# Patient Record
Sex: MALE | Race: BLACK OR AFRICAN AMERICAN | NOT HISPANIC OR LATINO | Employment: OTHER | URBAN - METROPOLITAN AREA
[De-identification: names, ages, dates, MRNs, and addresses within clinical notes are randomized per-mention and may not be internally consistent; named-entity substitution may affect disease eponyms.]

---

## 2019-10-07 ENCOUNTER — APPOINTMENT (EMERGENCY)
Dept: RADIOLOGY | Facility: HOSPITAL | Age: 79
DRG: 690 | End: 2019-10-07
Payer: COMMERCIAL

## 2019-10-07 ENCOUNTER — APPOINTMENT (EMERGENCY)
Dept: CT IMAGING | Facility: HOSPITAL | Age: 79
DRG: 690 | End: 2019-10-07
Payer: COMMERCIAL

## 2019-10-07 ENCOUNTER — HOSPITAL ENCOUNTER (INPATIENT)
Facility: HOSPITAL | Age: 79
LOS: 9 days | Discharge: HOME HEALTH | DRG: 690 | End: 2019-10-16
Attending: EMERGENCY MEDICINE | Admitting: NURSE ANESTHETIST, CERTIFIED REGISTERED
Payer: COMMERCIAL

## 2019-10-07 DIAGNOSIS — R14.0 ABDOMINAL DISTENTION: ICD-10-CM

## 2019-10-07 DIAGNOSIS — R26.2 AMBULATORY DYSFUNCTION: ICD-10-CM

## 2019-10-07 DIAGNOSIS — R06.00 DYSPNEA: Primary | ICD-10-CM

## 2019-10-07 DIAGNOSIS — N17.9 AKI (ACUTE KIDNEY INJURY) (HCC): ICD-10-CM

## 2019-10-07 DIAGNOSIS — R33.9 URINARY RETENTION: ICD-10-CM

## 2019-10-07 DIAGNOSIS — N12 PYELONEPHRITIS: ICD-10-CM

## 2019-10-07 LAB
ALBUMIN SERPL BCP-MCNC: 4 G/DL (ref 3.5–5)
ALP SERPL-CCNC: 98 U/L (ref 46–116)
ALT SERPL W P-5'-P-CCNC: 17 U/L (ref 12–78)
ANION GAP SERPL CALCULATED.3IONS-SCNC: 17 MMOL/L (ref 4–13)
APTT PPP: 38 SECONDS (ref 23–37)
AST SERPL W P-5'-P-CCNC: 27 U/L (ref 5–45)
BACTERIA UR QL AUTO: ABNORMAL /HPF
BASE EX.OXY STD BLDV CALC-SCNC: 83 % (ref 60–80)
BASE EXCESS BLDV CALC-SCNC: -4.8 MMOL/L
BASOPHILS # BLD AUTO: 0.04 THOUSANDS/ΜL (ref 0–0.1)
BASOPHILS NFR BLD AUTO: 0 % (ref 0–1)
BILIRUB SERPL-MCNC: 1 MG/DL (ref 0.2–1)
BILIRUB UR QL STRIP: NEGATIVE
BUN SERPL-MCNC: 70 MG/DL (ref 5–25)
CALCIUM SERPL-MCNC: 9.6 MG/DL (ref 8.3–10.1)
CHLORIDE SERPL-SCNC: 105 MMOL/L (ref 100–108)
CLARITY UR: ABNORMAL
CO2 SERPL-SCNC: 19 MMOL/L (ref 21–32)
COLOR UR: ABNORMAL
CREAT SERPL-MCNC: 4.74 MG/DL (ref 0.6–1.3)
EOSINOPHIL # BLD AUTO: 0.03 THOUSAND/ΜL (ref 0–0.61)
EOSINOPHIL NFR BLD AUTO: 0 % (ref 0–6)
ERYTHROCYTE [DISTWIDTH] IN BLOOD BY AUTOMATED COUNT: 14.3 % (ref 11.6–15.1)
GFR SERPL CREATININE-BSD FRML MDRD: 13 ML/MIN/1.73SQ M
GLUCOSE SERPL-MCNC: 120 MG/DL (ref 65–140)
GLUCOSE UR STRIP-MCNC: NEGATIVE MG/DL
HCO3 BLDV-SCNC: 17.7 MMOL/L (ref 24–30)
HCT VFR BLD AUTO: 45.8 % (ref 36.5–49.3)
HGB BLD-MCNC: 15.1 G/DL (ref 12–17)
HGB UR QL STRIP.AUTO: ABNORMAL
IMM GRANULOCYTES # BLD AUTO: 0.07 THOUSAND/UL (ref 0–0.2)
IMM GRANULOCYTES NFR BLD AUTO: 1 % (ref 0–2)
INR PPP: 1.1 (ref 0.84–1.19)
KETONES UR STRIP-MCNC: NEGATIVE MG/DL
LACTATE SERPL-SCNC: 1.5 MMOL/L (ref 0.5–2)
LEUKOCYTE ESTERASE UR QL STRIP: ABNORMAL
LIPASE SERPL-CCNC: 74 U/L (ref 73–393)
LYMPHOCYTES # BLD AUTO: 1 THOUSANDS/ΜL (ref 0.6–4.47)
LYMPHOCYTES NFR BLD AUTO: 8 % (ref 14–44)
MAGNESIUM SERPL-MCNC: 2.5 MG/DL (ref 1.6–2.6)
MCH RBC QN AUTO: 29.6 PG (ref 26.8–34.3)
MCHC RBC AUTO-ENTMCNC: 33 G/DL (ref 31.4–37.4)
MCV RBC AUTO: 90 FL (ref 82–98)
MONOCYTES # BLD AUTO: 1 THOUSAND/ΜL (ref 0.17–1.22)
MONOCYTES NFR BLD AUTO: 8 % (ref 4–12)
NEUTROPHILS # BLD AUTO: 10.96 THOUSANDS/ΜL (ref 1.85–7.62)
NEUTS SEG NFR BLD AUTO: 83 % (ref 43–75)
NITRITE UR QL STRIP: NEGATIVE
NON-SQ EPI CELLS URNS QL MICRO: ABNORMAL /HPF
NRBC BLD AUTO-RTO: 0 /100 WBCS
NT-PROBNP SERPL-MCNC: 289 PG/ML
O2 CT BLDV-SCNC: 18 ML/DL
PCO2 BLDV: 27.2 MM HG (ref 42–50)
PH BLDV: 7.43 [PH] (ref 7.3–7.4)
PH UR STRIP.AUTO: 8 [PH]
PLATELET # BLD AUTO: 197 THOUSANDS/UL (ref 149–390)
PMV BLD AUTO: 11.3 FL (ref 8.9–12.7)
PO2 BLDV: 49.3 MM HG (ref 35–45)
POTASSIUM SERPL-SCNC: 5.1 MMOL/L (ref 3.5–5.3)
PROT SERPL-MCNC: 8.6 G/DL (ref 6.4–8.2)
PROT UR STRIP-MCNC: ABNORMAL MG/DL
PROTHROMBIN TIME: 14.2 SECONDS (ref 11.6–14.5)
RBC # BLD AUTO: 5.1 MILLION/UL (ref 3.88–5.62)
RBC #/AREA URNS AUTO: ABNORMAL /HPF
SODIUM SERPL-SCNC: 141 MMOL/L (ref 136–145)
SP GR UR STRIP.AUTO: 1.01 (ref 1–1.03)
TROPONIN I SERPL-MCNC: <0.02 NG/ML
UROBILINOGEN UR QL STRIP.AUTO: 0.2 E.U./DL
WBC # BLD AUTO: 13.1 THOUSAND/UL (ref 4.31–10.16)
WBC #/AREA URNS AUTO: ABNORMAL /HPF

## 2019-10-07 PROCEDURE — 84145 PROCALCITONIN (PCT): CPT | Performed by: EMERGENCY MEDICINE

## 2019-10-07 PROCEDURE — 85730 THROMBOPLASTIN TIME PARTIAL: CPT | Performed by: EMERGENCY MEDICINE

## 2019-10-07 PROCEDURE — 82805 BLOOD GASES W/O2 SATURATION: CPT | Performed by: EMERGENCY MEDICINE

## 2019-10-07 PROCEDURE — 81001 URINALYSIS AUTO W/SCOPE: CPT | Performed by: EMERGENCY MEDICINE

## 2019-10-07 PROCEDURE — 99285 EMERGENCY DEPT VISIT HI MDM: CPT

## 2019-10-07 PROCEDURE — 87040 BLOOD CULTURE FOR BACTERIA: CPT | Performed by: EMERGENCY MEDICINE

## 2019-10-07 PROCEDURE — 71250 CT THORAX DX C-: CPT

## 2019-10-07 PROCEDURE — 96375 TX/PRO/DX INJ NEW DRUG ADDON: CPT

## 2019-10-07 PROCEDURE — 83690 ASSAY OF LIPASE: CPT | Performed by: EMERGENCY MEDICINE

## 2019-10-07 PROCEDURE — 36415 COLL VENOUS BLD VENIPUNCTURE: CPT | Performed by: EMERGENCY MEDICINE

## 2019-10-07 PROCEDURE — 93005 ELECTROCARDIOGRAM TRACING: CPT

## 2019-10-07 PROCEDURE — 71046 X-RAY EXAM CHEST 2 VIEWS: CPT

## 2019-10-07 PROCEDURE — 83880 ASSAY OF NATRIURETIC PEPTIDE: CPT | Performed by: EMERGENCY MEDICINE

## 2019-10-07 PROCEDURE — 85025 COMPLETE CBC W/AUTO DIFF WBC: CPT | Performed by: EMERGENCY MEDICINE

## 2019-10-07 PROCEDURE — 83735 ASSAY OF MAGNESIUM: CPT | Performed by: EMERGENCY MEDICINE

## 2019-10-07 PROCEDURE — 96374 THER/PROPH/DIAG INJ IV PUSH: CPT

## 2019-10-07 PROCEDURE — 87086 URINE CULTURE/COLONY COUNT: CPT | Performed by: EMERGENCY MEDICINE

## 2019-10-07 PROCEDURE — 80061 LIPID PANEL: CPT | Performed by: STUDENT IN AN ORGANIZED HEALTH CARE EDUCATION/TRAINING PROGRAM

## 2019-10-07 PROCEDURE — 83605 ASSAY OF LACTIC ACID: CPT | Performed by: EMERGENCY MEDICINE

## 2019-10-07 PROCEDURE — 96361 HYDRATE IV INFUSION ADD-ON: CPT

## 2019-10-07 PROCEDURE — 84484 ASSAY OF TROPONIN QUANT: CPT | Performed by: EMERGENCY MEDICINE

## 2019-10-07 PROCEDURE — 74176 CT ABD & PELVIS W/O CONTRAST: CPT

## 2019-10-07 PROCEDURE — 99284 EMERGENCY DEPT VISIT MOD MDM: CPT | Performed by: EMERGENCY MEDICINE

## 2019-10-07 PROCEDURE — 85610 PROTHROMBIN TIME: CPT | Performed by: EMERGENCY MEDICINE

## 2019-10-07 PROCEDURE — 80053 COMPREHEN METABOLIC PANEL: CPT | Performed by: EMERGENCY MEDICINE

## 2019-10-07 RX ORDER — ONDANSETRON 2 MG/ML
4 INJECTION INTRAMUSCULAR; INTRAVENOUS ONCE
Status: COMPLETED | OUTPATIENT
Start: 2019-10-07 | End: 2019-10-07

## 2019-10-07 RX ORDER — 0.9 % SODIUM CHLORIDE 0.9 %
3 VIAL (ML) INJECTION AS NEEDED
Status: DISCONTINUED | OUTPATIENT
Start: 2019-10-07 | End: 2019-10-08

## 2019-10-07 RX ORDER — MORPHINE SULFATE 4 MG/ML
4 INJECTION, SOLUTION INTRAMUSCULAR; INTRAVENOUS ONCE
Status: COMPLETED | OUTPATIENT
Start: 2019-10-07 | End: 2019-10-07

## 2019-10-07 RX ADMIN — ONDANSETRON 4 MG: 2 INJECTION INTRAMUSCULAR; INTRAVENOUS at 20:49

## 2019-10-07 RX ADMIN — SODIUM CHLORIDE 500 ML: 0.9 INJECTION, SOLUTION INTRAVENOUS at 18:08

## 2019-10-07 RX ADMIN — MORPHINE SULFATE 4 MG: 4 INJECTION INTRAVENOUS at 20:52

## 2019-10-07 NOTE — ED PROVIDER NOTES
History  Chief Complaint   Patient presents with    Shortness of Breath     pt presents to ed with sob that started a few weeks ago and has been getting worse, pt also reports constant sharp testicular pain for the last two days    Testicle Pain     67year old male patient presents emergency department for evaluation shortness of breath with any exertion  The patient is tachypneic and trying to lift his leg on the stretcher  The patient has an exam showing likely congestive heart failure but also has a purulent penile discharge  This is new and worsening over last three days  Patient describes a large amount of pelvic pain as well  Patient's penis and scrotum were visualized and there is no obvious signs of perineal swelling but the patient's exam otherwise with a purulent penile discharge and his pain out of proportion to his exam is concerning for 40 days  Patient had a full septic evaluation done  History provided by:  Patient   used: No    Shortness of Breath   Severity:  Moderate  Onset quality:  Gradual  Timing:  Constant  Progression:  Worsening  Chronicity:  New  Context: not activity, not animal exposure, not smoke exposure and not URI    Relieved by:  Nothing  Worsened by:  Nothing  Ineffective treatments:  None tried  Associated symptoms: cough    Associated symptoms: no diaphoresis, no hemoptysis, no PND, no sputum production and no swollen glands    Risk factors: no hx of cancer, no hx of PE/DVT and no oral contraceptive use    Testicle Pain   Associated symptoms: cough and shortness of breath        None       Past Medical History:   Diagnosis Date    Hypertension        Past Surgical History:   Procedure Laterality Date    COLONOSCOPY      TUMOR REMOVAL         No family history on file  I have reviewed and agree with the history as documented      Social History     Tobacco Use    Smoking status: Never Smoker    Smokeless tobacco: Never Used   Substance Use Topics    Alcohol use: Never     Frequency: Never    Drug use: Never        Review of Systems   Constitutional: Negative for diaphoresis  Respiratory: Positive for cough and shortness of breath  Negative for hemoptysis and sputum production  Cardiovascular: Negative for PND  Genitourinary: Positive for testicular pain  All other systems reviewed and are negative  Physical Exam  Physical Exam   Constitutional: He is oriented to person, place, and time  He appears well-developed and well-nourished  No distress  HENT:   Head: Normocephalic and atraumatic  Right Ear: External ear normal    Left Ear: External ear normal    Eyes: Conjunctivae and EOM are normal  Right eye exhibits no discharge  Left eye exhibits no discharge  No scleral icterus  Neck: Normal range of motion  Neck supple  No JVD present  No tracheal deviation present  No thyromegaly present  Cardiovascular: Normal rate and regular rhythm  Pulmonary/Chest: Effort normal and breath sounds normal  No stridor  No respiratory distress  He has no wheezes  He has no rales  Abdominal: Soft  Bowel sounds are normal  He exhibits no distension  There is no tenderness  Musculoskeletal: Normal range of motion  He exhibits no edema, tenderness or deformity  Neurological: He is alert and oriented to person, place, and time  No cranial nerve deficit  Coordination normal    Skin: Skin is warm and dry  He is not diaphoretic  Psychiatric: He has a normal mood and affect  His behavior is normal    Nursing note and vitals reviewed        Vital Signs  ED Triage Vitals [10/07/19 1624]   Temperature Pulse Respirations Blood Pressure SpO2   98 5 °F (36 9 °C) (!) 107 (!) 35 125/61 98 %      Temp Source Heart Rate Source Patient Position - Orthostatic VS BP Location FiO2 (%)   Oral Monitor Sitting Left arm --      Pain Score       --           Vitals:    10/07/19 1624   BP: 125/61   Pulse: (!) 107   Patient Position - Orthostatic VS: Sitting Visual Acuity      ED Medications  Medications   sodium chloride (PF) 0 9 % injection 3 mL (has no administration in time range)   sodium chloride 0 9 % bolus 500 mL (has no administration in time range)       Diagnostic Studies  Results Reviewed     Procedure Component Value Units Date/Time    Lipase [[id number]]     Lab Status:  No result Specimen:  Blood     Magnesium [[id number]]     Lab Status:  No result Specimen:  Blood     Blood gas, venous [[id number]]     Lab Status:  No result Specimen:  Blood     Protime-INR [[id number]]     Lab Status:  No result Specimen:  Blood     APTT [[id number]]     Lab Status:  No result Specimen:  Blood     Procalcitonin [[id number]]     Lab Status:  No result Specimen:  Blood     Urinalysis with culture and sensitivity reflex [[id number]]     Lab Status:  No result Specimen:  Urine     Troponin I [[id number]]     Lab Status:  No result Specimen:  Blood     Blood culture [[id number]]     Lab Status:  No result Specimen:  Blood     Blood culture [[id number]]     Lab Status:  No result Specimen:  Blood     CBC and differential [[id number]]     Lab Status:  No result Specimen:  Blood     B-type natriuretic peptide [[id number]]     Lab Status:  No result Specimen:  Blood     Comprehensive metabolic panel [[id number]]     Lab Status:  No result Specimen:  Blood     Lactate Blood [[id number]]     Lab Status:  No result Specimen:  Blood     Lactate Blood [[id number]]     Lab Status:  No result Specimen:  Blood                  XR chest 2 views    (Results Pending)              Procedures  Procedures       ED Course                               MDM  Number of Diagnoses or Management Options  Dyspnea: new and requires workup  Urinary retention: new and requires workup     Amount and/or Complexity of Data Reviewed  Clinical lab tests: ordered and reviewed  Tests in the radiology section of CPT®: reviewed and ordered  Decide to obtain previous medical records or to obtain history from someone other than the patient: yes  Review and summarize past medical records: yes    Patient Progress  Patient progress: stable      Disposition  Final diagnoses:   None     ED Disposition     None      Follow-up Information    None         Patient's Medications    No medications on file     No discharge procedures on file      ED Provider  Electronically Signed by           Tamiko Jacob DO  10/09/19 0549

## 2019-10-08 ENCOUNTER — APPOINTMENT (INPATIENT)
Dept: NON INVASIVE DIAGNOSTICS | Facility: HOSPITAL | Age: 79
DRG: 690 | End: 2019-10-08
Payer: COMMERCIAL

## 2019-10-08 PROBLEM — E78.49 OTHER HYPERLIPIDEMIA: Status: ACTIVE | Noted: 2019-10-08

## 2019-10-08 PROBLEM — R06.02 SOB (SHORTNESS OF BREATH): Status: ACTIVE | Noted: 2019-10-08

## 2019-10-08 PROBLEM — N17.9 AKI (ACUTE KIDNEY INJURY) (HCC): Status: ACTIVE | Noted: 2019-10-08

## 2019-10-08 PROBLEM — I10 ESSENTIAL HYPERTENSION: Status: ACTIVE | Noted: 2019-10-08

## 2019-10-08 PROBLEM — R33.9 URINARY RETENTION: Status: ACTIVE | Noted: 2019-10-08

## 2019-10-08 PROBLEM — N12 PYELONEPHRITIS: Status: ACTIVE | Noted: 2019-10-08

## 2019-10-08 LAB
ANION GAP SERPL CALCULATED.3IONS-SCNC: 16 MMOL/L (ref 4–13)
ATRIAL RATE: 108 BPM
BASOPHILS # BLD AUTO: 0.03 THOUSANDS/ΜL (ref 0–0.1)
BASOPHILS # BLD AUTO: 0.05 THOUSANDS/ΜL (ref 0–0.1)
BASOPHILS NFR BLD AUTO: 0 % (ref 0–1)
BASOPHILS NFR BLD AUTO: 0 % (ref 0–1)
BUN SERPL-MCNC: 73 MG/DL (ref 5–25)
CALCIUM SERPL-MCNC: 8.6 MG/DL (ref 8.3–10.1)
CHLORIDE SERPL-SCNC: 109 MMOL/L (ref 100–108)
CHOLEST SERPL-MCNC: 115 MG/DL (ref 50–200)
CHOLEST SERPL-MCNC: 146 MG/DL (ref 50–200)
CO2 SERPL-SCNC: 18 MMOL/L (ref 21–32)
CREAT SERPL-MCNC: 4.89 MG/DL (ref 0.6–1.3)
EOSINOPHIL # BLD AUTO: 0.09 THOUSAND/ΜL (ref 0–0.61)
EOSINOPHIL # BLD AUTO: 0.11 THOUSAND/ΜL (ref 0–0.61)
EOSINOPHIL NFR BLD AUTO: 1 % (ref 0–6)
EOSINOPHIL NFR BLD AUTO: 1 % (ref 0–6)
ERYTHROCYTE [DISTWIDTH] IN BLOOD BY AUTOMATED COUNT: 14.3 % (ref 11.6–15.1)
ERYTHROCYTE [DISTWIDTH] IN BLOOD BY AUTOMATED COUNT: 14.3 % (ref 11.6–15.1)
GFR SERPL CREATININE-BSD FRML MDRD: 12 ML/MIN/1.73SQ M
GLUCOSE SERPL-MCNC: 102 MG/DL (ref 65–140)
HCT VFR BLD AUTO: 38.2 % (ref 36.5–49.3)
HCT VFR BLD AUTO: 41.1 % (ref 36.5–49.3)
HDLC SERPL-MCNC: 39 MG/DL (ref 40–60)
HDLC SERPL-MCNC: 46 MG/DL (ref 40–60)
HGB BLD-MCNC: 12.2 G/DL (ref 12–17)
HGB BLD-MCNC: 13.4 G/DL (ref 12–17)
IMM GRANULOCYTES # BLD AUTO: 0.03 THOUSAND/UL (ref 0–0.2)
IMM GRANULOCYTES # BLD AUTO: 0.04 THOUSAND/UL (ref 0–0.2)
IMM GRANULOCYTES NFR BLD AUTO: 0 % (ref 0–2)
IMM GRANULOCYTES NFR BLD AUTO: 0 % (ref 0–2)
LDLC SERPL CALC-MCNC: 62 MG/DL (ref 0–100)
LDLC SERPL CALC-MCNC: 74 MG/DL (ref 0–100)
LYMPHOCYTES # BLD AUTO: 1.31 THOUSANDS/ΜL (ref 0.6–4.47)
LYMPHOCYTES # BLD AUTO: 1.57 THOUSANDS/ΜL (ref 0.6–4.47)
LYMPHOCYTES NFR BLD AUTO: 11 % (ref 14–44)
LYMPHOCYTES NFR BLD AUTO: 14 % (ref 14–44)
MAGNESIUM SERPL-MCNC: 2.5 MG/DL (ref 1.6–2.6)
MCH RBC QN AUTO: 29.7 PG (ref 26.8–34.3)
MCH RBC QN AUTO: 29.8 PG (ref 26.8–34.3)
MCHC RBC AUTO-ENTMCNC: 31.9 G/DL (ref 31.4–37.4)
MCHC RBC AUTO-ENTMCNC: 32.6 G/DL (ref 31.4–37.4)
MCV RBC AUTO: 91 FL (ref 82–98)
MCV RBC AUTO: 93 FL (ref 82–98)
MONOCYTES # BLD AUTO: 1.05 THOUSAND/ΜL (ref 0.17–1.22)
MONOCYTES # BLD AUTO: 1.15 THOUSAND/ΜL (ref 0.17–1.22)
MONOCYTES NFR BLD AUTO: 11 % (ref 4–12)
MONOCYTES NFR BLD AUTO: 9 % (ref 4–12)
NEUTROPHILS # BLD AUTO: 8.01 THOUSANDS/ΜL (ref 1.85–7.62)
NEUTROPHILS # BLD AUTO: 9 THOUSANDS/ΜL (ref 1.85–7.62)
NEUTS SEG NFR BLD AUTO: 74 % (ref 43–75)
NEUTS SEG NFR BLD AUTO: 79 % (ref 43–75)
NONHDLC SERPL-MCNC: 100 MG/DL
NONHDLC SERPL-MCNC: 76 MG/DL
NRBC BLD AUTO-RTO: 0 /100 WBCS
NRBC BLD AUTO-RTO: 0 /100 WBCS
P AXIS: 65 DEGREES
PLATELET # BLD AUTO: 155 THOUSANDS/UL (ref 149–390)
PLATELET # BLD AUTO: 165 THOUSANDS/UL (ref 149–390)
PMV BLD AUTO: 11.2 FL (ref 8.9–12.7)
PMV BLD AUTO: 11.4 FL (ref 8.9–12.7)
POTASSIUM SERPL-SCNC: 4.3 MMOL/L (ref 3.5–5.3)
PR INTERVAL: 152 MS
PROCALCITONIN SERPL-MCNC: 0.36 NG/ML
QRS AXIS: 71 DEGREES
QRSD INTERVAL: 82 MS
QT INTERVAL: 314 MS
QTC INTERVAL: 420 MS
RBC # BLD AUTO: 4.11 MILLION/UL (ref 3.88–5.62)
RBC # BLD AUTO: 4.5 MILLION/UL (ref 3.88–5.62)
SODIUM SERPL-SCNC: 143 MMOL/L (ref 136–145)
T WAVE AXIS: -46 DEGREES
TRIGL SERPL-MCNC: 131 MG/DL
TRIGL SERPL-MCNC: 72 MG/DL
VENTRICULAR RATE: 108 BPM
WBC # BLD AUTO: 10.9 THOUSAND/UL (ref 4.31–10.16)
WBC # BLD AUTO: 11.54 THOUSAND/UL (ref 4.31–10.16)

## 2019-10-08 PROCEDURE — 85025 COMPLETE CBC W/AUTO DIFF WBC: CPT | Performed by: STUDENT IN AN ORGANIZED HEALTH CARE EDUCATION/TRAINING PROGRAM

## 2019-10-08 PROCEDURE — 80061 LIPID PANEL: CPT | Performed by: PHYSICIAN ASSISTANT

## 2019-10-08 PROCEDURE — 83735 ASSAY OF MAGNESIUM: CPT | Performed by: PHYSICIAN ASSISTANT

## 2019-10-08 PROCEDURE — 93306 TTE W/DOPPLER COMPLETE: CPT | Performed by: INTERNAL MEDICINE

## 2019-10-08 PROCEDURE — 93010 ELECTROCARDIOGRAM REPORT: CPT | Performed by: INTERNAL MEDICINE

## 2019-10-08 PROCEDURE — 99223 1ST HOSP IP/OBS HIGH 75: CPT | Performed by: PHYSICIAN ASSISTANT

## 2019-10-08 PROCEDURE — 99223 1ST HOSP IP/OBS HIGH 75: CPT | Performed by: INTERNAL MEDICINE

## 2019-10-08 PROCEDURE — 51702 INSERT TEMP BLADDER CATH: CPT | Performed by: NURSE PRACTITIONER

## 2019-10-08 PROCEDURE — 99222 1ST HOSP IP/OBS MODERATE 55: CPT | Performed by: UROLOGY

## 2019-10-08 PROCEDURE — 85025 COMPLETE CBC W/AUTO DIFF WBC: CPT | Performed by: PHYSICIAN ASSISTANT

## 2019-10-08 PROCEDURE — 0T9B70Z DRAINAGE OF BLADDER WITH DRAINAGE DEVICE, VIA NATURAL OR ARTIFICIAL OPENING: ICD-10-PCS | Performed by: UROLOGY

## 2019-10-08 PROCEDURE — 80048 BASIC METABOLIC PNL TOTAL CA: CPT | Performed by: PHYSICIAN ASSISTANT

## 2019-10-08 PROCEDURE — 93306 TTE W/DOPPLER COMPLETE: CPT

## 2019-10-08 RX ORDER — DONEPEZIL HYDROCHLORIDE 5 MG/1
5 TABLET, FILM COATED ORAL
COMMUNITY
End: 2020-01-19 | Stop reason: SINTOL

## 2019-10-08 RX ORDER — HYDROMORPHONE HCL/PF 1 MG/ML
0.5 SYRINGE (ML) INJECTION
Status: DISCONTINUED | OUTPATIENT
Start: 2019-10-08 | End: 2019-10-16 | Stop reason: HOSPADM

## 2019-10-08 RX ORDER — CLONIDINE HYDROCHLORIDE 0.1 MG/1
0.1 TABLET ORAL
Status: DISCONTINUED | OUTPATIENT
Start: 2019-10-08 | End: 2019-10-09

## 2019-10-08 RX ORDER — OXYBUTYNIN CHLORIDE 5 MG/1
5 TABLET ORAL 3 TIMES DAILY
Status: DISCONTINUED | OUTPATIENT
Start: 2019-10-08 | End: 2019-10-16 | Stop reason: HOSPADM

## 2019-10-08 RX ORDER — OMEGA-3-ACID ETHYL ESTERS 1 G/1
1 CAPSULE, LIQUID FILLED ORAL DAILY
COMMUNITY

## 2019-10-08 RX ORDER — PRAVASTATIN SODIUM 40 MG
40 TABLET ORAL
Status: DISCONTINUED | OUTPATIENT
Start: 2019-10-08 | End: 2019-10-16 | Stop reason: HOSPADM

## 2019-10-08 RX ORDER — CHLORAL HYDRATE 500 MG
1000 CAPSULE ORAL DAILY
Status: DISCONTINUED | OUTPATIENT
Start: 2019-10-08 | End: 2019-10-09

## 2019-10-08 RX ORDER — ONDANSETRON 2 MG/ML
4 INJECTION INTRAMUSCULAR; INTRAVENOUS EVERY 6 HOURS PRN
Status: DISCONTINUED | OUTPATIENT
Start: 2019-10-08 | End: 2019-10-16 | Stop reason: HOSPADM

## 2019-10-08 RX ORDER — SODIUM CHLORIDE 9 MG/ML
75 INJECTION, SOLUTION INTRAVENOUS CONTINUOUS
Status: DISPENSED | OUTPATIENT
Start: 2019-10-08 | End: 2019-10-08

## 2019-10-08 RX ORDER — ACETAMINOPHEN 325 MG/1
650 TABLET ORAL EVERY 6 HOURS PRN
Status: DISCONTINUED | OUTPATIENT
Start: 2019-10-08 | End: 2019-10-16 | Stop reason: HOSPADM

## 2019-10-08 RX ORDER — HEPARIN SODIUM 5000 [USP'U]/ML
5000 INJECTION, SOLUTION INTRAVENOUS; SUBCUTANEOUS EVERY 8 HOURS SCHEDULED
Status: DISCONTINUED | OUTPATIENT
Start: 2019-10-08 | End: 2019-10-08

## 2019-10-08 RX ORDER — CLONIDINE HYDROCHLORIDE 0.1 MG/1
0.1 TABLET ORAL
COMMUNITY
End: 2019-11-04 | Stop reason: SDUPTHER

## 2019-10-08 RX ORDER — TAMSULOSIN HYDROCHLORIDE 0.4 MG/1
0.4 CAPSULE ORAL
Status: DISCONTINUED | OUTPATIENT
Start: 2019-10-08 | End: 2019-10-16 | Stop reason: HOSPADM

## 2019-10-08 RX ORDER — ATROPA BELLADONNA AND OPIUM 16.2; 3 MG/1; MG/1
30 SUPPOSITORY RECTAL EVERY 8 HOURS PRN
Status: DISCONTINUED | OUTPATIENT
Start: 2019-10-08 | End: 2019-10-16 | Stop reason: HOSPADM

## 2019-10-08 RX ORDER — ALLOPURINOL 300 MG/1
300 TABLET ORAL DAILY
COMMUNITY
End: 2019-12-18 | Stop reason: SDUPTHER

## 2019-10-08 RX ORDER — LABETALOL 200 MG/1
200 TABLET, FILM COATED ORAL
Status: DISCONTINUED | OUTPATIENT
Start: 2019-10-08 | End: 2019-10-09

## 2019-10-08 RX ORDER — DONEPEZIL HYDROCHLORIDE 5 MG/1
5 TABLET, FILM COATED ORAL
Status: DISCONTINUED | OUTPATIENT
Start: 2019-10-08 | End: 2019-10-16 | Stop reason: HOSPADM

## 2019-10-08 RX ORDER — MULTIVIT-MIN/IRON FUM/FOLIC AC 7.5 MG-4
1 TABLET ORAL DAILY
COMMUNITY

## 2019-10-08 RX ORDER — CALCIUM CARBONATE 200(500)MG
1000 TABLET,CHEWABLE ORAL DAILY PRN
Status: DISCONTINUED | OUTPATIENT
Start: 2019-10-08 | End: 2019-10-16 | Stop reason: HOSPADM

## 2019-10-08 RX ORDER — LABETALOL 200 MG/1
200 TABLET, FILM COATED ORAL
COMMUNITY
End: 2019-12-11 | Stop reason: SDUPTHER

## 2019-10-08 RX ORDER — SIMVASTATIN 20 MG
20 TABLET ORAL
COMMUNITY
End: 2020-01-20 | Stop reason: SDUPTHER

## 2019-10-08 RX ORDER — BISACODYL 10 MG
10 SUPPOSITORY, RECTAL RECTAL DAILY PRN
Status: DISCONTINUED | OUTPATIENT
Start: 2019-10-08 | End: 2019-10-16 | Stop reason: HOSPADM

## 2019-10-08 RX ADMIN — CLONIDINE HYDROCHLORIDE 0.1 MG: 0.1 TABLET ORAL at 23:12

## 2019-10-08 RX ADMIN — OXYBUTYNIN CHLORIDE 5 MG: 5 TABLET ORAL at 21:35

## 2019-10-08 RX ADMIN — ATROPA BELLADONNA AND OPIUM 1 SUPPOSITORY: 16.2; 3 SUPPOSITORY RECTAL at 13:41

## 2019-10-08 RX ADMIN — SODIUM CHLORIDE 75 ML/HR: 0.9 INJECTION, SOLUTION INTRAVENOUS at 00:47

## 2019-10-08 RX ADMIN — DONEPEZIL HYDROCHLORIDE 5 MG: 5 TABLET ORAL at 00:47

## 2019-10-08 RX ADMIN — OXYBUTYNIN CHLORIDE 5 MG: 5 TABLET ORAL at 17:27

## 2019-10-08 RX ADMIN — PRAVASTATIN SODIUM 40 MG: 40 TABLET ORAL at 17:27

## 2019-10-08 RX ADMIN — CLONIDINE HYDROCHLORIDE 0.1 MG: 0.1 TABLET ORAL at 00:47

## 2019-10-08 RX ADMIN — LABETALOL HYDROCHLORIDE 200 MG: 100 TABLET, FILM COATED ORAL at 00:47

## 2019-10-08 RX ADMIN — CEFTRIAXONE SODIUM 1000 MG: 10 INJECTION, POWDER, FOR SOLUTION INTRAVENOUS at 02:10

## 2019-10-08 RX ADMIN — HYDROMORPHONE HYDROCHLORIDE 0.5 MG: 1 INJECTION, SOLUTION INTRAMUSCULAR; INTRAVENOUS; SUBCUTANEOUS at 21:09

## 2019-10-08 RX ADMIN — DONEPEZIL HYDROCHLORIDE 5 MG: 5 TABLET ORAL at 23:12

## 2019-10-08 RX ADMIN — LABETALOL HYDROCHLORIDE 200 MG: 100 TABLET, FILM COATED ORAL at 23:13

## 2019-10-08 RX ADMIN — HEPARIN SODIUM 5000 UNITS: 5000 INJECTION INTRAVENOUS; SUBCUTANEOUS at 00:47

## 2019-10-08 RX ADMIN — HYDROMORPHONE HYDROCHLORIDE 0.5 MG: 1 INJECTION, SOLUTION INTRAMUSCULAR; INTRAVENOUS; SUBCUTANEOUS at 12:17

## 2019-10-08 RX ADMIN — Medication 1000 MG: at 08:35

## 2019-10-08 RX ADMIN — TAMSULOSIN HYDROCHLORIDE 0.4 MG: 0.4 CAPSULE ORAL at 17:27

## 2019-10-08 RX ADMIN — HEPARIN SODIUM 5000 UNITS: 5000 INJECTION INTRAVENOUS; SUBCUTANEOUS at 06:42

## 2019-10-08 NOTE — ASSESSMENT & PLAN NOTE
· Reports SOB w/ minimal activity x 1 month  · Unclear etiology  BBSCTA  O2 sat 94-98% on RA  · CXR and CT chest negative for acute pulmonary process  CT chest does reveal cardiomegaly with small anterior pericardial effusion    · Echocardiogram pending

## 2019-10-08 NOTE — PROCEDURES
BEDSIDE PROCEDURE  Indwelling Catheter PROCEDURE NOTE    Pre-operative Diagnosis: BPH, Urinary Retention, gross hematuria        Post-operative Diagnosis: BPH, Urinary Retention, gross hematuria & Diff Catheterization    INDICATION   Lucian Gonzales is a 78-year-old male with BPH, urinary obstruction, bilateral hydronephrosis and gross hematuria after Cruz catheter insertion  Consultation requested to perform three-way Cruz Catheter Placement for initiation of continuous bladder irrigation  TIME OUT: Correct patient identity  PROCEDURE   Consent: Patient was agreeable  Formal  Informed consent however, not applicable  Under sterile conditions the patient was positioned  Betadine solution and sterile drapes were utilized  Non- Petroleum based gel was used to lubricate urethral meatus insertion site  Utilized 22FR three-way Catheter  Urine was obtained without any difficulties  Findings  200 mL of wine colored urine was obtained  Complications:  None; patient tolerated the procedure well  Condition: stable    Plan  Maintain Cruz to continuous bladder irrigation  Do not remove  Continue Q 4 hour manual irrigation using Nallely syringe and 120 ml NSS  Refer to manual irrigation orders  No further  intervention required  Void trial to be determined          TAMRA García        Attending Attestation: Not Applicable

## 2019-10-08 NOTE — H&P
H&P- Ebenezermatt Sanchezr 1940, 67 y o  male MRN: [id number]    Unit/Bed#: -01 Encounter: [id number]    Primary Care Provider: No primary care provider on file  Date and time admitted to hospital: 10/7/2019  4:48 PM        * Urinary retention  Assessment & Plan  · Daughter reports frequent visits to bathroom and c/o inability to "go to the bathroom" x 2-3 days  Initially thought he was referring to his bowels  Began having abdominal and testicular pain on day of admission  · CT abdomen/pelvis completed in ED revealed "Prostatomegaly with prostatic calcifications and moderately distended bladder which demonstrates circumferential bladder wall thickening and perivesicular fat stranding  Findings are consistent with cystitis  Correlate for bladder outlet obstruction  Moderately distended ureters and renal pelvices with with mild periureteral and perinephric stranding  Findings suggest upper urinary tract infection  · UA completed in ED revealed Lg blood, Lg leukocytes and 20-30 WBC  · Creatinine on admission 4 74  Unknown baseline  Recently moved to area with daughter  · Cruz cath placed in ED  · Initiate Flomax daily  · Consult urology    Pyelonephritis  Assessment & Plan  · Per CT and (+) urinary symptoms  · UA results as above  · Urine culture pending  · WBC 13 10  Afebrile  · Rocephin daily    MILE (acute kidney injury) ([facility] [address] )  Assessment & Plan  · Creatinine on admission 4 74  Unknown baseline  · Likely 2/2 post-renal obstruction  · Cruz cath placed  · NS @ 75mL/hr  · Recheck BMP in am  If not improving consult nephrology  · Urology consult placed for bladder outlet obstruction    SOB (shortness of breath)  Assessment & Plan  · Reports SOB w/ minimal activity x 1 month  · Unclear etiology  BBSCTA  O2 sat 94-98% on RA  · CXR and CT chest negative for acute pulmonary process  CT chest does reveal cardiomegaly with small anterior pericardial effusion    · Echocardiogram pending    Essential hypertension  Assessment & Plan  · BP adequately controlled on current regimen  · Continue home dose clonidine and labetalol  · Monitor BP per unit protocol    Other hyperlipidemia  Assessment & Plan  · FLP in am  · Continue home dose simvastatin (equivelant)      VTE Prophylaxis: Heparin  / sequential compression device   Code Status: Level 1 Full Code  POLST: POLST form is not discussed and not completed at this time  Discussion with family: Daughter at bedside    Anticipated Length of Stay:  Patient will be admitted on an Inpatient basis with an anticipated length of stay of  Greater than 2 midnights  Justification for Hospital Stay: See AP above    Total Time for Visit, including Counseling / Coordination of Care: 30 minutes  Greater than 50% of this total time spent on direct patient counseling and coordination of care  Chief Complaint:   SOB x 1 month  Abdominal and testicular pain    History of Present Illness:    Bessie Garcia is a 67 y o  male who presents with SOB on minimal exertion x 1 month  Also with c/o acute onset abdominal and testicular pain prompting visit to ED  Daughter does states that for the past 2-3 days he has made frequent trips to the bathroom and had complaints of inability to go to the bathroom  Daughter states she assumed he was referring to his bowel so she bought him some stool softeners  CT completed in ED revealed bladder distension and bladder wall thickening as well as moderately distended ureters and renal pelvices  Was also noted to have creatinine at 4 74  Unknown baseline creatinine as previous records not available  Denies fevers (+) chills  Denies N/V/D    Review of Systems:    Review of Systems   Constitutional: Positive for chills  Negative for appetite change and fever  HENT: Negative for congestion, ear pain, sinus pressure and sore throat  Eyes: Negative for visual disturbance  Respiratory: Positive for shortness of breath   Negative for cough and wheezing  Cardiovascular: Negative for chest pain, palpitations and leg swelling  Gastrointestinal: Positive for abdominal pain and constipation  Negative for diarrhea, nausea and vomiting  Genitourinary: Positive for difficulty urinating, frequency and testicular pain  Musculoskeletal: Negative for neck pain and neck stiffness  Neurological: Negative for dizziness, syncope and headaches  All other systems reviewed and are negative  Past Medical and Surgical History:     Past Medical History:   Diagnosis Date    Hypertension        Past Surgical History:   Procedure Laterality Date    COLONOSCOPY      TUMOR REMOVAL         Meds/Allergies:    Prior to Admission medications    Medication Sig Start Date End Date Taking? Authorizing Provider   allopurinol (ZYLOPRIM) 300 mg tablet Take 300 mg by mouth daily   Yes Historical Provider, MD   cloNIDine (CATAPRES) 0 1 mg tablet Take 0 1 mg by mouth daily at bedtime   Yes Historical Provider, MD   donepezil (ARICEPT) 5 mg tablet Take 5 mg by mouth daily at bedtime   Yes Historical Provider, MD   labetalol (NORMODYNE) 200 mg tablet Take 200 mg by mouth daily at bedtime   Yes Historical Provider, MD   Multiple Vitamins-Minerals (MULTIVITAMIN WITH MINERALS) tablet Take 1 tablet by mouth daily   Yes Historical Provider, MD   omega-3-acid ethyl esters (LOVAZA) 1 g capsule Take 1 g by mouth daily   Yes Historical Provider, MD   simvastatin (ZOCOR) 20 mg tablet Take 20 mg by mouth daily at bedtime   Yes Historical Provider, MD     I have reviewed home medications with patient family member  Allergies:    Allergies   Allergen Reactions    Strawberry C [Ascorbate] Hives       Social History:     Marital Status:    Patient Pre-hospital Living Situation: Lives with daughter  Patient Pre-hospital Level of Mobility: Ambulatory with cane  Patient Pre-hospital Diet Restrictions: None  Substance Use History:   Social History     Substance and Sexual Activity Alcohol Use Never    Frequency: Never     Social History     Tobacco Use   Smoking Status Passive Smoke Exposure - Never Smoker   Smokeless Tobacco Never Used     Social History     Substance and Sexual Activity   Drug Use Never       Family History:    Family History   Family history unknown: Yes       Physical Exam:     Vitals:   Blood Pressure: 107/65 (10/08/19 0215)  Pulse: 82 (10/08/19 0215)  Temperature: 98 4 °F (36 9 °C) (10/07/19 2347)  Temp Source: Oral (10/07/19 2347)  Respirations: 20 (10/08/19 0020)  Height: 6' (182 9 cm) (10/07/19 1624)  Weight - Scale: 91 4 kg (201 lb 8 oz) (10/07/19 1912)  SpO2: 95 % (10/08/19 0215)    Physical Exam   Constitutional: He appears well-developed and well-nourished  No distress  HENT:   Head: Normocephalic and atraumatic  Eyes: Pupils are equal, round, and reactive to light  EOM are normal    Neck: Normal range of motion  Neck supple  Cardiovascular: Normal rate, regular rhythm, normal heart sounds and intact distal pulses  Exam reveals no gallop and no friction rub  No murmur heard  Pulmonary/Chest: Effort normal and breath sounds normal  No respiratory distress  He has no wheezes  He has no rales  Abdominal: Soft  Bowel sounds are normal  There is no tenderness  There is no rebound and no guarding  Musculoskeletal: Normal range of motion  He exhibits no edema or tenderness  Neurological: He is alert  Skin: Skin is warm and dry  Additional Data:     Lab Results: I have personally reviewed pertinent reports        Results from last 7 days   Lab Units 10/07/19  1718   WBC Thousand/uL 13 10*   HEMOGLOBIN g/dL 15 1   HEMATOCRIT % 45 8   PLATELETS Thousands/uL 197   NEUTROS PCT % 83*   LYMPHS PCT % 8*   MONOS PCT % 8   EOS PCT % 0     Results from last 7 days   Lab Units 10/07/19  1718   SODIUM mmol/L 141   POTASSIUM mmol/L 5 1   CHLORIDE mmol/L 105   CO2 mmol/L 19*   BUN mg/dL 70*   CREATININE mg/dL 4 74*   ANION GAP mmol/L 17*   CALCIUM mg/dL 9 6 ALBUMIN g/dL 4 0   TOTAL BILIRUBIN mg/dL 1 00   ALK PHOS U/L 98   ALT U/L 17   AST U/L 27   GLUCOSE RANDOM mg/dL 120     Results from last 7 days   Lab Units 10/07/19  1718   INR  1 10             Results from last 7 days   Lab Units 10/07/19  1910 10/07/19  1718   LACTIC ACID mmol/L  --  1 5   PROCALCITONIN ng/ml 0 36*  --        Imaging: I have personally reviewed pertinent reports  and I have personally reviewed pertinent films in PACS    CT chest abdomen pelvis wo contrast   Final Result by Chely Alex MD (10/07 2114)   No acute pulmonary disease  Cholelithiasis without cholecystitis  Prostatomegaly with prostatic calcifications and moderately distended bladder which demonstrates circumferential bladder wall thickening and perivesicular fat stranding  Findings are consistent with cystitis  Correlate for bladder outlet obstruction  Moderately distended ureters and renal pelvices with with mild periureteral and perinephric stranding  Findings suggest upper urinary tract infection  Please note that pyelonephritis is difficult to exclude without contrast       Left lower pole renal cyst   No renal calculi  I personally discussed this study with Kaley Frost on 10/7/2019 at 9:14 PM                      Workstation performed: [id number]         XR chest 2 views    (Results Pending)       EKG, Pathology, and Other Studies Reviewed on Admission:   · EKG: NA    Allscripts / Epic Records Reviewed: Yes     ** Please Note: This note has been constructed using a voice recognition system   **

## 2019-10-08 NOTE — ASSESSMENT & PLAN NOTE
· Daughter reports frequent visits to bathroom and c/o inability to "go to the bathroom" x 2-3 days  Initially thought he was referring to his bowels  Began having abdominal and testicular pain on day of admission  · CT abdomen/pelvis completed in ED revealed "Prostatomegaly with prostatic calcifications and moderately distended bladder which demonstrates circumferential bladder wall thickening and perivesicular fat stranding  Findings are consistent with cystitis  Correlate for bladder outlet obstruction  Moderately distended ureters and renal pelvices with with mild periureteral and perinephric stranding  Findings suggest upper urinary tract infection  · UA completed in ED revealed Lg blood, Lg leukocytes and 20-30 WBC  · Creatinine on admission 4 74  Unknown baseline  Recently moved to area with daughter    · Cruz cath placed in ED  · Initiate Flomax daily  · Consult urology

## 2019-10-08 NOTE — UTILIZATION REVIEW
Initial Clinical Review    Admission: Date/Time/Statement: Inpatient Admission Orders (From admission, onward)     Ordered        10/07/19 2224  Inpatient Admission  Once                   Orders Placed This Encounter   Procedures    Inpatient Admission     Standing Status:   Standing     Number of Occurrences:   1     Order Specific Question:   Admitting Physician     Answer:   Skylar Lopez [[id number]]     Order Specific Question:   Level of Care     Answer:   Med Surg [[id number]]     Order Specific Question:   Estimated length of stay     Answer:   More than 2 Midnights     Order Specific Question:   Certification     Answer:   I certify that inpatient services are medically necessary for this patient for a duration of greater than two midnights  See H&P and MD Progress Notes for additional information about the patient's course of treatment  ED Arrival Information     Expected Arrival Acuity Means of Arrival Escorted By Service Admission Type    - 10/7/2019 16:21 Emergent Walk-In Family [facility] General Medicine Emergency    Arrival Complaint    testicular pain, sob        Chief Complaint   Patient presents with    Shortness of Breath     pt presents to ed with sob that started a few weeks ago and has been getting worse, pt also reports constant sharp testicular pain for the last two days    Testicle Pain     Assessment/Plan: 68 yo male to ED from home w/ SOB on minimal exertion for the last month   + acute onset of abd pain and testicular pain   freq trips to BR and inability to go  CT in ED revealed bladder distention and bladder wall thickening and noted to have creat 4 74, w/ unknown baseline     ED Triage Vitals   Temperature Pulse Respirations Blood Pressure SpO2   10/07/19 1624 10/07/19 1624 10/07/19 1624 10/07/19 1624 10/07/19 1624   98 5 °F (36 9 °C) (!) 107 (!) 35 125/61 98 %      Temp Source Heart Rate Source Patient Position - Orthostatic VS BP Location FiO2 (%)   10/07/19 1624 10/07/19 1624 10/07/19 1624 10/07/19 1624 --   Oral Monitor Sitting Left arm       Pain Score       10/07/19 1808       No Pain        Wt Readings from Last 1 Encounters:   10/07/19 91 4 kg (201 lb 8 oz)     Additional Vital Signs:   10/08/19 06:53:17  98 3 °F (36 8 °C)  75  17  121/73  89  95 %       10/08/19 02:15:58    82    107/65  79  95 %       10/08/19 0020    98  20  142/78        Lying   10/07/19 2347  98 4 °F (36 9 °C)  88  20  167/97    95 %  None (Room air)  Sitting   10/07/19 2200    94  20  114/64    94 %       10/07/19 1808    99  20  138/86    96 %  None (Room          Pertinent Labs/Diagnostic Test Results:   10/7 CT chest -   No acute pulmonary disease  Cholelithiasis without cholecystitis  Prostatomegaly with prostatic calcifications and moderately distended bladder which demonstrates circumferential bladder wall thickening and perivesicular fat stranding  Findings are consistent with cystitis  Correlate for bladder outlet obstruction  Moderately distended ureters and renal pelvices with with mild periureteral and perinephric stranding  Findings suggest upper urinary tract infection  Please note that pyelonephritis is difficult to exclude without contrast    Left lower pole renal cyst   No renal calculi        Results from last 7 days   Lab Units 10/08/19  0545 10/07/19  1718   WBC Thousand/uL 10 90* 13 10*   HEMOGLOBIN g/dL 13 4 15 1   HEMATOCRIT % 41 1 45 8   PLATELETS Thousands/uL 165 197   NEUTROS ABS Thousands/µL 8 01* 10 96*     Results from last 7 days   Lab Units 10/08/19  0545 10/07/19  1718   SODIUM mmol/L 143 141   POTASSIUM mmol/L 4 3 5 1   CHLORIDE mmol/L 109* 105   CO2 mmol/L 18* 19*   ANION GAP mmol/L 16* 17*   BUN mg/dL 73* 70*   CREATININE mg/dL 4 89* 4 74*   EGFR ml/min/1 73sq m 12 13   CALCIUM mg/dL 8 6 9 6   MAGNESIUM mg/dL 2 5 2 5     Results from last 7 days   Lab Units 10/07/19  1718   AST U/L 27   ALT U/L 17   ALK PHOS U/L 98   TOTAL PROTEIN g/dL 8 6*   ALBUMIN g/dL 4 0 TOTAL BILIRUBIN mg/dL 1 00     Results from last 7 days   Lab Units 10/08/19  0545 10/07/19  1718   GLUCOSE RANDOM mg/dL 102 120     Results from last 7 days   Lab Units 10/07/19  1718   PH TELMA  7 431*   PCO2 TELMA mm Hg 27 2*   PO2 TELMA mm Hg 49 3*   HCO3 TELMA mmol/L 17 7*   BASE EXC TELMA mmol/L -4 8   O2 CONTENT TELMA ml/dL 18 0   O2 HGB, VENOUS % 83 0*     Results from last 7 days   Lab Units 10/07/19  1718   TROPONIN I ng/mL <0 02     Results from last 7 days   Lab Units 10/07/19  1718   PROTIME seconds 14 2   INR  1 10   PTT seconds 38*     Results from last 7 days   Lab Units 10/07/19  1910   PROCALCITONIN ng/ml 0 36*     Results from last 7 days   Lab Units 10/07/19  1718   LACTIC ACID mmol/L 1 5     Results from last 7 days   Lab Units 10/07/19  1718   NT-PRO BNP pg/mL 289     Results from last 7 days   Lab Units 10/07/19  1718   LIPASE u/L 74     Results from last 7 days   Lab Units 10/07/19  2150   CLARITY UA  Cloudy   COLOR UA  Red   SPEC GRAV UA  1 015   PH UA  8 0   GLUCOSE UA mg/dl Negative   KETONES UA mg/dl Negative   BLOOD UA  Large*   PROTEIN UA mg/dl 100 (2+)*   NITRITE UA  Negative   BILIRUBIN UA  Negative   UROBILINOGEN UA E U /dl 0 2   LEUKOCYTES UA  Large*   WBC UA /hpf 20-30*   RBC UA /hpf Innumerable*   BACTERIA UA /hpf Occasional   EPITHELIAL CELLS WET PREP /hpf Occasional       ED Treatment:   Medication Administration from 10/07/2019 1621 to 10/07/2019 2346       Date/Time Order Dose Route Action     10/07/2019 1808 sodium chloride 0 9 % bolus 500 mL 500 mL Intravenous New Bag     10/07/2019 2052 morphine (PF) 4 mg/mL injection 4 mg 4 mg Intravenous Given     10/07/2019 2049 ondansetron (ZOFRAN) injection 4 mg 4 mg Intravenous Given        Past Medical History:   Diagnosis Date    Hypertension      Present on Admission:   Essential hypertension   Pyelonephritis   Other hyperlipidemia   MILE (acute kidney injury) ([facility] [address] )      Admitting Diagnosis: Urinary retention [R33 9]  Testicular pain [N50 819]  Dyspnea [R06 00]  SOB (shortness of breath) [R06 02]  Age/Sex: 67 y o  male  Admission Orders:    Current Facility-Administered Medications:  acetaminophen 650 mg Oral Q6H PRN     bisacodyl 10 mg Rectal Daily PRN     calcium carbonate 1,000 mg Oral Daily PRN     cefTRIAXone 1,000 mg Intravenous Q24H     cloNIDine 0 1 mg Oral HS     donepezil 5 mg Oral HS     fish oil 1,000 mg Oral Daily     heparin (porcine) 5,000 Units Subcutaneous Q8H [address]     labetalol 200 mg Oral HS     ondansetron 4 mg Intravenous Q6H PRN     pravastatin 40 mg Oral Daily With Dinner     sodium chloride 75 mL/hr Intravenous Continuous     tamsulosin 0 4 mg Oral Daily With Dinner       Reg diet   I&O   Up w/ assist   SCD  IP CONSULT TO UROLOGY  IP CONSULT TO NEPHROLOGY    Network Utilization Review Department  Phone: [phone]; Fax [phone]  [email or web address]  ATTENTION: Please call with any questions or concerns to [phone]  and carefully listen to the prompts so that you are directed to the right person  Send all requests for admission clinical reviews, approved or denied determinations and any other requests to fax [phone]   All voicemails are confidential

## 2019-10-08 NOTE — CONSULTS
NEPHROLOGY CONSULTATION NOTE    Patient: Shwetha Acosta               Sex: male          DOA: 10/7/2019  4:48 PM   YOB: 1940        Age:  67 y o         LOS:  LOS: 1 day     REFERRING PHYSICIAN:  RATNA Connor 105 / CONSULTATION:  Acute kidney injury    DATE OF CONSULTATION / SERVICE: 10/8/2019    ADMISSION DIAGNOSIS: Urinary retention     CHIEF COMPLAINT     Inability to go to the bathroom for few days  HPI     This is a 67years old male with past medical history of hypertension, dyslipidemia who was brought to our facility by his daughter due to inability to empty his bladder completely for the past few days  Patient used to live in [place] and moved in with his daughter about 1 month ago  Patient's daughter states that Mr  Alba Zhou had been complaining of testicular pain for the past few days and had difficulty walking  In the emergency room, a CT scan of the abdomen pelvis without contrast revealed distended bladder along with hydroureter  Urinalysis revealed pyuria suggestive of urinary tract infection  A Cruz catheter was placed in the ER which is draining red color urine  Patient denies having history of large prostate  He or the daughter also denies having any history of underlying CKD  Currently patient denies nausea, vomiting, headache, dizziness, abdominal pain, constipation or rash      PAST MEDICAL HISTORY     Past Medical History:   Diagnosis Date    Hypertension        PAST SURGICAL HISTORY     Past Surgical History:   Procedure Laterality Date    COLONOSCOPY      TUMOR REMOVAL         ALLERGIES     Allergies   Allergen Reactions    Strawberry C [Ascorbate] Hives       SOCIAL HISTORY     Social History     Substance and Sexual Activity   Alcohol Use Never    Frequency: Never     Social History     Substance and Sexual Activity   Drug Use Never     Social History     Tobacco Use   Smoking Status Passive Smoke Exposure - Never Smoker Smokeless Tobacco Never Used       FAMILY HISTORY     Family History   Family history unknown: Yes       CURRENT MEDICATIONS       Current Facility-Administered Medications:     acetaminophen (TYLENOL) tablet 650 mg, 650 mg, Oral, Q6H PRN, Lauren Aguilar PA-C    bisacodyl (DULCOLAX) rectal suppository 10 mg, 10 mg, Rectal, Daily PRN, Lauren Aguilar PA-C    calcium carbonate (TUMS) chewable tablet 1,000 mg, 1,000 mg, Oral, Daily PRN, Tisha Weber PA-C    cefTRIAXone (ROCEPHIN) 1,000 mg in dextrose 5 % 50 mL IVPB, 1,000 mg, Intravenous, Q24H, Mic Weber PA-C, Last Rate: 100 mL/hr at 10/08/19 0210, 1,000 mg at 10/08/19 0210    cloNIDine (CATAPRES) tablet 0 1 mg, 0 1 mg, Oral, HS, Lauren Aguilar PA-C, 0 1 mg at 10/08/19 0047    donepezil (ARICEPT) tablet 5 mg, 5 mg, Oral, HS, Lauren Aguilar PA-C, 5 mg at 10/08/19 0047    fish oil capsule 1,000 mg, 1,000 mg, Oral, Daily, Lauren Aguilar PA-C, 1,000 mg at 10/08/19 0835    heparin (porcine) subcutaneous injection 5,000 Units, 5,000 Units, Subcutaneous, Q8H [facility], 5,000 Units at 10/08/19 6706 **AND** [CANCELED] Platelet count, , , Once, Lauren Aguilar PA-C    labetalol (NORMODYNE) tablet 200 mg, 200 mg, Oral, HS, Lauren Aguilar PA-C, 200 mg at 10/08/19 0047    ondansetron (ZOFRAN) injection 4 mg, 4 mg, Intravenous, Q6H PRN, Lauren Aguilar PA-C    pravastatin (PRAVACHOL) tablet 40 mg, 40 mg, Oral, Daily With Dinner, Lauren Aguilar PA-C    sodium chloride 0 9 % infusion, 75 mL/hr, Intravenous, Continuous, Mic Weber PA-C, Last Rate: 75 mL/hr at 10/08/19 0047, 75 mL/hr at 10/08/19 0047    tamsulosin (FLOMAX) capsule 0 4 mg, 0 4 mg, Oral, Daily With Jb Vergara PA-C    REVIEW OF SYSTEMS     Review of Systems   Constitutional: Negative  HENT: Negative  Eyes: Negative  Respiratory: Negative  Cardiovascular: Negative  Gastrointestinal: Negative  Endocrine: Negative      Genitourinary: Positive for testicular pain  Musculoskeletal: Negative  Skin: Negative  Allergic/Immunologic: Negative  Neurological: Negative  Hematological: Negative  All other systems reviewed and are negative  OBJECTIVE     Current Weight: Weight - Scale: 91 4 kg (201 lb 8 oz)  Vitals:    10/08/19 0653   BP: 121/73   Pulse: 75   Resp: 17   Temp: 98 3 °F (36 8 °C)   SpO2: 95%     Body mass index is 27 33 kg/m²  Intake/Output Summary (Last 24 hours) at 10/8/2019 1049  Last data filed at 10/8/2019 0838  Gross per 24 hour   Intake 780 ml   Output 2550 ml   Net -1770 ml       PHYSICAL EXAMINATION     Physical Exam   Constitutional: He is oriented to person, place, and time  He appears well-developed and well-nourished  HENT:   Head: Normocephalic and atraumatic  Eyes: Pupils are equal, round, and reactive to light  Neck: Neck supple  Cardiovascular: Normal rate, regular rhythm and normal heart sounds  Pulmonary/Chest: Effort normal    Abdominal: Soft  Bowel sounds are normal    Musculoskeletal: Normal range of motion  Neurological: He is alert and oriented to person, place, and time  Skin: Skin is warm  Psychiatric: He has a normal mood and affect  LAB RESULTS        Results from last 7 days   Lab Units 10/08/19  0545 10/07/19  1718   WBC Thousand/uL 10 90* 13 10*   HEMOGLOBIN g/dL 13 4 15 1   HEMATOCRIT % 41 1 45 8   PLATELETS Thousands/uL 165 197   POTASSIUM mmol/L 4 3 5 1   CHLORIDE mmol/L 109* 105   CO2 mmol/L 18* 19*   BUN mg/dL 73* 70*   CREATININE mg/dL 4 89* 4 74*   EGFR ml/min/1 73sq m 12 13   CALCIUM mg/dL 8 6 9 6   MAGNESIUM mg/dL 2 5 2 5         RADIOLOGY RESULTS     Reviewed    PLAN / RECOMMENDATIONS      67years old male with past medical history of hypertension, dyslipidemia who presents to our facility brought by his daughter due to abdominal pain and 3 day history of inability to void appropriately      1  Acute kidney injury present admission:  Etiology likely appears to be bladder outlet obstruction resulting in urinary retention in the bladder and causing hydroureter and hydronephrosis  -presented with a serum creatinine of 4 74 yesterday  -repeat labs performed early this a m  Failed to show any improvement in renal function and serum creatinine is at 4 89   -Cruz catheter is inserted which is draining red color urine   -urology consult pending  -patient is not noted to be any nephrotoxic medications such as NSAIDs, ACE-inhibitor or ARB/diuretics that may have complicated Clint    -unsure if this is acute versus having any chronicity  - Will perform labs daily to ensure any improvement in renal function   -continue with normal saline at the current rate  2  Prostatomegaly:  Noted on CT scan causing urinary tension probable #  1     3  Pyelonephritis:  As suggested by flank pain and with urinalysis positive for UTI  Noted to be on antibiotic  4  Hypertension:  Patient blood pressures are on target with the current regimen  5  Anion gap metabolic acidosis:  Likely secondary to #1  Thank you for the consultation to participate in patient's care  I have personally discussed my plan with the referring physician       Alicia Hurtado MD    10/8/2019

## 2019-10-08 NOTE — ASSESSMENT & PLAN NOTE
· Creatinine on admission 4 74  Unknown baseline     · Likely 2/2 post-renal obstruction  · Cruz cath placed  · NS @ 75mL/hr  · Recheck BMP in am  If not improving consult nephrology  · Urology consult placed for bladder outlet obstruction

## 2019-10-08 NOTE — ASSESSMENT & PLAN NOTE
· Reports SOB w/ minimal activity x 1 month  · Unclear etiology  BBSCTA  O2 sat 94-98% on RA  · CXR and CT chest negative for acute pulmonary process  CT chest does reveal cardiomegaly with small anterior pericardial effusion    · EF 60%, likely secondary to pain

## 2019-10-08 NOTE — ED NOTES
1  CC - Shortness of Breath (pt presents to ed with sob that started a few weeks ago and has been getting worse, pt also reports constant sharp testicular pain for the last two days)  + Testicle Pain  2  Admission related to injury? - n/a  3  Orientation status - alert and oriented x 4   4  Abnormal labs/abnormal focused assessment/vitals - Dx dyspnea, urinary retention  5  Medication/drips - none  6  Last time narcotics given - morphine 2100  7  IV lines/drains/etc - #20g RAC  8  Isolation status - none  9  Skin - none  10  Ambulation - unable to assess  11   ED nurse's phone number - 16 Shazia Lopez RN  10/07/19 3431

## 2019-10-08 NOTE — ASSESSMENT & PLAN NOTE
· Daughter reports frequent visits to bathroom and c/o inability to "go to the bathroom" x 2-3 days  Initially thought he was referring to his bowels  Began having abdominal and testicular pain on day of admission  · CT abdomen/pelvis completed in ED revealed "Prostatomegaly with prostatic calcifications and moderately distended bladder which demonstrates circumferential bladder wall thickening and perivesicular fat stranding  Findings are consistent with cystitis  Correlate for bladder outlet obstruction  Moderately distended ureters and renal pelvices with with mild periureteral and perinephric stranding  Findings suggest upper urinary tract infection  · UA completed in ED revealed Lg blood, Lg leukocytes and 20-30 WBC  · Creatinine on admission 4 74  Unknown baseline  Recently moved to area with daughter    · Initiate Flomax daily  · Urology consulted, soto with gross hematuria and large clots

## 2019-10-08 NOTE — ASSESSMENT & PLAN NOTE
· BP adequately controlled on current regimen  · Continue home dose clonidine and labetalol  · Monitor BP per unit protocol

## 2019-10-08 NOTE — ASSESSMENT & PLAN NOTE
· Per CT and (+) urinary symptoms  · UA results as above  · Urine culture pending  · WBC 13 10   Afebrile  · Rocephin daily

## 2019-10-08 NOTE — PROGRESS NOTES
Progress Note - Speedy Henriquez 1940, 67 y o  male MRN: [id number]    Unit/Bed#: -01 Encounter: [id number]    Primary Care Provider: No primary care provider on file  Date and time admitted to hospital: 10/7/2019  4:48 PM        MILE (acute kidney injury) ([facility] [address] )  Assessment & Plan  · Creatinine on admission 4 74  Unknown baseline  · Likely 2/2 post-renal obstruction  · Cruz cath placed  · NS @ 75mL/hr  · Nephrology consulted  · Cruz placed with large clots and gross hematuria as output    Other hyperlipidemia  Assessment & Plan  · Continue home dose simvastatin (equivelant)    Pyelonephritis  Assessment & Plan  · Per CT and (+) urinary symptoms  · UA results as above  · Urine culture pending  · Rocephin daily    Essential hypertension  Assessment & Plan  · BP adequately controlled on current regimen  · Continue home dose clonidine and labetalol  · Monitor BP per unit protocol    SOB (shortness of breath)  Assessment & Plan  · Reports SOB w/ minimal activity x 1 month  · Unclear etiology  BBSCTA  O2 sat 94-98% on RA  · CXR and CT chest negative for acute pulmonary process  CT chest does reveal cardiomegaly with small anterior pericardial effusion  · EF 60%, likely secondary to pain    * Urinary retention  Assessment & Plan  · Daughter reports frequent visits to bathroom and c/o inability to "go to the bathroom" x 2-3 days  Initially thought he was referring to his bowels  Began having abdominal and testicular pain on day of admission  · CT abdomen/pelvis completed in ED revealed "Prostatomegaly with prostatic calcifications and moderately distended bladder which demonstrates circumferential bladder wall thickening and perivesicular fat stranding  Findings are consistent with cystitis  Correlate for bladder outlet obstruction  Moderately distended ureters and renal pelvices with with mild periureteral and perinephric stranding  Findings suggest upper urinary tract infection    · UA completed in ED revealed Lg blood, Lg leukocytes and 20-30 WBC  · Creatinine on admission 4 74  Unknown baseline  Recently moved to area with daughter  · Initiate Flomax daily  · Urology consulted, soto with gross hematuria and large clots      VTE Pharmacologic Prophylaxis:   Pharmacologic: Heparin  Mechanical VTE Prophylaxis in Place: Yes    Patient Centered Rounds: I have performed bedside rounds with nursing staff today  Discussions with Specialists or Other Care Team Provider: Nephrology, Urology    Education and Discussions with Family / Patient: NA    Time Spent for Care: 30 minutes  More than 50% of total time spent on counseling and coordination of care as described above  Current Length of Stay: 1 day(s)    Current Patient Status: Inpatient   Certification Statement: The patient will continue to require additional inpatient hospital stay due to gross hematuria    Discharge Plan: To be determined    Code Status: Level 1 - Full Code      Subjective:   Patient experiencing pain after soto insertion  Objective:     Vitals:   Temp (24hrs), Av 2 °F (36 8 °C), Min:97 9 °F (36 6 °C), Max:98 4 °F (36 9 °C)    Temp:  [97 9 °F (36 6 °C)-98 4 °F (36 9 °C)] 97 9 °F (36 6 °C)  HR:  [75-98] 82  Resp:  [17-20] 18  BP: (107-167)/(64-97) 122/74  SpO2:  [90 %-95 %] 90 %  Body mass index is 27 33 kg/m²  Input and Output Summary (last 24 hours):        Intake/Output Summary (Last 24 hours) at 10/8/2019 1819  Last data filed at 10/8/2019 1751  Gross per 24 hour   Intake 880 ml   Output 3750 ml   Net -2870 ml       Physical Exam:     Alert, in distress  Regular rate, rhythm  Clear to auscultation bilaterally  Bowel sounds positive   No lower extremity edema  Soto with gross hematuria     Additional Data:     Labs:    Results from last 7 days   Lab Units 10/08/19  0545   WBC Thousand/uL 10 90*   HEMOGLOBIN g/dL 13 4   HEMATOCRIT % 41 1   PLATELETS Thousands/uL 165   NEUTROS PCT % 74   LYMPHS PCT % 14   MONOS PCT % 11 EOS PCT % 1     Results from last 7 days   Lab Units 10/08/19  0545 10/07/19  1718   SODIUM mmol/L 143 141   POTASSIUM mmol/L 4 3 5 1   CHLORIDE mmol/L 109* 105   CO2 mmol/L 18* 19*   BUN mg/dL 73* 70*   CREATININE mg/dL 4 89* 4 74*   ANION GAP mmol/L 16* 17*   CALCIUM mg/dL 8 6 9 6   ALBUMIN g/dL  --  4 0   TOTAL BILIRUBIN mg/dL  --  1 00   ALK PHOS U/L  --  98   ALT U/L  --  17   AST U/L  --  27   GLUCOSE RANDOM mg/dL 102 120     Results from last 7 days   Lab Units 10/07/19  1718   INR  1 10             Results from last 7 days   Lab Units 10/07/19  1910 10/07/19  1718   LACTIC ACID mmol/L  --  1 5   PROCALCITONIN ng/ml 0 36*  --            * I Have Reviewed All Lab Data Listed Above  * Additional Pertinent Lab Tests Reviewed:  Kaylene Dickson Admission Reviewed    Imaging:    Imaging Reports Reviewed Today Include: CT A/P  Imaging Personally Reviewed by Myself Includes:  NA    Recent Cultures (last 7 days):           Last 24 Hours Medication List:     Current Facility-Administered Medications:  acetaminophen 650 mg Oral Q6H PRN Merline Bran, PA-C    belladonna-opium 30 mg Rectal Q8H PRN Chan Silvius, CRNP    bisacodyl 10 mg Rectal Daily PRN Merline Bran, PA-C    calcium carbonate 1,000 mg Oral Daily PRN Merline Bran, PA-C    cefTRIAXone 1,000 mg Intravenous Q24H Merline Bran, PA-C Last Rate: 1,000 mg (10/08/19 0210)   cloNIDine 0 1 mg Oral HS Merline Bran, PA-C    donepezil 5 mg Oral HS Merline Bran, PA-C    fish oil 1,000 mg Oral Daily Merline Bran, PA-C    heparin (porcine) 5,000 Units Subcutaneous [facility] Yanick Lees PA-C    HYDROmorphone 0 5 mg Intravenous Q3H PRN Shyann Contreras MD    labetalol 200 mg Oral HS Merline Bran, PA-C    ondansetron 4 mg Intravenous Q6H PRN Merline Bran, PA-C    oxybutynin 5 mg Oral TID TAMRA Escamilla    pravastatin 40 mg Oral Daily With [address], [place]    tamsulosin 0 4 mg Oral Daily With [address] [facility], [place]         Today, Patient Was Seen By: LUCHO Woodruff      ** Please Note: Dictation voice to text software may have been used in the creation of this document   **

## 2019-10-08 NOTE — PLAN OF CARE
Problem: Potential for Falls  Goal: Patient will remain free of falls  Description  INTERVENTIONS:  - Assess patient frequently for physical needs  -  Identify cognitive and physical deficits and behaviors that affect risk of falls    -  [place] fall precautions as indicated by assessment   - Educate patient/family on patient safety including physical limitations  - Instruct patient to call for assistance with activity based on assessment  - Modify environment to reduce risk of injury  - Consider OT/PT consult to assist with strengthening/mobility  Outcome: Progressing     Problem: Prexisting or High Potential for Compromised Skin Integrity  Goal: Skin integrity is maintained or improved  Description  INTERVENTIONS:  - Identify patients at risk for skin breakdown  - Assess and monitor skin integrity  - Assess and monitor nutrition and hydration status  - Monitor labs   - Assess for incontinence   - Turn and reposition patient  - Assist with mobility/ambulation  - Relieve pressure over bony prominences  - Avoid friction and shearing  - Provide appropriate hygiene as needed including keeping skin clean and dry  - Evaluate need for skin moisturizer/barrier cream  - Collaborate with interdisciplinary team   - Patient/family teaching  - Consider wound care consult   Outcome: Progressing     Problem: PAIN - ADULT  Goal: Verbalizes/displays adequate comfort level or baseline comfort level  Description  Interventions:  - Encourage patient to monitor pain and request assistance  - Assess pain using appropriate pain scale  - Administer analgesics based on type and severity of pain and evaluate response  - Implement non-pharmacological measures as appropriate and evaluate response  - Consider cultural and social influences on pain and pain management  - Notify physician/advanced practitioner if interventions unsuccessful or patient reports new pain  Outcome: Progressing     Problem: SAFETY ADULT  Goal: Patient will remain free of falls  Description  INTERVENTIONS:  - Assess patient frequently for physical needs  -  Identify cognitive and physical deficits and behaviors that affect risk of falls    -  [place] fall precautions as indicated by assessment   - Educate patient/family on patient safety including physical limitations  - Instruct patient to call for assistance with activity based on assessment  - Modify environment to reduce risk of injury  - Consider OT/PT consult to assist with strengthening/mobility  Outcome: Progressing  Goal: Maintain or return to baseline ADL function  Description  INTERVENTIONS:  -  Assess patient's ability to carry out ADLs; assess patient's baseline for ADL function and identify physical deficits which impact ability to perform ADLs (bathing, care of mouth/teeth, toileting, grooming, dressing, etc )  - Assess/evaluate cause of self-care deficits   - Assess range of motion  - Assess patient's mobility; develop plan if impaired  - Assess patient's need for assistive devices and provide as appropriate  - Encourage maximum independence but intervene and supervise when necessary  - Involve family in performance of ADLs  - Assess for home care needs following discharge   - Consider OT consult to assist with ADL evaluation and planning for discharge  - Provide patient education as appropriate  Outcome: Progressing  Goal: Maintain or return mobility status to optimal level  Description  INTERVENTIONS:  - Assess patient's baseline mobility status (ambulation, transfers, stairs, etc )    - Identify cognitive and physical deficits and behaviors that affect mobility  - Identify mobility aids required to assist with transfers and/or ambulation (gait belt, sit-to-stand, lift, walker, cane, etc )  - Blackwell fall precautions as indicated by assessment  - Record patient progress and toleration of activity level on Mobility SBAR; progress patient to next Phase/Stage  - Instruct patient to call for assistance with activity based on assessment  - Consider rehabilitation consult to assist with strengthening/weightbearing, etc   Outcome: Progressing     Problem: DISCHARGE PLANNING  Goal: Discharge to home or other facility with appropriate resources  Description  INTERVENTIONS:  - Identify barriers to discharge w/patient and caregiver  - Arrange for needed discharge resources and transportation as appropriate  - Identify discharge learning needs (meds, wound care, etc )  - Arrange for interpretive services to assist at discharge as needed  - Refer to Case Management Department for coordinating discharge planning if the patient needs post-hospital services based on physician/advanced practitioner order or complex needs related to functional status, cognitive ability, or social support system  Outcome: Progressing     Problem: Knowledge Deficit  Goal: Patient/family/caregiver demonstrates understanding of disease process, treatment plan, medications, and discharge instructions  Description  Complete learning assessment and assess knowledge base    Interventions:  - Provide teaching at level of understanding  - Provide teaching via preferred learning methods  Outcome: Progressing

## 2019-10-08 NOTE — ASSESSMENT & PLAN NOTE
· Creatinine on admission 4 74  Unknown baseline     · Likely 2/2 post-renal obstruction  · Cruz cath placed  · NS @ 75mL/hr  · Nephrology consulted  · Cruz placed with large clots and gross hematuria as output

## 2019-10-08 NOTE — CONSULTS
CONSULT    Patient Name: Teri Snow  Patient MRN: [id number]  Date of Service: 10/8/2019   Date / Time Note Created: 10/8/2019 10:38 AM   Referring Provider: Sarah Stevens MD    Provider Creating Note: TAMRA Glaser    PCP: No primary care provider on file  Attending Provider:  Sarah Stevens MD    Reason for Consult: Hematuria    HPI --,Teri Snow is a 79-year-old male presenting to [facility] [place] emergency room with shortness of breath and several day complaint of abdominal, testicular pain, and stranguria; not accompanied by fever, chills or dysuria  Urinalysis was positive for large amounts of blood leukocytes and WBCs  Patient admitted by Internal Medicine service for antibiosis and management of acute kidney injury with admission creatinine approaching 5  Of note, baseline labs and imaging are unavailable  CT of the abdomen and pelvis were positive for significant prostatomegaly, prostatic calcifications and distended urinary bladder with circumferential bladder wall thickening and perivesical stranding suggestive of cystitis  Cruz catheter was inserted  Gross hematuria present thereafter  Patient is not anticoagulated chronically, but takes fish oils  Urologic consultation requested for further management recommendations  Source:the patient       Active Problems:    Patient Active Problem List   Diagnosis    Urinary retention    SOB (shortness of breath)    Essential hypertension    Pyelonephritis    Other hyperlipidemia    MILE (acute kidney injury) ([facility] [address] )             Impressions  BPH with obstruction  Lower urinary tract  UTI  Cruz trauma  Gross hematuria    Recommendations  Continue medical optimization, symptom management and antibiosis  Exchange current 2 way Cruz to [address] [place] [address] 3 way for bladder irrigation  Connect to [employer]  Continue Q 4 hour manual irrigation in addition to CBI for 24 hours  Hold anticoagulation    Monitor H&H and for signs of hemorrhaging hypotension, intractable suprapubic and flank pain  Narrow antibiotics per sensitivities  Continue Flomax and Proscar  Will follow degree of urinary bleeding and manage catheter  In the interim, hydration, (if not otherwise medically contraindicated), increased mobility and bowel regimen can enhance bladder function in preparation for upcoming void trial   Patient may require Cruz catheter at time of discharge  Further surgical management of BPH, if indicated to be discussed as outpatient            Past Medical History:   Diagnosis Date    Hypertension        Past Surgical History:   Procedure Laterality Date    COLONOSCOPY      TUMOR REMOVAL         Family History   Family history unknown: Yes       Social History     Socioeconomic History    Marital status:      Spouse name: Not on file    Number of children: Not on file    Years of education: Not on file    Highest education level: Not on file   Occupational History    Not on file   Social Needs    Financial resource strain: Not on file    Food insecurity:     Worry: Not on file     Inability: Not on file    Transportation needs:     Medical: Not on file     Non-medical: Not on file   Tobacco Use    Smoking status: Passive Smoke Exposure - Never Smoker    Smokeless tobacco: Never Used   Substance and Sexual Activity    Alcohol use: Never     Frequency: Never    Drug use: Never    Sexual activity: Not on file   Lifestyle    Physical activity:     Days per week: Not on file     Minutes per session: Not on file    Stress: Not on file   Relationships    Social connections:     Talks on phone: Not on file     Gets together: Not on file     Attends [employer] service: Not on file     Active member of club or organization: Not on file     Attends meetings of clubs or organizations: Not on file     Relationship status: Not on file    Intimate partner violence:     Fear of current or ex partner: Not on file     Emotionally abused: Not on file     Physically abused: Not on file     Forced sexual activity: Not on file   Other Topics Concern    Not on file   Social History Narrative    Not on file       Allergies   Allergen Reactions    Strawberry C [Ascorbate] Hives       Review of Systems  Review of Systems - History obtained from chart review and the patient  General ROS: negative for - chills or fever  Respiratory ROS: no cough, shortness of breath, or wheezing  Cardiovascular ROS: positive for - shortness of breath  Gastrointestinal ROS: positive for - abdominal pain  Genito-Urinary ROS: positive for - change in urinary stream, hematuria and urinary frequency/urgency  Neurological ROS: no TIA or stroke symptoms       Chart Review   Allergies, current medications, history, problem list    Vital Signs  /73   Pulse 75   Temp 98 3 °F (36 8 °C)   Resp 17   Ht 6' (1 829 m)   Wt 91 4 kg (201 lb 8 oz)   SpO2 95%   BMI 27 33 kg/m²     Physical Exam  General appearance: alert and oriented, in no acute distress, appears stated age, cooperative and no distress  Head: Normocephalic, without obvious abnormality, atraumatic  Neck: no adenopathy, no carotid bruit, no JVD, supple, symmetrical, trachea midline and thyroid not enlarged, symmetric, no tenderness/mass/nodules  Lungs: clear to auscultation bilaterally  Heart: regular rate and rhythm, S1, S2 normal, no murmur, click, rub or gallop  Abdomen: soft, non-tender; bowel sounds normal; no masses,  no organomegaly  Extremities: extremities normal, warm and well-perfused; no cyanosis, clubbing, or edema  Pulses: 2+ and symmetric  Neurologic: Grossly normal  Cruz patent for very dark/wine colored urine     Laboratory Studies  Lab Results   Component Value Date    K 4 3 10/08/2019     (H) 10/08/2019    CO2 18 (L) 10/08/2019    CREATININE 4 89 (H) 10/08/2019    BUN 73 (H) 10/08/2019    MG 2 5 10/08/2019     Lab Results   Component Value Date    WBC 10 90 (H) 10/08/2019    RBC 4 50 10/08/2019 HGB 13 4 10/08/2019    HCT 41 1 10/08/2019    MCV 91 10/08/2019    MCH 29 8 10/08/2019    RDW 14 3 10/08/2019     10/08/2019       Imaging and Other Studies  )Ct Chest Abdomen Pelvis Wo Contrast    Result Date: 10/7/2019  Narrative: CT CHEST, ABDOMEN AND PELVIS WITHOUT IV CONTRAST INDICATION:   looking for infection  COMPARISON:  Chest x-ray from October 7, 2019  TECHNIQUE: CT examination of the chest, abdomen and pelvis was performed without intravenous contrast   Axial, sagittal, and coronal 2D reformatted images were created from the source data and submitted for interpretation  Radiation dose length product (DLP) for this visit:  655 mGy-cm   This examination, like all CT scans performed in the [facility], was performed utilizing techniques to minimize radiation dose exposure, including the use of iterative reconstruction and automated exposure control  Enteric contrast was not administered  FINDINGS: CHEST LUNGS: Respiratory motion limits optimal assessment for small pulmonary nodules  No pulmonary consolidation  Lungs are clear  There is no tracheal or endobronchial lesion  PLEURA:  Unremarkable  HEART/GREAT VESSELS:  Cardiomegaly  Small anterior pericardial effusion  MEDIASTINUM AND XANDER:  Enlarged right thyroid lobe  No discrete nodules identified  CHEST WALL AND LOWER NECK:   Unremarkable  ABDOMEN LIVER/BILIARY TREE:  Unremarkable  GALLBLADDER:  Cholelithiasis without cholecystitis  SPLEEN:  Unremarkable  PANCREAS:  Unremarkable  ADRENAL GLANDS:  Unremarkable  KIDNEYS/URETERS:  Bilateral perinephric stranding  Left lower pole medially located exophytic cyst   No nephrolithiasis  Moderately dilated ureters bilaterally  STOMACH AND BOWEL:  Small hiatal hernia  Normal small and large bowel loops  APPENDIX:  Appendectomy  ABDOMINOPELVIC CAVITY:  Small amount of free fluid in the deep pelvis  VESSELS:  Unremarkable for patient's age   PELVIS REPRODUCTIVE ORGANS: Prostatomegaly with prostatic calcifications  URINARY BLADDER:  Markedly distended with circumferential wall thickening  Mild stranding of the perivesicular fat  ABDOMINAL WALL/INGUINAL REGIONS:  Unremarkable  OSSEOUS STRUCTURES:  Degenerative changes of the lumbar spine with mild S-shaped scoliosis  Facet arthropathy, disc bulging, disc space narrowing, diskal gas and endplate osteophytic ridging results in multilevel spinal stenosis  In addition, there is 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 with mildly prominent epidural lipomatosis  Impression: No acute pulmonary disease  Cholelithiasis without cholecystitis  Prostatomegaly with prostatic calcifications and moderately distended bladder which demonstrates circumferential bladder wall thickening and perivesicular fat stranding  Findings are consistent with cystitis  Correlate for bladder outlet obstruction  Moderately distended ureters and renal pelvices with with mild periureteral and perinephric stranding  Findings suggest upper urinary tract infection  Please note that pyelonephritis is difficult to exclude without contrast  Left lower pole renal cyst   No renal calculi  I personally discussed this study with Charles Red on 10/7/2019 at 9:14 PM  Workstation performed: [id number]     Xr Chest 2 Views    Result Date: 10/8/2019  Narrative: CHEST INDICATION:   dyspnea on any exertion  COMPARISON:  None EXAM PERFORMED/VIEWS:  XR CHEST PA & LATERAL Images: 2 FINDINGS: Cardiomediastinal silhouette appears enlarged  The pulmonary vessels are normal  The lungs are clear  No pneumothorax or pleural effusion  Osseous structures appear within normal limits for patient age  Impression: No acute cardiopulmonary disease  Cardiomegaly   Workstation performed: [id number]         Medications     Current Facility-Administered Medications:  acetaminophen 650 mg Oral Q6H PRN Amira Benz PA-C    bisacodyl 10 mg Rectal Daily PRN Jimbo Adairs Mony Dorantes PA-C    calcium carbonate 1,000 mg Oral Daily PRN Beba Brown PA-C    cefTRIAXone 1,000 mg Intravenous Q24H [employer] Last Rate: 1,000 mg (10/08/19 0210)   cloNIDine 0 1 mg Oral HS Beba Brown PA-C    donepezil 5 mg Oral HS Beba Brown PA-C    fish oil 1,000 mg Oral Daily Beba Brown PA-C    heparin (porcine) 5,000 Units Subcutaneous [facility] Ritta Chimera Mony Dorantes PA-C    labetalol 200 mg Oral HS Beba Brown PA-C    ondansetron 4 mg Intravenous Q6H PRN Beba Brown PA-C    pravastatin 40 mg Oral Daily With [address], PA-C    sodium chloride 75 mL/hr Intravenous Continuous Beba Brown PA-C Last Rate: 75 mL/hr (10/08/19 0047)   tamsulosin 0 4 mg Oral Daily With [address], PA-C        Total time spent with patient 30 minutes, >50% spent counseling and/or coordination of care           TAMRA Perales

## 2019-10-09 PROBLEM — R31.0 HEMATURIA, GROSS: Status: ACTIVE | Noted: 2019-10-09

## 2019-10-09 LAB
ALBUMIN SERPL BCP-MCNC: 2.6 G/DL (ref 3.5–5)
ANION GAP SERPL CALCULATED.3IONS-SCNC: 16 MMOL/L (ref 4–13)
BASOPHILS # BLD AUTO: 0.03 THOUSANDS/ΜL (ref 0–0.1)
BASOPHILS # BLD AUTO: 0.04 THOUSANDS/ΜL (ref 0–0.1)
BASOPHILS NFR BLD AUTO: 0 % (ref 0–1)
BASOPHILS NFR BLD AUTO: 0 % (ref 0–1)
BUN SERPL-MCNC: 76 MG/DL (ref 5–25)
CALCIUM SERPL-MCNC: 7.9 MG/DL (ref 8.3–10.1)
CHLORIDE SERPL-SCNC: 107 MMOL/L (ref 100–108)
CO2 SERPL-SCNC: 15 MMOL/L (ref 21–32)
CREAT SERPL-MCNC: 4.39 MG/DL (ref 0.6–1.3)
EOSINOPHIL # BLD AUTO: 0.08 THOUSAND/ΜL (ref 0–0.61)
EOSINOPHIL # BLD AUTO: 0.39 THOUSAND/ΜL (ref 0–0.61)
EOSINOPHIL NFR BLD AUTO: 1 % (ref 0–6)
EOSINOPHIL NFR BLD AUTO: 4 % (ref 0–6)
ERYTHROCYTE [DISTWIDTH] IN BLOOD BY AUTOMATED COUNT: 13.8 % (ref 11.6–15.1)
ERYTHROCYTE [DISTWIDTH] IN BLOOD BY AUTOMATED COUNT: 14.2 % (ref 11.6–15.1)
GFR SERPL CREATININE-BSD FRML MDRD: 14 ML/MIN/1.73SQ M
GLUCOSE SERPL-MCNC: 117 MG/DL (ref 65–140)
HCT VFR BLD AUTO: 29.5 % (ref 36.5–49.3)
HCT VFR BLD AUTO: 32.7 % (ref 36.5–49.3)
HGB BLD-MCNC: 10.3 G/DL (ref 12–17)
HGB BLD-MCNC: 9.6 G/DL (ref 12–17)
IMM GRANULOCYTES # BLD AUTO: 0.05 THOUSAND/UL (ref 0–0.2)
IMM GRANULOCYTES # BLD AUTO: 0.07 THOUSAND/UL (ref 0–0.2)
IMM GRANULOCYTES NFR BLD AUTO: 1 % (ref 0–2)
IMM GRANULOCYTES NFR BLD AUTO: 1 % (ref 0–2)
LYMPHOCYTES # BLD AUTO: 1.39 THOUSANDS/ΜL (ref 0.6–4.47)
LYMPHOCYTES # BLD AUTO: 1.46 THOUSANDS/ΜL (ref 0.6–4.47)
LYMPHOCYTES NFR BLD AUTO: 13 % (ref 14–44)
LYMPHOCYTES NFR BLD AUTO: 16 % (ref 14–44)
MCH RBC QN AUTO: 29.4 PG (ref 26.8–34.3)
MCH RBC QN AUTO: 29.4 PG (ref 26.8–34.3)
MCHC RBC AUTO-ENTMCNC: 31.5 G/DL (ref 31.4–37.4)
MCHC RBC AUTO-ENTMCNC: 32.5 G/DL (ref 31.4–37.4)
MCV RBC AUTO: 91 FL (ref 82–98)
MCV RBC AUTO: 93 FL (ref 82–98)
MONOCYTES # BLD AUTO: 0.97 THOUSAND/ΜL (ref 0.17–1.22)
MONOCYTES # BLD AUTO: 1.17 THOUSAND/ΜL (ref 0.17–1.22)
MONOCYTES NFR BLD AUTO: 10 % (ref 4–12)
MONOCYTES NFR BLD AUTO: 11 % (ref 4–12)
NEUTROPHILS # BLD AUTO: 6.06 THOUSANDS/ΜL (ref 1.85–7.62)
NEUTROPHILS # BLD AUTO: 8.77 THOUSANDS/ΜL (ref 1.85–7.62)
NEUTS SEG NFR BLD AUTO: 68 % (ref 43–75)
NEUTS SEG NFR BLD AUTO: 75 % (ref 43–75)
NRBC BLD AUTO-RTO: 0 /100 WBCS
NRBC BLD AUTO-RTO: 0 /100 WBCS
PHOSPHATE SERPL-MCNC: 5.7 MG/DL (ref 2.3–4.1)
PLATELET # BLD AUTO: 146 THOUSANDS/UL (ref 149–390)
PLATELET # BLD AUTO: 148 THOUSANDS/UL (ref 149–390)
PMV BLD AUTO: 11.5 FL (ref 8.9–12.7)
PMV BLD AUTO: 11.6 FL (ref 8.9–12.7)
POTASSIUM SERPL-SCNC: 5.1 MMOL/L (ref 3.5–5.3)
RBC # BLD AUTO: 3.26 MILLION/UL (ref 3.88–5.62)
RBC # BLD AUTO: 3.5 MILLION/UL (ref 3.88–5.62)
SODIUM SERPL-SCNC: 138 MMOL/L (ref 136–145)
WBC # BLD AUTO: 11.59 THOUSAND/UL (ref 4.31–10.16)
WBC # BLD AUTO: 8.89 THOUSAND/UL (ref 4.31–10.16)

## 2019-10-09 PROCEDURE — 99232 SBSQ HOSP IP/OBS MODERATE 35: CPT | Performed by: INTERNAL MEDICINE

## 2019-10-09 PROCEDURE — 85025 COMPLETE CBC W/AUTO DIFF WBC: CPT | Performed by: NURSE PRACTITIONER

## 2019-10-09 PROCEDURE — 80069 RENAL FUNCTION PANEL: CPT | Performed by: NURSE PRACTITIONER

## 2019-10-09 PROCEDURE — 99232 SBSQ HOSP IP/OBS MODERATE 35: CPT | Performed by: STUDENT IN AN ORGANIZED HEALTH CARE EDUCATION/TRAINING PROGRAM

## 2019-10-09 PROCEDURE — 85025 COMPLETE CBC W/AUTO DIFF WBC: CPT | Performed by: STUDENT IN AN ORGANIZED HEALTH CARE EDUCATION/TRAINING PROGRAM

## 2019-10-09 PROCEDURE — 99232 SBSQ HOSP IP/OBS MODERATE 35: CPT | Performed by: UROLOGY

## 2019-10-09 RX ORDER — LABETALOL 100 MG/1
200 TABLET, FILM COATED ORAL
Status: DISCONTINUED | OUTPATIENT
Start: 2019-10-09 | End: 2019-10-16 | Stop reason: HOSPADM

## 2019-10-09 RX ORDER — CLONIDINE HYDROCHLORIDE 0.1 MG/1
0.1 TABLET ORAL
Status: DISCONTINUED | OUTPATIENT
Start: 2019-10-09 | End: 2019-10-16 | Stop reason: HOSPADM

## 2019-10-09 RX ADMIN — ATROPA BELLADONNA AND OPIUM 1 SUPPOSITORY: 16.2; 3 SUPPOSITORY RECTAL at 13:50

## 2019-10-09 RX ADMIN — OXYBUTYNIN CHLORIDE 5 MG: 5 TABLET ORAL at 15:49

## 2019-10-09 RX ADMIN — LABETALOL HCL 200 MG: 200 TABLET, FILM COATED ORAL at 23:09

## 2019-10-09 RX ADMIN — DONEPEZIL HYDROCHLORIDE 5 MG: 5 TABLET ORAL at 23:10

## 2019-10-09 RX ADMIN — Medication 1000 MG: at 09:25

## 2019-10-09 RX ADMIN — PRAVASTATIN SODIUM 40 MG: 40 TABLET ORAL at 15:49

## 2019-10-09 RX ADMIN — TAMSULOSIN HYDROCHLORIDE 0.4 MG: 0.4 CAPSULE ORAL at 15:49

## 2019-10-09 RX ADMIN — OXYBUTYNIN CHLORIDE 5 MG: 5 TABLET ORAL at 09:25

## 2019-10-09 RX ADMIN — OXYBUTYNIN CHLORIDE 5 MG: 5 TABLET ORAL at 23:10

## 2019-10-09 RX ADMIN — CEFTRIAXONE SODIUM 1000 MG: 10 INJECTION, POWDER, FOR SOLUTION INTRAVENOUS at 00:40

## 2019-10-09 RX ADMIN — CLONIDINE HYDROCHLORIDE 0.1 MG: 0.1 TABLET ORAL at 23:10

## 2019-10-09 NOTE — SOCIAL WORK
Kale 60  Not a readmission  Cm met w pt and his daughter and Victorina Coelho  Pt resides in [facility] and [place]  Recently moved in w them from [place]  Pt is alone from 7a to 3p while Jennifer West is at work  2 story home  1 step to enter  1st floor set up  Pt has rw, but does not use  ind w adls  Daughter transports pt to [facility]  No VNA hx  Uses [facility] for RXs  No issues filling RXs  Daughter in process of changing pharmacy to [facility] in [place]  Daughter also in process of changing PCP from [place] to [place]  Currently looking into  geriatric associates in [facility]  Pt has transport home at d c no other reported needs at this time  Cm to follow    CM reviewed discharge planning process including the following: identifying help at home, patient preference for discharge planning needs, pharmacy preference, and availability of treatment team to discuss questions or concerns patient and/or family may have regarding understanding medications and recognizing signs and symptoms once discharged  CM also encouraged patient to follow up with all recommended appointments after discharge  Patient advised of importance for patient and family to participate in managing patients medical well being  CM name and role reviewed  Discharge Checklist reviewed and CM will continue to monitor for progress toward discharge goals in nursing and provider rounds

## 2019-10-09 NOTE — PROGRESS NOTES
Progress Note - Shea Lopez 67 y o  male MRN: [id number]    Unit/Bed#:  Encounter: [id number]      Assessment:  Shea Lopez is a 18-year-old male presenting to [facility] emergency room which shortness of breath and several days complaint of abdominal and testicular pain; not accompanied by fever, chills or dysuria but positive maria guadalupe uria  Urinalysis was positive for blood and leukocytes  He was admitted to Internal Medicine service for empiric antibiotics and management of acute kidney injury with admission creatinine approaching 5  Baseline lab work and imaging were unavailable  Admission CT of the abdomen and pelvis were positive for significant prostatomegaly with prostatic calcifications and over distended urinary bladder, circumferential bladder wall thickening indicative of long-term obstruction and perivesicular stranding suggestive of cystitis  Cruz catheter was inserted and patient subsequently developed gross hematuria requiring exchange of Cruz to three-way Cruz catheter for continuous bladder irrigation yesterday late morning  Patient is afebrile hemodynamically stable  Hemoglobin dropped from 12 2--10  3  Positive leukocytosis of approximately 11 5, increased likely due to  manipulation  Creatinine at noon slightly decreased from 4 8--4 39 today  Three-way Cruz to CBI, secure and patent for red to dark blush urine  CBI running briskly  Plan:  Maintain Cruz catheter to CBI  Increase manual irrigation to every 2 hours and as needed to maintain catheter patency  Monitor creatinine and H&H  Will maintain NPO after midnight in the event of precipitous drop in hemoglobin accompanied by worsening hematuria  Will continue to follow  Subjective:   Unreliable historian  But denies fever, chills, flank, abdominal or suprapubic pain    Objective:     Vitals: Blood pressure 107/58, pulse 87, temperature 98 °F (36 7 °C), temperature source Oral, resp   rate 16, height 6' (1 829 m), weight 90 9 kg (200 lb 6 4 oz), SpO2 96 %  ,Body mass index is 27 18 kg/m²  Intake/Output Summary (Last 24 hours) at 10/9/2019 1114  Last data filed at 10/9/2019 0917  Gross per 24 hour   Intake 730 ml   Output 5745 ml   Net -5015 ml       Physical Exam: General appearance: alert and oriented, in no acute distress, appears stated age, cooperative and no distress  Head: Normocephalic, without obvious abnormality, atraumatic  Neck: no adenopathy, no carotid bruit, no JVD, supple, symmetrical, trachea midline and thyroid not enlarged, symmetric, no tenderness/mass/nodules  Lungs: clear to auscultation bilaterally  Heart: regular rate and rhythm, S1, S2 normal, no murmur, click, rub or gallop  Abdomen: soft, non-tender; bowel sounds normal; no masses,  no organomegaly  Extremities: extremities normal, warm and well-perfused; no cyanosis, clubbing, or edema  Pulses: 2+ and symmetric  Neurologic: Grossly normal  Three-way Cruz--CBI--dark red--to dark blush urine     Invasive Devices     Peripheral Intravenous Line            Peripheral IV 10/07/19 Right;Upper Forearm 1 day          Drain            Continuous Bladder Irrigation Three-way less than 1 day              Lab Results   Component Value Date    WBC 11 59 (H) 10/09/2019    HGB 10 3 (L) 10/09/2019    HCT 32 7 (L) 10/09/2019    MCV 93 10/09/2019     (L) 10/09/2019     Lab Results   Component Value Date    SODIUM 138 10/09/2019    K 5 1 10/09/2019     10/09/2019    CO2 15 (L) 10/09/2019    BUN 76 (H) 10/09/2019    CREATININE 4 39 (H) 10/09/2019    GLUC 117 10/09/2019    CALCIUM 7 9 (L) 10/09/2019       Lab, Imaging and other studies: I have personally reviewed pertinent reports

## 2019-10-09 NOTE — PLAN OF CARE
Problem: Potential for Falls  Goal: Patient will remain free of falls  Description  INTERVENTIONS:  - Assess patient frequently for physical needs  -  Identify cognitive and physical deficits and behaviors that affect risk of falls    -  [place] fall precautions as indicated by assessment   - Educate patient/family on patient safety including physical limitations  - Instruct patient to call for assistance with activity based on assessment  - Modify environment to reduce risk of injury  - Consider OT/PT consult to assist with strengthening/mobility  Outcome: Progressing     Problem: Prexisting or High Potential for Compromised Skin Integrity  Goal: Skin integrity is maintained or improved  Description  INTERVENTIONS:  - Identify patients at risk for skin breakdown  - Assess and monitor skin integrity  - Assess and monitor nutrition and hydration status  - Monitor labs   - Assess for incontinence   - Turn and reposition patient  - Assist with mobility/ambulation  - Relieve pressure over bony prominences  - Avoid friction and shearing  - Provide appropriate hygiene as needed including keeping skin clean and dry  - Evaluate need for skin moisturizer/barrier cream  - Collaborate with interdisciplinary team   - Patient/family teaching  - Consider wound care consult   Outcome: Progressing     Problem: PAIN - ADULT  Goal: Verbalizes/displays adequate comfort level or baseline comfort level  Description  Interventions:  - Encourage patient to monitor pain and request assistance  - Assess pain using appropriate pain scale  - Administer analgesics based on type and severity of pain and evaluate response  - Implement non-pharmacological measures as appropriate and evaluate response  - Consider cultural and social influences on pain and pain management  - Notify physician/advanced practitioner if interventions unsuccessful or patient reports new pain  Outcome: Progressing     Problem: SAFETY ADULT  Goal: Patient will remain free of falls  Description  INTERVENTIONS:  - Assess patient frequently for physical needs  -  Identify cognitive and physical deficits and behaviors that affect risk of falls    -  [place] fall precautions as indicated by assessment   - Educate patient/family on patient safety including physical limitations  - Instruct patient to call for assistance with activity based on assessment  - Modify environment to reduce risk of injury  - Consider OT/PT consult to assist with strengthening/mobility  Outcome: Progressing  Goal: Maintain or return to baseline ADL function  Description  INTERVENTIONS:  -  Assess patient's ability to carry out ADLs; assess patient's baseline for ADL function and identify physical deficits which impact ability to perform ADLs (bathing, care of mouth/teeth, toileting, grooming, dressing, etc )  - Assess/evaluate cause of self-care deficits   - Assess range of motion  - Assess patient's mobility; develop plan if impaired  - Assess patient's need for assistive devices and provide as appropriate  - Encourage maximum independence but intervene and supervise when necessary  - Involve family in performance of ADLs  - Assess for home care needs following discharge   - Consider OT consult to assist with ADL evaluation and planning for discharge  - Provide patient education as appropriate  Outcome: Progressing  Goal: Maintain or return mobility status to optimal level  Description  INTERVENTIONS:  - Assess patient's baseline mobility status (ambulation, transfers, stairs, etc )    - Identify cognitive and physical deficits and behaviors that affect mobility  - Identify mobility aids required to assist with transfers and/or ambulation (gait belt, sit-to-stand, lift, walker, cane, etc )  - [place] fall precautions as indicated by assessment  - Record patient progress and toleration of activity level on Mobility SBAR; progress patient to next Phase/Stage  - Instruct patient to call for assistance with activity based on assessment  - Consider rehabilitation consult to assist with strengthening/weightbearing, etc   Outcome: Progressing     Problem: DISCHARGE PLANNING  Goal: Discharge to home or other facility with appropriate resources  Description  INTERVENTIONS:  - Identify barriers to discharge w/patient and caregiver  - Arrange for needed discharge resources and transportation as appropriate  - Identify discharge learning needs (meds, wound care, etc )  - Arrange for interpretive services to assist at discharge as needed  - Refer to Case Management Department for coordinating discharge planning if the patient needs post-hospital services based on physician/advanced practitioner order or complex needs related to functional status, cognitive ability, or social support system  Outcome: Progressing     Problem: Knowledge Deficit  Goal: Patient/family/caregiver demonstrates understanding of disease process, treatment plan, medications, and discharge instructions  Description  Complete learning assessment and assess knowledge base    Interventions:  - Provide teaching at level of understanding  - Provide teaching via preferred learning methods  Outcome: Progressing     Problem: GENITOURINARY - ADULT  Goal: Maintains or returns to baseline urinary function  Description  INTERVENTIONS:  - Assess urinary function  - Encourage oral fluids to ensure adequate hydration if ordered  - Administer IV fluids as ordered to ensure adequate hydration  - Administer ordered medications as needed  - Offer frequent toileting  - Follow urinary retention protocol if ordered  Outcome: Progressing  Goal: Absence of urinary retention  Description  INTERVENTIONS:  - Assess patients ability to void and empty bladder  - Monitor I/O  - Bladder scan as needed  - Discuss with physician/AP medications to alleviate retention as needed  - Discuss catheterization for long term situations as appropriate  Outcome: Progressing  Goal: Urinary catheter remains patent  Description  INTERVENTIONS:  - Assess patency of urinary catheter  - If patient has a chronic soto, consider changing catheter if non-functioning  - Follow guidelines for intermittent irrigation of non-functioning urinary catheter  Outcome: Progressing

## 2019-10-09 NOTE — PROGRESS NOTES
Progress Note - Fransisco Solorzano 1940, 67 y o  male MRN: [id number]    Unit/Bed#:  Encounter: [id number]    Primary Care Provider: No primary care provider on file  Date and time admitted to hospital: 10/7/2019  4:48 PM        Hematuria, gross  Assessment & Plan  Gross hematuria from soto output  Hb drop from 15 -> 10 during admission  Will keep NPO in case of further urological intervention  Continue to monitor Hb, transfuse if <7    MILE (acute kidney injury) ([facility] [address] )  Assessment & Plan  Cr 4 39 today, improved from 4 89  No prior baseline to compare to, likely new baseline  Suspected to be secondary to post renal obstruction from enlarged prostate  CT A/P - Prostatomegaly  Urology following    Other hyperlipidemia  Assessment & Plan  Continue pravastatin     Pyelonephritis  Assessment & Plan  UA with + Leuk esterase but negative nitrates , urine culture still processing  Blood cultures negative, afebrile and hemodynamically stable  On ceftriaxone- Day 3    Essential hypertension  Assessment & Plan  BP relatively well controlled  Continue with labetalol and clonidine     SOB (shortness of breath)  Assessment & Plan  Likely secondary to pain, now resolved  No episodes of hypoxia, no history of CHF  CXR clear     * Urinary retention  Assessment & Plan  Now resolved with soto insertion  Still draining gross hematuria with clots  Continue with soto, follow up urology recommendations        VTE Pharmacologic Prophylaxis:   Pharmacologic: Held due to gross hematuria  Mechanical VTE Prophylaxis in Place: Yes    Patient Centered Rounds: I have performed bedside rounds with nursing staff today  Discussions with Specialists or Other Care Team Provider: Urology    Education and Discussions with Family / Patient: Hydronephrosis     Time Spent for Care: 30 minutes  More than 50% of total time spent on counseling and coordination of care as described above      Current Length of Stay: 2 day(s)    Current Patient Status: Inpatient   Certification Statement: The patient will continue to require additional inpatient hospital stay due to hydronephrosis, gross hematuria     Discharge Plan: 48+ hours    Code Status: Level 1 - Full Code      Subjective:   Patient states pain is much better today  No complaints  Objective:     Vitals:   Temp (24hrs), Av °F (36 7 °C), Min:98 °F (36 7 °C), Max:98 °F (36 7 °C)    Temp:  [98 °F (36 7 °C)] 98 °F (36 7 °C)  HR:  [] 82  Resp:  [16-18] 18  BP: (107-160)/(58-88) 133/67  SpO2:  [93 %-97 %] 97 %  Body mass index is 27 18 kg/m²  Input and Output Summary (last 24 hours): Intake/Output Summary (Last 24 hours) at 10/9/2019 1715  Last data filed at 10/9/2019 1612  Gross per 24 hour   Intake 630 ml   Output 6370 ml   Net -5740 ml       Physical Exam:     No acute distress resting comfortably  Regular rate rhythm  Clear to Auscultation bilaterally  Bowel sounds positive, nontender and nondistended  Cruz intact with gross hematuria  No lower extremity edema    Additional Data:     Labs:    Results from last 7 days   Lab Units 10/09/19  0608   WBC Thousand/uL 11 59*   HEMOGLOBIN g/dL 10 3*   HEMATOCRIT % 32 7*   PLATELETS Thousands/uL 148*   NEUTROS PCT % 75   LYMPHS PCT % 13*   MONOS PCT % 10   EOS PCT % 1     Results from last 7 days   Lab Units 10/09/19  0608  10/07/19  1718   SODIUM mmol/L 138   < > 141   POTASSIUM mmol/L 5 1   < > 5 1   CHLORIDE mmol/L 107   < > 105   CO2 mmol/L 15*   < > 19*   BUN mg/dL 76*   < > 70*   CREATININE mg/dL 4 39*   < > 4 74*   ANION GAP mmol/L 16*   < > 17*   CALCIUM mg/dL 7 9*   < > 9 6   ALBUMIN g/dL 2 6*  --  4 0   TOTAL BILIRUBIN mg/dL  --   --  1 00   ALK PHOS U/L  --   --  98   ALT U/L  --   --  17   AST U/L  --   --  27   GLUCOSE RANDOM mg/dL 117   < > 120    < > = values in this interval not displayed       Results from last 7 days   Lab Units 10/07/19  1718   INR  1 10             Results from last 7 days   Lab Units 10/07/19  1910 10/07/19  1718   LACTIC ACID mmol/L  --  1 5   PROCALCITONIN ng/ml 0 36*  --            * I Have Reviewed All Lab Data Listed Above  * Additional Pertinent Lab Tests Reviewed: Kaylene 67 Admission Reviewed    Imaging:    Imaging Reports Reviewed Today Include: Ct A/p  Imaging Personally Reviewed by Myself Includes:  NA    Recent Cultures (last 7 days):     Results from last 7 days   Lab Units 10/07/19  2150 10/07/19  1910 10/07/19  1718   BLOOD CULTURE   --  No Growth at 24 hrs  No Growth at 24 hrs  URINE CULTURE  Culture too young- will reincubate  --   --        Last 24 Hours Medication List:     Current Facility-Administered Medications:  acetaminophen 650 mg Oral Q6H PRN Darral Lars, CRNP    belladonna-opium 30 mg Rectal Q8H PRN Darral Lars, CRNP    bisacodyl 10 mg Rectal Daily PRN Darral Lars, CRNP    calcium carbonate 1,000 mg Oral Daily PRN Darral Lars, CRNP    cefTRIAXone 1,000 mg Intravenous Q24H Darral Lars, CRNP Last Rate: Stopped (10/09/19 0101)   cloNIDine 0 1 mg Oral HS Steff Solis MD    donepezil 5 mg Oral HS Darral Dev, CRNP    HYDROmorphone 0 5 mg Intravenous Q3H PRN Darral Lars, CRNP    labetalol 200 mg Oral HS Steff Solis MD    ondansetron 4 mg Intravenous Q6H PRN Darral Lars, CRNP    oxybutynin 5 mg Oral TID Darral Dev, TAMRA    pravastatin 40 mg Oral Daily With Jelena, TAMRA    tamsulosin 0 4 mg Oral Daily With TAMRA Bowles         Today, Patient Was Seen By: LUCHO Crews      ** Please Note: Dictation voice to text software may have been used in the creation of this document   **

## 2019-10-09 NOTE — NURSING NOTE
2230- Pts CBI clotting and stopping flow frequently, fast flow ordered, paged and spoke to on call urology about how frequent flushes can be, new orders to flush as needed to keep CBI flowing  Paged Slim to make aware, new orders to transfer pt placed      [id number]- Called and spoke to [place] receiving RN to give report    [phone]- Pt transferred

## 2019-10-09 NOTE — ASSESSMENT & PLAN NOTE
Gross hematuria from soto output  Hb drop from 15 -> 10 during admission  Will keep NPO in case of further urological intervention  Continue to monitor Hb, transfuse if <7

## 2019-10-09 NOTE — ASSESSMENT & PLAN NOTE
UA with + Leuk esterase but negative nitrates , urine culture still processing  Blood cultures negative, afebrile and hemodynamically stable  On ceftriaxone- Day 3

## 2019-10-09 NOTE — PLAN OF CARE
Problem: Potential for Falls  Goal: Patient will remain free of falls  Description  INTERVENTIONS:  - Assess patient frequently for physical needs  -  Identify cognitive and physical deficits and behaviors that affect risk of falls    -  [place] fall precautions as indicated by assessment   - Educate patient/family on patient safety including physical limitations  - Instruct patient to call for assistance with activity based on assessment  - Modify environment to reduce risk of injury  - Consider OT/PT consult to assist with strengthening/mobility  Outcome: Progressing     Problem: Prexisting or High Potential for Compromised Skin Integrity  Goal: Skin integrity is maintained or improved  Description  INTERVENTIONS:  - Identify patients at risk for skin breakdown  - Assess and monitor skin integrity  - Assess and monitor nutrition and hydration status  - Monitor labs   - Assess for incontinence   - Turn and reposition patient  - Assist with mobility/ambulation  - Relieve pressure over bony prominences  - Avoid friction and shearing  - Provide appropriate hygiene as needed including keeping skin clean and dry  - Evaluate need for skin moisturizer/barrier cream  - Collaborate with interdisciplinary team   - Patient/family teaching  - Consider wound care consult   Outcome: Progressing     Problem: PAIN - ADULT  Goal: Verbalizes/displays adequate comfort level or baseline comfort level  Description  Interventions:  - Encourage patient to monitor pain and request assistance  - Assess pain using appropriate pain scale  - Administer analgesics based on type and severity of pain and evaluate response  - Implement non-pharmacological measures as appropriate and evaluate response  - Consider cultural and social influences on pain and pain management  - Notify physician/advanced practitioner if interventions unsuccessful or patient reports new pain  Outcome: Progressing     Problem: SAFETY ADULT  Goal: Patient will remain free of falls  Description  INTERVENTIONS:  - Assess patient frequently for physical needs  -  Identify cognitive and physical deficits and behaviors that affect risk of falls    -  [place] fall precautions as indicated by assessment   - Educate patient/family on patient safety including physical limitations  - Instruct patient to call for assistance with activity based on assessment  - Modify environment to reduce risk of injury  - Consider OT/PT consult to assist with strengthening/mobility  Outcome: Progressing  Goal: Maintain or return to baseline ADL function  Description  INTERVENTIONS:  -  Assess patient's ability to carry out ADLs; assess patient's baseline for ADL function and identify physical deficits which impact ability to perform ADLs (bathing, care of mouth/teeth, toileting, grooming, dressing, etc )  - Assess/evaluate cause of self-care deficits   - Assess range of motion  - Assess patient's mobility; develop plan if impaired  - Assess patient's need for assistive devices and provide as appropriate  - Encourage maximum independence but intervene and supervise when necessary  - Involve family in performance of ADLs  - Assess for home care needs following discharge   - Consider OT consult to assist with ADL evaluation and planning for discharge  - Provide patient education as appropriate  Outcome: Progressing  Goal: Maintain or return mobility status to optimal level  Description  INTERVENTIONS:  - Assess patient's baseline mobility status (ambulation, transfers, stairs, etc )    - Identify cognitive and physical deficits and behaviors that affect mobility  - Identify mobility aids required to assist with transfers and/or ambulation (gait belt, sit-to-stand, lift, walker, cane, etc )  - [place] fall precautions as indicated by assessment  - Record patient progress and toleration of activity level on Mobility SBAR; progress patient to next Phase/Stage  - Instruct patient to call for assistance with activity based on assessment  - Consider rehabilitation consult to assist with strengthening/weightbearing, etc   Outcome: Progressing     Problem: DISCHARGE PLANNING  Goal: Discharge to home or other facility with appropriate resources  Description  INTERVENTIONS:  - Identify barriers to discharge w/patient and caregiver  - Arrange for needed discharge resources and transportation as appropriate  - Identify discharge learning needs (meds, wound care, etc )  - Arrange for interpretive services to assist at discharge as needed  - Refer to Case Management Department for coordinating discharge planning if the patient needs post-hospital services based on physician/advanced practitioner order or complex needs related to functional status, cognitive ability, or social support system  Outcome: Progressing     Problem: Knowledge Deficit  Goal: Patient/family/caregiver demonstrates understanding of disease process, treatment plan, medications, and discharge instructions  Description  Complete learning assessment and assess knowledge base    Interventions:  - Provide teaching at level of understanding  - Provide teaching via preferred learning methods  Outcome: Progressing

## 2019-10-09 NOTE — ASSESSMENT & PLAN NOTE
Now resolved with soto insertion  Still draining gross hematuria with clots  Continue with soto, follow up urology recommendations

## 2019-10-09 NOTE — ASSESSMENT & PLAN NOTE
Cr 4 39 today, improved from 4 89  No prior baseline to compare to, likely new baseline  Suspected to be secondary to post renal obstruction from enlarged prostate  CT A/P - Prostatomegaly  Urology following

## 2019-10-09 NOTE — PROGRESS NOTES
NEPHROLOGY PROGRESS NOTE    Patient: Roxana Smith               Sex: male          DOA: 10/7/2019  4:48 PM   YOB: 1940        Age:  67 y o         LOS:  LOS: 2 days   10/9/2019    REASON FOR THE CONSULTATION:      Acute kidney injury    SUBJECTIVE     Patient is seen and examined in the intensive care unit  Transferred to the ICU due to patient undergoing continuous bladder irrigation      CURRENT MEDICATIONS       Current Facility-Administered Medications:     acetaminophen (TYLENOL) tablet 650 mg, 650 mg, Oral, Q6H PRN, TAMRA Allen    belladonna-opium (B&O SUPPOSITORY) 16 2-30 mg suppository 1 suppository, 30 mg, Rectal, Q8H PRN, TAMRA Allen, 1 suppository at 10/08/19 1341    bisacodyl (DULCOLAX) rectal suppository 10 mg, 10 mg, Rectal, Daily PRN, TAMRA Allen    calcium carbonate (TUMS) chewable tablet 1,000 mg, 1,000 mg, Oral, Daily PRN, TAMRA Allen    cefTRIAXone (ROCEPHIN) 1,000 mg in dextrose 5 % 50 mL IVPB, 1,000 mg, Intravenous, Q24H, TAMRA Allen, Stopped at 10/09/19 0101    cloNIDine (CATAPRES) tablet 0 1 mg, 0 1 mg, Oral, HS, TAMRA Allen, 0 1 mg at 10/08/19 2312    donepezil (ARICEPT) tablet 5 mg, 5 mg, Oral, HS, TAMRA Allen, 5 mg at 10/08/19 2312    HYDROmorphone (DILAUDID) injection 0 5 mg, 0 5 mg, Intravenous, Q3H PRN, TAMRA Allen, 0 5 mg at 10/08/19 2109    labetalol (NORMODYNE) tablet 200 mg, 200 mg, Oral, HS, TAMRA Allen, 200 mg at 10/08/19 2313    ondansetron (ZOFRAN) injection 4 mg, 4 mg, Intravenous, Q6H PRN, TAMRA Allen    oxybutynin (DITROPAN) tablet 5 mg, 5 mg, Oral, TID, TAMRA Allen, 5 mg at 10/09/19 0925    pravastatin (PRAVACHOL) tablet 40 mg, 40 mg, Oral, Daily With TAMRA Michael, 40 mg at 10/08/19 1727    tamsulosin (FLOMAX) capsule 0 4 mg, 0 4 mg, Oral, Daily With TAMRA Michael, 0 4 mg at 10/08/19 235 [facility]    REVIEW OF SYSTEMS     Review of Systems   Constitutional: Negative  HENT: Negative  Eyes: Negative  Respiratory: Negative  Cardiovascular: Negative  Gastrointestinal: Negative  Endocrine: Negative  Genitourinary: Negative  Musculoskeletal: Negative  Skin: Negative  Allergic/Immunologic: Negative  Neurological: Negative  Hematological: Negative  Psychiatric/Behavioral: Positive for confusion  All other systems reviewed and are negative  OBJECTIVE     Current Weight: Weight - Scale: 90 9 kg (200 lb 6 4 oz)  Vitals:    10/09/19 0900   BP: 107/58   Pulse: 87   Resp: 16   Temp: 98 °F (36 7 °C)   SpO2: 96%     Body mass index is 27 18 kg/m²  Intake/Output Summary (Last 24 hours) at 10/9/2019 1320  Last data filed at 10/9/2019 0917  Gross per 24 hour   Intake 630 ml   Output 6745 ml   Net -6115 ml       PHYSICAL EXAMINATION     Physical Exam   Constitutional: He appears well-developed and well-nourished  HENT:   Head: Normocephalic and atraumatic  Eyes: Pupils are equal, round, and reactive to light  Neck: Neck supple  Cardiovascular: Normal rate and regular rhythm  Murmur heard  Pulmonary/Chest: Effort normal    Abdominal: Soft  Bowel sounds are normal    Musculoskeletal: Normal range of motion  Neurological: He is alert  Skin: Skin is warm     Psychiatric:   Thought content abnormal         LAB RESULTS     Results from last 7 days   Lab Units 10/09/19  0608 10/08/19  1915 10/08/19  0545 10/07/19  1718   WBC Thousand/uL 11 59* 11 54* 10 90* 13 10*   HEMOGLOBIN g/dL 10 3* 12 2 13 4 15 1   HEMATOCRIT % 32 7* 38 2 41 1 45 8   PLATELETS Thousands/uL 148* 155 165 197   POTASSIUM mmol/L 5 1  --  4 3 5 1   CHLORIDE mmol/L 107  --  109* 105   CO2 mmol/L 15*  --  18* 19*   BUN mg/dL 76*  --  73* 70*   CREATININE mg/dL 4 39*  --  4 89* 4 74*   EGFR ml/min/1 73sq m 14  --  12 13   CALCIUM mg/dL 7 9*  --  8 6 9 6   MAGNESIUM mg/dL  --   --  2 5 2 5   PHOSPHORUS mg/dL 5 7*  --   --   --            RADIOLOGY RESULTS      Reviewed      ASSESSMENT/PLAN     This is a 52-year-old male with past medical history of hypertension, dyslipidemia, probable dementia who is brought to our facility by his daughter due to frequency in urination and found to have severe Clint I     1  Acute kidney injury:  Etiology likely appears to be 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   -chronicity of obstruction unknown     -baseline kidney function unknown   -presented with a serum creatinine 4 7 with imaging revealing bilateral hydroureter and hydronephrosis  - he was started on continues bladder irrigation and hence urine output cannot be documented appropriately   -noted improvement in renal function with serum creatinine down to 4 3     2  Acidosis:  Noted to have anion gap metabolic acidosis secondary to 1  Will start sodium bicarbonate tablets  3  Hyperkalemia:  Serum potassium is 5 1 secondary to renal failure as well  4  UTI:  Urinalysis had revealed possibility of cystitis  Pus was noted in the Cruz bag as well  Urine culture remains negative to date  He is noted to be on antibiotics  5  Hypertension:  Patient noted to be on labetalol and clonidine  There is systolic blood pressure holding parameters was raised to 130  Blood pressure is within acceptable range            Татьяна Abarca MD  Nephrology  10/9/2019

## 2019-10-10 PROBLEM — R06.02 SOB (SHORTNESS OF BREATH): Status: RESOLVED | Noted: 2019-10-08 | Resolved: 2019-10-10

## 2019-10-10 PROBLEM — R33.9 URINARY RETENTION: Status: RESOLVED | Noted: 2019-10-08 | Resolved: 2019-10-10

## 2019-10-10 LAB
ANION GAP SERPL CALCULATED.3IONS-SCNC: 13 MMOL/L (ref 4–13)
BACTERIA UR CULT: ABNORMAL
BASOPHILS # BLD AUTO: 0.04 THOUSANDS/ΜL (ref 0–0.1)
BASOPHILS NFR BLD AUTO: 1 % (ref 0–1)
BUN SERPL-MCNC: 69 MG/DL (ref 5–25)
CALCIUM SERPL-MCNC: 8 MG/DL (ref 8.3–10.1)
CHLORIDE SERPL-SCNC: 107 MMOL/L (ref 100–108)
CO2 SERPL-SCNC: 18 MMOL/L (ref 21–32)
CREAT SERPL-MCNC: 3.62 MG/DL (ref 0.6–1.3)
EOSINOPHIL # BLD AUTO: 0.38 THOUSAND/ΜL (ref 0–0.61)
EOSINOPHIL NFR BLD AUTO: 5 % (ref 0–6)
ERYTHROCYTE [DISTWIDTH] IN BLOOD BY AUTOMATED COUNT: 13.8 % (ref 11.6–15.1)
GFR SERPL CREATININE-BSD FRML MDRD: 18 ML/MIN/1.73SQ M
GLUCOSE SERPL-MCNC: 148 MG/DL (ref 65–140)
GLUCOSE SERPL-MCNC: 155 MG/DL (ref 65–140)
HCT VFR BLD AUTO: 31.3 % (ref 36.5–49.3)
HGB BLD-MCNC: 10 G/DL (ref 12–17)
IMM GRANULOCYTES # BLD AUTO: 0.02 THOUSAND/UL (ref 0–0.2)
IMM GRANULOCYTES NFR BLD AUTO: 0 % (ref 0–2)
LYMPHOCYTES # BLD AUTO: 0.99 THOUSANDS/ΜL (ref 0.6–4.47)
LYMPHOCYTES NFR BLD AUTO: 14 % (ref 14–44)
MCH RBC QN AUTO: 29.3 PG (ref 26.8–34.3)
MCHC RBC AUTO-ENTMCNC: 31.9 G/DL (ref 31.4–37.4)
MCV RBC AUTO: 92 FL (ref 82–98)
MONOCYTES # BLD AUTO: 0.77 THOUSAND/ΜL (ref 0.17–1.22)
MONOCYTES NFR BLD AUTO: 11 % (ref 4–12)
NEUTROPHILS # BLD AUTO: 4.96 THOUSANDS/ΜL (ref 1.85–7.62)
NEUTS SEG NFR BLD AUTO: 69 % (ref 43–75)
NRBC BLD AUTO-RTO: 0 /100 WBCS
PLATELET # BLD AUTO: 147 THOUSANDS/UL (ref 149–390)
PMV BLD AUTO: 11.9 FL (ref 8.9–12.7)
POTASSIUM SERPL-SCNC: 4.1 MMOL/L (ref 3.5–5.3)
RBC # BLD AUTO: 3.41 MILLION/UL (ref 3.88–5.62)
SODIUM SERPL-SCNC: 138 MMOL/L (ref 136–145)
WBC # BLD AUTO: 7.16 THOUSAND/UL (ref 4.31–10.16)

## 2019-10-10 PROCEDURE — 99232 SBSQ HOSP IP/OBS MODERATE 35: CPT | Performed by: STUDENT IN AN ORGANIZED HEALTH CARE EDUCATION/TRAINING PROGRAM

## 2019-10-10 PROCEDURE — 82948 REAGENT STRIP/BLOOD GLUCOSE: CPT

## 2019-10-10 PROCEDURE — 99232 SBSQ HOSP IP/OBS MODERATE 35: CPT | Performed by: INTERNAL MEDICINE

## 2019-10-10 PROCEDURE — 99232 SBSQ HOSP IP/OBS MODERATE 35: CPT | Performed by: NURSE PRACTITIONER

## 2019-10-10 PROCEDURE — 85025 COMPLETE CBC W/AUTO DIFF WBC: CPT | Performed by: STUDENT IN AN ORGANIZED HEALTH CARE EDUCATION/TRAINING PROGRAM

## 2019-10-10 PROCEDURE — 80048 BASIC METABOLIC PNL TOTAL CA: CPT | Performed by: STUDENT IN AN ORGANIZED HEALTH CARE EDUCATION/TRAINING PROGRAM

## 2019-10-10 RX ORDER — SODIUM BICARBONATE 650 MG/1
650 TABLET ORAL
Status: DISCONTINUED | OUTPATIENT
Start: 2019-10-10 | End: 2019-10-13

## 2019-10-10 RX ADMIN — SODIUM BICARBONATE 650 MG TABLET 650 MG: at 20:31

## 2019-10-10 RX ADMIN — HYDROMORPHONE HYDROCHLORIDE 0.5 MG: 1 INJECTION, SOLUTION INTRAMUSCULAR; INTRAVENOUS; SUBCUTANEOUS at 02:55

## 2019-10-10 RX ADMIN — LABETALOL HCL 200 MG: 200 TABLET, FILM COATED ORAL at 23:03

## 2019-10-10 RX ADMIN — CEFTRIAXONE SODIUM 1000 MG: 10 INJECTION, POWDER, FOR SOLUTION INTRAVENOUS at 00:38

## 2019-10-10 RX ADMIN — DONEPEZIL HYDROCHLORIDE 5 MG: 5 TABLET ORAL at 23:04

## 2019-10-10 RX ADMIN — OXYBUTYNIN CHLORIDE 5 MG: 5 TABLET ORAL at 20:30

## 2019-10-10 RX ADMIN — PRAVASTATIN SODIUM 40 MG: 40 TABLET ORAL at 15:32

## 2019-10-10 RX ADMIN — SODIUM BICARBONATE 650 MG TABLET 650 MG: at 11:55

## 2019-10-10 RX ADMIN — OXYBUTYNIN CHLORIDE 5 MG: 5 TABLET ORAL at 15:32

## 2019-10-10 RX ADMIN — OXYBUTYNIN CHLORIDE 5 MG: 5 TABLET ORAL at 08:57

## 2019-10-10 RX ADMIN — HYDROMORPHONE HYDROCHLORIDE 0.5 MG: 1 INJECTION, SOLUTION INTRAMUSCULAR; INTRAVENOUS; SUBCUTANEOUS at 06:35

## 2019-10-10 RX ADMIN — CLONIDINE HYDROCHLORIDE 0.1 MG: 0.1 TABLET ORAL at 23:03

## 2019-10-10 RX ADMIN — TAMSULOSIN HYDROCHLORIDE 0.4 MG: 0.4 CAPSULE ORAL at 15:32

## 2019-10-10 RX ADMIN — ATROPA BELLADONNA AND OPIUM 1 SUPPOSITORY: 16.2; 3 SUPPOSITORY RECTAL at 02:59

## 2019-10-10 NOTE — PROGRESS NOTES
NEPHROLOGY PROGRESS NOTE    Patient: Stephen Jackson               Sex: male          DOA: 10/7/2019  4:48 PM   YOB: 1940        Age:  67 y o         LOS:  LOS: 3 days   10/10/2019    REASON FOR THE CONSULTATION:      Acute kidney injury    SUBJECTIVE     Patient is seen and examined in the intensive care unit  Continues with bladder irrigation  Adequate urine output noted resulting improving renal parameters      CURRENT MEDICATIONS       Current Facility-Administered Medications:     acetaminophen (TYLENOL) tablet 650 mg, 650 mg, Oral, Q6H PRN, TAMRA Dennis    belladonna-opium (B&O SUPPOSITORY) 16 2-30 mg suppository 1 suppository, 30 mg, Rectal, Q8H PRN, TAMRA Dennis, 1 suppository at 10/10/19 0259    bisacodyl (DULCOLAX) rectal suppository 10 mg, 10 mg, Rectal, Daily PRN, TAMRA Dennis    calcium carbonate (TUMS) chewable tablet 1,000 mg, 1,000 mg, Oral, Daily PRN, TAMRA Dennis    cefTRIAXone (ROCEPHIN) 1,000 mg in dextrose 5 % 50 mL IVPB, 1,000 mg, Intravenous, Q24H, TAMRA Nunes, Last Rate: 100 mL/hr at 10/10/19 0038, 1,000 mg at 10/10/19 0038    cloNIDine (CATAPRES) tablet 0 1 mg, 0 1 mg, Oral, HS, Albert Meyer MD, 0 1 mg at 10/09/19 2310    donepezil (ARICEPT) tablet 5 mg, 5 mg, Oral, HS, TAMRA Dennis, 5 mg at 10/09/19 2310    HYDROmorphone (DILAUDID) injection 0 5 mg, 0 5 mg, Intravenous, Q3H PRN, TAMRA Dennis, 0 5 mg at 10/10/19 0635    labetalol (NORMODYNE) tablet 200 mg, 200 mg, Oral, HS, Albert Meyer MD, 200 mg at 10/09/19 2300    ondansetron (ZOFRAN) injection 4 mg, 4 mg, Intravenous, Q6H PRN, Grace Loach, CRNP    oxybutynin (DITROPAN) tablet 5 mg, 5 mg, Oral, TID, TAMRA Dennis, 5 mg at 10/10/19 0857    pravastatin (PRAVACHOL) tablet 40 mg, 40 mg, Oral, Daily With TAMRA Sanchez, 40 mg at 10/09/19 1549    tamsulosin (FLOMAX) capsule 0 4 mg, 0 4 mg, Oral, Daily With Dinner, Hershall Sides, CRNP, 0 4 mg at 10/09/19 1549    REVIEW OF SYSTEMS     Review of Systems   Constitutional: Negative  HENT: Negative  Eyes: Negative  Respiratory: Negative  Cardiovascular: Negative  Gastrointestinal: Negative  Endocrine: Negative  Genitourinary: Negative  Musculoskeletal: Negative  Skin: Negative  Allergic/Immunologic: Negative  Neurological: Negative  Hematological: Negative  Psychiatric/Behavioral: Positive for confusion and decreased concentration  All other systems reviewed and are negative  OBJECTIVE     Current Weight: Weight - Scale: 90 9 kg (200 lb 6 4 oz)  Vitals:    10/10/19 0849   BP: 112/56   Pulse: 80   Resp: 20   Temp: 98 4 °F (36 9 °C)   SpO2: 92%     Body mass index is 27 18 kg/m²  Intake/Output Summary (Last 24 hours) at 10/10/2019 1052  Last data filed at 10/10/2019 0938  Gross per 24 hour   Intake    Output 3175 ml   Net -3175 ml       PHYSICAL EXAMINATION     Physical Exam   Constitutional: He appears well-developed and well-nourished  HENT:   Head: Normocephalic and atraumatic  Eyes: Pupils are equal, round, and reactive to light  Neck: Neck supple  Cardiovascular: Normal rate, regular rhythm and normal heart sounds  Pulmonary/Chest: Effort normal    Abdominal: Soft  Bowel sounds are normal    Musculoskeletal: Normal range of motion  Neurological: He is alert  Skin: Skin is warm  Psychiatric: He has a normal mood and affect     Thought content abnormal         LAB RESULTS     Results from last 7 days   Lab Units 10/10/19  0557 10/09/19  2032 10/09/19  0608 10/08/19  1915 10/08/19  0545 10/07/19  1718   WBC Thousand/uL 7 16 8 89 11 59* 11 54* 10 90* 13 10*   HEMOGLOBIN g/dL 10 0* 9 6* 10 3* 12 2 13 4 15 1   HEMATOCRIT % 31 3* 29 5* 32 7* 38 2 41 1 45 8   PLATELETS Thousands/uL 147* 146* 148* 155 165 197   POTASSIUM mmol/L 4 1  --  5 1  --  4 3 5 1   CHLORIDE mmol/L 107  --  107  --  109* 105   CO2 mmol/L 18*  -- 15*  --  18* 19*   BUN mg/dL 69*  --  76*  --  73* 70*   CREATININE mg/dL 3 62*  --  4 39*  --  4 89* 4 74*   EGFR ml/min/1 73sq m 18  --  14  --  12 13   CALCIUM mg/dL 8 0*  --  7 9*  --  8 6 9 6   MAGNESIUM mg/dL  --   --   --   --  2 5 2 5   PHOSPHORUS mg/dL  --   --  5 7*  --   --   --            RADIOLOGY RESULTS      Reviewed      ASSESSMENT/PLAN     This is a 76-year-old male with past medical history of hypertension, dyslipidemia, probable dementia who is brought to our facility by his daughter due to frequency in urination and found to have severe Clint I     1  Acute kidney injury:  Etiology likely appears to be 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   -chronicity of obstruction unknown     -baseline kidney function unknown   -presented with a serum creatinine 4 7 with imaging revealing bilateral hydroureter and hydronephrosis  - he was started on continues bladder irrigation   -noted improvement in renal function with serum creatinine down to 3 6     2  Acidosis:  Noted to have anion gap metabolic acidosis secondary to 1  Serum bicarb is 18  Patient will be started on sodium bicarbonate tablets today  3  Hyperkalemia:  Serum potassium has improved to 4 1     4  UTI:  Urinalysis had revealed possibility of cystitis  Urine culture growing Corynebacterium  5  Hypertension:  Patient noted to be on labetalol and clonidine  There is systolic blood pressure holding parameters was raised to 130  Blood pressure is within acceptable range            Gabrielle Soliz MD  Nephrology  10/10/2019

## 2019-10-10 NOTE — PLAN OF CARE
Problem: Potential for Falls  Goal: Patient will remain free of falls  Description  INTERVENTIONS:  - Assess patient frequently for physical needs  -  Identify cognitive and physical deficits and behaviors that affect risk of falls    -  [place] fall precautions as indicated by assessment   - Educate patient/family on patient safety including physical limitations  - Instruct patient to call for assistance with activity based on assessment  - Modify environment to reduce risk of injury  - Consider OT/PT consult to assist with strengthening/mobility  Outcome: Progressing     Problem: Prexisting or High Potential for Compromised Skin Integrity  Goal: Skin integrity is maintained or improved  Description  INTERVENTIONS:  - Identify patients at risk for skin breakdown  - Assess and monitor skin integrity  - Assess and monitor nutrition and hydration status  - Monitor labs   - Assess for incontinence   - Turn and reposition patient  - Assist with mobility/ambulation  - Relieve pressure over bony prominences  - Avoid friction and shearing  - Provide appropriate hygiene as needed including keeping skin clean and dry  - Evaluate need for skin moisturizer/barrier cream  - Collaborate with interdisciplinary team   - Patient/family teaching  - Consider wound care consult   Outcome: Progressing     Problem: PAIN - ADULT  Goal: Verbalizes/displays adequate comfort level or baseline comfort level  Description  Interventions:  - Encourage patient to monitor pain and request assistance  - Assess pain using appropriate pain scale  - Administer analgesics based on type and severity of pain and evaluate response  - Implement non-pharmacological measures as appropriate and evaluate response  - Consider cultural and social influences on pain and pain management  - Notify physician/advanced practitioner if interventions unsuccessful or patient reports new pain  Outcome: Progressing     Problem: SAFETY ADULT  Goal: Patient will remain free of falls  Description  INTERVENTIONS:  - Assess patient frequently for physical needs  -  Identify cognitive and physical deficits and behaviors that affect risk of falls    -  [place] fall precautions as indicated by assessment   - Educate patient/family on patient safety including physical limitations  - Instruct patient to call for assistance with activity based on assessment  - Modify environment to reduce risk of injury  - Consider OT/PT consult to assist with strengthening/mobility  Outcome: Progressing  Goal: Maintain or return to baseline ADL function  Description  INTERVENTIONS:  -  Assess patient's ability to carry out ADLs; assess patient's baseline for ADL function and identify physical deficits which impact ability to perform ADLs (bathing, care of mouth/teeth, toileting, grooming, dressing, etc )  - Assess/evaluate cause of self-care deficits   - Assess range of motion  - Assess patient's mobility; develop plan if impaired  - Assess patient's need for assistive devices and provide as appropriate  - Encourage maximum independence but intervene and supervise when necessary  - Involve family in performance of ADLs  - Assess for home care needs following discharge   - Consider OT consult to assist with ADL evaluation and planning for discharge  - Provide patient education as appropriate  Outcome: Progressing  Goal: Maintain or return mobility status to optimal level  Description  INTERVENTIONS:  - Assess patient's baseline mobility status (ambulation, transfers, stairs, etc )    - Identify cognitive and physical deficits and behaviors that affect mobility  - Identify mobility aids required to assist with transfers and/or ambulation (gait belt, sit-to-stand, lift, walker, cane, etc )  - Witt fall precautions as indicated by assessment  - Record patient progress and toleration of activity level on Mobility SBAR; progress patient to next Phase/Stage  - Instruct patient to call for assistance with activity based on assessment  - Consider rehabilitation consult to assist with strengthening/weightbearing, etc   Outcome: Progressing     Problem: DISCHARGE PLANNING  Goal: Discharge to home or other facility with appropriate resources  Description  INTERVENTIONS:  - Identify barriers to discharge w/patient and caregiver  - Arrange for needed discharge resources and transportation as appropriate  - Identify discharge learning needs (meds, wound care, etc )  - Arrange for interpretive services to assist at discharge as needed  - Refer to Case Management Department for coordinating discharge planning if the patient needs post-hospital services based on physician/advanced practitioner order or complex needs related to functional status, cognitive ability, or social support system  Outcome: Progressing     Problem: Knowledge Deficit  Goal: Patient/family/caregiver demonstrates understanding of disease process, treatment plan, medications, and discharge instructions  Description  Complete learning assessment and assess knowledge base    Interventions:  - Provide teaching at level of understanding  - Provide teaching via preferred learning methods  Outcome: Progressing     Problem: GENITOURINARY - ADULT  Goal: Maintains or returns to baseline urinary function  Description  INTERVENTIONS:  - Assess urinary function  - Encourage oral fluids to ensure adequate hydration if ordered  - Administer IV fluids as ordered to ensure adequate hydration  - Administer ordered medications as needed  - Offer frequent toileting  - Follow urinary retention protocol if ordered  Outcome: Progressing  Goal: Absence of urinary retention  Description  INTERVENTIONS:  - Assess patients ability to void and empty bladder  - Monitor I/O  - Bladder scan as needed  - Discuss with physician/AP medications to alleviate retention as needed  - Discuss catheterization for long term situations as appropriate  Outcome: Progressing  Goal: Urinary catheter remains patent  Description  INTERVENTIONS:  - Assess patency of urinary catheter  - If patient has a chronic soto, consider changing catheter if non-functioning  - Follow guidelines for intermittent irrigation of non-functioning urinary catheter  Outcome: Progressing     Problem: INFECTION - ADULT  Goal: Absence or prevention of progression during hospitalization  Description  INTERVENTIONS:  - Assess and monitor for signs and symptoms of infection  - Monitor lab/diagnostic results  - Monitor all insertion sites, i e  indwelling lines, tubes, and drains  - Monitor endotracheal if appropriate and nasal secretions for changes in amount and color  - [place] appropriate cooling/warming therapies per order  - Administer medications as ordered  - Instruct and encourage patient and family to use good hand hygiene technique  - Identify and instruct in appropriate isolation precautions for identified infection/condition  Outcome: Progressing

## 2019-10-10 NOTE — PROGRESS NOTES
Progress Note - Kavin Martines 67 y o  male MRN: [id number]    Unit/Bed#:  Encounter: [id number]      Assessment:  Kavin Martines is a 67-year-old male presenting to [facility] emergency room which shortness of breath and several days complaint of abdominal and testicular pain; not accompanied by fever, chills or dysuria but positive maria guadalupe uria  Urinalysis was positive for blood and leukocytes  He was admitted to Internal Medicine service for empiric antibiotics and management of acute kidney injury with admission creatinine approaching 5  Baseline lab work and imaging were unavailable  Admission CT of the abdomen and pelvis were positive for significant prostatomegaly with prostatic calcifications and over distended urinary bladder, circumferential bladder wall thickening indicative of long-term obstruction and perivesicular stranding suggestive of cystitis  Cruz catheter was inserted and patient subsequently developed gross hematuria requiring exchange of Cruz to three-way Cruz catheter for continuous bladder irrigation yesterday late morning  Patient is afebrile hemodynamically stable  Hemoglobin 10 0 from 9 6  Leukocytosis-- resolved  Creatinine 4 39--->3 29 today  Urine culture positive  Three-way Cruz to CBI, secure and patent for pale blush urine  CBI running briskly  Plan:  Maintain CBI  Weaned to clamp by 0 600 tomorrow  Continue to monitor hemoglobin and creatinine  Monitor other clinical signs of hemorrhage  We anticipate red spontaneous resolution of bleeding  Patient will require in the long run, cystoscopy, trans rectal ultrasound prior to tentative elective definitive surgical management for prostatomegaly  Subjective:   Unreliable historian  But denies fever, chills, flank, abdominal or suprapubic pain    Objective:     Vitals: Blood pressure 109/60, pulse 73, temperature 99 5 °F (37 5 °C), temperature source Oral, resp   rate (!) 23, height 6' (1 829 m), weight 90 9 kg (200 lb 6 4 oz), SpO2 94 %  ,Body mass index is 27 18 kg/m²  Intake/Output Summary (Last 24 hours) at 10/10/2019 0802  Last data filed at 10/10/2019 0630  Gross per 24 hour   Intake 360 ml   Output 2895 ml   Net -2535 ml       Physical Exam: General appearance: alert and oriented, in no acute distress, appears stated age, cooperative and no distress  Head: Normocephalic, without obvious abnormality, atraumatic  Neck: no adenopathy, no carotid bruit, no JVD, supple, symmetrical, trachea midline and thyroid not enlarged, symmetric, no tenderness/mass/nodules  Lungs: clear to auscultation bilaterally  Heart: regular rate and rhythm, S1, S2 normal, no murmur, click, rub or gallop  Abdomen: soft, non-tender; bowel sounds normal; no masses,  no organomegaly  Extremities: extremities normal, warm and well-perfused; no cyanosis, clubbing, or edema  Pulses: 2+ and symmetric  Neurologic: Mental status: alertness: alert, orientation: person, place, In good spirits  Cruz patent for pale clear blush      Invasive Devices     Peripheral Intravenous Line            Peripheral IV 10/09/19 Right;Upper;Ventral (anterior) Arm less than 1 day          Drain            Continuous Bladder Irrigation Three-way 1 day              Lab Results   Component Value Date    WBC 7 16 10/10/2019    HGB 10 0 (L) 10/10/2019    HCT 31 3 (L) 10/10/2019    MCV 92 10/10/2019     (L) 10/10/2019     Lab Results   Component Value Date    SODIUM 138 10/10/2019    K 4 1 10/10/2019     10/10/2019    CO2 18 (L) 10/10/2019    BUN 69 (H) 10/10/2019    CREATININE 3 62 (H) 10/10/2019    GLUC 148 (H) 10/10/2019    CALCIUM 8 0 (L) 10/10/2019       Lab, Imaging and other studies: I have personally reviewed pertinent reports

## 2019-10-10 NOTE — ASSESSMENT & PLAN NOTE
UA with + Leuk esterase but negative nitrates , urine culture + for corynebacterium   Blood cultures negative, afebrile and hemodynamically stable  On ceftriaxone- Day 4

## 2019-10-10 NOTE — ASSESSMENT & PLAN NOTE
Cr continuing to improve, now 3 62  Per Urology- continue with bladder irrigation, will need possible cystoscopy and transrectal US for possible surgery for prostatomegaly  Cruz with less bloody output

## 2019-10-10 NOTE — ASSESSMENT & PLAN NOTE
Hematuria now improving   Hb initially dropped from 15 to 10, now stable   Diet adjusted, no longer NPO  Continue to monitor Hb, transfuse if <7

## 2019-10-10 NOTE — ASSESSMENT & PLAN NOTE
Now resolved with soto insertion  Hematuria now improving  Continue with soto, follow up urology recommendations

## 2019-10-10 NOTE — PROGRESS NOTES
Progress Note - Stephen Jackson 1940, 67 y o  male MRN: [id number]    Unit/Bed#:  Encounter: [id number]    Primary Care Provider: No primary care provider on file  Date and time admitted to hospital: 10/7/2019  4:48 PM        Hematuria, gross  Assessment & Plan  Hematuria now improving   Hb initially dropped from 15 to 10, now stable   Diet adjusted, no longer NPO  Continue to monitor Hb, transfuse if <7    MILE (acute kidney injury) [facility])  Assessment & Plan  Cr continuing to improve, now 3 62  Per Urology- continue with bladder irrigation, will need possible cystoscopy and transrectal US for possible surgery for prostatomegaly  Soto with less bloody output       Other hyperlipidemia  Assessment & Plan  Continue pravastatin     Pyelonephritis  Assessment & Plan  UA with + Leuk esterase but negative nitrates , urine culture + for corynebacterium   Blood cultures negative, afebrile and hemodynamically stable  On ceftriaxone- Day 4    Essential hypertension  Assessment & Plan  Continue with labetalol and clonidine     SOB (shortness of breath)resolved as of 10/10/2019  Assessment & Plan  Likely secondary to pain, now resolved  No episodes of hypoxia, no history of CHF  CXR clear     * Urinary retentionresolved as of 10/10/2019  Assessment & Plan  Now resolved with soto insertion  Hematuria now improving  Continue with soto, follow up urology recommendations      VTE Pharmacologic Prophylaxis:   Pharmacologic: Pharmacologic VTE Prophylaxis contraindicated due to hematuria  Mechanical VTE Prophylaxis in Place: Yes    Patient Centered Rounds: I have performed bedside rounds with nursing staff today  Discussions with Specialists or Other Care Team Provider: Urology, Nephrology    Education and Discussions with Family / Patient: Hematuria    Time Spent for Care: 30 minutes  More than 50% of total time spent on counseling and coordination of care as described above      Current Length of Stay: 3 day(s)    Current Patient Status: Inpatient   Certification Statement: The patient will continue to require additional inpatient hospital stay due to prostatomegaly     Discharge Plan: 48+ hours    Code Status: Level 1 - Full Code      Subjective:   Patient feels well today, no complaints  Objective:     Vitals:   Temp (24hrs), Av 6 °F (37 °C), Min:98 1 °F (36 7 °C), Max:99 5 °F (37 5 °C)    Temp:  [98 1 °F (36 7 °C)-99 5 °F (37 5 °C)] 98 2 °F (36 8 °C)  HR:  [73-86] 85  Resp:  [19-23] 19  BP: (109-158)/(56-82) 158/70  SpO2:  [92 %-98 %] 97 %  Body mass index is 27 18 kg/m²  Input and Output Summary (last 24 hours): Intake/Output Summary (Last 24 hours) at 10/10/2019 1534  Last data filed at 10/10/2019 0938  Gross per 24 hour   Intake    Output 3000 ml   Net -3000 ml       Physical Exam:     Physical Exam   Constitutional: He appears well-developed and well-nourished  HENT:   Head: Normocephalic and atraumatic  Cardiovascular: Normal rate and regular rhythm  Pulmonary/Chest: Effort normal and breath sounds normal    Abdominal: Soft  Bowel sounds are normal    Genitourinary:   Genitourinary Comments: Cruz intact, urine with less bloody output   Neurological: He is alert  Skin: Skin is warm and dry  Psychiatric: He has a normal mood and affect   His behavior is normal        Additional Data:     Labs:    Results from last 7 days   Lab Units 10/10/19  0557   WBC Thousand/uL 7 16   HEMOGLOBIN g/dL 10 0*   HEMATOCRIT % 31 3*   PLATELETS Thousands/uL 147*   NEUTROS PCT % 69   LYMPHS PCT % 14   MONOS PCT % 11   EOS PCT % 5     Results from last 7 days   Lab Units 10/10/19  0557 10/09/19  0608  10/07/19  1718   SODIUM mmol/L 138 138   < > 141   POTASSIUM mmol/L 4 1 5 1   < > 5 1   CHLORIDE mmol/L 107 107   < > 105   CO2 mmol/L 18* 15*   < > 19*   BUN mg/dL 69* 76*   < > 70*   CREATININE mg/dL 3 62* 4 39*   < > 4 74*   ANION GAP mmol/L 13 16*   < > 17*   CALCIUM mg/dL 8 0* 7 9*   < > 9 6 ALBUMIN g/dL  --  2 6*  --  4 0   TOTAL BILIRUBIN mg/dL  --   --   --  1 00   ALK PHOS U/L  --   --   --  98   ALT U/L  --   --   --  17   AST U/L  --   --   --  27   GLUCOSE RANDOM mg/dL 148* 117   < > 120    < > = values in this interval not displayed  Results from last 7 days   Lab Units 10/07/19  1718   INR  1 10             Results from last 7 days   Lab Units 10/07/19  1910 10/07/19  1718   LACTIC ACID mmol/L  --  1 5   PROCALCITONIN ng/ml 0 36*  --            * I Have Reviewed All Lab Data Listed Above  * Additional Pertinent Lab Tests Reviewed: Kaylene 67 Admission Reviewed    Imaging:    Imaging Reports Reviewed Today Include: NA  Imaging Personally Reviewed by Myself Includes:  NA    Recent Cultures (last 7 days):     Results from last 7 days   Lab Units 10/07/19  2150 10/07/19  1910 10/07/19  1718   BLOOD CULTURE   --  No Growth at 48 hrs  No Growth at 48 hrs     URINE CULTURE  >100,000 cfu/ml Corynebacterium riegelii*  --   --        Last 24 Hours Medication List:     Current Facility-Administered Medications:  acetaminophen 650 mg Oral Q6H PRN Yuly Hamman, CAMRYNNP    belladonna-opium 30 mg Rectal Q8H PRN Yuly Hamman, CRNP    bisacodyl 10 mg Rectal Daily PRN Yuly Hamman, CRNP    calcium carbonate 1,000 mg Oral Daily PRN Yuly Hamman, CAMRYNNP    cefTRIAXone 1,000 mg Intravenous Q24H Myriam Hamman, CRNP Last Rate: 1,000 mg (10/10/19 0038)   cloNIDine 0 1 mg Oral HS Gabrielle Soliz MD    donepezil 5 mg Oral HS Myriam Hamman, CRNP    HYDROmorphone 0 5 mg Intravenous Q3H PRN Myriam Hamman, CRNP    labetalol 200 mg Oral HS Gabrielle Soliz MD    ondansetron 4 mg Intravenous Q6H PRN Myriam Hamman, CAMRYNNP    oxybutynin 5 mg Oral TID Myriam Hamman, CRNP    pravastatin 40 mg Oral Daily With TAMRA Briseno    sodium bicarbonate 650 mg Oral TID after meals Gabrielle Soilz MD    tamsulosin 0 4 mg Oral Daily With 85 TAMRA Avelar         Today, Patient Was Seen By: LUCHO Swain      ** Please Note: Dictation voice to text software may have been used in the creation of this document   **

## 2019-10-11 ENCOUNTER — APPOINTMENT (INPATIENT)
Dept: ULTRASOUND IMAGING | Facility: HOSPITAL | Age: 79
DRG: 690 | End: 2019-10-11
Payer: COMMERCIAL

## 2019-10-11 ENCOUNTER — TELEPHONE (OUTPATIENT)
Dept: OTHER | Facility: HOSPITAL | Age: 79
End: 2019-10-11

## 2019-10-11 PROBLEM — E87.2 METABOLIC ACIDOSIS: Status: ACTIVE | Noted: 2019-10-11

## 2019-10-11 PROBLEM — D64.9 ANEMIA: Status: ACTIVE | Noted: 2019-10-11

## 2019-10-11 PROBLEM — R79.89 AZOTEMIA: Status: ACTIVE | Noted: 2019-10-11

## 2019-10-11 PROBLEM — D62 ACUTE BLOOD LOSS ANEMIA: Status: ACTIVE | Noted: 2019-10-11

## 2019-10-11 PROBLEM — E87.20 METABOLIC ACIDOSIS: Status: ACTIVE | Noted: 2019-10-11

## 2019-10-11 LAB
ANION GAP SERPL CALCULATED.3IONS-SCNC: 13 MMOL/L (ref 4–13)
BASOPHILS # BLD AUTO: 0.04 THOUSANDS/ΜL (ref 0–0.1)
BASOPHILS NFR BLD AUTO: 1 % (ref 0–1)
BUN SERPL-MCNC: 55 MG/DL (ref 5–25)
CALCIUM SERPL-MCNC: 8.3 MG/DL (ref 8.3–10.1)
CHLORIDE SERPL-SCNC: 106 MMOL/L (ref 100–108)
CO2 SERPL-SCNC: 18 MMOL/L (ref 21–32)
CREAT SERPL-MCNC: 2.7 MG/DL (ref 0.6–1.3)
EOSINOPHIL # BLD AUTO: 0.38 THOUSAND/ΜL (ref 0–0.61)
EOSINOPHIL NFR BLD AUTO: 5 % (ref 0–6)
ERYTHROCYTE [DISTWIDTH] IN BLOOD BY AUTOMATED COUNT: 13.5 % (ref 11.6–15.1)
GFR SERPL CREATININE-BSD FRML MDRD: 25 ML/MIN/1.73SQ M
GLUCOSE SERPL-MCNC: 125 MG/DL (ref 65–140)
GLUCOSE SERPL-MCNC: 131 MG/DL (ref 65–140)
GLUCOSE SERPL-MCNC: 135 MG/DL (ref 65–140)
GLUCOSE SERPL-MCNC: 149 MG/DL (ref 65–140)
GLUCOSE SERPL-MCNC: 154 MG/DL (ref 65–140)
HCT VFR BLD AUTO: 29 % (ref 36.5–49.3)
HGB BLD-MCNC: 9.3 G/DL (ref 12–17)
IMM GRANULOCYTES # BLD AUTO: 0.05 THOUSAND/UL (ref 0–0.2)
IMM GRANULOCYTES NFR BLD AUTO: 1 % (ref 0–2)
LYMPHOCYTES # BLD AUTO: 0.83 THOUSANDS/ΜL (ref 0.6–4.47)
LYMPHOCYTES NFR BLD AUTO: 10 % (ref 14–44)
MCH RBC QN AUTO: 29.4 PG (ref 26.8–34.3)
MCHC RBC AUTO-ENTMCNC: 32.1 G/DL (ref 31.4–37.4)
MCV RBC AUTO: 92 FL (ref 82–98)
MONOCYTES # BLD AUTO: 0.75 THOUSAND/ΜL (ref 0.17–1.22)
MONOCYTES NFR BLD AUTO: 9 % (ref 4–12)
NEUTROPHILS # BLD AUTO: 5.99 THOUSANDS/ΜL (ref 1.85–7.62)
NEUTS SEG NFR BLD AUTO: 74 % (ref 43–75)
NRBC BLD AUTO-RTO: 0 /100 WBCS
PLATELET # BLD AUTO: 158 THOUSANDS/UL (ref 149–390)
PMV BLD AUTO: 11.3 FL (ref 8.9–12.7)
POTASSIUM SERPL-SCNC: 4.5 MMOL/L (ref 3.5–5.3)
RBC # BLD AUTO: 3.16 MILLION/UL (ref 3.88–5.62)
SODIUM SERPL-SCNC: 137 MMOL/L (ref 136–145)
WBC # BLD AUTO: 8.04 THOUSAND/UL (ref 4.31–10.16)

## 2019-10-11 PROCEDURE — 76770 US EXAM ABDO BACK WALL COMP: CPT

## 2019-10-11 PROCEDURE — 99233 SBSQ HOSP IP/OBS HIGH 50: CPT | Performed by: INTERNAL MEDICINE

## 2019-10-11 PROCEDURE — 82948 REAGENT STRIP/BLOOD GLUCOSE: CPT

## 2019-10-11 PROCEDURE — 80048 BASIC METABOLIC PNL TOTAL CA: CPT | Performed by: INTERNAL MEDICINE

## 2019-10-11 PROCEDURE — 99232 SBSQ HOSP IP/OBS MODERATE 35: CPT | Performed by: NURSE PRACTITIONER

## 2019-10-11 PROCEDURE — 99232 SBSQ HOSP IP/OBS MODERATE 35: CPT | Performed by: STUDENT IN AN ORGANIZED HEALTH CARE EDUCATION/TRAINING PROGRAM

## 2019-10-11 PROCEDURE — 85025 COMPLETE CBC W/AUTO DIFF WBC: CPT | Performed by: INTERNAL MEDICINE

## 2019-10-11 RX ADMIN — PRAVASTATIN SODIUM 40 MG: 40 TABLET ORAL at 15:33

## 2019-10-11 RX ADMIN — OXYBUTYNIN CHLORIDE 5 MG: 5 TABLET ORAL at 21:00

## 2019-10-11 RX ADMIN — SODIUM BICARBONATE 650 MG TABLET 650 MG: at 08:46

## 2019-10-11 RX ADMIN — TAMSULOSIN HYDROCHLORIDE 0.4 MG: 0.4 CAPSULE ORAL at 15:33

## 2019-10-11 RX ADMIN — OXYBUTYNIN CHLORIDE 5 MG: 5 TABLET ORAL at 15:33

## 2019-10-11 RX ADMIN — OXYBUTYNIN CHLORIDE 5 MG: 5 TABLET ORAL at 08:46

## 2019-10-11 RX ADMIN — LABETALOL HCL 200 MG: 200 TABLET, FILM COATED ORAL at 21:00

## 2019-10-11 RX ADMIN — SODIUM BICARBONATE 650 MG TABLET 650 MG: at 11:41

## 2019-10-11 RX ADMIN — SODIUM BICARBONATE 650 MG TABLET 650 MG: at 21:00

## 2019-10-11 RX ADMIN — CEFTRIAXONE SODIUM 1000 MG: 10 INJECTION, POWDER, FOR SOLUTION INTRAVENOUS at 00:01

## 2019-10-11 RX ADMIN — CLONIDINE HYDROCHLORIDE 0.1 MG: 0.1 TABLET ORAL at 21:00

## 2019-10-11 RX ADMIN — DONEPEZIL HYDROCHLORIDE 5 MG: 5 TABLET ORAL at 21:00

## 2019-10-11 NOTE — TELEPHONE ENCOUNTER
Patient is scheduled for a cysto on 11/8/19 at 1:00 with Dr Bauman  This time frame is ok per MD      Please call and confirm

## 2019-10-11 NOTE — TELEPHONE ENCOUNTER
Artificial Tears: One drop to both eyes 3-4 times daily. We recommend Systane or Refresh lubricating eye drops which can be found at any pharmacy. Please assist in finding spot in time frame asked   Thank you

## 2019-10-11 NOTE — TELEPHONE ENCOUNTER
Called and spoke w daughter Dora Eugene about apt for 11/8/19  While on the phone she was questioning 1 of 2 things  Why is it so long till the apt and is this something that can be done while her father is currently admitted? I stated I realized he is still inpatient and that this was triaged by our NP and that the length of the apt was ok from a medical standpoint but she would still like to speak with someone in regards to this being done while him being inpatient  Also she said she does not want her father coming home with a catheter, I informed her that I could not guarantee her this, that this will be based on his condition and d/c physician  Please contact daughter in regards to this at [phone]   Thank you

## 2019-10-11 NOTE — ASSESSMENT & PLAN NOTE
Cr continuing to improve, now 2 70  Hematuria now resolving   Pending retroperitoneal ultrasound  Will likely need to be discharged with soto and have urology follow up for further intervention of prostatomegaly

## 2019-10-11 NOTE — TELEPHONE ENCOUNTER
Shwetha Acosta is a 71-year-old male seen in consultation at [facility] for BPH with obstruction, urinary retention, bilateral hydronephrosis, acute kidney injury and gross hematuria  Patient will be discharged per Internal Medicine with Cruz catheter in place  He will require cystoscopy and trans rectal ultrasound in approximately 3 weeks  Please contact patient's daughter to arrange for hospital follow-up appointment date and time and further workup to determine eligibility for surgical management of BPH verses long-term catheter  Thank you

## 2019-10-11 NOTE — ASSESSMENT & PLAN NOTE
Acute blood loss anemia in the setting of gross hematuria as evidenced by hgb 15 1>10 0, treated with CBI and frequent monitoring of CBC's  Hb now stable

## 2019-10-11 NOTE — TELEPHONE ENCOUNTER
Called and spoke to patients daughter  Explained to her what the in office procedure cysto is for and that it is within appropriate time frame per physician  Did put him on a cancelation list and if something opens up sooner with any physician at the [facility] office  Also reviewed with her that anything that needs to be done or what she wants requested in the hospital need to come from the hospital we cannot do anything from the office

## 2019-10-11 NOTE — PROGRESS NOTES
Progress Note - Kevon Stewart 67 y o  male MRN: [id number]    Unit/Bed#:  Encounter: [id number]      Assessment:  Kevon Stewart is a 50-year-old male presenting to [facility] emergency room which shortness of breath and several days complaint of abdominal and testicular pain; not accompanied by fever, chills or dysuria but positive maria guadalupe uria  Urinalysis was positive for blood and leukocytes  He was admitted to Internal Medicine service for empiric antibiotics and management of acute kidney injury with admission creatinine approaching 5  Baseline lab work and imaging were unavailable  Admission CT of the abdomen and pelvis were positive for significant prostatomegaly with prostatic calcifications and over distended urinary bladder, circumferential bladder wall thickening indicative of long-term obstruction and perivesicular stranding suggestive of cystitis  Cruz catheter was inserted and patient subsequently developed gross hematuria requiring exchange of Cruz to three-way Cruz catheter for continuous bladder irrigation   Patient is afebrile hemodynamically stable  Hemoglobin 10 0 ----9 6----9  3  Leukocytosis-- resolved  Creatinine 4 39--->3 29=---->2 70 today  Urine culture positive  Three-way Cruz with CBI clamped as of this morning  Catheter in-situ and patent for light daisy urine with tinge of blush  No clots  Plan:  Continue medical optimization, antibiosis and symptom management  Obtain renal ultrasound to re-evaluate hydronephrosis  Maintain catheter to straight drainage  Do not remove  Monitor creatinine  Discharge with Cruz catheter in-situ  Patient will require BPH workup to determine eligibility for surgical management of prostatomegaly and bladder outlet obstruction  Our service will contact patient/caregiver with hospital follow-up appointment date and time once discharged    Patient will require cystoscopy and trans rectal ultrasound as an outpatient  This will be arranged with family electively at an interval   Will follow ultrasound results  No further  intervention indicated this hospital stay  Will follow peripherally  Call as needed  Subjective:   Unreliable historian  But denies fever, chills, flank, abdominal or suprapubic pain    Objective:     Vitals: Blood pressure 132/63, pulse 83, temperature 99 °F (37 2 °C), temperature source Oral, resp  rate 18, height 6' (1 829 m), weight 90 9 kg (200 lb 6 4 oz), SpO2 95 %  ,Body mass index is 27 18 kg/m²  Intake/Output Summary (Last 24 hours) at 10/11/2019 3452  Last data filed at 10/11/2019 0601  Gross per 24 hour   Intake 10 ml   Output 3025 ml   Net -3015 ml       Physical Exam:General appearance: fatigued  Head: Normocephalic, without obvious abnormality, atraumatic  Neck: no adenopathy, no carotid bruit, no JVD, supple, symmetrical, trachea midline and thyroid not enlarged, symmetric, no tenderness/mass/nodules  Lungs: diminished breath sounds  Heart: regular rate and rhythm, S1, S2 normal, no murmur, click, rub or gallop  Abdomen: soft, non-tender; bowel sounds normal; no masses,  no organomegaly  Extremities: extremities normal, warm and well-perfused; no cyanosis, clubbing, or edema  Pulses: 2+ and symmetric  Neurologic: Mental status: alertness: alert, orientation: person, place  Three-way Cruz--CBI clamped--to straight drainage and patent for pale daisy urine with slight tinge of blush    No clots        Invasive Devices     Peripheral Intravenous Line            Peripheral IV 10/11/19 Right;Ventral (anterior) Forearm less than 1 day          Drain            Continuous Bladder Irrigation Three-way 2 days              Lab Results   Component Value Date    WBC 8 04 10/11/2019    HGB 9 3 (L) 10/11/2019    HCT 29 0 (L) 10/11/2019    MCV 92 10/11/2019     10/11/2019     Lab Results   Component Value Date    SODIUM 137 10/11/2019    K 4 5 10/11/2019     10/11/2019 CO2 18 (L) 10/11/2019    BUN 55 (H) 10/11/2019    CREATININE 2 70 (H) 10/11/2019    GLUC 135 10/11/2019    CALCIUM 8 3 10/11/2019       Lab, Imaging and other studies: I have personally reviewed pertinent reports

## 2019-10-11 NOTE — TREATMENT PLAN
Yoselin Jackson is a 60-year-old male admitted for gross hematuria in [facility]  Seen in consultation secondary to BPH with severe obstruction, urinary retention, bladder overdistention, bilateral hydronephrosis and acute kidney injury  Three-way Cruz catheter to CBI clamped since yesterday  Gross hematuria now resolved  Creatinine continues to trend downward favorably  Repeat ultrasound reviewed  Hydronephrosis resolved  Plan:  Maintain Cruz catheter to straight drainage  Do not remove  Not nursing or other department managed  Discharge with Cruz in-situ to straight drainage  Patient will require long-term decompression  Our service will contact patient's daughter once patient is discharged from hospital to arrange for cystoscopy and transrectal ultrasound required for further evaluation to determine eligibility for surgical management for BPH versus long-term catheter  No  intervention indicated during this hospital stay  Will sign off

## 2019-10-11 NOTE — PROGRESS NOTES
Progress Note - Ebenezer Mistry 1940, 67 y o  male MRN: [id number]    Unit/Bed#:  Encounter: [id number]    Primary Care Provider: No primary care provider on file  Date and time admitted to hospital: 10/7/2019  4:48 PM        Acute blood loss anemia  Assessment & Plan  Acute blood loss anemia in the setting of gross hematuria as evidenced by hgb 15 1>10 0, treated with CBI and frequent monitoring of CBC's  Hb now stable      Hematuria, gross  Assessment & Plan  Now resolving  Continuous bladder irrigation has been discontinued      MILE (acute kidney injury) ([facility] [address] )  Assessment & Plan  Cr continuing to improve, now 2 70  Hematuria now resolving  Pending retroperitoneal ultrasound  Will likely need to be discharged with soto and have urology follow up for further intervention of prostatomegaly       Other hyperlipidemia  Assessment & Plan  Continue pravastatin     Pyelonephritis  Assessment & Plan  urine culture + for corynebacterium   Blood cultures negative, afebrile and hemodynamically stable  On ceftriaxone- Day 5    Essential hypertension  Assessment & Plan  Continue with labetalol and clonidine         VTE Pharmacologic Prophylaxis:   Pharmacologic: Pharmacologic VTE Prophylaxis contraindicated due to hematuria  Mechanical VTE Prophylaxis in Place: Yes    Patient Centered Rounds: I have performed bedside rounds with nursing staff today  Discussions with Specialists or Other Care Team Provider:  Urology    Education and Discussions with Family / Patient:  None    Time Spent for Care: 30 minutes  More than 50% of total time spent on counseling and coordination of care as described above      Current Length of Stay: 4 day(s)    Current Patient Status: Inpatient   Certification Statement: The patient will continue to require additional inpatient hospital stay due to Retroperitoneal ultrasound and monitoring of hematuria    Discharge Plan:  48 hours    Code Status: Level 1 - Full Code      Subjective:   Patient feels well today currently has no complaints    Objective:     Vitals:   Temp (24hrs), Av 3 °F (37 4 °C), Min:99 °F (37 2 °C), Max:99 5 °F (37 5 °C)    Temp:  [99 °F (37 2 °C)-99 5 °F (37 5 °C)] 99 °F (37 2 °C)  HR:  [62-89] 83  Resp:  [18-20] 18  BP: (124-156)/(60-79) 142/72  SpO2:  [92 %-96 %] 93 %  Body mass index is 27 18 kg/m²  Input and Output Summary (last 24 hours): Intake/Output Summary (Last 24 hours) at 10/11/2019 1509  Last data filed at 10/11/2019 0601  Gross per 24 hour   Intake 10 ml   Output 2450 ml   Net -2440 ml       Physical Exam:     Physical Exam   Constitutional: He appears well-developed and well-nourished  Cardiovascular: Normal rate and regular rhythm  Pulmonary/Chest: Effort normal and breath sounds normal    Abdominal: Soft  Bowel sounds are normal    Genitourinary:   Genitourinary Comments: Soto intact with hematuria improving  No clots or gross blood in soto  Neurological: He is alert  Skin: Skin is warm and dry  Psychiatric: He has a normal mood and affect   His behavior is normal          Additional Data:     Labs:    Results from last 7 days   Lab Units 10/11/19  0508   WBC Thousand/uL 8 04   HEMOGLOBIN g/dL 9 3*   HEMATOCRIT % 29 0*   PLATELETS Thousands/uL 158   NEUTROS PCT % 74   LYMPHS PCT % 10*   MONOS PCT % 9   EOS PCT % 5     Results from last 7 days   Lab Units 10/11/19  0508  10/09/19  0608  10/07/19  1718   SODIUM mmol/L 137   < > 138   < > 141   POTASSIUM mmol/L 4 5   < > 5 1   < > 5 1   CHLORIDE mmol/L 106   < > 107   < > 105   CO2 mmol/L 18*   < > 15*   < > 19*   BUN mg/dL 55*   < > 76*   < > 70*   CREATININE mg/dL 2 70*   < > 4 39*   < > 4 74*   ANION GAP mmol/L 13   < > 16*   < > 17*   CALCIUM mg/dL 8 3   < > 7 9*   < > 9 6   ALBUMIN g/dL  --   --  2 6*  --  4 0   TOTAL BILIRUBIN mg/dL  --   --   --   --  1 00   ALK PHOS U/L  --   --   --   --  98   ALT U/L  --   --   --   --  17   AST U/L  --   --   --   --  27 GLUCOSE RANDOM mg/dL 135   < > 117   < > 120    < > = values in this interval not displayed  Results from last 7 days   Lab Units 10/07/19  1718   INR  1 10     Results from last 7 days   Lab Units 10/11/19  1054 10/11/19  0747 10/10/19  1551   POC GLUCOSE mg/dl 149* 125 155*         Results from last 7 days   Lab Units 10/07/19  1910 10/07/19  1718   LACTIC ACID mmol/L  --  1 5   PROCALCITONIN ng/ml 0 36*  --            * I Have Reviewed All Lab Data Listed Above  * Additional Pertinent Lab Tests Reviewed: Kaylene 67 Admission Reviewed    Imaging:    Imaging Reports Reviewed Today Include: NA  Imaging Personally Reviewed by Myself Includes:  NA    Recent Cultures (last 7 days):     Results from last 7 days   Lab Units 10/07/19  2150 10/07/19  1910 10/07/19  1718   BLOOD CULTURE   --  No Growth at 72 hrs  No Growth at 72 hrs     URINE CULTURE  >100,000 cfu/ml Corynebacterium riegelii*  --   --        Last 24 Hours Medication List:     Current Facility-Administered Medications:  acetaminophen 650 mg Oral Q6H PRN Kaitlynn Damico, CRNP    belladonna-opium 30 mg Rectal Q8H PRN Kaitlynn Damico, CRNP    bisacodyl 10 mg Rectal Daily PRN Kaitlynn Damico, CRNP    calcium carbonate 1,000 mg Oral Daily PRN Kaitlynn Damico, CRNP    cefTRIAXone 1,000 mg Intravenous Q24H Kaitlynnjuliana Damico, CRNP Last Rate: 1,000 mg (10/11/19 0001)   cloNIDine 0 1 mg Oral HS Laura Rizo MD    donepezil 5 mg Oral HS Kaitlynnjuliana Damico, CRNP    HYDROmorphone 0 5 mg Intravenous Q3H PRN Kaitlynn Damico, CRNP    labetalol 200 mg Oral HS Laura Rizo MD    ondansetron 4 mg Intravenous Q6H PRN Kaitlynn Damico, CRNP    oxybutynin 5 mg Oral TID Kaitlynnjuliana Damico, CRNP    pravastatin 40 mg Oral Daily With Deepak Kowalski, CRNP    sodium bicarbonate 650 mg Oral TID after meals Laura Rizo MD    tamsulosin 0 4 mg Oral Daily With [address] [place], [place] [address]TAMRA         Today, Patient Was Seen By: LUCHO Aguiar      ** Please Note: Dictation voice to text software may have been used in the creation of this document   **

## 2019-10-11 NOTE — TELEPHONE ENCOUNTER
Estella Lomeli Case Management calling on behalf of pt's daughter to see if appointment can be moved up sooner

## 2019-10-11 NOTE — SOCIAL WORK
Pt's dtr Amberly Dockery) requested to speak to CM regarding pt's continuity of care  Pt's OP cysto procedure was scheduled for 11/8/2019 @ 1:00 PM  Mireya Bradley has concerns as to why OP procedure can not be completed until almost 4 weeks out from now and that it is only for the scope - not for the procedure itself as the results of the scope will determine need for procedure which will then prolong treatment  CM reached out to [address] [place] [address] Urology at [phone]  Nurse unable to provide any further information  CM waiting callback from [address] [place] [address]

## 2019-10-11 NOTE — DISCHARGE INSTRUCTIONS
PER UROLOGY  Cath Care instructions---Maintain catheter to straight drainage  May use leg bag and shower  May flush daily prn using Nallely syringe and 120 ml NSS  May use more saline ad kang to prevent/treat cath obstruction  Urinary Catheter can remain in place for up to 4--6 weeks at a time, if necessary  Do not remove  Urology office staff will contact patient/caregiver with hospital follow-up appointment date and time once discharged  Catheter will be handled at this time    Catheter placed at [address] [place] [address] on  10/07/2019

## 2019-10-11 NOTE — PLAN OF CARE
Problem: Potential for Falls  Goal: Patient will remain free of falls  Description  INTERVENTIONS:  - Assess patient frequently for physical needs  -  Identify cognitive and physical deficits and behaviors that affect risk of falls    -  [place] fall precautions as indicated by assessment   - Educate patient/family on patient safety including physical limitations  - Instruct patient to call for assistance with activity based on assessment  - Modify environment to reduce risk of injury  - Consider OT/PT consult to assist with strengthening/mobility  Outcome: Progressing     Problem: Prexisting or High Potential for Compromised Skin Integrity  Goal: Skin integrity is maintained or improved  Description  INTERVENTIONS:  - Identify patients at risk for skin breakdown  - Assess and monitor skin integrity  - Assess and monitor nutrition and hydration status  - Monitor labs   - Assess for incontinence   - Turn and reposition patient  - Assist with mobility/ambulation  - Relieve pressure over bony prominences  - Avoid friction and shearing  - Provide appropriate hygiene as needed including keeping skin clean and dry  - Evaluate need for skin moisturizer/barrier cream  - Collaborate with interdisciplinary team   - Patient/family teaching  - Consider wound care consult   Outcome: Progressing     Problem: PAIN - ADULT  Goal: Verbalizes/displays adequate comfort level or baseline comfort level  Description  Interventions:  - Encourage patient to monitor pain and request assistance  - Assess pain using appropriate pain scale  - Administer analgesics based on type and severity of pain and evaluate response  - Implement non-pharmacological measures as appropriate and evaluate response  - Consider cultural and social influences on pain and pain management  - Notify physician/advanced practitioner if interventions unsuccessful or patient reports new pain  Outcome: Progressing     Problem: SAFETY ADULT  Goal: Patient will remain free of falls  Description  INTERVENTIONS:  - Assess patient frequently for physical needs  -  Identify cognitive and physical deficits and behaviors that affect risk of falls    -  [place] fall precautions as indicated by assessment   - Educate patient/family on patient safety including physical limitations  - Instruct patient to call for assistance with activity based on assessment  - Modify environment to reduce risk of injury  - Consider OT/PT consult to assist with strengthening/mobility  Outcome: Progressing  Goal: Maintain or return to baseline ADL function  Description  INTERVENTIONS:  -  Assess patient's ability to carry out ADLs; assess patient's baseline for ADL function and identify physical deficits which impact ability to perform ADLs (bathing, care of mouth/teeth, toileting, grooming, dressing, etc )  - Assess/evaluate cause of self-care deficits   - Assess range of motion  - Assess patient's mobility; develop plan if impaired  - Assess patient's need for assistive devices and provide as appropriate  - Encourage maximum independence but intervene and supervise when necessary  - Involve family in performance of ADLs  - Assess for home care needs following discharge   - Consider OT consult to assist with ADL evaluation and planning for discharge  - Provide patient education as appropriate  Outcome: Progressing  Goal: Maintain or return mobility status to optimal level  Description  INTERVENTIONS:  - Assess patient's baseline mobility status (ambulation, transfers, stairs, etc )    - Identify cognitive and physical deficits and behaviors that affect mobility  - Identify mobility aids required to assist with transfers and/or ambulation (gait belt, sit-to-stand, lift, walker, cane, etc )  - [place] fall precautions as indicated by assessment  - Record patient progress and toleration of activity level on Mobility SBAR; progress patient to next Phase/Stage  - Instruct patient to call for assistance with activity based on assessment  - Consider rehabilitation consult to assist with strengthening/weightbearing, etc   Outcome: Progressing     Problem: DISCHARGE PLANNING  Goal: Discharge to home or other facility with appropriate resources  Description  INTERVENTIONS:  - Identify barriers to discharge w/patient and caregiver  - Arrange for needed discharge resources and transportation as appropriate  - Identify discharge learning needs (meds, wound care, etc )  - Arrange for interpretive services to assist at discharge as needed  - Refer to Case Management Department for coordinating discharge planning if the patient needs post-hospital services based on physician/advanced practitioner order or complex needs related to functional status, cognitive ability, or social support system  Outcome: Progressing     Problem: Knowledge Deficit  Goal: Patient/family/caregiver demonstrates understanding of disease process, treatment plan, medications, and discharge instructions  Description  Complete learning assessment and assess knowledge base    Interventions:  - Provide teaching at level of understanding  - Provide teaching via preferred learning methods  Outcome: Progressing     Problem: GENITOURINARY - ADULT  Goal: Maintains or returns to baseline urinary function  Description  INTERVENTIONS:  - Assess urinary function  - Encourage oral fluids to ensure adequate hydration if ordered  - Administer IV fluids as ordered to ensure adequate hydration  - Administer ordered medications as needed  - Offer frequent toileting  - Follow urinary retention protocol if ordered  Outcome: Progressing  Goal: Absence of urinary retention  Description  INTERVENTIONS:  - Assess patients ability to void and empty bladder  - Monitor I/O  - Bladder scan as needed  - Discuss with physician/AP medications to alleviate retention as needed  - Discuss catheterization for long term situations as appropriate  Outcome: Progressing  Goal: Urinary catheter remains patent  Description  INTERVENTIONS:  - Assess patency of urinary catheter  - If patient has a chronic soto, consider changing catheter if non-functioning  - Follow guidelines for intermittent irrigation of non-functioning urinary catheter  Outcome: Progressing     Problem: INFECTION - ADULT  Goal: Absence or prevention of progression during hospitalization  Description  INTERVENTIONS:  - Assess and monitor for signs and symptoms of infection  - Monitor lab/diagnostic results  - Monitor all insertion sites, i e  indwelling lines, tubes, and drains  - Monitor endotracheal if appropriate and nasal secretions for changes in amount and color  - [place] appropriate cooling/warming therapies per order  - Administer medications as ordered  - Instruct and encourage patient and family to use good hand hygiene technique  - Identify and instruct in appropriate isolation precautions for identified infection/condition  Outcome: Progressing

## 2019-10-11 NOTE — ASSESSMENT & PLAN NOTE
urine culture + for corynebacterium   Blood cultures negative, afebrile and hemodynamically stable  On ceftriaxone- Day 5

## 2019-10-11 NOTE — PLAN OF CARE
Problem: Potential for Falls  Goal: Patient will remain free of falls  Description  INTERVENTIONS:  - Assess patient frequently for physical needs  -  Identify cognitive and physical deficits and behaviors that affect risk of falls    -  [place] fall precautions as indicated by assessment   - Educate patient/family on patient safety including physical limitations  - Instruct patient to call for assistance with activity based on assessment  - Modify environment to reduce risk of injury  - Consider OT/PT consult to assist with strengthening/mobility  Outcome: Progressing     Problem: Prexisting or High Potential for Compromised Skin Integrity  Goal: Skin integrity is maintained or improved  Description  INTERVENTIONS:  - Identify patients at risk for skin breakdown  - Assess and monitor skin integrity  - Assess and monitor nutrition and hydration status  - Monitor labs   - Assess for incontinence   - Turn and reposition patient  - Assist with mobility/ambulation  - Relieve pressure over bony prominences  - Avoid friction and shearing  - Provide appropriate hygiene as needed including keeping skin clean and dry  - Evaluate need for skin moisturizer/barrier cream  - Collaborate with interdisciplinary team   - Patient/family teaching  - Consider wound care consult   Outcome: Progressing     Problem: PAIN - ADULT  Goal: Verbalizes/displays adequate comfort level or baseline comfort level  Description  Interventions:  - Encourage patient to monitor pain and request assistance  - Assess pain using appropriate pain scale  - Administer analgesics based on type and severity of pain and evaluate response  - Implement non-pharmacological measures as appropriate and evaluate response  - Consider cultural and social influences on pain and pain management  - Notify physician/advanced practitioner if interventions unsuccessful or patient reports new pain  Outcome: Progressing     Problem: SAFETY ADULT  Goal: Patient will remain free of falls  Description  INTERVENTIONS:  - Assess patient frequently for physical needs  -  Identify cognitive and physical deficits and behaviors that affect risk of falls    -  [place] fall precautions as indicated by assessment   - Educate patient/family on patient safety including physical limitations  - Instruct patient to call for assistance with activity based on assessment  - Modify environment to reduce risk of injury  - Consider OT/PT consult to assist with strengthening/mobility  Outcome: Progressing  Goal: Maintain or return to baseline ADL function  Description  INTERVENTIONS:  -  Assess patient's ability to carry out ADLs; assess patient's baseline for ADL function and identify physical deficits which impact ability to perform ADLs (bathing, care of mouth/teeth, toileting, grooming, dressing, etc )  - Assess/evaluate cause of self-care deficits   - Assess range of motion  - Assess patient's mobility; develop plan if impaired  - Assess patient's need for assistive devices and provide as appropriate  - Encourage maximum independence but intervene and supervise when necessary  - Involve family in performance of ADLs  - Assess for home care needs following discharge   - Consider OT consult to assist with ADL evaluation and planning for discharge  - Provide patient education as appropriate  Outcome: Progressing  Goal: Maintain or return mobility status to optimal level  Description  INTERVENTIONS:  - Assess patient's baseline mobility status (ambulation, transfers, stairs, etc )    - Identify cognitive and physical deficits and behaviors that affect mobility  - Identify mobility aids required to assist with transfers and/or ambulation (gait belt, sit-to-stand, lift, walker, cane, etc )  - [place] fall precautions as indicated by assessment  - Record patient progress and toleration of activity level on Mobility SBAR; progress patient to next Phase/Stage  - Instruct patient to call for assistance with activity based on assessment  - Consider rehabilitation consult to assist with strengthening/weightbearing, etc   Outcome: Progressing     Problem: DISCHARGE PLANNING  Goal: Discharge to home or other facility with appropriate resources  Description  INTERVENTIONS:  - Identify barriers to discharge w/patient and caregiver  - Arrange for needed discharge resources and transportation as appropriate  - Identify discharge learning needs (meds, wound care, etc )  - Arrange for interpretive services to assist at discharge as needed  - Refer to Case Management Department for coordinating discharge planning if the patient needs post-hospital services based on physician/advanced practitioner order or complex needs related to functional status, cognitive ability, or social support system  Outcome: Progressing     Problem: Knowledge Deficit  Goal: Patient/family/caregiver demonstrates understanding of disease process, treatment plan, medications, and discharge instructions  Description  Complete learning assessment and assess knowledge base    Interventions:  - Provide teaching at level of understanding  - Provide teaching via preferred learning methods  Outcome: Progressing     Problem: GENITOURINARY - ADULT  Goal: Maintains or returns to baseline urinary function  Description  INTERVENTIONS:  - Assess urinary function  - Encourage oral fluids to ensure adequate hydration if ordered  - Administer IV fluids as ordered to ensure adequate hydration  - Administer ordered medications as needed  - Offer frequent toileting  - Follow urinary retention protocol if ordered  Outcome: Progressing  Goal: Absence of urinary retention  Description  INTERVENTIONS:  - Assess patients ability to void and empty bladder  - Monitor I/O  - Bladder scan as needed  - Discuss with physician/AP medications to alleviate retention as needed  - Discuss catheterization for long term situations as appropriate  Outcome: Progressing  Goal: Urinary catheter remains patent  Description  INTERVENTIONS:  - Assess patency of urinary catheter  - If patient has a chronic soto, consider changing catheter if non-functioning  - Follow guidelines for intermittent irrigation of non-functioning urinary catheter  Outcome: Progressing     Problem: INFECTION - ADULT  Goal: Absence or prevention of progression during hospitalization  Description  INTERVENTIONS:  - Assess and monitor for signs and symptoms of infection  - Monitor lab/diagnostic results  - Monitor all insertion sites, i e  indwelling lines, tubes, and drains  - Monitor endotracheal if appropriate and nasal secretions for changes in amount and color  - [place] appropriate cooling/warming therapies per order  - Administer medications as ordered  - Instruct and encourage patient and family to use good hand hygiene technique  - Identify and instruct in appropriate isolation precautions for identified infection/condition  Outcome: Progressing

## 2019-10-11 NOTE — PROGRESS NOTES
NEPHROLOGY PROGRESS NOTE    Patient: Shilpi De Leon               Sex: male          DOA: 10/7/2019  4:48 PM   Date of Birth: @        Age: @        LOS:  LOS: 4 days   10/11/2019    REASON FOR THE CONSULTATION:  Further management of MILE  HPI     This is a 67 y o  male admitted for Urinary retention     SUBJECTIVE     - currently breathing is stable and patient is also found to be nonoliguric overnight  Patient denies nausea, vomiting, headache or dizziness today    - Reviewed last 24 hrs events     CURRENT MEDICATIONS       Current Facility-Administered Medications:     acetaminophen (TYLENOL) tablet 650 mg, 650 mg, Oral, Q6H PRN, TAMRA Monzon    belladonna-opium (B&O SUPPOSITORY) 16 2-30 mg suppository 1 suppository, 30 mg, Rectal, Q8H PRN, TAMRA Monzon, 1 suppository at 10/10/19 0259    bisacodyl (DULCOLAX) rectal suppository 10 mg, 10 mg, Rectal, Daily PRN, TAMRA Monzon    calcium carbonate (TUMS) chewable tablet 1,000 mg, 1,000 mg, Oral, Daily PRN, TAMRA Monzon    cefTRIAXone (ROCEPHIN) 1,000 mg in dextrose 5 % 50 mL IVPB, 1,000 mg, Intravenous, Q24H, TAMRA Nunes, Last Rate: 100 mL/hr at 10/11/19 0001, 1,000 mg at 10/11/19 0001    cloNIDine (CATAPRES) tablet 0 1 mg, 0 1 mg, Oral, HS, Zackary Abbasi MD, 0 1 mg at 10/10/19 2303    donepezil (ARICEPT) tablet 5 mg, 5 mg, Oral, HS, CAMRYN MonzonNP, 5 mg at 10/10/19 2304    HYDROmorphone (DILAUDID) injection 0 5 mg, 0 5 mg, Intravenous, Q3H PRN, TAMRA Monzon, 0 5 mg at 10/10/19 0635    labetalol (NORMODYNE) tablet 200 mg, 200 mg, Oral, HS, Zackary Abbasi MD, 200 mg at 10/10/19 2303    ondansetron (ZOFRAN) injection 4 mg, 4 mg, Intravenous, Q6H PRN, TAMRA Monzon    oxybutynin (DITROPAN) tablet 5 mg, 5 mg, Oral, TID, TAMRA Monzon, 5 mg at 10/11/19 0846    pravastatin (PRAVACHOL) tablet 40 mg, 40 mg, Oral, Daily With TAMRA Baird, 40 mg at 10/10/19 1532    sodium bicarbonate tablet 650 mg, 650 mg, Oral, TID after meals, Yin Fry MD, 650 mg at 10/11/19 1141    tamsulosin (FLOMAX) capsule 0 4 mg, 0 4 mg, Oral, Daily With TAMRA Seay, 0 4 mg at 10/10/19 1532    OBJECTIVE     Current Weight: Weight - Scale: 90 9 kg (200 lb 6 4 oz)  Vitals:    10/11/19 0800   BP: 130/64   Pulse: 82   Resp:    Temp:    SpO2: 93%     Body mass index is 27 18 kg/m²  Intake/Output Summary (Last 24 hours) at 10/11/2019 1146  Last data filed at 10/11/2019 0601  Gross per 24 hour   Intake 10 ml   Output 2450 ml   Net -2440 ml       PHYSICAL EXAMINATION     Physical Exam   Constitutional: No distress  HENT:   Head: Normocephalic and atraumatic  Eyes: No scleral icterus  Neck: Neck supple  No JVD present  Cardiovascular: Normal rate  Pulmonary/Chest: No accessory muscle usage  No respiratory distress  He has no wheezes  Abdominal: Soft  He exhibits no distension  Musculoskeletal:        Right hand: He exhibits no laceration  Left hand: He exhibits no laceration  Neurological: He is alert  Skin: Skin is warm  No cyanosis  Psychiatric: He is not combative  He expresses no suicidal ideation           LAB RESULTS     Results from last 7 days   Lab Units 10/11/19  0508 10/10/19  0557 10/09/19  2032 10/09/19  0608 10/08/19  1915 10/08/19  0545 10/07/19  1718   WBC Thousand/uL 8 04 7 16 8 89 11 59* 11 54* 10 90* 13 10*   HEMOGLOBIN g/dL 9 3* 10 0* 9 6* 10 3* 12 2 13 4 15 1   HEMATOCRIT % 29 0* 31 3* 29 5* 32 7* 38 2 41 1 45 8   PLATELETS Thousands/uL 158 147* 146* 148* 155 165 197   SODIUM mmol/L 137 138  --  138  --  143 141   POTASSIUM mmol/L 4 5 4 1  --  5 1  --  4 3 5 1   CHLORIDE mmol/L 106 107  --  107  --  109* 105   CO2 mmol/L 18* 18*  --  15*  --  18* 19*   BUN mg/dL 55* 69*  --  76*  --  73* 70*   CREATININE mg/dL 2 70* 3 62*  --  4 39*  --  4 89* 4 74*   EGFR ml/min/1 73sq m 25 18  --  14  --  12 13   CALCIUM mg/dL 8 3 8 0*  --  7 9* --  8 6 9 6   MAGNESIUM mg/dL  --   --   --   --   --  2 5 2 5   PHOSPHORUS mg/dL  --   --   --  5 7*  --   --   --        I have personally reviewed the old medical records and patient's previously known baseline creatinine level is ~ unknown    RADIOLOGY RESULTS      CT chest abdomen pelvis wo contrast   Final Result by Latonia Boo MD (10/07 2114)   No acute pulmonary disease  Cholelithiasis without cholecystitis  Prostatomegaly with prostatic calcifications and moderately distended bladder which demonstrates circumferential bladder wall thickening and perivesicular fat stranding  Findings are consistent with cystitis  Correlate for bladder outlet obstruction  Moderately distended ureters and renal pelvices with with mild periureteral and perinephric stranding  Findings suggest upper urinary tract infection  Please note that pyelonephritis is difficult to exclude without contrast       Left lower pole renal cyst   No renal calculi  I personally discussed this study with Mary Ann Wade on 10/7/2019 at 9:14 PM                      Workstation performed: [id number]         XR chest 2 views   Final Result by Chris Aceves MD (10/08 9221)      No acute cardiopulmonary disease  Cardiomegaly  Workstation performed: [id number]         US kidney and bladder    (Results Pending)       PLAN / RECOMMENDATIONS      1  MILE  Present on admission  Most likely secondary to bladder outlet obstruction secondary to and large prostate  Currently patient is nonoliguric and also have indwelling Cruz catheter in place and also been followed by urology team   Upon review of old medical records patient's baseline creatinine level is unknown but overnight renal function has improved to current creatinine of 2 7  Patient was earlier receiving IV fluid which is now off    Patient has voided 3 L of urine in last 24 hours and advised patient to drink plenty of water to ensure staying well hydrated  Monitor renal function while in the hospital     2  Metabolic acidosis  Multifactorial and suspected due to MILE  Started on sodium bicarb supplementation 650 mg PO TID yesterday and current bicarb is 18 which is still below the goal   Plan to continue current bicarb supplementation and monitor bicarb level while the hospital      3  Hypertension  Essential, currently blood pressure is under well control and monitor hypertension with clonidine 0 1 mg PO q h as   Along with labetalol 200 mg PO q h as     4  Anemia  Multifactorial, current hemoglobin is 9 3  Monitor hemoglobin level and consider blood transfusion if hemoglobin level drops below 7     5  Azotemia  Multifactorial, slowly improving with current BUN of 55  Patient is asymptomatic from azotemia perspective  Monitor BUN level  Overall above mentioned plan was also d/w Gypsy Fine MD  Nephrology  10/11/2019        Portions of the record may have been created with voice recognition software  Occasional wrong word or "sound a like" substitutions may have occurred due to the inherent limitations of voice recognition software  Read the chart carefully and recognize, using context, where substitutions have occurred

## 2019-10-12 PROBLEM — N40.0 BPH (BENIGN PROSTATIC HYPERPLASIA): Status: ACTIVE | Noted: 2019-10-12

## 2019-10-12 PROBLEM — R26.2 AMBULATORY DYSFUNCTION: Status: ACTIVE | Noted: 2019-10-12

## 2019-10-12 LAB
ANION GAP SERPL CALCULATED.3IONS-SCNC: 11 MMOL/L (ref 4–13)
BACTERIA BLD CULT: NORMAL
BACTERIA BLD CULT: NORMAL
BASOPHILS # BLD AUTO: 0.04 THOUSANDS/ΜL (ref 0–0.1)
BASOPHILS NFR BLD AUTO: 1 % (ref 0–1)
BUN SERPL-MCNC: 40 MG/DL (ref 5–25)
CALCIUM SERPL-MCNC: 8.5 MG/DL (ref 8.3–10.1)
CHLORIDE SERPL-SCNC: 106 MMOL/L (ref 100–108)
CO2 SERPL-SCNC: 22 MMOL/L (ref 21–32)
CREAT SERPL-MCNC: 2.32 MG/DL (ref 0.6–1.3)
EOSINOPHIL # BLD AUTO: 0.39 THOUSAND/ΜL (ref 0–0.61)
EOSINOPHIL NFR BLD AUTO: 5 % (ref 0–6)
ERYTHROCYTE [DISTWIDTH] IN BLOOD BY AUTOMATED COUNT: 13.7 % (ref 11.6–15.1)
GFR SERPL CREATININE-BSD FRML MDRD: 30 ML/MIN/1.73SQ M
GLUCOSE SERPL-MCNC: 118 MG/DL (ref 65–140)
GLUCOSE SERPL-MCNC: 126 MG/DL (ref 65–140)
GLUCOSE SERPL-MCNC: 129 MG/DL (ref 65–140)
GLUCOSE SERPL-MCNC: 131 MG/DL (ref 65–140)
GLUCOSE SERPL-MCNC: 131 MG/DL (ref 65–140)
HCT VFR BLD AUTO: 30.5 % (ref 36.5–49.3)
HGB BLD-MCNC: 9.8 G/DL (ref 12–17)
IMM GRANULOCYTES # BLD AUTO: 0.05 THOUSAND/UL (ref 0–0.2)
IMM GRANULOCYTES NFR BLD AUTO: 1 % (ref 0–2)
LYMPHOCYTES # BLD AUTO: 1.09 THOUSANDS/ΜL (ref 0.6–4.47)
LYMPHOCYTES NFR BLD AUTO: 14 % (ref 14–44)
MCH RBC QN AUTO: 29.3 PG (ref 26.8–34.3)
MCHC RBC AUTO-ENTMCNC: 32.1 G/DL (ref 31.4–37.4)
MCV RBC AUTO: 91 FL (ref 82–98)
MONOCYTES # BLD AUTO: 0.83 THOUSAND/ΜL (ref 0.17–1.22)
MONOCYTES NFR BLD AUTO: 11 % (ref 4–12)
NEUTROPHILS # BLD AUTO: 5.18 THOUSANDS/ΜL (ref 1.85–7.62)
NEUTS SEG NFR BLD AUTO: 68 % (ref 43–75)
NRBC BLD AUTO-RTO: 0 /100 WBCS
PLATELET # BLD AUTO: 187 THOUSANDS/UL (ref 149–390)
PMV BLD AUTO: 11.1 FL (ref 8.9–12.7)
POTASSIUM SERPL-SCNC: 4.8 MMOL/L (ref 3.5–5.3)
RBC # BLD AUTO: 3.34 MILLION/UL (ref 3.88–5.62)
SODIUM SERPL-SCNC: 139 MMOL/L (ref 136–145)
WBC # BLD AUTO: 7.58 THOUSAND/UL (ref 4.31–10.16)

## 2019-10-12 PROCEDURE — G8979 MOBILITY GOAL STATUS: HCPCS

## 2019-10-12 PROCEDURE — 99233 SBSQ HOSP IP/OBS HIGH 50: CPT | Performed by: INTERNAL MEDICINE

## 2019-10-12 PROCEDURE — 82948 REAGENT STRIP/BLOOD GLUCOSE: CPT

## 2019-10-12 PROCEDURE — G8978 MOBILITY CURRENT STATUS: HCPCS

## 2019-10-12 PROCEDURE — 97163 PT EVAL HIGH COMPLEX 45 MIN: CPT

## 2019-10-12 PROCEDURE — 80048 BASIC METABOLIC PNL TOTAL CA: CPT | Performed by: INTERNAL MEDICINE

## 2019-10-12 PROCEDURE — 85025 COMPLETE CBC W/AUTO DIFF WBC: CPT | Performed by: INTERNAL MEDICINE

## 2019-10-12 RX ORDER — DOCUSATE SODIUM 100 MG/1
100 CAPSULE, LIQUID FILLED ORAL 2 TIMES DAILY
Status: DISCONTINUED | OUTPATIENT
Start: 2019-10-12 | End: 2019-10-16 | Stop reason: HOSPADM

## 2019-10-12 RX ORDER — POLYETHYLENE GLYCOL 3350 17 G/17G
17 POWDER, FOR SOLUTION ORAL DAILY
Status: DISCONTINUED | OUTPATIENT
Start: 2019-10-12 | End: 2019-10-16 | Stop reason: HOSPADM

## 2019-10-12 RX ADMIN — SODIUM BICARBONATE 650 MG TABLET 650 MG: at 18:11

## 2019-10-12 RX ADMIN — DONEPEZIL HYDROCHLORIDE 5 MG: 5 TABLET ORAL at 21:18

## 2019-10-12 RX ADMIN — TAMSULOSIN HYDROCHLORIDE 0.4 MG: 0.4 CAPSULE ORAL at 16:02

## 2019-10-12 RX ADMIN — CEFTRIAXONE SODIUM 1000 MG: 10 INJECTION, POWDER, FOR SOLUTION INTRAVENOUS at 00:43

## 2019-10-12 RX ADMIN — DOCUSATE SODIUM 100 MG: 100 CAPSULE, LIQUID FILLED ORAL at 18:11

## 2019-10-12 RX ADMIN — OXYBUTYNIN CHLORIDE 5 MG: 5 TABLET ORAL at 15:59

## 2019-10-12 RX ADMIN — SODIUM BICARBONATE 650 MG TABLET 650 MG: at 13:24

## 2019-10-12 RX ADMIN — CLONIDINE HYDROCHLORIDE 0.1 MG: 0.1 TABLET ORAL at 21:18

## 2019-10-12 RX ADMIN — PRAVASTATIN SODIUM 40 MG: 40 TABLET ORAL at 16:03

## 2019-10-12 RX ADMIN — LABETALOL HCL 200 MG: 200 TABLET, FILM COATED ORAL at 21:18

## 2019-10-12 RX ADMIN — SODIUM BICARBONATE 650 MG TABLET 650 MG: at 09:06

## 2019-10-12 RX ADMIN — OXYBUTYNIN CHLORIDE 5 MG: 5 TABLET ORAL at 09:05

## 2019-10-12 RX ADMIN — CEFTRIAXONE SODIUM 1000 MG: 10 INJECTION, POWDER, FOR SOLUTION INTRAVENOUS at 23:31

## 2019-10-12 RX ADMIN — OXYBUTYNIN CHLORIDE 5 MG: 5 TABLET ORAL at 21:18

## 2019-10-12 NOTE — PLAN OF CARE
Problem: Potential for Falls  Goal: Patient will remain free of falls  Description  INTERVENTIONS:  - Assess patient frequently for physical needs  -  Identify cognitive and physical deficits and behaviors that affect risk of falls    -  [place] fall precautions as indicated by assessment   - Educate patient/family on patient safety including physical limitations  - Instruct patient to call for assistance with activity based on assessment  - Modify environment to reduce risk of injury  - Consider OT/PT consult to assist with strengthening/mobility  Outcome: Progressing     Problem: Prexisting or High Potential for Compromised Skin Integrity  Goal: Skin integrity is maintained or improved  Description  INTERVENTIONS:  - Identify patients at risk for skin breakdown  - Assess and monitor skin integrity  - Assess and monitor nutrition and hydration status  - Monitor labs   - Assess for incontinence   - Turn and reposition patient  - Assist with mobility/ambulation  - Relieve pressure over bony prominences  - Avoid friction and shearing  - Provide appropriate hygiene as needed including keeping skin clean and dry  - Evaluate need for skin moisturizer/barrier cream  - Collaborate with interdisciplinary team   - Patient/family teaching  - Consider wound care consult   Outcome: Progressing     Problem: PAIN - ADULT  Goal: Verbalizes/displays adequate comfort level or baseline comfort level  Description  Interventions:  - Encourage patient to monitor pain and request assistance  - Assess pain using appropriate pain scale  - Administer analgesics based on type and severity of pain and evaluate response  - Implement non-pharmacological measures as appropriate and evaluate response  - Consider cultural and social influences on pain and pain management  - Notify physician/advanced practitioner if interventions unsuccessful or patient reports new pain  Outcome: Progressing     Problem: SAFETY ADULT  Goal: Patient will remain free of falls  Description  INTERVENTIONS:  - Assess patient frequently for physical needs  -  Identify cognitive and physical deficits and behaviors that affect risk of falls    -  [place] fall precautions as indicated by assessment   - Educate patient/family on patient safety including physical limitations  - Instruct patient to call for assistance with activity based on assessment  - Modify environment to reduce risk of injury  - Consider OT/PT consult to assist with strengthening/mobility  Outcome: Progressing  Goal: Maintain or return to baseline ADL function  Description  INTERVENTIONS:  -  Assess patient's ability to carry out ADLs; assess patient's baseline for ADL function and identify physical deficits which impact ability to perform ADLs (bathing, care of mouth/teeth, toileting, grooming, dressing, etc )  - Assess/evaluate cause of self-care deficits   - Assess range of motion  - Assess patient's mobility; develop plan if impaired  - Assess patient's need for assistive devices and provide as appropriate  - Encourage maximum independence but intervene and supervise when necessary  - Involve family in performance of ADLs  - Assess for home care needs following discharge   - Consider OT consult to assist with ADL evaluation and planning for discharge  - Provide patient education as appropriate  Outcome: Progressing  Goal: Maintain or return mobility status to optimal level  Description  INTERVENTIONS:  - Assess patient's baseline mobility status (ambulation, transfers, stairs, etc )    - Identify cognitive and physical deficits and behaviors that affect mobility  - Identify mobility aids required to assist with transfers and/or ambulation (gait belt, sit-to-stand, lift, walker, cane, etc )  - [place] fall precautions as indicated by assessment  - Record patient progress and toleration of activity level on Mobility SBAR; progress patient to next Phase/Stage  - Instruct patient to call for assistance with activity based on assessment  - Consider rehabilitation consult to assist with strengthening/weightbearing, etc   Outcome: Progressing     Problem: DISCHARGE PLANNING  Goal: Discharge to home or other facility with appropriate resources  Description  INTERVENTIONS:  - Identify barriers to discharge w/patient and caregiver  - Arrange for needed discharge resources and transportation as appropriate  - Identify discharge learning needs (meds, wound care, etc )  - Arrange for interpretive services to assist at discharge as needed  - Refer to Case Management Department for coordinating discharge planning if the patient needs post-hospital services based on physician/advanced practitioner order or complex needs related to functional status, cognitive ability, or social support system  Outcome: Progressing     Problem: Knowledge Deficit  Goal: Patient/family/caregiver demonstrates understanding of disease process, treatment plan, medications, and discharge instructions  Description  Complete learning assessment and assess knowledge base    Interventions:  - Provide teaching at level of understanding  - Provide teaching via preferred learning methods  Outcome: Progressing     Problem: GENITOURINARY - ADULT  Goal: Maintains or returns to baseline urinary function  Description  INTERVENTIONS:  - Assess urinary function  - Encourage oral fluids to ensure adequate hydration if ordered  - Administer IV fluids as ordered to ensure adequate hydration  - Administer ordered medications as needed  - Offer frequent toileting  - Follow urinary retention protocol if ordered  Outcome: Progressing  Goal: Absence of urinary retention  Description  INTERVENTIONS:  - Assess patients ability to void and empty bladder  - Monitor I/O  - Bladder scan as needed  - Discuss with physician/AP medications to alleviate retention as needed  - Discuss catheterization for long term situations as appropriate  Outcome: Progressing  Goal: Urinary catheter remains patent  Description  INTERVENTIONS:  - Assess patency of urinary catheter  - If patient has a chronic soto, consider changing catheter if non-functioning  - Follow guidelines for intermittent irrigation of non-functioning urinary catheter  Outcome: Progressing     Problem: INFECTION - ADULT  Goal: Absence or prevention of progression during hospitalization  Description  INTERVENTIONS:  - Assess and monitor for signs and symptoms of infection  - Monitor lab/diagnostic results  - Monitor all insertion sites, i e  indwelling lines, tubes, and drains  - Monitor endotracheal if appropriate and nasal secretions for changes in amount and color  - [place] appropriate cooling/warming therapies per order  - Administer medications as ordered  - Instruct and encourage patient and family to use good hand hygiene technique  - Identify and instruct in appropriate isolation precautions for identified infection/condition  Outcome: Progressing

## 2019-10-12 NOTE — ASSESSMENT & PLAN NOTE
CT of the abdomen     IMPRESSION:  No acute pulmonary disease      Cholelithiasis without cholecystitis      Prostatomegaly with prostatic calcifications and moderately distended bladder which demonstrates circumferential bladder wall thickening and perivesicular fat stranding  Findings are consistent with cystitis  Correlate for bladder outlet obstruction  Moderately distended ureters and renal pelvices with with mild periureteral and perinephric stranding  Findings suggest upper urinary tract infection  Please note that pyelonephritis is difficult to exclude without contrast   Left lower pole renal cyst   No renal calculi  Resolved  CBI was Discontinued  Urology note reviewed recommendations appreciated    As per Urology will be discharged on Cruz catheter in place and have outpatient follow-up in 3 weeks for cystoscopy and further evaluation of prostate enlargement

## 2019-10-12 NOTE — ASSESSMENT & PLAN NOTE
CT scan of the abdomen demonstrated severe prostatomegaly with calcifications, over distention of urinary bladder, bladder thickening  Repeat ultrasound of the kidneys and bladder did not demonstrate any hydronephrosis  Outpatient follow-up needed for cystoscopy in rectal ultrasound    Urology recommending to continue Cruz catheter on discharge

## 2019-10-12 NOTE — ASSESSMENT & PLAN NOTE
Acute blood loss anemia in the setting of gross hematuria as evidenced by hgb 15 1>10 0, treated with CBI and frequent monitoring of CBC's  Hb now stable  Hematuria resolved

## 2019-10-12 NOTE — PHYSICAL THERAPY NOTE
Physical Therapy Evaluation     Patient's Name: Harjinder Bautista    Admitting Diagnosis  Urinary retention [R33 9]  Testicular pain [N50 819]  Dyspnea [R06 00]  SOB (shortness of breath) [R06 02]    Problem List  Patient Active Problem List   Diagnosis    Essential hypertension    Pyelonephritis    Other hyperlipidemia    MILE (acute kidney injury) ([facility] [address] )    Hematuria, gross    Metabolic acidosis    Acute blood loss anemia    Azotemia    Ambulatory dysfunction    BPH (benign prostatic hyperplasia)       Past Medical History  Past Medical History:   Diagnosis Date    Hypertension        Past Surgical History  Past Surgical History:   Procedure Laterality Date    COLONOSCOPY      TUMOR REMOVAL            10/12/19 1228   Note Type   Note type Eval only   Pain Assessment   Pain Assessment No/denies pain   Pain Score No Pain   Home Living   Type of [address] [place] [address] Two level;Stairs to enter without rails; Performs ADLs on one level; Able to live on main level with bedroom/bathroom  (1 KEIRY  1st floor set up)   Bathroom Shower/Tub Tub/shower unit   Bathroom Toilet Standard   Bathroom Equipment Commode   Bathroom Accessibility Accessible   Home Equipment Walker;Cane  (rollator)   Additional Comments no AD used at baseline   Prior Function   Level of [place] Independent with ADLs and functional mobility   Lives With Daughter  (and RIAN)   Receives Help From Family   ADL Assistance Independent   IADLs Independent  (pt able to prep meals for himself)   Falls in the last 6 months 0  ((+) near falls)   Vocational Retired   Comments pt home alone when dtr works during the day  (-) driving   Restrictions/Precautions   Weight Bearing Precautions Per Order No   Braces or Orthoses   (none per pt)   Other Precautions Chair Alarm; Bed Alarm;Multiple lines; Fall Risk   General   Family/Caregiver Present Yes   Cognition   Overall Cognitive Status [facility]   Arousal/Participation Alert   Orientation Level Oriented X4   Memory Within functional limits   Following Commands Follows all commands and directions without difficulty   Comments pt agreeable to PT evaluation   RUE Assessment   RUE Assessment WFL   LUE Assessment   LUE Assessment WFL   RLE Assessment   RLE Assessment WFL   LLE Assessment   LLE Assessment WFL   Coordination   Movements are Fluid and Coordinated 1   Sensation WFL   Light Touch   RLE Light Touch Grossly intact   LLE Light Touch Grossly intact   Bed Mobility   Additional Comments pt received OOB to recliner upon arrival   Transfers   Sit to Stand 4  Minimal assistance   Additional items Assist x 1; Increased time required;Verbal cues   Stand to Sit 4  Minimal assistance   Additional items Assist x 1; Increased time required;Verbal cues   Ambulation/Elevation   Gait pattern Decreased foot clearance; Short stride; Step to; Wide OLVIN   Gait Assistance 4  Minimal assist   Additional items Assist x 1;Verbal cues   Assistive Device None  (pt refusing RW)   Distance 15' x2   Stair Management Assistance Not tested   Balance   Static Sitting Good   Dynamic Sitting Fair +   Static Standing Fair   Dynamic Standing Fair -   Ambulatory Fair -   Endurance Deficit   Endurance Deficit Yes   Activity Tolerance   Activity Tolerance Patient limited by fatigue   Medical Staff Karen Bronson and [facility] verbalized pt appropriate to see, made aware of session outcome/recs   Assessment   Prognosis Good   Problem List Decreased strength;Decreased endurance; Impaired balance;Decreased mobility   Assessment Pt is 67 y o  male seen for PT evaluation on 10/12/2019 s/p admit to [facility] on 10/7/2019 w/ Urinary retention  PT consulted to assess pt's functional mobility and d/c needs  Order placed for PT eval and tx, w/ up w/ A order  Performed at least 2 patient identifiers during session: Name and wristband  Comorbidities affecting pt's physical performance at time of assessment include: HTN   PTA, pt was independent w/ all functional mobility w/ no AD/DME, ambulates community distances and elevations, has 1 KEIRY and lives w/ dtr and RIAN in 2 level home (1st floor set up)  Personal factors affecting pt at time of IE include: inaccessible home environment, stairs to enter home, inability to navigate community distances, inability to navigate level surfaces w/o external assistance, limited home support, positive near fall history and decreased initiation and engagement  Please find objective findings from PT assessment regarding body systems outlined above with impairments and limitations including weakness, impaired balance, decreased endurance, gait deviations, decreased activity tolerance and fall risk, as well as mobility assessment (need for x1 assist w/ all phases of mobility when usually ambulating independently and need for cueing for mobility technique)  The following objective measures performed on IE also reveal limitations: Barthel Index: 40/100 and Modified [place]: 4 (moderate/severe disability)  Pt's clinical presentation is currently unstable/unpredictable seen in pt's presentation of abnormal lab value(s), need for input for task focus and mobility technique and ongoing medical assessment  Pt to benefit from continued PT tx to address deficits as defined above and maximize level of functional independent mobility and consistency  From PT/mobility standpoint, recommendation at time of d/c would be STR vs  Home PT pending progress in order to facilitate return to PLOF     Barriers to Discharge Inaccessible home environment;Decreased caregiver support   Barriers to Discharge Comments pt home alone during the day   Goals   Patient Goals to return home   STG Expiration Date 10/22/19   Short Term Goal #1 In 7-10 days: Increase bilateral LE strength 1/2 grade to facilitate independent mobility, Perform all bed mobility tasks modified independent to decrease caregiver burden, Perform all transfers modified independent to improve independence, Ambulate > 100 ft  with least restrictive assistive device modified independent w/o LOB and w/ normalized gait pattern 100% of the time, Navigate 1 stairs modified independent without handrail to facilitate return to previous living environment, Increase all balance 1/2 grade to decrease risk for falls, Tolerate 4 hr OOB to faciliate upright tolerance, Improve Barthel Index score to 60 or greater to facilitate independence and PT provider will perform functional balance assessment to determine fall risk   PT Treatment Day 0   Plan   Treatment/Interventions Functional transfer training;LE strengthening/ROM; Elevations; Therapeutic exercise; Endurance training;Patient/family training;Equipment eval/education; Bed mobility;Gait training;Spoke to nursing   PT Frequency   (3-5x/wk)   Recommendation   Recommendation Short-term skilled PT  (vs HHPT pending progress)   Equipment Recommended   (TBD)   PT - OK to Discharge No   Modified [place] Scale   Modified [place] Scale 4   Barthel Index   Feeding 10   Bathing 0   Grooming Score 0   Dressing Score 5   Bladder Score 0   Bowels Score 10   Toilet Use Score 5   Transfers (Bed/Chair) Score 10   Mobility (Level Surface) Score 0   Stairs Score 0   Barthel Index Score 40         Khoa Helton, PT, DPT

## 2019-10-12 NOTE — ASSESSMENT & PLAN NOTE
CT of the abdomen     IMPRESSION:  No acute pulmonary disease      Cholelithiasis without cholecystitis      Prostatomegaly with prostatic calcifications and moderately distended bladder which demonstrates circumferential bladder wall thickening and perivesicular fat stranding  Findings are consistent with cystitis  Correlate for bladder outlet obstruction  Moderately distended ureters and renal pelvices with with mild periureteral and perinephric stranding  Findings suggest upper urinary tract infection  Please note that pyelonephritis is difficult to exclude without contrast   Left lower pole renal cyst   No renal calculi  Resolved  CVA discontinue  Urology note reviewed recommendations appreciated    As per Urology will be discharged on Cruz catheter in place and have outpatient follow-up in 3 weeks for cystoscopy and further evaluation of prostate enlargement

## 2019-10-12 NOTE — PROGRESS NOTES
NEPHROLOGY PROGRESS NOTE    Patient: Ebenezer Mistry               Sex: male          DOA: 10/7/2019  4:48 PM   Date of Birth: @        Age: @        LOS:  LOS: 5 days   10/12/2019    REASON FOR THE CONSULTATION:  Further management of MILE    HPI     This is a 67 y o  male admitted for Urinary retention     SUBJECTIVE     - remained nonoliguric and breathing is also currently stable and at baseline  Patient denies nausea, vomiting, headache or dizziness today    - Reviewed last 24 hrs events     CURRENT MEDICATIONS       Current Facility-Administered Medications:     acetaminophen (TYLENOL) tablet 650 mg, 650 mg, Oral, Q6H PRN, Cristina Trinidad, CAMRYNNP    belladonna-opium (B&O SUPPOSITORY) 16 2-30 mg suppository 1 suppository, 30 mg, Rectal, Q8H PRN, Cristina Abdi, TAMRA, 1 suppository at 10/10/19 0259    bisacodyl (DULCOLAX) rectal suppository 10 mg, 10 mg, Rectal, Daily PRN, CAMRYN SrivastavaNP    calcium carbonate (TUMS) chewable tablet 1,000 mg, 1,000 mg, Oral, Daily PRN, CAMRYN SrivastavaNP    cefTRIAXone (ROCEPHIN) 1,000 mg in dextrose 5 % 50 mL IVPB, 1,000 mg, Intravenous, Q24H, TAMRA Nunes, Last Rate: 100 mL/hr at 10/12/19 0043, 1,000 mg at 10/12/19 0043    cloNIDine (CATAPRES) tablet 0 1 mg, 0 1 mg, Oral, HS, Thanh Rutledge MD, 0 1 mg at 10/11/19 2100    donepezil (ARICEPT) tablet 5 mg, 5 mg, Oral, HS, CAMRYN SrivastavaNP, 5 mg at 10/11/19 2100    HYDROmorphone (DILAUDID) injection 0 5 mg, 0 5 mg, Intravenous, Q3H PRN, TAMRA Srivastava, 0 5 mg at 10/10/19 0635    labetalol (NORMODYNE) tablet 200 mg, 200 mg, Oral, HS, Thanh Rutledge MD, 200 mg at 10/11/19 2100    ondansetron (ZOFRAN) injection 4 mg, 4 mg, Intravenous, Q6H PRN, TAMRA Srivastava    oxybutynin (DITROPAN) tablet 5 mg, 5 mg, Oral, TID, TAMRA Srivastava, 5 mg at 10/12/19 0905    pravastatin (PRAVACHOL) tablet 40 mg, 40 mg, Oral, Daily With TAMRA Rosen, 40 mg at 10/11/19 1533   sodium bicarbonate tablet 650 mg, 650 mg, Oral, TID after meals, Mandy Metzger MD, 650 mg at 10/12/19 0906    tamsulosin (FLOMAX) capsule 0 4 mg, 0 4 mg, Oral, Daily With TAMRA Carrero, 0 4 mg at 10/11/19 1533    OBJECTIVE     Current Weight: Weight - Scale: 90 9 kg (200 lb 6 4 oz)  Vitals:    10/12/19 0800   BP:    Pulse: 83   Resp:    Temp:    SpO2: 96%     Body mass index is 27 18 kg/m²  Intake/Output Summary (Last 24 hours) at 10/12/2019 1113  Last data filed at 10/12/2019 0611  Gross per 24 hour   Intake    Output 475 ml   Net -475 ml       PHYSICAL EXAMINATION     Physical Exam   Constitutional: No distress  HENT:   Head: Normocephalic and atraumatic  Eyes: No scleral icterus  Neck: Neck supple  No JVD present  Cardiovascular: Normal rate  Pulmonary/Chest: No accessory muscle usage  No respiratory distress  He has no wheezes  Abdominal: Soft  He exhibits no distension  Musculoskeletal:        Right hand: He exhibits no laceration  Left hand: He exhibits no laceration  Neurological: He is alert  Skin: Skin is warm  No cyanosis  Psychiatric: He is not combative  He expresses no suicidal ideation           LAB RESULTS     Results from last 7 days   Lab Units 10/12/19  0617 10/11/19  0508 10/10/19  0557 10/09/19  2032 10/09/19  0608 10/08/19  1915 10/08/19  0545 10/07/19  1718   WBC Thousand/uL 7 58 8 04 7 16 8 89 11 59* 11 54* 10 90* 13 10*   HEMOGLOBIN g/dL 9 8* 9 3* 10 0* 9 6* 10 3* 12 2 13 4 15 1   HEMATOCRIT % 30 5* 29 0* 31 3* 29 5* 32 7* 38 2 41 1 45 8   PLATELETS Thousands/uL 187 158 147* 146* 148* 155 165 197   SODIUM mmol/L 139 137 138  --  138  --  143 141   POTASSIUM mmol/L 4 8 4 5 4 1  --  5 1  --  4 3 5 1   CHLORIDE mmol/L 106 106 107  --  107  --  109* 105   CO2 mmol/L 22 18* 18*  --  15*  --  18* 19*   BUN mg/dL 40* 55* 69*  --  76*  --  73* 70*   CREATININE mg/dL 2 32* 2 70* 3 62*  --  4 39*  --  4 89* 4 74*   EGFR ml/min/1 73sq m 30 25 18  --  14  -- 12 13   CALCIUM mg/dL 8 5 8 3 8 0*  --  7 9*  --  8 6 9 6   MAGNESIUM mg/dL  --   --   --   --   --   --  2 5 2 5   PHOSPHORUS mg/dL  --   --   --   --  5 7*  --   --   --        I have personally reviewed the old medical records and patient's previously known baseline creatinine level is unknown    RADIOLOGY RESULTS      US kidney and bladder   Final Result by Jori Saha MD (10/11 4687)      No evidence of obstruction  Bladder wall thickening likely related to chronic bladder outlet obstruction  Workstation performed: [id number]         CT chest abdomen pelvis wo contrast   Final Result by Jose Peralta MD (10/07 2114)   No acute pulmonary disease  Cholelithiasis without cholecystitis  Prostatomegaly with prostatic calcifications and moderately distended bladder which demonstrates circumferential bladder wall thickening and perivesicular fat stranding  Findings are consistent with cystitis  Correlate for bladder outlet obstruction  Moderately distended ureters and renal pelvices with with mild periureteral and perinephric stranding  Findings suggest upper urinary tract infection  Please note that pyelonephritis is difficult to exclude without contrast       Left lower pole renal cyst   No renal calculi  I personally discussed this study with José Strong on 10/7/2019 at 9:14 PM                      Workstation performed: [id number]         XR chest 2 views   Final Result by Nazia Lawton MD (10/08 1371)      No acute cardiopulmonary disease  Cardiomegaly  Workstation performed: [id number]             PLAN / RECOMMENDATIONS      1  MILE  Present on admission, most likely secondary to bladder outlet obstruction in the setting of and large prostate  Upon review of old medical records, patient's baseline creatinine level is unknown    Patient was found to have urinary retention on admission and now Cruz catheter has in place and patient is found to be nonoliguric and also been followed by urology team   Overnight patient has remained nonoliguric and renal function has improved to current creatinine of 2 32  Clinically patient is not in volume overload state and advised patient to drink plenty of water to avoid dehydration  Plan to monitor renal function while in the hospital     2  Metabolic acidosis  Secondary to MILE  Currently on sodium bicarb supplementation and bicarb level has improved to 22 which is at goal   Continue sodium bicarb supplementation 650 mg PO TID and monitor bicarb level while the hospital       3  Hypertension  Essential, currently blood pressure is under well control and will continue monitor hypertension with labetalol 200 mg PO daily and clonidine 0 1 mg PO daily  4  Anemia  Multifactorial, overnight hemoglobin level has slightly improved to current hemoglobin of 9 8  No active signs of bleeding seen overnight  Monitor hemoglobin level while in the hospital and consider blood transfusion if hemoglobin level drops below 7     5  Azotemia  Multifactorial, BUN level has improved to 40  Encouraged to increase oral fluid intake  Patient has remained asymptomatic from azotemia perspective, plan to monitor BUN level while in the hospital     Disposition:  Stable from renal standpoint for discharge  Overall above mentioned plan was also d/w Kimberly Burch MD  Nephrology  10/12/2019        Portions of the record may have been created with voice recognition software  Occasional wrong word or "sound a like" substitutions may have occurred due to the inherent limitations of voice recognition software  Read the chart carefully and recognize, using context, where substitutions have occurred

## 2019-10-12 NOTE — PLAN OF CARE
Problem: PHYSICAL THERAPY ADULT  Goal: Performs mobility at highest level of function for planned discharge setting  See evaluation for individualized goals  Description  Treatment/Interventions: Functional transfer training, LE strengthening/ROM, Elevations, Therapeutic exercise, Endurance training, Patient/family training, Equipment eval/education, Bed mobility, Gait training, Spoke to nursing  Equipment Recommended: (TBD)       See flowsheet documentation for full assessment, interventions and recommendations  Note:   Prognosis: Good  Problem List: Decreased strength, Decreased endurance, Impaired balance, Decreased mobility  Assessment: Pt is 67 y o  male seen for PT evaluation on 10/12/2019 s/p admit to [facility] on 10/7/2019 w/ Urinary retention  PT consulted to assess pt's functional mobility and d/c needs  Order placed for PT eval and tx, w/ up w/ A order  Performed at least 2 patient identifiers during session: Name and wristband  Comorbidities affecting pt's physical performance at time of assessment include: HTN  PTA, pt was independent w/ all functional mobility w/ no AD/DME, ambulates community distances and elevations, has 1 KEIRY and lives w/ dtr and RIAN in 2 level home (1st floor set up)  Personal factors affecting pt at time of IE include: inaccessible home environment, stairs to enter home, inability to navigate community distances, inability to navigate level surfaces w/o external assistance, limited home support, positive near fall history and decreased initiation and engagement  Please find objective findings from PT assessment regarding body systems outlined above with impairments and limitations including weakness, impaired balance, decreased endurance, gait deviations, decreased activity tolerance and fall risk, as well as mobility assessment (need for x1 assist w/ all phases of mobility when usually ambulating independently and need for cueing for mobility technique)   The following objective measures performed on IE also reveal limitations: Barthel Index: 40/100 and Modified [place]: 4 (moderate/severe disability)  Pt's clinical presentation is currently unstable/unpredictable seen in pt's presentation of abnormal lab value(s), need for input for task focus and mobility technique and ongoing medical assessment  Pt to benefit from continued PT tx to address deficits as defined above and maximize level of functional independent mobility and consistency  From PT/mobility standpoint, recommendation at time of d/c would be STR vs  Home PT pending progress in order to facilitate return to PLOF  Barriers to Discharge: Inaccessible home environment, Decreased caregiver support  Barriers to Discharge Comments: pt home alone during the day  Recommendation: Short-term skilled PT(vs HHPT pending progress)     PT - OK to Discharge: No    See flowsheet documentation for full assessment

## 2019-10-13 ENCOUNTER — APPOINTMENT (INPATIENT)
Dept: RADIOLOGY | Facility: HOSPITAL | Age: 79
DRG: 690 | End: 2019-10-13
Payer: COMMERCIAL

## 2019-10-13 PROBLEM — R14.0 ABDOMINAL DISTENTION: Status: ACTIVE | Noted: 2019-10-13

## 2019-10-13 LAB
ANION GAP SERPL CALCULATED.3IONS-SCNC: 11 MMOL/L (ref 4–13)
BASOPHILS # BLD AUTO: 0.05 THOUSANDS/ΜL (ref 0–0.1)
BASOPHILS NFR BLD AUTO: 1 % (ref 0–1)
BUN SERPL-MCNC: 51 MG/DL (ref 5–25)
CALCIUM SERPL-MCNC: 9.4 MG/DL (ref 8.3–10.1)
CHLORIDE SERPL-SCNC: 100 MMOL/L (ref 100–108)
CO2 SERPL-SCNC: 24 MMOL/L (ref 21–32)
CREAT SERPL-MCNC: 2.38 MG/DL (ref 0.6–1.3)
EOSINOPHIL # BLD AUTO: 0.22 THOUSAND/ΜL (ref 0–0.61)
EOSINOPHIL NFR BLD AUTO: 2 % (ref 0–6)
ERYTHROCYTE [DISTWIDTH] IN BLOOD BY AUTOMATED COUNT: 13.5 % (ref 11.6–15.1)
GFR SERPL CREATININE-BSD FRML MDRD: 29 ML/MIN/1.73SQ M
GLUCOSE SERPL-MCNC: 127 MG/DL (ref 65–140)
GLUCOSE SERPL-MCNC: 128 MG/DL (ref 65–140)
GLUCOSE SERPL-MCNC: 139 MG/DL (ref 65–140)
GLUCOSE SERPL-MCNC: 141 MG/DL (ref 65–140)
HCT VFR BLD AUTO: 33.2 % (ref 36.5–49.3)
HCT VFR BLD AUTO: 35.7 % (ref 36.5–49.3)
HGB BLD-MCNC: 10.8 G/DL (ref 12–17)
HGB BLD-MCNC: 10.8 G/DL (ref 12–17)
IMM GRANULOCYTES # BLD AUTO: 0.06 THOUSAND/UL (ref 0–0.2)
IMM GRANULOCYTES NFR BLD AUTO: 1 % (ref 0–2)
LYMPHOCYTES # BLD AUTO: 0.9 THOUSANDS/ΜL (ref 0.6–4.47)
LYMPHOCYTES NFR BLD AUTO: 9 % (ref 14–44)
MCH RBC QN AUTO: 29.6 PG (ref 26.8–34.3)
MCHC RBC AUTO-ENTMCNC: 32.5 G/DL (ref 31.4–37.4)
MCV RBC AUTO: 91 FL (ref 82–98)
MONOCYTES # BLD AUTO: 1.06 THOUSAND/ΜL (ref 0.17–1.22)
MONOCYTES NFR BLD AUTO: 10 % (ref 4–12)
NEUTROPHILS # BLD AUTO: 7.9 THOUSANDS/ΜL (ref 1.85–7.62)
NEUTS SEG NFR BLD AUTO: 77 % (ref 43–75)
NRBC BLD AUTO-RTO: 0 /100 WBCS
PLATELET # BLD AUTO: 252 THOUSANDS/UL (ref 149–390)
PMV BLD AUTO: 10.9 FL (ref 8.9–12.7)
POTASSIUM SERPL-SCNC: 4.9 MMOL/L (ref 3.5–5.3)
PROCALCITONIN SERPL-MCNC: 2.66 NG/ML
RBC # BLD AUTO: 3.65 MILLION/UL (ref 3.88–5.62)
SODIUM SERPL-SCNC: 135 MMOL/L (ref 136–145)
WBC # BLD AUTO: 10.19 THOUSAND/UL (ref 4.31–10.16)

## 2019-10-13 PROCEDURE — 99233 SBSQ HOSP IP/OBS HIGH 50: CPT | Performed by: INTERNAL MEDICINE

## 2019-10-13 PROCEDURE — 85018 HEMOGLOBIN: CPT | Performed by: INTERNAL MEDICINE

## 2019-10-13 PROCEDURE — 74018 RADEX ABDOMEN 1 VIEW: CPT

## 2019-10-13 PROCEDURE — 80048 BASIC METABOLIC PNL TOTAL CA: CPT | Performed by: INTERNAL MEDICINE

## 2019-10-13 PROCEDURE — 82948 REAGENT STRIP/BLOOD GLUCOSE: CPT

## 2019-10-13 PROCEDURE — 85014 HEMATOCRIT: CPT | Performed by: INTERNAL MEDICINE

## 2019-10-13 PROCEDURE — 71045 X-RAY EXAM CHEST 1 VIEW: CPT

## 2019-10-13 PROCEDURE — 99232 SBSQ HOSP IP/OBS MODERATE 35: CPT | Performed by: INTERNAL MEDICINE

## 2019-10-13 PROCEDURE — 99222 1ST HOSP IP/OBS MODERATE 55: CPT | Performed by: SURGERY

## 2019-10-13 PROCEDURE — 85025 COMPLETE CBC W/AUTO DIFF WBC: CPT | Performed by: INTERNAL MEDICINE

## 2019-10-13 PROCEDURE — 84145 PROCALCITONIN (PCT): CPT | Performed by: INTERNAL MEDICINE

## 2019-10-13 RX ORDER — ACETAMINOPHEN 650 MG/1
650 SUPPOSITORY RECTAL EVERY 4 HOURS PRN
Status: DISCONTINUED | OUTPATIENT
Start: 2019-10-13 | End: 2019-10-16

## 2019-10-13 RX ORDER — SODIUM CHLORIDE 9 MG/ML
75 INJECTION, SOLUTION INTRAVENOUS CONTINUOUS
Status: DISCONTINUED | OUTPATIENT
Start: 2019-10-13 | End: 2019-10-16 | Stop reason: HOSPADM

## 2019-10-13 RX ADMIN — METRONIDAZOLE 500 MG: 500 INJECTION, SOLUTION INTRAVENOUS at 21:00

## 2019-10-13 RX ADMIN — ONDANSETRON 4 MG: 2 INJECTION INTRAMUSCULAR; INTRAVENOUS at 05:08

## 2019-10-13 RX ADMIN — SODIUM CHLORIDE 75 ML/HR: 0.9 INJECTION, SOLUTION INTRAVENOUS at 19:25

## 2019-10-13 RX ADMIN — CALCIUM CARBONATE (ANTACID) CHEW TAB 500 MG 1000 MG: 500 CHEW TAB at 04:24

## 2019-10-13 RX ADMIN — BISACODYL 10 MG: 10 SUPPOSITORY RECTAL at 15:17

## 2019-10-13 RX ADMIN — METRONIDAZOLE 500 MG: 500 INJECTION, SOLUTION INTRAVENOUS at 13:20

## 2019-10-13 RX ADMIN — SODIUM CHLORIDE 75 ML/HR: 0.9 INJECTION, SOLUTION INTRAVENOUS at 04:59

## 2019-10-13 NOTE — CONSULTS
Consult- General Surgery   Sherin Gaines 67 y o  male MRN: [id number]  Unit/Bed#:  Encounter: [id number]          Assessment/Plan     Assessment/Plan:    Sherin Gaines is a 67 y o  male    1  Nausea, vomiting, abdominal distension  KUB showed non-specific small bowel distention and wall thickening  Stool and gas noted within the rectum, no free air, possible enteritis vs partial SBO    No acute surgical intervention at this time  Patient is hemodynamically stable and in no acute distress  Abdomen is soft and mildly distended with no tenderness to palpation  Continue current therapy of NPO/IVF/NG Tube decompression until return of bowel function  Patient febrile with complaints of SOB, new from admission  Mild leukocytosis  At time suspect possible aspiration after episode of emesis this AM  CXR ordered and pending  Consider follow up imaging of abdomen pending results of CXR  Serial abdominal exams, trend labs   Ambulation/OOB  SLIM primary    History of Present Illness     HPI:  Sherin Gaines is a 67 y o  male who presents with nausea, vomiting, and abdomina distension  Patient was originally admitted on 10/7 for complaints of urinary retention secondary to BPH  Indwelling soto was placed and patient was since remained in the hospital for management of bilateral hydronephrosis and MILE  Unfortunately, patient developed acute abdominal distension with large episode of emesis early this morning  KUB was ordered which showed distension of small bowel and NG tube was placed for decompression and surgery consulted  At this time, patient states he does feel improved  He denies noticing any abdominal bloating or distension  He states he had felt "unsually full" yesterday and this AM with persistent hiccups which he attributed to his PO medication  His last bowel movement was yesterday afternoon which he states was normal  He has not passed flatus since onset of emesis  He denies any fabio abdominal pain   He denies any prior similar episodes  Patient appears to be a poor historian  He does have a large midline scar which he attributes to a prior "muscle resection" where he had "28 cm of veins removed" on his abdomen  He denies any true intraabdominal surgeries  He denies any other pertinent medical history  Review of Systems   Constitutional: Positive for appetite change  Negative for activity change, fatigue, fever and unexpected weight change  HENT: Negative for congestion, sore throat, tinnitus, trouble swallowing and voice change  Eyes: Negative for photophobia, discharge and visual disturbance  Respiratory: Negative for apnea, cough, chest tightness, shortness of breath and wheezing  Cardiovascular: Negative for chest pain, palpitations and leg swelling  Gastrointestinal: Positive for abdominal distention, nausea and vomiting  Negative for abdominal pain, constipation and diarrhea  Endocrine: Negative for cold intolerance, polyphagia and polyuria  Genitourinary: Positive for difficulty urinating  Negative for decreased urine volume, flank pain, frequency, hematuria and urgency  Indwelling soto in place   Musculoskeletal: Negative for arthralgias, back pain, joint swelling and myalgias  Skin: Negative for color change, pallor, rash and wound  Neurological: Negative for dizziness, seizures, facial asymmetry, light-headedness and numbness  Psychiatric/Behavioral: Negative for agitation and behavioral problems         Historical Information   Past Medical History:   Diagnosis Date    Hypertension      Past Surgical History:   Procedure Laterality Date    COLONOSCOPY      TUMOR REMOVAL       Social History   Social History     Substance and Sexual Activity   Alcohol Use Never    Frequency: Never     Social History     Substance and Sexual Activity   Drug Use Never     Social History     Tobacco Use   Smoking Status Passive Smoke Exposure - Never Smoker   Smokeless Tobacco Never Used Family History:   Family History   Family history unknown: Yes       Meds/Allergies   PTA meds:   Prior to Admission Medications   Prescriptions Last Dose Informant Patient Reported? Taking?    Multiple Vitamins-Minerals (MULTIVITAMIN WITH MINERALS) tablet   Yes Yes   Sig: Take 1 tablet by mouth daily   allopurinol (ZYLOPRIM) 300 mg tablet   Yes Yes   Sig: Take 300 mg by mouth daily   cloNIDine (CATAPRES) 0 1 mg tablet   Yes Yes   Sig: Take 0 1 mg by mouth daily at bedtime   donepezil (ARICEPT) 5 mg tablet   Yes Yes   Sig: Take 5 mg by mouth daily at bedtime   labetalol (NORMODYNE) 200 mg tablet   Yes Yes   Sig: Take 200 mg by mouth daily at bedtime   omega-3-acid ethyl esters (LOVAZA) 1 g capsule   Yes Yes   Sig: Take 1 g by mouth daily   simvastatin (ZOCOR) 20 mg tablet   Yes Yes   Sig: Take 20 mg by mouth daily at bedtime      Facility-Administered Medications: None     Allergies   Allergen Reactions    Strawberry C [Ascorbate] Hives       Objective   First Vitals:   Blood Pressure: 125/61 (10/07/19 1624)  Pulse: (!) 107 (10/07/19 1624)  Temperature: 98 5 °F (36 9 °C) (10/07/19 1624)  Temp Source: Oral (10/07/19 1624)  Respirations: (!) 35 (10/07/19 1624)  Height: 6' (182 9 cm) (10/07/19 1624)  Weight - Scale: 91 4 kg (201 lb 8 oz) (10/07/19 1912)  SpO2: 98 % (10/07/19 1624)    Current Vitals:   Blood Pressure: 140/63 (10/13/19 1100)  Pulse: (!) 118 (10/13/19 1100)  Temperature: (!) 100 9 °F (38 3 °C) (10/13/19 1100)  Temp Source: Oral (10/13/19 1100)  Respirations: 18 (10/13/19 1100)  Height: 6' (182 9 cm) (10/07/19 1624)  Weight - Scale: 90 9 kg (200 lb 6 4 oz) (10/09/19 0500)  SpO2: 97 % (10/13/19 1100)      Intake/Output Summary (Last 24 hours) at 10/13/2019 1431  Last data filed at 10/13/2019 0800  Gross per 24 hour   Intake 226 25 ml   Output 1675 ml   Net -1448 75 ml       Invasive Devices     Peripheral Intravenous Line            Peripheral IV 10/11/19 Right;Ventral (anterior) Forearm 2 days Drain            Continuous Bladder Irrigation Three-way 5 days    NG/OG/Enteral Tube 16 Fr Right nares less than 1 day                Physical Exam   Constitutional: He is oriented to person, place, and time  He appears well-developed and well-nourished  No distress  HENT:   Head: Normocephalic and atraumatic  Right Ear: External ear normal    Left Ear: External ear normal    Mouth/Throat: No oropharyngeal exudate  Eyes: Pupils are equal, round, and reactive to light  Conjunctivae and EOM are normal    Neck: Normal range of motion  Neck supple  No tracheal deviation present  No thyromegaly present  Cardiovascular: Normal rate, regular rhythm, normal heart sounds and intact distal pulses  Exam reveals no gallop and no friction rub  No murmur heard  Pulmonary/Chest: Effort normal and breath sounds normal  No respiratory distress  He has no wheezes  He has no rales  He exhibits no tenderness  Abdominal: Soft  Bowel sounds are normal  He exhibits distension  There is no tenderness  There is no rebound and no guarding  Mild distension on physical examination, abdomen otherwise obese and soft, non-tender to palpation, large midline scar noted   Genitourinary:   Genitourinary Comments: Indwelling soto for urinary retention in place     Musculoskeletal: Normal range of motion  He exhibits no edema, tenderness or deformity  Neurological: He is alert and oriented to person, place, and time  Skin: Skin is warm and dry  No rash noted  He is not diaphoretic  No erythema  No pallor  Psychiatric: He has a normal mood and affect  His behavior is normal  Thought content normal        Lab Results: I have personally reviewed pertinent lab results      Recent Results (from the past 36 hour(s))   Basic metabolic panel    Collection Time: 10/12/19  6:17 AM   Result Value Ref Range    Sodium 139 136 - 145 mmol/L    Potassium 4 8 3 5 - 5 3 mmol/L    Chloride 106 100 - 108 mmol/L    CO2 22 21 - 32 mmol/L    ANION GAP 11 4 - 13 mmol/L    BUN 40 (H) 5 - 25 mg/dL    Creatinine 2 32 (H) 0 60 - 1 30 mg/dL    Glucose 118 65 - 140 mg/dL    Calcium 8 5 8 3 - 10 1 mg/dL    eGFR 30 ml/min/1 73sq m   CBC and differential    Collection Time: 10/12/19  6:17 AM   Result Value Ref Range    WBC 7 58 4 31 - 10 16 Thousand/uL    RBC 3 34 (L) 3 88 - 5 62 Million/uL    Hemoglobin 9 8 (L) 12 0 - 17 0 g/dL    Hematocrit 30 5 (L) 36 5 - 49 3 %    MCV 91 82 - 98 fL    MCH 29 3 26 8 - 34 3 pg    MCHC 32 1 31 4 - 37 4 g/dL    RDW 13 7 11 6 - 15 1 %    MPV 11 1 8 9 - 12 7 fL    Platelets 529 395 - 515 Thousands/uL    nRBC 0 /100 WBCs    Neutrophils Relative 68 43 - 75 %    Immat GRANS % 1 0 - 2 %    Lymphocytes Relative 14 14 - 44 %    Monocytes Relative 11 4 - 12 %    Eosinophils Relative 5 0 - 6 %    Basophils Relative 1 0 - 1 %    Neutrophils Absolute 5 18 1 85 - 7 62 Thousands/µL    Immature Grans Absolute 0 05 0 00 - 0 20 Thousand/uL    Lymphocytes Absolute 1 09 0 60 - 4 47 Thousands/µL    Monocytes Absolute 0 83 0 17 - 1 22 Thousand/µL    Eosinophils Absolute 0 39 0 00 - 0 61 Thousand/µL    Basophils Absolute 0 04 0 00 - 0 10 Thousands/µL   Fingerstick Glucose (POCT)    Collection Time: 10/12/19  7:42 AM   Result Value Ref Range    POC Glucose 131 65 - 140 mg/dl   Fingerstick Glucose (POCT)    Collection Time: 10/12/19 11:32 AM   Result Value Ref Range    POC Glucose 131 65 - 140 mg/dl   Fingerstick Glucose (POCT)    Collection Time: 10/12/19  4:04 PM   Result Value Ref Range    POC Glucose 129 65 - 140 mg/dl   Fingerstick Glucose (POCT)    Collection Time: 10/12/19  8:48 PM   Result Value Ref Range    POC Glucose 126 65 - 140 mg/dl   Basic metabolic panel    Collection Time: 10/13/19  4:57 AM   Result Value Ref Range    Sodium 135 (L) 136 - 145 mmol/L    Potassium 4 9 3 5 - 5 3 mmol/L    Chloride 100 100 - 108 mmol/L    CO2 24 21 - 32 mmol/L    ANION GAP 11 4 - 13 mmol/L    BUN 51 (H) 5 - 25 mg/dL    Creatinine 2 38 (H) 0 60 - 1 30 mg/dL    Glucose 139 65 - 140 mg/dL    Calcium 9 4 8 3 - 10 1 mg/dL    eGFR 29 ml/min/1 73sq m   CBC and differential    Collection Time: 10/13/19  4:57 AM   Result Value Ref Range    WBC 10 19 (H) 4 31 - 10 16 Thousand/uL    RBC 3 65 (L) 3 88 - 5 62 Million/uL    Hemoglobin 10 8 (L) 12 0 - 17 0 g/dL    Hematocrit 33 2 (L) 36 5 - 49 3 %    MCV 91 82 - 98 fL    MCH 29 6 26 8 - 34 3 pg    MCHC 32 5 31 4 - 37 4 g/dL    RDW 13 5 11 6 - 15 1 %    MPV 10 9 8 9 - 12 7 fL    Platelets 234 166 - 308 Thousands/uL    nRBC 0 /100 WBCs    Neutrophils Relative 77 (H) 43 - 75 %    Immat GRANS % 1 0 - 2 %    Lymphocytes Relative 9 (L) 14 - 44 %    Monocytes Relative 10 4 - 12 %    Eosinophils Relative 2 0 - 6 %    Basophils Relative 1 0 - 1 %    Neutrophils Absolute 7 90 (H) 1 85 - 7 62 Thousands/µL    Immature Grans Absolute 0 06 0 00 - 0 20 Thousand/uL    Lymphocytes Absolute 0 90 0 60 - 4 47 Thousands/µL    Monocytes Absolute 1 06 0 17 - 1 22 Thousand/µL    Eosinophils Absolute 0 22 0 00 - 0 61 Thousand/µL    Basophils Absolute 0 05 0 00 - 0 10 Thousands/µL   Fingerstick Glucose (POCT)    Collection Time: 10/13/19  7:23 AM   Result Value Ref Range    POC Glucose 128 65 - 140 mg/dl   Fingerstick Glucose (POCT)    Collection Time: 10/13/19 11:19 AM   Result Value Ref Range    POC Glucose 127 65 - 140 mg/dl     Imaging: I have personally reviewed pertinent reports  Ct Chest Abdomen Pelvis Wo Contrast    Result Date: 10/7/2019  Impression: No acute pulmonary disease  Cholelithiasis without cholecystitis  Prostatomegaly with prostatic calcifications and moderately distended bladder which demonstrates circumferential bladder wall thickening and perivesicular fat stranding  Findings are consistent with cystitis  Correlate for bladder outlet obstruction  Moderately distended ureters and renal pelvices with with mild periureteral and perinephric stranding  Findings suggest upper urinary tract infection    Please note that pyelonephritis is difficult to exclude without contrast  Left lower pole renal cyst   No renal calculi  I personally discussed this study with Kaley Frost on 10/7/2019 at 9:14 PM  Workstation performed: [id number]     Xr Chest 2 Views    Result Date: 10/8/2019  Impression: No acute cardiopulmonary disease  Cardiomegaly  Workstation performed: [id number]     Us Kidney And Bladder    Result Date: 10/11/2019  Impression: No evidence of obstruction  Bladder wall thickening likely related to chronic bladder outlet obstruction  Workstation performed: [id number]       EKG, Pathology, and Other Studies: I have personally reviewed pertinent reports

## 2019-10-13 NOTE — ASSESSMENT & PLAN NOTE
Cr continuing to improved to 2 3  Nephrology following, recommendations appreciated  Gentle IV fluids  Avoid hypotension/nephrotoxins medication  Monitor BMP in a m

## 2019-10-13 NOTE — PROGRESS NOTES
Progress Note - Fransisco Solorzano 1940, 67 y o  male MRN: [id number]    Unit/Bed#:  Encounter: [id number]    Primary Care Provider: No primary care provider on file  Date and time admitted to hospital: 10/7/2019  4:48 PM        Abdominal distention  Assessment & Plan  Patient had abdominal x-ray series done  Possible ileus   NG tube was placed  NPO  IV fluids  Surgical consult and further imaging per surgical recommendations  Patient does not have any abdominal pain  BPH (benign prostatic hyperplasia)  Assessment & Plan  CT scan of the abdomen demonstrated severe prostatomegaly with calcifications, over distention of urinary bladder, bladder thickening  Repeat ultrasound of the kidneys and bladder did not demonstrate any hydronephrosis  Outpatient follow-up needed for cystoscopy in rectal ultrasound  Urology recommending to continue Cruz catheter on discharge      Ambulatory dysfunction  Assessment & Plan  PT/OT ordered    Acute blood loss anemia  Assessment & Plan  Acute blood loss anemia in the setting of gross hematuria as evidenced by hgb 15 1>10 0, treated with CBI and frequent monitoring of CBC's  Stable  Hematuria resolved  Monitor H&H and transfuse as needed  Repeat hemoglobin now      Hematuria, gross  Assessment & Plan  CT of the abdomen     IMPRESSION:  No acute pulmonary disease      Cholelithiasis without cholecystitis      Prostatomegaly with prostatic calcifications and moderately distended bladder which demonstrates circumferential bladder wall thickening and perivesicular fat stranding  Findings are consistent with cystitis  Correlate for bladder outlet obstruction  Moderately distended ureters and renal pelvices with with mild periureteral and perinephric stranding  Findings suggest upper urinary tract infection  Please note that pyelonephritis is difficult to exclude without contrast   Left lower pole renal cyst   No renal calculi           Resolved  CBI was Discontinued  Urology note reviewed recommendations appreciated  As per Urology will be discharged on Cruz catheter in place and have outpatient follow-up in 3 weeks for cystoscopy and further evaluation of prostate enlargement      MILE (acute kidney injury) ([facility] [address] )  Assessment & Plan  Cr continuing to improved to 2 3  Nephrology following, recommendations appreciated  Gentle IV fluids  Avoid hypotension/nephrotoxins medication  Monitor BMP in a m  Other hyperlipidemia  Assessment & Plan  Continue pravastatin     Pyelonephritis  Assessment & Plan  urine culture + for corynebacterium   Blood cultures negative, afebrile and hemodynamically stable  On ceftriaxone- Day 6    Essential hypertension  Assessment & Plan  Continue with labetalol and clonidine       Labs & Imaging: I have personally reviewed pertinent reports  VTE Pharmacologic Prophylaxis: Reason for no pharmacologic prophylaxis Hematuria  VTE Mechanical Prophylaxis: sequential compression device    Code Status:   Level 1 - Full Code    Patient Centered Rounds: I have performed bedside rounds with nursing staff today  Discussions with Specialists or Other Care Team Provider:     Education and Discussions with Family / Patient:  Family including daughter    Current Length of Stay: 6 day(s)    Current Patient Status: Inpatient   Certification Statement: The patient will continue to require additional inpatient hospital stay due to see my assessment and plan  Subjective:   Patient is seen and examined at bedside  Overnight events were noted  Patient was found to have abdominal distension this morning and had episode of vomiting  NG tube was placed  Denies any abdominal pain, shortness of breath, diarrhea, chest pain, palpitation  T-max of 100 9° this morning  Will order chest x-ray for possible aspiration pneumonia and start on Flagyl  All other ROS are negative      Objective:    Vitals: Blood pressure 140/63, pulse (!) 118, temperature (!) 100 9 °F (38 3 °C), temperature source Oral, resp  rate 18, height 6' (1 829 m), weight 90 9 kg (200 lb 6 4 oz), SpO2 97 %  ,Body mass index is 27 18 kg/m²  SPO2 RA Rest      ED to Hosp-Admission (Current) from 10/7/2019 in [facility] Intensive Care Unit   SpO2  97 %   SpO2 Activity  At Rest   O2 Device  Nasal cannula   O2 Flow Rate          I&O:     Intake/Output Summary (Last 24 hours) at 10/13/2019 1435  Last data filed at 10/13/2019 0800  Gross per 24 hour   Intake 226 25 ml   Output 1675 ml   Net -1448 75 ml       Physical Exam:    General- Alert, lying comfortably in bed  Not in any acute distress  HEENT- BETITO, EOM intact  NG tube in place  Neck- Supple, No JVD  CVS- regular, S1 and S2 normal   Chest- Bilateral Air entry, No rhochi, crackles or wheezing present  Abdomen- soft, nontender, distended, no guarding or rigidity, BS+  Extremities-  No pedal edema, No calf tenderness                         Normal ROM in all extremities  CNS-   Alert, awake and orientedx3  No focal deficits present  Invasive Devices     Peripheral Intravenous Line            Peripheral IV 10/11/19 Right;Ventral (anterior) Forearm 2 days          Drain            Continuous Bladder Irrigation Three-way 5 days    NG/OG/Enteral Tube 16 Fr Right nares less than 1 day                      Social History  reviewed  Family History   Family history unknown:  Yes    reviewed    Meds:  Current Facility-Administered Medications   Medication Dose Route Frequency Provider Last Rate Last Dose    acetaminophen (TYLENOL) rectal suppository 650 mg  650 mg Rectal Q4H PRN Margarita Escobedo MD        acetaminophen (TYLENOL) tablet 650 mg  650 mg Oral Q6H PRN TAMRA Mcleod        belladonna-opium (B&O SUPPOSITORY) 16 2-30 mg suppository 1 suppository  30 mg Rectal Q8H PRN TAMRA Mcleod   1 suppository at 10/10/19 0259    bisacodyl (DULCOLAX) rectal suppository 10 mg  10 mg Rectal Daily PRN Miguel A Quesada CRNP        calcium carbonate (TUMS) chewable tablet 1,000 mg  1,000 mg Oral Daily PRN Annabel Rice, CRNP   1,000 mg at 10/13/19 0424    cefTRIAXone (ROCEPHIN) 1,000 mg in dextrose 5 % 50 mL IVPB  1,000 mg Intravenous Q24H Annabel Rice, CRNP 100 mL/hr at 10/12/19 2331 1,000 mg at 10/12/19 2331    cloNIDine (CATAPRES) tablet 0 1 mg  0 1 mg Oral HS Nickie Reaves MD   0 1 mg at 10/12/19 2118    docusate sodium (COLACE) capsule 100 mg  100 mg Oral BID Danyell Irizarry MD   100 mg at 10/12/19 1811    donepezil (ARICEPT) tablet 5 mg  5 mg Oral HS Annabel Rice, CRNP   5 mg at 10/12/19 2118    HYDROmorphone (DILAUDID) injection 0 5 mg  0 5 mg Intravenous Q3H PRN Annabel Rice, CRNP   0 5 mg at 10/10/19 5755    labetalol (NORMODYNE) tablet 200 mg  200 mg Oral HS Nickie Reaves MD   200 mg at 10/12/19 2118    metroNIDAZOLE (FLAGYL) IVPB (premix) 500 mg  500 mg Intravenous Q8H Pradip Newell  mL/hr at 10/13/19 1320 500 mg at 10/13/19 1320    ondansetron (ZOFRAN) injection 4 mg  4 mg Intravenous Q6H PRN Annabel Rice, CRNP   4 mg at 10/13/19 0508    oxybutynin (DITROPAN) tablet 5 mg  5 mg Oral TID Annabel Rice, CRNP   5 mg at 10/12/19 2118    polyethylene glycol (MIRALAX) packet 17 g  17 g Oral Daily Danyell Irizarry MD        pravastatin (PRAVACHOL) tablet 40 mg  40 mg Oral Daily With TAMRA Bowles   40 mg at 10/12/19 1603    sodium chloride 0 9 % infusion  75 mL/hr Intravenous Continuous Delphine Boas, MD 75 mL/hr at 10/13/19 0459 75 mL/hr at 10/13/19 0459    tamsulosin (FLOMAX) capsule 0 4 mg  0 4 mg Oral Daily With TAMRA Birseno   0 4 mg at 10/12/19 1602      Medications Prior to Admission   Medication    allopurinol (ZYLOPRIM) 300 mg tablet    cloNIDine (CATAPRES) 0 1 mg tablet    donepezil (ARICEPT) 5 mg tablet    labetalol (NORMODYNE) 200 mg tablet    Multiple Vitamins-Minerals (MULTIVITAMIN WITH MINERALS) tablet    omega-3-acid ethyl esters (LOVAZA) 1 g capsule    simvastatin (ZOCOR) 20 mg tablet       Labs:  Results from last 7 days   Lab Units 10/13/19  0457 10/12/19  0617 10/11/19  0508   WBC Thousand/uL 10 19* 7 58 8 04   HEMOGLOBIN g/dL 10 8* 9 8* 9 3*   HEMATOCRIT % 33 2* 30 5* 29 0*   PLATELETS Thousands/uL 252 187 158   NEUTROS PCT % 77* 68 74   LYMPHS PCT % 9* 14 10*   MONOS PCT % 10 11 9   EOS PCT % 2 5 5     Results from last 7 days   Lab Units 10/13/19  0457 10/12/19  0617 10/11/19  0508  10/07/19  1718   POTASSIUM mmol/L 4 9 4 8 4 5   < > 5 1   CHLORIDE mmol/L 100 106 106   < > 105   CO2 mmol/L 24 22 18*   < > 19*   BUN mg/dL 51* 40* 55*   < > 70*   CREATININE mg/dL 2 38* 2 32* 2 70*   < > 4 74*   CALCIUM mg/dL 9 4 8 5 8 3   < > 9 6   ALK PHOS U/L  --   --   --   --  98   ALT U/L  --   --   --   --  17   AST U/L  --   --   --   --  27    < > = values in this interval not displayed  Lab Results   Component Value Date    TROPONINI <0 02 10/07/2019     Results from last 7 days   Lab Units 10/07/19  1718   INR  1 10     Lab Results   Component Value Date    BLOODCX No Growth After 5 Days  10/07/2019    BLOODCX No Growth After 5 Days  10/07/2019    URINECX >100,000 cfu/ml Corynebacterium riegelii (A) 10/07/2019         Imaging:  No results found for this or any previous visit  Results for orders placed during the hospital encounter of 10/07/19   XR chest 2 views    Narrative CHEST     INDICATION:   dyspnea on any exertion  COMPARISON:  None    EXAM PERFORMED/VIEWS:  XR CHEST PA & LATERAL  Images: 2    FINDINGS:    Cardiomediastinal silhouette appears enlarged  The pulmonary vessels are normal     The lungs are clear  No pneumothorax or pleural effusion  Osseous structures appear within normal limits for patient age  Impression No acute cardiopulmonary disease  Cardiomegaly          Workstation performed: [id number]         Last 24 Hours Medication List:     Current Facility-Administered Medications:  acetaminophen 650 mg Rectal Q4H PRN Melisa Gillespie MD    acetaminophen 650 mg Oral Q6H PRN Myriam Hamman, TAMRA    belladonna-opium 30 mg Rectal Q8H PRN Myriam Hamman, TAMRA    bisacodyl 10 mg Rectal Daily PRN Myriam Hamman, TAMRA    calcium carbonate 1,000 mg Oral Daily PRN Myriam Hamman, CAMRYNNP    cefTRIAXone 1,000 mg Intravenous Q24H Myriam Hamman, CRNP Last Rate: 1,000 mg (10/12/19 2331)   cloNIDine 0 1 mg Oral HS Keith Lepe MD    docusate sodium 100 mg Oral BID Thomas Torres MD    donepezil 5 mg Oral HS Myriam Hamman, CRNP    HYDROmorphone 0 5 mg Intravenous Q3H PRN Myriam Hamman, CRNP    labetalol 200 mg Oral HS Gabrielle Soliz MD    metroNIDAZOLE 500 mg Intravenous Q8H Melisa Gillespie MD Last Rate: 500 mg (10/13/19 1320)   ondansetron 4 mg Intravenous Q6H PRN Myriam Hamman, CRNP    oxybutynin 5 mg Oral TID Myriam Hamman, CRNP    polyethylene glycol 17 g Oral Daily Thomas Torres MD    pravastatin 40 mg Oral Daily With TAMRA Briseno    sodium chloride 75 mL/hr Intravenous Continuous Bev Pan MD Last Rate: 75 mL/hr (10/13/19 1255)   tamsulosin 0 4 mg Oral Daily With Dinner Myriam Hamman, CRNP         Today, Patient Was Seen By: Melisa Gillespie MD    ** Please Note: Dictation voice to text software may have been used in the creation of this document   **

## 2019-10-13 NOTE — ASSESSMENT & PLAN NOTE
Acute blood loss anemia in the setting of gross hematuria as evidenced by hgb 15 1>10 0, treated with CBI and frequent monitoring of CBC's  Stable  Hematuria resolved    Monitor H&H and transfuse as needed  Repeat hemoglobin now

## 2019-10-13 NOTE — PROGRESS NOTES
NEPHROLOGY PROGRESS NOTE    Patient: Cortney Guillory               Sex: male          DOA: 10/7/2019  4:48 PM   Date of Birth: @        Age: @        LOS:  LOS: 6 days   10/13/2019    REASON FOR THE CONSULTATION:  Further management of MILE  HPI     This is a 67 y o  male admitted for Urinary retention     SUBJECTIVE     - patient found to have abdominal distention and also omitted this morning  Abdominal x-ray done earlier this morning and suspected to have ileus and NG tube was placed and patient was also restarted on IV fluid  Patient denies shortness of breath, dizziness or chest pain today    - Reviewed last 24 hrs events     CURRENT MEDICATIONS       Current Facility-Administered Medications:     acetaminophen (TYLENOL) tablet 650 mg, 650 mg, Oral, Q6H PRN, TAMRA Mckeon    belladonna-opium (B&O SUPPOSITORY) 16 2-30 mg suppository 1 suppository, 30 mg, Rectal, Q8H PRN, TAMRA Mckeon, 1 suppository at 10/10/19 0259    bisacodyl (DULCOLAX) rectal suppository 10 mg, 10 mg, Rectal, Daily PRN, TAMRA Mckeon    calcium carbonate (TUMS) chewable tablet 1,000 mg, 1,000 mg, Oral, Daily PRN, TAMRA Mckeon, 1,000 mg at 10/13/19 0424    cefTRIAXone (ROCEPHIN) 1,000 mg in dextrose 5 % 50 mL IVPB, 1,000 mg, Intravenous, Q24H, TAMRA Nunes, Last Rate: 100 mL/hr at 10/12/19 2331, 1,000 mg at 10/12/19 2331    cloNIDine (CATAPRES) tablet 0 1 mg, 0 1 mg, Oral, HS, Laura Rizo MD, 0 1 mg at 10/12/19 2118    docusate sodium (COLACE) capsule 100 mg, 100 mg, Oral, BID, Jacquie Buck MD, 100 mg at 10/12/19 1811    donepezil (ARICEPT) tablet 5 mg, 5 mg, Oral, HS, TAMRA Mckeon, 5 mg at 10/12/19 2118    HYDROmorphone (DILAUDID) injection 0 5 mg, 0 5 mg, Intravenous, Q3H PRN, TAMRA Mckeon, 0 5 mg at 10/10/19 0635    labetalol (NORMODYNE) tablet 200 mg, 200 mg, Oral, HS, Laura Rizo MD, 200 mg at 10/12/19 1914    ondansetron (ZOFRAN) injection 4 mg, 4 mg, Intravenous, Q6H PRN, Orvel TAMRA Szymanski, 4 mg at 10/13/19 5004    oxybutynin (DITROPAN) tablet 5 mg, 5 mg, Oral, TID, Orvel TAMRA Szymanski, 5 mg at 10/12/19 2118    polyethylene glycol (MIRALAX) packet 17 g, 17 g, Oral, Daily, Christina Campos MD    pravastatin (PRAVACHOL) tablet 40 mg, 40 mg, Oral, Daily With TAMRA Craft, 40 mg at 10/12/19 1603    sodium bicarbonate tablet 650 mg, 650 mg, Oral, TID after meals, Raymundo Pulido MD, 650 mg at 10/12/19 1811    sodium chloride 0 9 % infusion, 75 mL/hr, Intravenous, Continuous, Lenard Lam MD, Last Rate: 75 mL/hr at 10/13/19 0459, 75 mL/hr at 10/13/19 0459    tamsulosin (FLOMAX) capsule 0 4 mg, 0 4 mg, Oral, Daily With TAMRA Craft, 0 4 mg at 10/12/19 1602    OBJECTIVE     Current Weight: Weight - Scale: 90 9 kg (200 lb 6 4 oz)  Vitals:    10/13/19 1100   BP: 140/63   Pulse: (!) 118   Resp: 18   Temp: (!) 100 9 °F (38 3 °C)   SpO2: 97%     Body mass index is 27 18 kg/m²  Intake/Output Summary (Last 24 hours) at 10/13/2019 1137  Last data filed at 10/13/2019 0800  Gross per 24 hour   Intake 426 25 ml   Output 1675 ml   Net -1248 75 ml       PHYSICAL EXAMINATION     Physical Exam   Constitutional: No distress  HENT:   Head: Normocephalic and atraumatic  NG-tube in place   Eyes: No scleral icterus  Neck: Neck supple  No JVD present  Cardiovascular: Normal rate  Pulmonary/Chest: No accessory muscle usage  No respiratory distress  He has no wheezes  Abdominal: Soft  There is no tenderness  Musculoskeletal:        Right hand: He exhibits no laceration  Left hand: He exhibits no laceration  Neurological: He is alert  Skin: Skin is warm  No cyanosis  Psychiatric: He is not combative  He expresses no suicidal ideation           LAB RESULTS     Results from last 7 days   Lab Units 10/13/19  0457 10/12/19  0617 10/11/19  8574 10/10/19  7594 10/09/19  2032 10/09/19  2996 10/08/19  1915 10/08/19  4806 10/07/19  1718   WBC Thousand/uL 10 19* 7 58 8 04 7 16 8 89 11 59* 11 54* 10 90* 13 10*   HEMOGLOBIN g/dL 10 8* 9 8* 9 3* 10 0* 9 6* 10 3* 12 2 13 4 15 1   HEMATOCRIT % 33 2* 30 5* 29 0* 31 3* 29 5* 32 7* 38 2 41 1 45 8   PLATELETS Thousands/uL 252 187 158 147* 146* 148* 155 165 197   SODIUM mmol/L 135* 139 137 138  --  138  --  143 141   POTASSIUM mmol/L 4 9 4 8 4 5 4 1  --  5 1  --  4 3 5 1   CHLORIDE mmol/L 100 106 106 107  --  107  --  109* 105   CO2 mmol/L 24 22 18* 18*  --  15*  --  18* 19*   BUN mg/dL 51* 40* 55* 69*  --  76*  --  73* 70*   CREATININE mg/dL 2 38* 2 32* 2 70* 3 62*  --  4 39*  --  4 89* 4 74*   EGFR ml/min/1 73sq m 29 30 25 18  --  14  --  12 13   CALCIUM mg/dL 9 4 8 5 8 3 8 0*  --  7 9*  --  8 6 9 6   MAGNESIUM mg/dL  --   --   --   --   --   --   --  2 5 2 5   PHOSPHORUS mg/dL  --   --   --   --   --  5 7*  --   --   --        I have personally reviewed the old medical records and patient's previously known baseline creatinine level is unknown    RADIOLOGY RESULTS      US kidney and bladder   Final Result by Lennox Bonds MD (10/11 1147)      No evidence of obstruction  Bladder wall thickening likely related to chronic bladder outlet obstruction  Workstation performed: [id number]         CT chest abdomen pelvis wo contrast   Final Result by Negrito Urrutia MD (10/07 2114)   No acute pulmonary disease  Cholelithiasis without cholecystitis  Prostatomegaly with prostatic calcifications and moderately distended bladder which demonstrates circumferential bladder wall thickening and perivesicular fat stranding  Findings are consistent with cystitis  Correlate for bladder outlet obstruction  Moderately distended ureters and renal pelvices with with mild periureteral and perinephric stranding  Findings suggest upper urinary tract infection  Please note that pyelonephritis is difficult to exclude without contrast       Left lower pole renal cyst   No renal calculi          I personally discussed this study with Elijah Steve on 10/7/2019 at 9:14 PM                      Workstation performed: [id number]         XR chest 2 views   Final Result by Miladys Senior MD (10/08 7132)      No acute cardiopulmonary disease  Cardiomegaly  Workstation performed: [id number]         XR abdomen 1 view kub    (Results Pending)       PLAN / RECOMMENDATIONS      1  MILE  Present on admission, suspected due to bladder outlet obstruction in the light of enlarge prostate  Cruz catheter is in place and patient is currently nonoliguric  Upon review of old medical records, patient's previously known baseline creatinine level is unknown  Overnight renal function has failed to improved to current creatinine of 2 35  Patient is now found to have ileus and restarted on IV fluid and NG-tube was placed  Continue IV fluid today and monitor renal function  2  Metabolic acidosis  Multifactorial and suspected due to MILE  Current bicarb is 24 which is at goal   Plan to stop oral bicarb supplementation today and monitor bicarb level  3  Hypertension  Overnight blood pressure has remained under good control and monitor hypertension with clonidine 0 1 mg PO daily and labetalol 200 mg PO daily today  4  Anemia  Multifactorial, overnight hemoglobin level has improved to 10 8  Monitor hemoglobin level while in the hospital and consider blood transfusion if hemoglobin level drops below 7     5  Azotemia  Overnight BUN level has worsened to 51  Patient is suspected to have ileus and started on IV fluid  Patient has remained asymptomatic from azotemia perspective  Monitor BUN level on IV fluids today  Overall above mentioned plan was also d/w Erick Masters MD  Nephrology  10/13/2019        Portions of the record may have been created with voice recognition software   Occasional wrong word or "sound a like" substitutions may have occurred due to the inherent limitations of voice recognition software  Read the chart carefully and recognize, using context, where substitutions have occurred

## 2019-10-13 NOTE — ASSESSMENT & PLAN NOTE
Patient had abdominal x-ray series done  Possible ileus   NG tube was placed  NPO  IV fluids  Surgical consult and further imaging per surgical recommendations  Patient does not have any abdominal pain

## 2019-10-13 NOTE — ASSESSMENT & PLAN NOTE
urine culture + for corynebacterium   Blood cultures negative, afebrile and hemodynamically stable  On ceftriaxone- Day 6

## 2019-10-14 ENCOUNTER — APPOINTMENT (INPATIENT)
Dept: CT IMAGING | Facility: HOSPITAL | Age: 79
DRG: 690 | End: 2019-10-14
Payer: COMMERCIAL

## 2019-10-14 LAB
ALBUMIN SERPL BCP-MCNC: 2.7 G/DL (ref 3.5–5)
ALP SERPL-CCNC: 72 U/L (ref 46–116)
ALT SERPL W P-5'-P-CCNC: 24 U/L (ref 12–78)
ANION GAP SERPL CALCULATED.3IONS-SCNC: 8 MMOL/L (ref 4–13)
AST SERPL W P-5'-P-CCNC: 19 U/L (ref 5–45)
BASOPHILS # BLD AUTO: 0.06 THOUSANDS/ΜL (ref 0–0.1)
BASOPHILS NFR BLD AUTO: 0 % (ref 0–1)
BILIRUB DIRECT SERPL-MCNC: 0.13 MG/DL (ref 0–0.2)
BILIRUB SERPL-MCNC: 0.4 MG/DL (ref 0.2–1)
BUN SERPL-MCNC: 51 MG/DL (ref 5–25)
CALCIUM SERPL-MCNC: 8.7 MG/DL (ref 8.3–10.1)
CHLORIDE SERPL-SCNC: 103 MMOL/L (ref 100–108)
CO2 SERPL-SCNC: 26 MMOL/L (ref 21–32)
CREAT SERPL-MCNC: 2.63 MG/DL (ref 0.6–1.3)
EOSINOPHIL # BLD AUTO: 0.26 THOUSAND/ΜL (ref 0–0.61)
EOSINOPHIL NFR BLD AUTO: 2 % (ref 0–6)
ERYTHROCYTE [DISTWIDTH] IN BLOOD BY AUTOMATED COUNT: 13.8 % (ref 11.6–15.1)
GFR SERPL CREATININE-BSD FRML MDRD: 26 ML/MIN/1.73SQ M
GLUCOSE SERPL-MCNC: 116 MG/DL (ref 65–140)
GLUCOSE SERPL-MCNC: 128 MG/DL (ref 65–140)
HCT VFR BLD AUTO: 27.9 % (ref 36.5–49.3)
HGB BLD-MCNC: 9.1 G/DL (ref 12–17)
IMM GRANULOCYTES # BLD AUTO: 0.08 THOUSAND/UL (ref 0–0.2)
IMM GRANULOCYTES NFR BLD AUTO: 1 % (ref 0–2)
LYMPHOCYTES # BLD AUTO: 1.12 THOUSANDS/ΜL (ref 0.6–4.47)
LYMPHOCYTES NFR BLD AUTO: 8 % (ref 14–44)
MAGNESIUM SERPL-MCNC: 2.4 MG/DL (ref 1.6–2.6)
MCH RBC QN AUTO: 29.6 PG (ref 26.8–34.3)
MCHC RBC AUTO-ENTMCNC: 32.6 G/DL (ref 31.4–37.4)
MCV RBC AUTO: 91 FL (ref 82–98)
MONOCYTES # BLD AUTO: 1.13 THOUSAND/ΜL (ref 0.17–1.22)
MONOCYTES NFR BLD AUTO: 8 % (ref 4–12)
NEUTROPHILS # BLD AUTO: 11.28 THOUSANDS/ΜL (ref 1.85–7.62)
NEUTS SEG NFR BLD AUTO: 81 % (ref 43–75)
NRBC BLD AUTO-RTO: 0 /100 WBCS
PLATELET # BLD AUTO: 236 THOUSANDS/UL (ref 149–390)
PMV BLD AUTO: 10.6 FL (ref 8.9–12.7)
POTASSIUM SERPL-SCNC: 4.6 MMOL/L (ref 3.5–5.3)
PROCALCITONIN SERPL-MCNC: 4.64 NG/ML
PROT SERPL-MCNC: 6.6 G/DL (ref 6.4–8.2)
RBC # BLD AUTO: 3.07 MILLION/UL (ref 3.88–5.62)
SODIUM SERPL-SCNC: 137 MMOL/L (ref 136–145)
WBC # BLD AUTO: 13.93 THOUSAND/UL (ref 4.31–10.16)

## 2019-10-14 PROCEDURE — 99233 SBSQ HOSP IP/OBS HIGH 50: CPT | Performed by: INTERNAL MEDICINE

## 2019-10-14 PROCEDURE — 74176 CT ABD & PELVIS W/O CONTRAST: CPT

## 2019-10-14 PROCEDURE — 99232 SBSQ HOSP IP/OBS MODERATE 35: CPT | Performed by: STUDENT IN AN ORGANIZED HEALTH CARE EDUCATION/TRAINING PROGRAM

## 2019-10-14 PROCEDURE — 80048 BASIC METABOLIC PNL TOTAL CA: CPT | Performed by: INTERNAL MEDICINE

## 2019-10-14 PROCEDURE — 85025 COMPLETE CBC W/AUTO DIFF WBC: CPT | Performed by: INTERNAL MEDICINE

## 2019-10-14 PROCEDURE — 84145 PROCALCITONIN (PCT): CPT | Performed by: INTERNAL MEDICINE

## 2019-10-14 PROCEDURE — 82948 REAGENT STRIP/BLOOD GLUCOSE: CPT

## 2019-10-14 PROCEDURE — 80076 HEPATIC FUNCTION PANEL: CPT | Performed by: INTERNAL MEDICINE

## 2019-10-14 PROCEDURE — 99232 SBSQ HOSP IP/OBS MODERATE 35: CPT | Performed by: SURGERY

## 2019-10-14 PROCEDURE — 83735 ASSAY OF MAGNESIUM: CPT | Performed by: INTERNAL MEDICINE

## 2019-10-14 RX ADMIN — OXYBUTYNIN CHLORIDE 5 MG: 5 TABLET ORAL at 10:30

## 2019-10-14 RX ADMIN — ONDANSETRON 4 MG: 2 INJECTION INTRAMUSCULAR; INTRAVENOUS at 14:21

## 2019-10-14 RX ADMIN — TAMSULOSIN HYDROCHLORIDE 0.4 MG: 0.4 CAPSULE ORAL at 16:22

## 2019-10-14 RX ADMIN — POLYETHYLENE GLYCOL 3350 17 G: 17 POWDER, FOR SOLUTION ORAL at 10:30

## 2019-10-14 RX ADMIN — DONEPEZIL HYDROCHLORIDE 5 MG: 5 TABLET ORAL at 22:43

## 2019-10-14 RX ADMIN — LABETALOL HCL 200 MG: 200 TABLET, FILM COATED ORAL at 22:43

## 2019-10-14 RX ADMIN — METRONIDAZOLE 500 MG: 500 INJECTION, SOLUTION INTRAVENOUS at 12:13

## 2019-10-14 RX ADMIN — OXYBUTYNIN CHLORIDE 5 MG: 5 TABLET ORAL at 16:22

## 2019-10-14 RX ADMIN — METRONIDAZOLE 500 MG: 500 INJECTION, SOLUTION INTRAVENOUS at 04:48

## 2019-10-14 RX ADMIN — OXYBUTYNIN CHLORIDE 5 MG: 5 TABLET ORAL at 22:43

## 2019-10-14 RX ADMIN — CEFTRIAXONE SODIUM 1000 MG: 10 INJECTION, POWDER, FOR SOLUTION INTRAVENOUS at 00:16

## 2019-10-14 RX ADMIN — PRAVASTATIN SODIUM 40 MG: 40 TABLET ORAL at 16:22

## 2019-10-14 RX ADMIN — ONDANSETRON 4 MG: 2 INJECTION INTRAMUSCULAR; INTRAVENOUS at 04:48

## 2019-10-14 RX ADMIN — SODIUM CHLORIDE 75 ML/HR: 0.9 INJECTION, SOLUTION INTRAVENOUS at 09:46

## 2019-10-14 RX ADMIN — CLONIDINE HYDROCHLORIDE 0.1 MG: 0.1 TABLET ORAL at 22:43

## 2019-10-14 RX ADMIN — DOCUSATE SODIUM 100 MG: 100 CAPSULE, LIQUID FILLED ORAL at 10:30

## 2019-10-14 NOTE — ASSESSMENT & PLAN NOTE
Cr continuing to improved since admission, currently 2 63  Nephrology following, recommendations appreciated  Avoid hypotension/nephrotoxins medication  Renal Ultrasound showing bladder wall thickening suggestive of chronic outflow obstruction

## 2019-10-14 NOTE — ASSESSMENT & PLAN NOTE
Acute blood loss anemia in the setting of gross hematuria as evidenced by hgb 15 1>10 0, treated with CBI and frequent monitoring of CBC's  Stable  Hematuria resolved    Monitor H&H and transfuse as needed

## 2019-10-14 NOTE — PROGRESS NOTES
Progress Note - Yoselin Jackson 1940, 67 y o  male MRN: [id number]    Unit/Bed#:  Encounter: [id number]    Primary Care Provider: No primary care provider on file  Date and time admitted to hospital: 10/7/2019  4:48 PM    SIRS  SIRS in the setting of an ileus as evidenced by Temp 100 9,  and WBC 10 19>13 93 treated by stomach decompression, IVF and CBC monitoring  Abdominal distention  Assessment & Plan  Surgery following- no surgical intervention  Still having output from NG tube, continue with decompression  KUB recommending follow up CT A/P for further evaluation- follow up results  Continue with NPO, currently denying any abdominal pain    BPH (benign prostatic hyperplasia)  Assessment & Plan  CT scan of the abdomen demonstrated severe prostatomegaly with calcifications, over distention of urinary bladder, bladder thickening  Repeat ultrasound of the kidneys and bladder did not demonstrate any hydronephrosis  Outpatient follow-up needed for cystoscopy in rectal ultrasound  Urology recommending to continue Cruz catheter on discharge      Ambulatory dysfunction  Assessment & Plan  PT/OT evaluation completed- STR vs home PT    Acute blood loss anemia  Assessment & Plan  Acute blood loss anemia in the setting of gross hematuria as evidenced by hgb 15 1>10 0, treated with CBI and frequent monitoring of CBC's  Stable  Hematuria resolved  Monitor H&H and transfuse as needed        Hematuria, gross  Assessment & Plan  CT of the abdomen     IMPRESSION:  No acute pulmonary disease      Cholelithiasis without cholecystitis      Prostatomegaly with prostatic calcifications and moderately distended bladder which demonstrates circumferential bladder wall thickening and perivesicular fat stranding  Findings are consistent with cystitis  Correlate for bladder outlet obstruction  Moderately distended ureters and renal pelvices with with mild periureteral and perinephric stranding    Findings suggest upper urinary tract infection  Please note that pyelonephritis is difficult to exclude without contrast   Left lower pole renal cyst   No renal calculi  Resolved  CBI was Discontinued  Urology note reviewed recommendations appreciated  As per Urology will be discharged on Cruz catheter in place and have outpatient follow-up in 3 weeks for cystoscopy and further evaluation of prostate enlargement      MILE (acute kidney injury) ([facility] [address] )  Assessment & Plan  Cr continuing to improved since admission, currently 2 63  Nephrology following, recommendations appreciated  Avoid hypotension/nephrotoxins medication  Renal Ultrasound showing bladder wall thickening suggestive of chronic outflow obstruction    Other hyperlipidemia  Assessment & Plan  Continue pravastatin     Pyelonephritis  Assessment & Plan  Resolved, completed 7 day course of antibiotics  Urine culture positive for corynebacterium     Essential hypertension  Assessment & Plan  Continue with labetalol and clonidine         VTE Pharmacologic Prophylaxis:   Pharmacologic: Pharmacologic VTE Prophylaxis contraindicated due to acute blood loss anemia  Mechanical VTE Prophylaxis in Place: Yes    Patient Centered Rounds: I have performed bedside rounds with nursing staff today  Discussions with Specialists or Other Care Team Provider: ROLANDO    Education and Discussions with Family / Patient: Bowel obstruction    Time Spent for Care: 30 minutes  More than 50% of total time spent on counseling and coordination of care as described above  Current Length of Stay: 7 day(s)    Current Patient Status: Inpatient   Certification Statement: The patient will continue to require additional inpatient hospital stay due to bowel decompression    Discharge Plan: 24+ hours    Code Status: Level 1 - Full Code      Subjective:   Patient currently denying any pain  No complaints at this time       Objective:     Vitals:   Temp (24hrs), Av 4 °F (36 9 °C), Min:97 8 °F (36 6 °C), Max:99 1 °F (37 3 °C)    Temp:  [97 8 °F (36 6 °C)-99 1 °F (37 3 °C)] 97 8 °F (36 6 °C)  HR:  [93] 93  Resp:  [15-18] 15  BP: (131-133)/(75-76) 133/75  SpO2:  [97 %-100 %] 100 %  Body mass index is 27 18 kg/m²  Input and Output Summary (last 24 hours): Intake/Output Summary (Last 24 hours) at 10/14/2019 1152  Last data filed at 10/14/2019 1032  Gross per 24 hour   Intake 2000 ml   Output 1525 ml   Net 475 ml       Physical Exam:     Physical Exam   Constitutional: He appears well-developed and well-nourished  HENT:   NG tube in place   Cardiovascular: Normal rate and regular rhythm  Pulmonary/Chest: Effort normal and breath sounds normal    Abdominal: Soft  Bowel sounds are normal    Genitourinary:   Genitourinary Comments: Cruz intact   Neurological: He is alert  Skin: Skin is warm and dry  Psychiatric: He has a normal mood and affect          Additional Data:     Labs:    Results from last 7 days   Lab Units 10/14/19  0427   WBC Thousand/uL 13 93*   HEMOGLOBIN g/dL 9 1*   HEMATOCRIT % 27 9*   PLATELETS Thousands/uL 236   NEUTROS PCT % 81*   LYMPHS PCT % 8*   MONOS PCT % 8   EOS PCT % 2     Results from last 7 days   Lab Units 10/14/19  0427   SODIUM mmol/L 137   POTASSIUM mmol/L 4 6   CHLORIDE mmol/L 103   CO2 mmol/L 26   BUN mg/dL 51*   CREATININE mg/dL 2 63*   ANION GAP mmol/L 8   CALCIUM mg/dL 8 7   ALBUMIN g/dL 2 7*   TOTAL BILIRUBIN mg/dL 0 40   ALK PHOS U/L 72   ALT U/L 24   AST U/L 19   GLUCOSE RANDOM mg/dL 116     Results from last 7 days   Lab Units 10/07/19  1718   INR  1 10     Results from last 7 days   Lab Units 10/13/19  2347 10/13/19  1619 10/13/19  1119 10/13/19  0723 10/12/19  2048 10/12/19  1604 10/12/19  1132 10/12/19  0742 10/11/19  2202 10/11/19  1607 10/11/19  1054 10/11/19  0747   POC GLUCOSE mg/dl 128 141* 127 128 126 129 131 131 154* 131 149* 125         Results from last 7 days   Lab Units 10/14/19  0427 10/13/19  1327 10/07/19  1910 10/07/19  1718   LACTIC ACID mmol/L  --   --   --  1 5   PROCALCITONIN ng/ml 4 64* 2 66* 0 36*  --            * I Have Reviewed All Lab Data Listed Above  * Additional Pertinent Lab Tests Reviewed: All [place] Admission Reviewed    Imaging:    Imaging Reports Reviewed Today Include: KUB, CXR  Imaging Personally Reviewed by Myself Includes:  NA    Recent Cultures (last 7 days):     Results from last 7 days   Lab Units 10/07/19  2150 10/07/19  1910 10/07/19  1718   BLOOD CULTURE   --  No Growth After 5 Days  No Growth After 5 Days  URINE CULTURE  >100,000 cfu/ml Corynebacterium riegelii*  --   --        Last 24 Hours Medication List:     Current Facility-Administered Medications:  acetaminophen 650 mg Rectal Q4H PRN Margarita Escobedo MD    acetaminophen 650 mg Oral Q6H PRN [facility], CRNP    belladonna-opium 30 mg Rectal Q8H PRN [facility], CRNP    bisacodyl 10 mg Rectal Daily PRN [facility], CRNP    calcium carbonate 1,000 mg Oral Daily PRN [facility], CRNP    cloNIDine 0 1 mg Oral HS Lorraine Oswald MD    docusate sodium 100 mg Oral BID Edel Magaña MD    donepezil 5 mg Oral HS [facility], TAMRA    HYDROmorphone 0 5 mg Intravenous Q3H PRN [facility], CAMRYNNP    labetalol 200 mg Oral HS Lorraine Oswald MD    metroNIDAZOLE 500 mg Intravenous Q8H Margarita Escobedo MD Last Rate: 500 mg (10/14/19 0448)   ondansetron 4 mg Intravenous Q6H PRN [facility], TAMRA    oxybutynin 5 mg Oral TID [facility], TAMRA    polyethylene glycol 17 g Oral Daily Edel Magaña MD    pravastatin 40 mg Oral Daily With TAMRA Briseno    sodium chloride 75 mL/hr Intravenous Continuous Vandana Dalton MD Last Rate: 75 mL/hr (10/14/19 0946)   tamsulosin 0 4 mg Oral Daily With Dinner [facility]         Today, Patient Was Seen By: Gearold Apley, M D      ** Please Note: Dictation voice to text software may have been used in the creation of this document   **

## 2019-10-14 NOTE — ASSESSMENT & PLAN NOTE
Acute blood loss anemia in the setting of gross hematuria as evidenced by hgb 15 1>10 0>8  4  Will monitor hemoglobin 1 more day  Hematuria resolved    Monitor H&H and transfuse as needed

## 2019-10-14 NOTE — ASSESSMENT & PLAN NOTE
Surgery following- no surgical intervention  NG tube removed yesterday  Patient is tolerating clear liquid diet  Will advance diet to full liquid this dinner and soft surgical diet for tomorrow morning

## 2019-10-14 NOTE — ASSESSMENT & PLAN NOTE
Surgery following- no surgical intervention  Still having output from NG tube, continue with decompression  KUB recommending follow up CT A/P for further evaluation- follow up results  Continue with NPO, currently denying any abdominal pain

## 2019-10-14 NOTE — TELEPHONE ENCOUNTER
Appointment is scheduled in appropriate time frame per MD  Already added to cancellation list incase something opens up sooner

## 2019-10-14 NOTE — PROGRESS NOTES
NEPHROLOGY PROGRESS NOTE    Patient: Luna Muse               Sex: male          DOA: 10/7/2019  4:48 PM   Date of Birth: @        Age: @        LOS:  LOS: 7 days   10/14/2019    REASON FOR THE CONSULTATION:  Further management of MILE  HPI     This is a 67 y o  male admitted for Urinary retention     SUBJECTIVE     - patient was seen by surgical team yesterday and I have personally reviewed the note with the recommendations  Currently breathing is stable and also been receiving IV fluid and also seems nonoliguric  Patient denies nausea, vomiting, headache or dizziness today    - Reviewed last 24 hrs events     CURRENT MEDICATIONS       Current Facility-Administered Medications:     acetaminophen (TYLENOL) rectal suppository 650 mg, 650 mg, Rectal, Q4H PRN, Josue Soulier, MD    acetaminophen (TYLENOL) tablet 650 mg, 650 mg, Oral, Q6H PRN, Hildy Reus, CRNP    belladonna-opium (B&O SUPPOSITORY) 16 2-30 mg suppository 1 suppository, 30 mg, Rectal, Q8H PRN, Hildy Reus, CRNP, 1 suppository at 10/10/19 0259    bisacodyl (DULCOLAX) rectal suppository 10 mg, 10 mg, Rectal, Daily PRN, Hildy Reus, CRNP, 10 mg at 10/13/19 1517    calcium carbonate (TUMS) chewable tablet 1,000 mg, 1,000 mg, Oral, Daily PRN, Hildy Reus, CRNP, 1,000 mg at 10/13/19 0424    cefTRIAXone (ROCEPHIN) 1,000 mg in dextrose 5 % 50 mL IVPB, 1,000 mg, Intravenous, Q24H, Hildy Reus, CRNP, Stopped at 10/14/19 0053    cloNIDine (CATAPRES) tablet 0 1 mg, 0 1 mg, Oral, HS, Teetee Sung MD, 0 1 mg at 10/12/19 2118    docusate sodium (COLACE) capsule 100 mg, 100 mg, Oral, BID, Jacquie Buck MD, 100 mg at 10/12/19 1811    donepezil (ARICEPT) tablet 5 mg, 5 mg, Oral, HS, Hildy Reus, CRNP, 5 mg at 10/12/19 2118    HYDROmorphone (DILAUDID) injection 0 5 mg, 0 5 mg, Intravenous, Q3H PRN, TAMRA Hess, 0 5 mg at 10/10/19 0635    labetalol (NORMODYNE) tablet 200 mg, 200 mg, Oral, HS, Teetee Sung MD, 200 mg at 10/12/19 2118    metroNIDAZOLE (FLAGYL) IVPB (premix) 500 mg, 500 mg, Intravenous, Q8H, Harvey Ortiz MD, Last Rate: 200 mL/hr at 10/14/19 0448, 500 mg at 10/14/19 0448    ondansetron (ZOFRAN) injection 4 mg, 4 mg, Intravenous, Q6H PRN, Juana Lieu, CRNP, 4 mg at 10/14/19 0448    oxybutynin (DITROPAN) tablet 5 mg, 5 mg, Oral, TID, Juana Lieu, CRNP, 5 mg at 10/12/19 2118    polyethylene glycol (MIRALAX) packet 17 g, 17 g, Oral, Daily, Constance Valadez MD    pravastatin (PRAVACHOL) tablet 40 mg, 40 mg, Oral, Daily With TAMRA Connors, 40 mg at 10/12/19 1603    sodium chloride 0 9 % infusion, 75 mL/hr, Intravenous, Continuous, Miquel Villagomez MD, Last Rate: 75 mL/hr at 10/14/19 0946, 75 mL/hr at 10/14/19 0946    tamsulosin (FLOMAX) capsule 0 4 mg, 0 4 mg, Oral, Daily With TAMRA Connors, 0 4 mg at 10/12/19 1602    OBJECTIVE     Current Weight: Weight - Scale: 90 9 kg (200 lb 6 4 oz)  Vitals:    10/14/19 0700   BP: 133/75   Pulse: 93   Resp: 15   Temp: 97 8 °F (36 6 °C)   SpO2: 100%     Body mass index is 27 18 kg/m²  Intake/Output Summary (Last 24 hours) at 10/14/2019 1040  Last data filed at 10/14/2019 0800  Gross per 24 hour   Intake 1900 ml   Output 1225 ml   Net 675 ml       PHYSICAL EXAMINATION     Physical Exam   Constitutional: No distress  HENT:   Head: Normocephalic and atraumatic  NG tube in place   Eyes: No scleral icterus  Neck: Neck supple  No JVD present  Cardiovascular: Normal rate  Pulmonary/Chest: No accessory muscle usage  No respiratory distress  He has no wheezes  Abdominal: Soft  He exhibits distension  Musculoskeletal:        Right hand: He exhibits no laceration  Left hand: He exhibits no laceration  Neurological: He is alert  Skin: Skin is warm  No cyanosis  Psychiatric: He is not combative  He expresses no suicidal ideation           LAB RESULTS     Results from last 7 days   Lab Units 10/14/19  2480 10/13/19  1450 10/13/19  0457 10/12/19  0617 10/11/19  0508 10/10/19  0557 10/09/19  2032 10/09/19  0608  10/08/19  0545 10/07/19  1718   WBC Thousand/uL 13 93*  --  10 19* 7 58 8 04 7 16 8 89 11 59*   < > 10 90* 13 10*   HEMOGLOBIN g/dL 9 1* 10 8* 10 8* 9 8* 9 3* 10 0* 9 6* 10 3*   < > 13 4 15 1   HEMATOCRIT % 27 9* 35 7* 33 2* 30 5* 29 0* 31 3* 29 5* 32 7*   < > 41 1 45 8   PLATELETS Thousands/uL 236  --  252 187 158 147* 146* 148*   < > 165 197   SODIUM mmol/L 137  --  135* 139 137 138  --  138  --  143 141   POTASSIUM mmol/L 4 6  --  4 9 4 8 4 5 4 1  --  5 1  --  4 3 5 1   CHLORIDE mmol/L 103  --  100 106 106 107  --  107  --  109* 105   CO2 mmol/L 26  --  24 22 18* 18*  --  15*  --  18* 19*   BUN mg/dL 51*  --  51* 40* 55* 69*  --  76*  --  73* 70*   CREATININE mg/dL 2 63*  --  2 38* 2 32* 2 70* 3 62*  --  4 39*  --  4 89* 4 74*   EGFR ml/min/1 73sq m 26  --  29 30 25 18  --  14  --  12 13   CALCIUM mg/dL 8 7  --  9 4 8 5 8 3 8 0*  --  7 9*  --  8 6 9 6   MAGNESIUM mg/dL 2 4  --   --   --   --   --   --   --   --  2 5 2 5   PHOSPHORUS mg/dL  --   --   --   --   --   --   --  5 7*  --   --   --     < > = values in this interval not displayed  I have personally reviewed the old medical records and patient's previously known baseline creatinine level is unknown    RADIOLOGY RESULTS      XR chest portable   Final Result by Rivera Lynch MD (02/35 6978)      No acute cardiopulmonary disease  Workstation performed: [id number]         XR abdomen 1 view kub   Final Result by Argelia Sawant MD (10/13 5121)      Nonspecific small bowel distention and wall thickening in the right more than left abdomen  Partial small bowel obstruction versus enteritis is suspected  Consider follow-up CT evaluation to determine a possible transition point  The study was marked in [facility] for immediate notification                 Workstation performed: [id number]         US kidney and bladder   Final Result by Flavio Schuler Roxann Zambrano MD (10/11 7682)      No evidence of obstruction  Bladder wall thickening likely related to chronic bladder outlet obstruction  Workstation performed: [id number]         CT chest abdomen pelvis wo contrast   Final Result by Katia Lopez MD (10/07 2114)   No acute pulmonary disease  Cholelithiasis without cholecystitis  Prostatomegaly with prostatic calcifications and moderately distended bladder which demonstrates circumferential bladder wall thickening and perivesicular fat stranding  Findings are consistent with cystitis  Correlate for bladder outlet obstruction  Moderately distended ureters and renal pelvices with with mild periureteral and perinephric stranding  Findings suggest upper urinary tract infection  Please note that pyelonephritis is difficult to exclude without contrast       Left lower pole renal cyst   No renal calculi  I personally discussed this study with Veronica Quintanilla on 10/7/2019 at 9:14 PM                      Workstation performed: [id number]         XR chest 2 views   Final Result by Abraham Beck MD (10/08 9522)      No acute cardiopulmonary disease  Cardiomegaly  Workstation performed: [id number]             PLAN / RECOMMENDATIONS      1  MILE  Present on admission, suspected due to bladder outlet obstruction in the light of enlarged prostate  Cruz catheter is in place and currently patient is nonoliguric  Due to abdominal distention, currently patient is NPO and NG tube is also in place and patient is also been followed by surgical team   Overnight renal function has slightly worsened to current creatinine of 2 63  Would continue IV fluid for time being and monitor renal function  2  Azotemia  Multifactorial, overnight BUN level has remained elevated but stable at 51  Continue IV fluid  Currently patient is asymptomatic from azotemia perspective  Recheck BUN level with AM labs  3  Anemia    Multifactorial, overnight hemoglobin level has dropped to 9 1 although no active signs of bleeding seen  Monitor hemoglobin level and consider blood transfusion if hemoglobin level drops below 7     4  Hypertension  Overnight blood pressure has remained under good control  Monitor hypertension with labetalol 200 mg PO daily and clonidine 0 1 mg PO daily  Overall above mentioned plan was also d/w [facility] physician  Luis Clark MD  Nephrology  10/14/2019        Portions of the record may have been created with voice recognition software  Occasional wrong word or "sound a like" substitutions may have occurred due to the inherent limitations of voice recognition software  Read the chart carefully and recognize, using context, where substitutions have occurred

## 2019-10-14 NOTE — QUICK NOTE
Surgical Rounds - General Surgery   Kavin Martines 67 y o  male MRN: [id number]  Unit/Bed#:  Encounter: [id number]      Daily Surgical Rounds    Patient seen and assessed by Dr Bashir Julio, and surgical team at 10:25        Mychal Shearer PA-C  10/14/2019

## 2019-10-14 NOTE — PROGRESS NOTES
Progress Note -Surgery PA  Lucian Gonzales 67 y o  male MRN: [id number]  Unit/Bed#:  Encounter: [id number]      Assessment   Nausea, vomiting, abdominal distention  -- suspect ileus  patient had bowel movement  He denies flatus  He is hiccuping  Plan   -- clamp NG tube  If patient tolerates and progresses with bowel function, possible removal of NG tube later today  -- serial labs and abdominal exams    ______________________________________________________________________  Subjective:   Patient denies any abdominal pain  He states he had a bowel movement  He has been hiccuping    Objective:    Vitals:  /75   Pulse 93   Temp 97 8 °F (36 6 °C) (Oral)   Resp 15   Ht 6' (1 829 m)   Wt 90 9 kg (200 lb 6 4 oz)   SpO2 100%   BMI 27 18 kg/m²     I/Os:  I/O last 3 completed shifts:   In: 1977 5 [I V :1877 5; IV Piggyback:100]  Out: 2150 [Urine:1350; Emesis/NG output:800]    I/O this shift:  In: 248 8 [I V :148 8; NG/GT:100]  Out: 600 [Urine:300; Emesis/NG output:300]    Invasive Devices     Peripheral Intravenous Line            Peripheral IV 10/11/19 Right;Ventral (anterior) Forearm 3 days    Peripheral IV 10/13/19 Right;Ventral (anterior) Wrist less than 1 day          Drain            Continuous Bladder Irrigation Three-way 6 days    NG/OG/Enteral Tube 16 Fr Right nares 1 day                Medications:  Current Facility-Administered Medications   Medication Dose Route Frequency    acetaminophen (TYLENOL) rectal suppository 650 mg  650 mg Rectal Q4H PRN    acetaminophen (TYLENOL) tablet 650 mg  650 mg Oral Q6H PRN    belladonna-opium (B&O SUPPOSITORY) 16 2-30 mg suppository 1 suppository  30 mg Rectal Q8H PRN    bisacodyl (DULCOLAX) rectal suppository 10 mg  10 mg Rectal Daily PRN    calcium carbonate (TUMS) chewable tablet 1,000 mg  1,000 mg Oral Daily PRN    cloNIDine (CATAPRES) tablet 0 1 mg  0 1 mg Oral HS    docusate sodium (COLACE) capsule 100 mg  100 mg Oral BID    donepezil (ARICEPT) tablet 5 mg  5 mg Oral HS    HYDROmorphone (DILAUDID) injection 0 5 mg  0 5 mg Intravenous Q3H PRN    labetalol (NORMODYNE) tablet 200 mg  200 mg Oral HS    metroNIDAZOLE (FLAGYL) IVPB (premix) 500 mg  500 mg Intravenous Q8H    ondansetron (ZOFRAN) injection 4 mg  4 mg Intravenous Q6H PRN    oxybutynin (DITROPAN) tablet 5 mg  5 mg Oral TID    polyethylene glycol (MIRALAX) packet 17 g  17 g Oral Daily    pravastatin (PRAVACHOL) tablet 40 mg  40 mg Oral Daily With Dinner    sodium chloride 0 9 % infusion  75 mL/hr Intravenous Continuous    tamsulosin (FLOMAX) capsule 0 4 mg  0 4 mg Oral Daily With Dinner                 Lab Results and Cultures:   CBC with diff:   Lab Results   Component Value Date    WBC 13 93 (H) 10/14/2019    HGB 9 1 (L) 10/14/2019    HCT 27 9 (L) 10/14/2019    MCV 91 10/14/2019     10/14/2019    MCH 29 6 10/14/2019    MCHC 32 6 10/14/2019    RDW 13 8 10/14/2019    MPV 10 6 10/14/2019    NRBC 0 10/14/2019       BMP/CMP:  Lab Results   Component Value Date    K 4 6 10/14/2019     10/14/2019    CO2 26 10/14/2019    BUN 51 (H) 10/14/2019    CREATININE 2 63 (H) 10/14/2019    CALCIUM 8 7 10/14/2019    AST 19 10/14/2019    ALT 24 10/14/2019    ALKPHOS 72 10/14/2019    EGFR 26 10/14/2019       Lipid Panel:   No results found for: CHOL    Coags:   Lab Results   Component Value Date    PTT 38 (H) 10/07/2019    INR 1 10 10/07/2019        Urinalysis:   Lab Results   Component Value Date    COLORU Red 10/07/2019    CLARITYU Cloudy 10/07/2019    SPECGRAV 1 015 10/07/2019    PHUR 8 0 10/07/2019    LEUKOCYTESUR Large (A) 10/07/2019    NITRITE Negative 10/07/2019    GLUCOSEU Negative 10/07/2019    KETONESU Negative 10/07/2019    BILIRUBINUR Negative 10/07/2019    BLOODU Large (A) 10/07/2019        Urine Culture:   Lab Results   Component Value Date    URINECX >100,000 cfu/ml Corynebacterium riandrési (A) 10/07/2019      Wound Culure: No results found for: WOUNDCULT  Blood Culture: Lab Results   Component Value Date    BLOODCX No Growth After 5 Days  10/07/2019         Physical Exam:  General Appearance:    Alert and orientated x 3, cooperative, no distress, appears stated age   Lungs:     Clear to auscultation bilaterally, respirations unlabored, no wheezes    Heart:    Regular rate and rhythm, S1 and S2 normal, no murmur   Abdomen:    Normoactive BS, soft, non tender, non rigid, no masses, no palpated organomegaly   Extremities:  Extremities normal, no calf tenderness, no cyanosis or edema   Pulses:   2+ and symmetric all extremities   Skin:   Skin color, texture, turgor normal, no rashes   Neurologic:   CNII-XII intact, normal strength, affect appropriate       Imaging:  Ct Chest Abdomen Pelvis Wo Contrast    Result Date: 10/7/2019  Impression: No acute pulmonary disease  Cholelithiasis without cholecystitis  Prostatomegaly with prostatic calcifications and moderately distended bladder which demonstrates circumferential bladder wall thickening and perivesicular fat stranding  Findings are consistent with cystitis  Correlate for bladder outlet obstruction  Moderately distended ureters and renal pelvices with with mild periureteral and perinephric stranding  Findings suggest upper urinary tract infection  Please note that pyelonephritis is difficult to exclude without contrast  Left lower pole renal cyst   No renal calculi  I personally discussed this study with Ting Jessie on 10/7/2019 at 9:14 PM  Workstation performed: [id number]     Xr Chest 2 Views    Result Date: 10/8/2019  Impression: No acute cardiopulmonary disease  Cardiomegaly  Workstation performed: [id number]     Us Kidney And Bladder    Result Date: 10/11/2019  Impression: No evidence of obstruction  Bladder wall thickening likely related to chronic bladder outlet obstruction   Workstation performed: [id number]       Nitza Linares PA-C   10/14/2019

## 2019-10-15 LAB
ANION GAP SERPL CALCULATED.3IONS-SCNC: 11 MMOL/L (ref 4–13)
BASOPHILS # BLD AUTO: 0.06 THOUSANDS/ΜL (ref 0–0.1)
BASOPHILS NFR BLD AUTO: 1 % (ref 0–1)
BUN SERPL-MCNC: 47 MG/DL (ref 5–25)
CALCIUM SERPL-MCNC: 8.4 MG/DL (ref 8.3–10.1)
CHLORIDE SERPL-SCNC: 106 MMOL/L (ref 100–108)
CO2 SERPL-SCNC: 22 MMOL/L (ref 21–32)
CREAT SERPL-MCNC: 2.35 MG/DL (ref 0.6–1.3)
EOSINOPHIL # BLD AUTO: 0.42 THOUSAND/ΜL (ref 0–0.61)
EOSINOPHIL NFR BLD AUTO: 4 % (ref 0–6)
ERYTHROCYTE [DISTWIDTH] IN BLOOD BY AUTOMATED COUNT: 13.4 % (ref 11.6–15.1)
GFR SERPL CREATININE-BSD FRML MDRD: 30 ML/MIN/1.73SQ M
GLUCOSE SERPL-MCNC: 101 MG/DL (ref 65–140)
GLUCOSE SERPL-MCNC: 97 MG/DL (ref 65–140)
HCT VFR BLD AUTO: 26.3 % (ref 36.5–49.3)
HGB BLD-MCNC: 8.4 G/DL (ref 12–17)
IMM GRANULOCYTES # BLD AUTO: 0.07 THOUSAND/UL (ref 0–0.2)
IMM GRANULOCYTES NFR BLD AUTO: 1 % (ref 0–2)
LYMPHOCYTES # BLD AUTO: 0.87 THOUSANDS/ΜL (ref 0.6–4.47)
LYMPHOCYTES NFR BLD AUTO: 9 % (ref 14–44)
MCH RBC QN AUTO: 29.3 PG (ref 26.8–34.3)
MCHC RBC AUTO-ENTMCNC: 31.9 G/DL (ref 31.4–37.4)
MCV RBC AUTO: 92 FL (ref 82–98)
MONOCYTES # BLD AUTO: 0.92 THOUSAND/ΜL (ref 0.17–1.22)
MONOCYTES NFR BLD AUTO: 9 % (ref 4–12)
NEUTROPHILS # BLD AUTO: 7.71 THOUSANDS/ΜL (ref 1.85–7.62)
NEUTS SEG NFR BLD AUTO: 76 % (ref 43–75)
NRBC BLD AUTO-RTO: 0 /100 WBCS
PLATELET # BLD AUTO: 250 THOUSANDS/UL (ref 149–390)
PMV BLD AUTO: 10.3 FL (ref 8.9–12.7)
POTASSIUM SERPL-SCNC: 4.7 MMOL/L (ref 3.5–5.3)
RBC # BLD AUTO: 2.87 MILLION/UL (ref 3.88–5.62)
SODIUM SERPL-SCNC: 139 MMOL/L (ref 136–145)
WBC # BLD AUTO: 10.05 THOUSAND/UL (ref 4.31–10.16)

## 2019-10-15 PROCEDURE — G8988 SELF CARE GOAL STATUS: HCPCS

## 2019-10-15 PROCEDURE — 99232 SBSQ HOSP IP/OBS MODERATE 35: CPT | Performed by: SURGERY

## 2019-10-15 PROCEDURE — G8987 SELF CARE CURRENT STATUS: HCPCS

## 2019-10-15 PROCEDURE — 97110 THERAPEUTIC EXERCISES: CPT

## 2019-10-15 PROCEDURE — 97116 GAIT TRAINING THERAPY: CPT

## 2019-10-15 PROCEDURE — 99232 SBSQ HOSP IP/OBS MODERATE 35: CPT | Performed by: FAMILY MEDICINE

## 2019-10-15 PROCEDURE — 85025 COMPLETE CBC W/AUTO DIFF WBC: CPT | Performed by: INTERNAL MEDICINE

## 2019-10-15 PROCEDURE — 80048 BASIC METABOLIC PNL TOTAL CA: CPT | Performed by: INTERNAL MEDICINE

## 2019-10-15 PROCEDURE — 99233 SBSQ HOSP IP/OBS HIGH 50: CPT | Performed by: INTERNAL MEDICINE

## 2019-10-15 PROCEDURE — 97167 OT EVAL HIGH COMPLEX 60 MIN: CPT

## 2019-10-15 RX ADMIN — OXYBUTYNIN CHLORIDE 5 MG: 5 TABLET ORAL at 09:34

## 2019-10-15 RX ADMIN — SODIUM CHLORIDE 75 ML/HR: 0.9 INJECTION, SOLUTION INTRAVENOUS at 01:30

## 2019-10-15 RX ADMIN — OXYBUTYNIN CHLORIDE 5 MG: 5 TABLET ORAL at 15:39

## 2019-10-15 RX ADMIN — SODIUM CHLORIDE 75 ML/HR: 0.9 INJECTION, SOLUTION INTRAVENOUS at 13:37

## 2019-10-15 RX ADMIN — DOCUSATE SODIUM 100 MG: 100 CAPSULE, LIQUID FILLED ORAL at 09:34

## 2019-10-15 RX ADMIN — ONDANSETRON 4 MG: 2 INJECTION INTRAMUSCULAR; INTRAVENOUS at 21:49

## 2019-10-15 RX ADMIN — PRAVASTATIN SODIUM 40 MG: 40 TABLET ORAL at 15:39

## 2019-10-15 RX ADMIN — POLYETHYLENE GLYCOL 3350 17 G: 17 POWDER, FOR SOLUTION ORAL at 09:34

## 2019-10-15 RX ADMIN — DOCUSATE SODIUM 100 MG: 100 CAPSULE, LIQUID FILLED ORAL at 17:28

## 2019-10-15 RX ADMIN — TAMSULOSIN HYDROCHLORIDE 0.4 MG: 0.4 CAPSULE ORAL at 15:39

## 2019-10-15 NOTE — OCCUPATIONAL THERAPY NOTE
Occupational Therapy Evaluation        Patient Name: Solange Serrato  UQDIM'H Date: 10/15/2019           10/15/19 1410   Note Type   Note type Eval only   Restrictions/Precautions   Weight Bearing Precautions Per Order No   Braces or Orthoses Other (Comment)  (none per pt)   Other Precautions Chair Alarm; Bed Alarm;Multiple lines; Fall Risk   Pain Assessment   Pain Assessment No/denies pain   Pain Score No Pain   Home Living   [facility];Stairs to enter without rails; Able to live on main level with bedroom/bathroom  ([address], 1st floor set up)   Bathroom Shower/Tub Tub/shower unit   Bathroom Toilet Standard   Bathroom Equipment Commode   Bathroom Accessibility Accessible   Home Equipment Walker;Cane  (rollator)   Additional Comments no AD at baseline   Prior Function   Level of [place] Independent with ADLs and functional mobility   Lives With Daughter  (and RIAN)   Receives Help From Family   ADL Assistance Independent   IADLs Independent  (pt able to prep meals for himself)   Falls in the last 6 months 0  ((+) near falls)   Vocational Retired   Comments pt home alone when dtr works during the day  (-) driving   Lifestyle   Autonomy Patient reported independent with ADLs, needs assistance with IADLs, ambulatory with no AD      Reciprocal Relationships Supportive Family   Service to Others Retired / [address] [facility] Enjoys working and being active   Psychosocial   Psychosocial (WDL) [address]  6  Modified independent  (anticipate no deficits in this area)   Grooming Assistance 5  Supervision/Setup   UB Bathing Assistance 4  Minimal Assistance   LB [address] 3  Moderate Assistance   UB Dressing Assistance 4  Minimal Assistance    [facility] [address] 3  Moderate [address]  3  Moderate Assistance   Functional Assistance 3  Moderate Assistance   Bed Mobility   Rolling R 4  Minimal assistance Additional items Assist x 1;HOB elevated; Increased time required;Verbal cues   Supine to Sit 4  Minimal assistance   Additional items Assist x 1; Increased time required;Verbal cues   Transfers   Sit to Stand 4  Minimal assistance   Additional items Assist x 1; Increased time required;Verbal cues   Stand to Sit 4  Minimal assistance   Additional items Assist x 1; Increased time required;Verbal cues   Functional Mobility   Functional Mobility 4  Minimal assistance   Additional items Rolling walker   Balance   Static Sitting Good   Dynamic Sitting Fair +   Static Standing Fair   Dynamic Standing Fair -   Activity Tolerance   Activity Tolerance Patient limited by fatigue   Nurse Made Aware PATY Blake verbalized patient appropriate for therapy, made aware of therapy outcome   RUE Assessment   RUE Assessment WFL   LUE Assessment   LUE Assessment WFL   Hand Function   Gross Motor Coordination Impaired   Fine Motor Coordination Functional   Sensation   Light Touch No apparent deficits  (BUEs)   Proprioception   Proprioception No apparent deficits   Vision-Basic Assessment   Current Vision Wears glasses only for reading   Patient Visual Report Other (Comment)  (No significant changes reported)   Cognition   Overall Cognitive Status WFL   Arousal/Participation Alert; Responsive; Cooperative   Attention Within functional limits   Orientation Level Oriented X4   Memory Within functional limits   Following Commands Follows all commands and directions without difficulty   Assessment   Limitation Decreased ADL status; Decreased endurance;Decreased self-care trans;Decreased high-level ADLs   Prognosis Good   Assessment Patient is a 67 y o  male seen for OT evaluation s/p admit to [id number] [facility] on 10/7/2019 w/Urinary retention  Commorbidities affecting patient's functional performance at time of assessment include: Abdominal Distention, BPH, Ambulatory  Dysfunction, Acute blood loss anemia, Gross hematuria, Cholelithiasis  Orders placed for OT evaluation and treatment  Performed at least two patient identifiers during session including name and wristband  Prior to admission,Patient reported independent with ADLs, needs assistance with IADLs, ambulatory with no AD  Personal factors affecting patient at time of initial evaluation include: limited caregiver support, difficulty performing ADLs and difficulty performing IADLs  Upon evaluation, patient requires supervision, set up and contact guard assist for UB ADLs, moderate and maximal assist for LB ADLs, transfers and functional ambulation in room and bathroom with moderate assist, with the use of Rolling Walker  Occupational performance is affected by the following deficits: dynamic sit/ stand balance deficit with poor standing tolerance time for self care and functional mobility, decreased activity tolerance, decreased safety awareness and postural control and postural alignment deficit, requiring external assistance to complete transitional movements  Therapist completed  extensive additional review of medical records and additional review of physical, cognitive or psychosocial history, clinical examination identifying 5 or more performance deficits, clinical decision making of a high complexity , consistent with a high complexity level evaluation  Patient to benefit from continued Occupational Therapy treatment while in the hospital to address deficits as defined above and maximize level of functional independence with ADLs and functional mobility  Occupational Performance areas to address include: bathing/ shower, dressing, toilet hygiene, transfer to all surfaces, functional ambulation, functional mobility, emergency response, health maintenance, medication routine/ management, IADLS: Household maintenance, IADLs: safety procedures, IADLs: meal prep/ clean up, Leisure Participation and Social participation   From OT standpoint, recommendation at time of d/c would be Short Term Rehab      Goals   Patient Goals get stronger to be able to go back home"   Plan   Treatment Interventions ADL retraining;Functional transfer training; Endurance training;Patient/family training;Equipment evaluation/education; Compensatory technique education;Continued evaluation; Energy conservation; Activityengagement   Goal Expiration Date 10/29/19   OT Frequency 3-5x/wk   Recommendation   OT Discharge Recommendation Short Term Rehab   Barthel Index   Feeding 10   Bathing 0   Grooming Score 0   Dressing Score 5   Bladder Score 0   Bowels Score 10   Toilet Use Score 5   Transfers (Bed/Chair) Score 10   Mobility (Level Surface) Score 0   Stairs Score 0   Barthel Index Score 40   Modified [place] Scale   Modified [place] Scale 4       1 - Patient will verbalize and demonstrate use of energy conservation/ deep breathing technique and work simplification skills during functional activity with no verbal cues  2 - Patient will verbalize and demonstrate good body mechanics and joint protection techniques during  ADLs/ IADLs with no verbal cues  3 - Patient will increase OOB/ sitting tolerance to 2-4 hours per day for increased participation in self care and leisure tasks with no s/s of exertion  4 - Patient will increase standing tolerance time to 5  minutes with unilateral UE support to complete sink level ADLs@ mod I level  5 - Patient will increase sitting tolerance at edge of bed to 20 minutes to complete UB ADLs @ set up assist level  6 - Patient will transfer bed to Chair / toilet at Set up assist level with AD as indicated  7 - Patient will complete UB ADLs with set up assist      8 - Patient will complete LB ADLs with min assist with the use of adaptive equipment       9 - Patient will complete toileting hygiene with set up assist/ supervision for thoroughness    10 - Patient/ Family  will demonstrate competency with UE Home Exercise Program

## 2019-10-15 NOTE — PLAN OF CARE
Problem: Potential for Falls  Goal: Patient will remain free of falls  Description  INTERVENTIONS:  - Assess patient frequently for physical needs  -  Identify cognitive and physical deficits and behaviors that affect risk of falls    -  [place] fall precautions as indicated by assessment   - Educate patient/family on patient safety including physical limitations  - Instruct patient to call for assistance with activity based on assessment  - Modify environment to reduce risk of injury  - Consider OT/PT consult to assist with strengthening/mobility  Outcome: Progressing     Problem: Prexisting or High Potential for Compromised Skin Integrity  Goal: Skin integrity is maintained or improved  Description  INTERVENTIONS:  - Identify patients at risk for skin breakdown  - Assess and monitor skin integrity  - Assess and monitor nutrition and hydration status  - Monitor labs   - Assess for incontinence   - Turn and reposition patient  - Assist with mobility/ambulation  - Relieve pressure over bony prominences  - Avoid friction and shearing  - Provide appropriate hygiene as needed including keeping skin clean and dry  - Evaluate need for skin moisturizer/barrier cream  - Collaborate with interdisciplinary team   - Patient/family teaching  - Consider wound care consult   Outcome: Progressing     Problem: PAIN - ADULT  Goal: Verbalizes/displays adequate comfort level or baseline comfort level  Description  Interventions:  - Encourage patient to monitor pain and request assistance  - Assess pain using appropriate pain scale  - Administer analgesics based on type and severity of pain and evaluate response  - Implement non-pharmacological measures as appropriate and evaluate response  - Consider cultural and social influences on pain and pain management  - Notify physician/advanced practitioner if interventions unsuccessful or patient reports new pain  Outcome: Progressing     Problem: SAFETY ADULT  Goal: Patient will remain free of falls  Description  INTERVENTIONS:  - Assess patient frequently for physical needs  -  Identify cognitive and physical deficits and behaviors that affect risk of falls    -  [place] fall precautions as indicated by assessment   - Educate patient/family on patient safety including physical limitations  - Instruct patient to call for assistance with activity based on assessment  - Modify environment to reduce risk of injury  - Consider OT/PT consult to assist with strengthening/mobility  Outcome: Progressing  Goal: Maintain or return to baseline ADL function  Description  INTERVENTIONS:  -  Assess patient's ability to carry out ADLs; assess patient's baseline for ADL function and identify physical deficits which impact ability to perform ADLs (bathing, care of mouth/teeth, toileting, grooming, dressing, etc )  - Assess/evaluate cause of self-care deficits   - Assess range of motion  - Assess patient's mobility; develop plan if impaired  - Assess patient's need for assistive devices and provide as appropriate  - Encourage maximum independence but intervene and supervise when necessary  - Involve family in performance of ADLs  - Assess for home care needs following discharge   - Consider OT consult to assist with ADL evaluation and planning for discharge  - Provide patient education as appropriate  Outcome: Progressing  Goal: Maintain or return mobility status to optimal level  Description  INTERVENTIONS:  - Assess patient's baseline mobility status (ambulation, transfers, stairs, etc )    - Identify cognitive and physical deficits and behaviors that affect mobility  - Identify mobility aids required to assist with transfers and/or ambulation (gait belt, sit-to-stand, lift, walker, cane, etc )  - [place] fall precautions as indicated by assessment  - Record patient progress and toleration of activity level on Mobility SBAR; progress patient to next Phase/Stage  - Instruct patient to call for assistance with activity based on assessment  - Consider rehabilitation consult to assist with strengthening/weightbearing, etc   Outcome: Progressing     Problem: DISCHARGE PLANNING  Goal: Discharge to home or other facility with appropriate resources  Description  INTERVENTIONS:  - Identify barriers to discharge w/patient and caregiver  - Arrange for needed discharge resources and transportation as appropriate  - Identify discharge learning needs (meds, wound care, etc )  - Arrange for interpretive services to assist at discharge as needed  - Refer to Case Management Department for coordinating discharge planning if the patient needs post-hospital services based on physician/advanced practitioner order or complex needs related to functional status, cognitive ability, or social support system  Outcome: Progressing     Problem: Knowledge Deficit  Goal: Patient/family/caregiver demonstrates understanding of disease process, treatment plan, medications, and discharge instructions  Description  Complete learning assessment and assess knowledge base    Interventions:  - Provide teaching at level of understanding  - Provide teaching via preferred learning methods  Outcome: Progressing     Problem: GENITOURINARY - ADULT  Goal: Maintains or returns to baseline urinary function  Description  INTERVENTIONS:  - Assess urinary function  - Encourage oral fluids to ensure adequate hydration if ordered  - Administer IV fluids as ordered to ensure adequate hydration  - Administer ordered medications as needed  - Offer frequent toileting  - Follow urinary retention protocol if ordered  Outcome: Progressing  Goal: Absence of urinary retention  Description  INTERVENTIONS:  - Assess patients ability to void and empty bladder  - Monitor I/O  - Bladder scan as needed  - Discuss with physician/AP medications to alleviate retention as needed  - Discuss catheterization for long term situations as appropriate  Outcome: Progressing  Goal: Urinary catheter remains patent  Description  INTERVENTIONS:  - Assess patency of urinary catheter  - If patient has a chronic soto, consider changing catheter if non-functioning  - Follow guidelines for intermittent irrigation of non-functioning urinary catheter  Outcome: Progressing     Problem: INFECTION - ADULT  Goal: Absence or prevention of progression during hospitalization  Description  INTERVENTIONS:  - Assess and monitor for signs and symptoms of infection  - Monitor lab/diagnostic results  - Monitor all insertion sites, i e  indwelling lines, tubes, and drains  - Monitor endotracheal if appropriate and nasal secretions for changes in amount and color  - [place] appropriate cooling/warming therapies per order  - Administer medications as ordered  - Instruct and encourage patient and family to use good hand hygiene technique  - Identify and instruct in appropriate isolation precautions for identified infection/condition  Outcome: Progressing

## 2019-10-15 NOTE — ASSESSMENT & PLAN NOTE
Cr continuing to improved since admission, currently 2 35  Nephrology following, recommendations appreciated  Avoid hypotension/nephrotoxins medication  Renal Ultrasound showing bladder wall thickening suggestive of chronic outflow obstruction

## 2019-10-15 NOTE — PROGRESS NOTES
NEPHROLOGY PROGRESS NOTE    Patient: Shilpi De Leon               Sex: male          DOA: 10/7/2019  4:48 PM   Date of Birth: @        Age: @        LOS:  LOS: 8 days   10/15/2019    REASON FOR THE CONSULTATION:  Further management of MILE  HPI     This is a 67 y o  male admitted for Urinary retention     SUBJECTIVE     - review General surgery note with recommendation and plan to remove NG tube today and trial of clear liquid  Currently patient is nonoliguric and breathing is also stable and at baseline  Indwelling Cruz catheter has remained in place  Patient denies nausea, vomiting, headache or dizziness today    - Reviewed last 24 hrs events     CURRENT MEDICATIONS       Current Facility-Administered Medications:     acetaminophen (TYLENOL) rectal suppository 650 mg, 650 mg, Rectal, Q4H PRN, Luis Rowe MD    acetaminophen (TYLENOL) tablet 650 mg, 650 mg, Oral, Q6H PRN, AlyssiaTAMRA Gavin    belladonna-opium (B&O SUPPOSITORY) 16 2-30 mg suppository 1 suppository, 30 mg, Rectal, Q8H PRN, TAMRA Monzon, 1 suppository at 10/10/19 0259    bisacodyl (DULCOLAX) rectal suppository 10 mg, 10 mg, Rectal, Daily PRN, AlyssiaTAMRA Gavin, 10 mg at 10/13/19 1517    calcium carbonate (TUMS) chewable tablet 1,000 mg, 1,000 mg, Oral, Daily PRN, AlyssiaCAMRYN GavinNP, 1,000 mg at 10/13/19 0424    cloNIDine (CATAPRES) tablet 0 1 mg, 0 1 mg, Oral, HS, Zackary Abbasi MD, 0 1 mg at 10/14/19 2243    docusate sodium (COLACE) capsule 100 mg, 100 mg, Oral, BID, Misti Breen MD, 100 mg at 10/15/19 0934    donepezil (ARICEPT) tablet 5 mg, 5 mg, Oral, HS, CAMRYN MonzonNP, 5 mg at 10/14/19 2243    HYDROmorphone (DILAUDID) injection 0 5 mg, 0 5 mg, Intravenous, Q3H PRN, TAMRA Monzon, 0 5 mg at 10/10/19 0635    iohexol (OMNIPAQUE) 240 MG/ML solution 50 mL, 50 mL, Oral, Once in imaging, Garo Gramajo MD    labetalol (NORMODYNE) tablet 200 mg, 200 mg, Oral, HS, Zackary Abbasi MD, 200 mg at 10/14/19 2243    ondansetron (ZOFRAN) injection 4 mg, 4 mg, Intravenous, Q6H PRN, CAMRYN SpragueNP, 4 mg at 10/14/19 1421    oxybutynin (DITROPAN) tablet 5 mg, 5 mg, Oral, TID, CAMRYN SpragueNP, 5 mg at 10/15/19 0934    polyethylene glycol (MIRALAX) packet 17 g, 17 g, Oral, Daily, Alban Ross MD, 17 g at 10/15/19 0934    pravastatin (PRAVACHOL) tablet 40 mg, 40 mg, Oral, Daily With TAMRA Carrero, 40 mg at 10/14/19 1622    sodium chloride 0 9 % infusion, 75 mL/hr, Intravenous, Continuous, Morales Santos MD, Last Rate: 75 mL/hr at 10/15/19 0130, 75 mL/hr at 10/15/19 0130    tamsulosin (FLOMAX) capsule 0 4 mg, 0 4 mg, Oral, Daily With TAMRA Carrero, 0 4 mg at 10/14/19 1622    OBJECTIVE     Current Weight: Weight - Scale: 90 9 kg (200 lb 6 4 oz)  Vitals:    10/15/19 0700   BP: 136/68   Pulse: 72   Resp: 18   Temp: 98 5 °F (36 9 °C)   SpO2: 92%     Body mass index is 27 18 kg/m²  Intake/Output Summary (Last 24 hours) at 10/15/2019 1136  Last data filed at 10/15/2019 0600  Gross per 24 hour   Intake 1748 75 ml   Output 1125 ml   Net 623 75 ml       PHYSICAL EXAMINATION     Physical Exam   Constitutional: No distress  HENT:   Head: Normocephalic and atraumatic  Eyes: No scleral icterus  Neck: Neck supple  No JVD present  Cardiovascular: Normal rate  Pulmonary/Chest: No accessory muscle usage  No respiratory distress  He has no wheezes  Abdominal: Soft  He exhibits no distension  Musculoskeletal:        Right hand: He exhibits no laceration  Left hand: He exhibits no laceration  Neurological: He is alert  Skin: Skin is warm  No cyanosis  Psychiatric: He is not combative  He expresses no suicidal ideation           LAB RESULTS     Results from last 7 days   Lab Units 10/15/19  0503 10/14/19  0427 10/13/19  1450 10/13/19  0457 10/12/19  0617 10/11/19  0508 10/10/19  0557 10/09/19  2032 10/09/19  0608   WBC Thousand/uL 10 05 13 93*  -- 10 19* 7 58 8 04 7 16 8 89 11 59*   HEMOGLOBIN g/dL 8 4* 9 1* 10 8* 10 8* 9 8* 9 3* 10 0* 9 6* 10 3*   HEMATOCRIT % 26 3* 27 9* 35 7* 33 2* 30 5* 29 0* 31 3* 29 5* 32 7*   PLATELETS Thousands/uL 250 236  --  252 187 158 147* 146* 148*   SODIUM mmol/L 139 137  --  135* 139 137 138  --  138   POTASSIUM mmol/L 4 7 4 6  --  4 9 4 8 4 5 4 1  --  5 1   CHLORIDE mmol/L 106 103  --  100 106 106 107  --  107   CO2 mmol/L 22 26  --  24 22 18* 18*  --  15*   BUN mg/dL 47* 51*  --  51* 40* 55* 69*  --  76*   CREATININE mg/dL 2 35* 2 63*  --  2 38* 2 32* 2 70* 3 62*  --  4 39*   EGFR ml/min/1 73sq m 30 26  --  29 30 25 18  --  14   CALCIUM mg/dL 8 4 8 7  --  9 4 8 5 8 3 8 0*  --  7 9*   MAGNESIUM mg/dL  --  2 4  --   --   --   --   --   --   --    PHOSPHORUS mg/dL  --   --   --   --   --   --   --   --  5 7*       I have personally reviewed the old medical records and patient's previously known baseline creatinine level is unknown    RADIOLOGY RESULTS      CT abdomen pelvis wo contrast   Final Result by Danae Frazier MD (10/14 2399)      Although stomach and bowel loops are distended with intermittent areas of collapsed loops, the pattern is more suspicious for or diffuse ileus than for bowel obstruction  Marked thickening of the wall of the urinary bladder which is collapsed around a Cruz catheter balloon  Groundglass and reticular prominence in the left lung base consistent with either atelectasis or developing left lower lobe pneumonia  Cardiomegaly  Workstation performed: [id number]         XR chest portable   Final Result by Brad Mullins MD (85/72 5262)      No acute cardiopulmonary disease  Workstation performed: [id number]         XR abdomen 1 view kub   Final Result by Berna Sellers MD (10/13 9060)      Nonspecific small bowel distention and wall thickening in the right more than left abdomen  Partial small bowel obstruction versus enteritis is suspected    Consider follow-up CT evaluation to determine a possible transition point  The study was marked in [facility] for immediate notification  Workstation performed: [id number]         US kidney and bladder   Final Result by Pieter Jaime MD (10/11 8787)      No evidence of obstruction  Bladder wall thickening likely related to chronic bladder outlet obstruction  Workstation performed: [id number]         CT chest abdomen pelvis wo contrast   Final Result by Mike Brownlee MD (10/07 2114)   No acute pulmonary disease  Cholelithiasis without cholecystitis  Prostatomegaly with prostatic calcifications and moderately distended bladder which demonstrates circumferential bladder wall thickening and perivesicular fat stranding  Findings are consistent with cystitis  Correlate for bladder outlet obstruction  Moderately distended ureters and renal pelvices with with mild periureteral and perinephric stranding  Findings suggest upper urinary tract infection  Please note that pyelonephritis is difficult to exclude without contrast       Left lower pole renal cyst   No renal calculi  I personally discussed this study with Shobha Ortega on 10/7/2019 at 9:14 PM                      Workstation performed: [id number]         XR chest 2 views   Final Result by Saran Mosqueda MD (10/08 8083)      No acute cardiopulmonary disease  Cardiomegaly  Workstation performed: [id number]             PLAN / RECOMMENDATIONS      1  MILE  Present on admission, suspected due to bladder outflow obstruction in the setting of and large prostate  Earlier patient was also found to have ileus and NG tube was placed and currently also receiving IV fluid  Overnight patient has remained nonoliguric and renal function has marginally improved to current creatinine of 2 35  Reviewed General surgery note and plan to remove NG tube and trial of clear liquid    Will plan to continue IV fluids today and recheck renal function with AM labs     2  Azotemia  Multifactorial, received IV fluid overnight and BUN level has improved to 47  Patient has remained asymptomatic from azotemia perspective  Continue IV fluids today and monitor BUN level  3  Anemia  Multifactorial, overnight hemoglobin level has worsened to 8 4 although no active signs of bleeding seen  Patient is receiving IV fluid and may have component of dilutional anemia  Plan to continue IV fluid as mentioned earlier today  Monitor renal function and consider blood transfusion if hemoglobin level drops below 7     4  Hypertension  Overnight blood pressure has remained under good control and will continue monitor hypertension with clonidine 0 1 mg PO daily along with labetalol 200 mg PO daily  Overall above mentioned plan was also d/w Rody Jade MD  Nephrology  10/15/2019        Portions of the record may have been created with voice recognition software  Occasional wrong word or "sound a like" substitutions may have occurred due to the inherent limitations of voice recognition software  Read the chart carefully and recognize, using context, where substitutions have occurred

## 2019-10-15 NOTE — PLAN OF CARE
Problem: PHYSICAL THERAPY ADULT  Goal: Performs mobility at highest level of function for planned discharge setting  See evaluation for individualized goals  Description  Treatment/Interventions: Functional transfer training, LE strengthening/ROM, Elevations, Therapeutic exercise, Endurance training, Patient/family training, Equipment eval/education, Bed mobility, Gait training, Spoke to nursing  Equipment Recommended: (TBD)       See flowsheet documentation for full assessment, interventions and recommendations  Outcome: Progressing  Note:   Prognosis: Good  Problem List: Decreased strength, Decreased endurance, Impaired balance, Decreased mobility  Assessment: Pt seen for PT treatment session this date with interventions consisting of gait training w/ emphasis on improving pt's ability to ambulate level surfaces x 5' + 15' x2 with min A provided by therapist with RW and Therapeutic exercise consisting of: AROM 10 reps B LE in sitting position  Pt agreeable to PT treatment session upon arrival, pt found supine in bed w/ HOB elevated, in no apparent distress, A&O x 4, responsive and requiring frequent cues for redirection/focus on task  In comparison to previous session, pt with improvements in continued household gait distance tolerance, pt agreeable to use of RW, initiation of B LE exercises to tolerance without pain reported  Post session: pt returned back to recliner, chair alarm engaged, all needs in reach and RN notified of session findings/recommendations  Continue to recommend STR at time of d/c in order to maximize pt's functional independence and safety w/ mobility  Pt continues to be functioning below baseline level, and remains limited 2* factors listed above  PT will continue to see pt while here in order to address the deficits listed above and provide interventions consistent w/ POC in effort to achieve STGs    Barriers to Discharge: Inaccessible home environment, Decreased caregiver support  Barriers to Discharge Comments: pt home alone during the day  Recommendation: Short-term skilled PT     PT - OK to Discharge: Yes(when medically cleared if to STR)    See flowsheet documentation for full assessment

## 2019-10-15 NOTE — PHYSICAL THERAPY NOTE
Physical Therapy Treatment Note       10/15/19 1237   Pain Assessment   Pain Assessment No/denies pain   Pain Score No Pain   Restrictions/Precautions   Weight Bearing Precautions Per Order No   Braces or Orthoses   (none per pt)   Other Precautions Chair Alarm; Bed Alarm;Multiple lines; Fall Risk   General   Chart Reviewed Yes   Response to Previous Treatment Patient with no complaints from previous session  Family/Caregiver Present No   Cognition   Overall Cognitive Status WFL   Arousal/Participation Alert; Responsive; Cooperative   Attention Within functional limits   Orientation Level Oriented X4   Memory Within functional limits   Following Commands Follows all commands and directions without difficulty   Comments pt agreeable to PT session   Subjective   Subjective "I will get up and walk"   Bed Mobility   Supine to Sit 4  Minimal assistance   Additional items Assist x 1;HOB elevated; Increased time required;Verbal cues   Transfers   Sit to Stand 4  Minimal assistance   Additional items Assist x 1; Increased time required;Verbal cues   Stand to Sit 4  Minimal assistance   Additional items Assist x 1; Increased time required;Verbal cues   Ambulation/Elevation   Gait pattern Decreased foot clearance; Short stride; Step to; Wide OLVIN   Gait Assistance 4  Minimal assist   Additional items Assist x 1;Verbal cues   Assistive Device Rolling walker   Balance   Static Sitting Good   Dynamic Sitting Fair +   Static Standing Fair   Dynamic Standing Fair -   Ambulatory Fair -  (Fair-/Poor+)   Activity Tolerance   Activity Tolerance Patient limited by fatigue   Medical Staff Made Aware surgery team   Nurse Made Aware PATY Diaz verbalized patient appropriate for therapy, made aware of therapy outcome   Exercises   Heelslides Sitting;10 reps;AROM; Bilateral   Hip Abduction Sitting;10 reps;AROM; Bilateral   Hip Adduction Sitting;10 reps;AROM; Bilateral  (returning to midline)   Knee AROM Long Arc Quad Sitting;10 reps;AROM; Bilateral Ankle Pumps Sitting;10 reps;AROM; Bilateral   Marching Sitting;10 reps;AROM; Bilateral   Assessment   Prognosis Good   Problem List Decreased strength;Decreased endurance; Impaired balance;Decreased mobility   Assessment Pt seen for PT treatment session this date with interventions consisting of gait training w/ emphasis on improving pt's ability to ambulate level surfaces x 5' + 15' x2 with min A provided by therapist with RW and Therapeutic exercise consisting of: AROM 10 reps B LE in sitting position  Pt agreeable to PT treatment session upon arrival, pt found supine in bed w/ HOB elevated, in no apparent distress, A&O x 4, responsive and requiring frequent cues for redirection/focus on task  In comparison to previous session, pt with improvements in continued household gait distance tolerance, pt agreeable to use of RW, initiation of B LE exercises to tolerance without pain reported  Post session: pt returned back to recliner, chair alarm engaged, all needs in reach and RN notified of session findings/recommendations  Continue to recommend STR at time of d/c in order to maximize pt's functional independence and safety w/ mobility  Pt continues to be functioning below baseline level, and remains limited 2* factors listed above  PT will continue to see pt while here in order to address the deficits listed above and provide interventions consistent w/ POC in effort to achieve STGs  Barriers to Discharge Inaccessible home environment;Decreased caregiver support   Goals   Patient Goals to get stronger & return home   STG Expiration Date 10/22/19   Short Term Goal #1 STGs remain appropriate   PT Treatment Day 1   Plan   Treatment/Interventions Functional transfer training;LE strengthening/ROM; Elevations; Therapeutic exercise; Endurance training;Patient/family training;Equipment eval/education; Bed mobility;Gait training;Spoke to nursing;Spoke to advanced practitioner;Spoke to case management;OT   Progress Slow progress, decreased activity tolerance   PT Frequency   (3-5x/wk)   Recommendation   Recommendation Short-term skilled PT   Equipment Recommended Walker  (RW)   PT - OK to Discharge Yes  (when medically cleared if to STR)       Jasmeet Palmer, PT, DPT    Time of PT treatment session: [id number]

## 2019-10-15 NOTE — PLAN OF CARE
Problem: OCCUPATIONAL THERAPY ADULT  Goal: Performs self-care activities at highest level of function for planned discharge setting  See evaluation for individualized goals  Description  Treatment Interventions: ADL retraining, Functional transfer training, Endurance training, Patient/family training, Equipment evaluation/education, Compensatory technique education, Continued evaluation, Energy conservation, Activityengagement          See flowsheet documentation for full assessment, interventions and recommendations  Note:   Limitation: Decreased ADL status, Decreased endurance, Decreased self-care trans, Decreased high-level ADLs  Prognosis: Good  Assessment: Patient is a 67 y o  male seen for OT evaluation s/p admit to [id number] [place] [address] on 10/7/2019 w/Urinary retention  Commorbidities affecting patient's functional performance at time of assessment include: Abdominal Distention, BPH, Ambulatory  Dysfunction, Acute blood loss anemia, Gross hematuria, Cholelithiasis  Orders placed for OT evaluation and treatment  Performed at least two patient identifiers during session including name and wristband  Prior to admission,Patient reported independent with ADLs, needs assistance with IADLs, ambulatory with no AD  Personal factors affecting patient at time of initial evaluation include: limited caregiver support, difficulty performing ADLs and difficulty performing IADLs  Upon evaluation, patient requires supervision, set up and contact guard assist for UB ADLs, moderate and maximal assist for LB ADLs, transfers and functional ambulation in room and bathroom with moderate assist, with the use of Rolling Walker   Occupational performance is affected by the following deficits: dynamic sit/ stand balance deficit with poor standing tolerance time for self care and functional mobility, decreased activity tolerance, decreased safety awareness and postural control and postural alignment deficit, requiring external assistance to complete transitional movements  Therapist completed  extensive additional review of medical records and additional review of physical, cognitive or psychosocial history, clinical examination identifying 5 or more performance deficits, clinical decision making of a high complexity , consistent with a high complexity level evaluation  Patient to benefit from continued Occupational Therapy treatment while in the hospital to address deficits as defined above and maximize level of functional independence with ADLs and functional mobility  Occupational Performance areas to address include: bathing/ shower, dressing, toilet hygiene, transfer to all surfaces, functional ambulation, functional mobility, emergency response, health maintenance, medication routine/ management, IADLS: Household maintenance, IADLs: safety procedures, IADLs: meal prep/ clean up, Leisure Participation and Social participation  From OT standpoint, recommendation at time of d/c would be Short Term Rehab         OT Discharge Recommendation: Short Term Rehab

## 2019-10-15 NOTE — PROGRESS NOTES
Progress Note - Byron Both 1940, 67 y o  male MRN: [id number]    Unit/Bed#: -01 Encounter: [id number]    Primary Care Provider: No primary care provider on file  Date and time admitted to hospital: 10/7/2019  4:48 PM        Abdominal distention  Assessment & Plan  Surgery following- no surgical intervention  NG tube removed yesterday  Patient is tolerating clear liquid diet  Will advance diet to full liquid this dinner and soft surgical diet for tomorrow morning  Ambulatory dysfunction  Assessment & Plan  PT/OT evaluation completed- STR vs home PT    Acute blood loss anemia  Assessment & Plan  Acute blood loss anemia in the setting of gross hematuria as evidenced by hgb 15 1>10 0>8  4  Will monitor hemoglobin 1 more day  Hematuria resolved  Monitor H&H and transfuse as needed        Hematuria, gross  Assessment & Plan  CT of the abdomen     IMPRESSION:  No acute pulmonary disease      Cholelithiasis without cholecystitis      Prostatomegaly with prostatic calcifications and moderately distended bladder which demonstrates circumferential bladder wall thickening and perivesicular fat stranding  Findings are consistent with cystitis  Correlate for bladder outlet obstruction  Moderately distended ureters and renal pelvices with with mild periureteral and perinephric stranding  Findings suggest upper urinary tract infection  Please note that pyelonephritis is difficult to exclude without contrast   Left lower pole renal cyst   No renal calculi  Resolved  CBI was Discontinued  Urology note reviewed recommendations appreciated    As per Urology, will be discharged on Cruz catheter in place and have outpatient follow-up in 3 weeks for cystoscopy and further evaluation of prostate enlargement      MILE (acute kidney injury) [facility])  Assessment & Plan  Cr continuing to improved since admission, currently 2 35  Nephrology following, recommendations appreciated  Avoid hypotension/nephrotoxins medication  Renal Ultrasound showing bladder wall thickening suggestive of chronic outflow obstruction    Essential hypertension  Assessment & Plan  Continue with labetalol and clonidine       VTE Pharmacologic Prophylaxis:   Pharmacologic: Pharmacologic VTE Prophylaxis contraindicated due to Hematuria and dropping hemoglobin  Mechanical VTE Prophylaxis in Place: Yes    Patient Centered Rounds: I have performed bedside rounds with nursing staff today  Discussions with Specialists or Other Care Team Provider:  Case management    Education and Discussions with Family / Patient:  Patient    Time Spent for Care: 20 minutes  More than 50% of total time spent on counseling and coordination of care as described above  Current Length of Stay: 8 day(s)    Current Patient Status: Inpatient   Certification Statement: The patient will continue to require additional inpatient hospital stay due to Hematuria and decrease in hemoglobin    Discharge Plan:  24 hours    Code Status: Level 1 - Full Code      Subjective:   Patient was seen and examined  He states that "he is not going to eat food in the hospital because it's not salty"  He also states that his family will pick him up later today  Objective:     Vitals:   Temp (24hrs), Av °F (37 2 °C), Min:98 5 °F (36 9 °C), Max:99 3 °F (37 4 °C)    Temp:  [98 5 °F (36 9 °C)-99 3 °F (37 4 °C)] 99 °F (37 2 °C)  HR:  [72-96] 88  Resp:  [18-22] 18  BP: (136-157)/(68-87) 142/87  SpO2:  [92 %-97 %] 97 %  Body mass index is 27 18 kg/m²  Input and Output Summary (last 24 hours): Intake/Output Summary (Last 24 hours) at 10/15/2019 1520  Last data filed at 10/15/2019 1237  Gross per 24 hour   Intake 1918 75 ml   Output 1825 ml   Net 93 75 ml       Physical Exam:     Physical Exam   HENT:   Head: Normocephalic and atraumatic  Neck: Normal range of motion  Cardiovascular: Normal rate, regular rhythm and normal heart sounds     Pulmonary/Chest: Effort normal and breath sounds normal  No respiratory distress  Abdominal: Soft  Bowel sounds are normal  He exhibits no distension  There is no tenderness  Musculoskeletal: He exhibits no edema or deformity  Neurological: He is alert  Skin: Skin is warm  Capillary refill takes less than 2 seconds  No rash noted  No erythema  Psychiatric: He has a normal mood and affect  Additional Data:     Labs:    Results from last 7 days   Lab Units 10/15/19  0503   WBC Thousand/uL 10 05   HEMOGLOBIN g/dL 8 4*   HEMATOCRIT % 26 3*   PLATELETS Thousands/uL 250   NEUTROS PCT % 76*   LYMPHS PCT % 9*   MONOS PCT % 9   EOS PCT % 4     Results from last 7 days   Lab Units 10/15/19  0503 10/14/19  0427   SODIUM mmol/L 139 137   POTASSIUM mmol/L 4 7 4 6   CHLORIDE mmol/L 106 103   CO2 mmol/L 22 26   BUN mg/dL 47* 51*   CREATININE mg/dL 2 35* 2 63*   ANION GAP mmol/L 11 8   CALCIUM mg/dL 8 4 8 7   ALBUMIN g/dL  --  2 7*   TOTAL BILIRUBIN mg/dL  --  0 40   ALK PHOS U/L  --  72   ALT U/L  --  24   AST U/L  --  19   GLUCOSE RANDOM mg/dL 101 116         Results from last 7 days   Lab Units 10/14/19  2343 10/13/19  2347 10/13/19  1619 10/13/19  1119 10/13/19  0723 10/12/19  2048 10/12/19  1604 10/12/19  1132 10/12/19  0742 10/11/19  2202 10/11/19  1607 10/11/19  1054   POC GLUCOSE mg/dl 97 128 141* 127 128 126 129 131 131 154* 131 149*         Results from last 7 days   Lab Units 10/14/19  0427 10/13/19  1327   PROCALCITONIN ng/ml 4 64* 2 66*           * I Have Reviewed All Lab Data Listed Above  * Additional Pertinent Lab Tests Reviewed:  All Labs Within Last 24 Hours Reviewed    Imaging:  CT abdomen and pelvis    Recent Cultures (last 7 days):           Last 24 Hours Medication List:     Current Facility-Administered Medications:  acetaminophen 650 mg Rectal Q4H PRN Raymundo Fairchild MD    acetaminophen 650 mg Oral Q6H PRN Raymundo Fairchild MD    belladonna-opium 30 mg Rectal Q8H PRN Raymundo Fairchild MD    bisacodyl 10 mg Rectal Daily PRN Sheryle Dan MD Hope    calcium carbonate 1,000 mg Oral Daily PRN Ahmet Monahan MD    cloNIDine 0 1 mg Oral HS Ahmet Monahan MD    docusate sodium 100 mg Oral BID Ahmet Monahan MD    donepezil 5 mg Oral HS Ahmet Monahan MD    HYDROmorphone 0 5 mg Intravenous Q3H PRN Ahmet Monahan MD    iohexol 50 mL Oral Once in imaging Ahmet Monahan MD    labetalol 200 mg Oral HS Ahmet Monahan MD    ondansetron 4 mg Intravenous Q6H PRN Ahmet Monahan MD    oxybutynin 5 mg Oral TID Ahmet Monahan MD    polyethylene glycol 17 g Oral Daily Ahmet Monahan MD    pravastatin 40 mg Oral Daily With Jeremi Davila MD    sodium chloride 75 mL/hr Intravenous Continuous Ahmet Monahan MD Last Rate: 75 mL/hr (10/15/19 1337)   tamsulosin 0 4 mg Oral Daily With Jeremi Davila MD         Today, Patient Was Seen By: Ahmet Monahan MD    ** Please Note: Dictation voice to text software may have been used in the creation of this document   **

## 2019-10-15 NOTE — SOCIAL WORK
Pt accepted by [facility]  CM discussed same with pt daughter Daisy Christianson  STR recommended by PT after evaluation  Daughter would like pt to return home with her and [facility] follow up  Does not want STR at this time  She will provide transport  CM to follow

## 2019-10-15 NOTE — PROGRESS NOTES
Progress Note - General Surgery   Shilpi De Leon 67 y o  male MRN: [id number]  Unit/Bed#:  Encounter: [id number]    Assessment/Plan  Shilpi De Leon is a 67 y o  male     1  Ileus  NG tube clamped since yesterday AM, no further nausea, vomiting, or abdominal discomfort/distension   Had bowel movement yesterday AM  Leukocytosis resolved, afebrile    Remove NG tube and trial sips of clear liquids as tolerated  Serial abdominal exams  Ambulation/OOB    Subjective/Objective     Subjective: No acute events overnight  No further nausea, vomiting, abdominal pain, or distension  No fevers or chills  Hiccupping has resolved  NG tube has been clamped since yesterday AM    Objective:     Blood pressure 136/68, pulse 72, temperature 98 5 °F (36 9 °C), temperature source Oral, resp  rate 18, height 6' (1 829 m), weight 90 9 kg (200 lb 6 4 oz), SpO2 92 %  ,Body mass index is 27 18 kg/m²  Intake/Output Summary (Last 24 hours) at 10/15/2019 0954  Last data filed at 10/15/2019 0600  Gross per 24 hour   Intake 1848  75 ml   Output 1425 ml   Net 423 75 ml       Invasive Devices     Peripheral Intravenous Line            Peripheral IV 10/13/19 Right;Ventral (anterior) Wrist 1 day          Drain            Continuous Bladder Irrigation Three-way 6 days    NG/OG/Enteral Tube 16 Fr Right nares 2 days                Physical Exam: /68   Pulse 72   Temp 98 5 °F (36 9 °C) (Oral)   Resp 18   Ht 6' (1 829 m)   Wt 90 9 kg (200 lb 6 4 oz)   SpO2 92%   BMI 27 18 kg/m²   General appearance: alert and oriented, in no acute distress  Lungs: clear to auscultation bilaterally  Heart: regular rate and rhythm, S1, S2 normal, no murmur, click, rub or gallop  Abdomen: soft, non-distended, active bowel sounds in all four quadrants, non-tender to palpation    Lab, Imaging and other studies:I have personally reviewed pertinent lab results       VTE Pharmacologic Prophylaxis: Sequential compression device (Venodyne)   VTE Mechanical Prophylaxis: sequential compression device    Recent Results (from the past 36 hour(s))   Fingerstick Glucose (POCT)    Collection Time: 10/13/19 11:47 PM   Result Value Ref Range    POC Glucose 128 65 - 140 mg/dl   Basic metabolic panel    Collection Time: 10/14/19  4:27 AM   Result Value Ref Range    Sodium 137 136 - 145 mmol/L    Potassium 4 6 3 5 - 5 3 mmol/L    Chloride 103 100 - 108 mmol/L    CO2 26 21 - 32 mmol/L    ANION GAP 8 4 - 13 mmol/L    BUN 51 (H) 5 - 25 mg/dL    Creatinine 2 63 (H) 0 60 - 1 30 mg/dL    Glucose 116 65 - 140 mg/dL    Calcium 8 7 8 3 - 10 1 mg/dL    eGFR 26 ml/min/1 73sq m   CBC and differential    Collection Time: 10/14/19  4:27 AM   Result Value Ref Range    WBC 13 93 (H) 4 31 - 10 16 Thousand/uL    RBC 3 07 (L) 3 88 - 5 62 Million/uL    Hemoglobin 9 1 (L) 12 0 - 17 0 g/dL    Hematocrit 27 9 (L) 36 5 - 49 3 %    MCV 91 82 - 98 fL    MCH 29 6 26 8 - 34 3 pg    MCHC 32 6 31 4 - 37 4 g/dL    RDW 13 8 11 6 - 15 1 %    MPV 10 6 8 9 - 12 7 fL    Platelets 198 002 - 681 Thousands/uL    nRBC 0 /100 WBCs    Neutrophils Relative 81 (H) 43 - 75 %    Immat GRANS % 1 0 - 2 %    Lymphocytes Relative 8 (L) 14 - 44 %    Monocytes Relative 8 4 - 12 %    Eosinophils Relative 2 0 - 6 %    Basophils Relative 0 0 - 1 %    Neutrophils Absolute 11 28 (H) 1 85 - 7 62 Thousands/µL    Immature Grans Absolute 0 08 0 00 - 0 20 Thousand/uL    Lymphocytes Absolute 1 12 0 60 - 4 47 Thousands/µL    Monocytes Absolute 1 13 0 17 - 1 22 Thousand/µL    Eosinophils Absolute 0 26 0 00 - 0 61 Thousand/µL    Basophils Absolute 0 06 0 00 - 0 10 Thousands/µL   Hepatic function panel    Collection Time: 10/14/19  4:27 AM   Result Value Ref Range    Total Bilirubin 0 40 0 20 - 1 00 mg/dL    Bilirubin, Direct 0 13 0 00 - 0 20 mg/dL    Alkaline Phosphatase 72 46 - 116 U/L    AST 19 5 - 45 U/L    ALT 24 12 - 78 U/L    Total Protein 6 6 6 4 - 8 2 g/dL    Albumin 2 7 (L) 3 5 - 5 0 g/dL   Magnesium    Collection Time: 10/14/19  4:27 AM   Result Value Ref Range    Magnesium 2 4 1 6 - 2 6 mg/dL   Procalcitonin    Collection Time: 10/14/19  4:27 AM   Result Value Ref Range    Procalcitonin 4 64 (H) <=0 25 ng/ml   Fingerstick Glucose (POCT)    Collection Time: 10/14/19 11:43 PM   Result Value Ref Range    POC Glucose 97 65 - 140 mg/dl   Basic metabolic panel    Collection Time: 10/15/19  5:03 AM   Result Value Ref Range    Sodium 139 136 - 145 mmol/L    Potassium 4 7 3 5 - 5 3 mmol/L    Chloride 106 100 - 108 mmol/L    CO2 22 21 - 32 mmol/L    ANION GAP 11 4 - 13 mmol/L    BUN 47 (H) 5 - 25 mg/dL    Creatinine 2 35 (H) 0 60 - 1 30 mg/dL    Glucose 101 65 - 140 mg/dL    Calcium 8 4 8 3 - 10 1 mg/dL    eGFR 30 ml/min/1 73sq m   CBC and differential    Collection Time: 10/15/19  5:03 AM   Result Value Ref Range    WBC 10 05 4 31 - 10 16 Thousand/uL    RBC 2 87 (L) 3 88 - 5 62 Million/uL    Hemoglobin 8 4 (L) 12 0 - 17 0 g/dL    Hematocrit 26 3 (L) 36 5 - 49 3 %    MCV 92 82 - 98 fL    MCH 29 3 26 8 - 34 3 pg    MCHC 31 9 31 4 - 37 4 g/dL    RDW 13 4 11 6 - 15 1 %    MPV 10 3 8 9 - 12 7 fL    Platelets 598 097 - 060 Thousands/uL    nRBC 0 /100 WBCs    Neutrophils Relative 76 (H) 43 - 75 %    Immat GRANS % 1 0 - 2 %    Lymphocytes Relative 9 (L) 14 - 44 %    Monocytes Relative 9 4 - 12 %    Eosinophils Relative 4 0 - 6 %    Basophils Relative 1 0 - 1 %    Neutrophils Absolute 7 71 (H) 1 85 - 7 62 Thousands/µL    Immature Grans Absolute 0 07 0 00 - 0 20 Thousand/uL    Lymphocytes Absolute 0 87 0 60 - 4 47 Thousands/µL    Monocytes Absolute 0 92 0 17 - 1 22 Thousand/µL    Eosinophils Absolute 0 42 0 00 - 0 61 Thousand/µL    Basophils Absolute 0 06 0 00 - 0 10 Thousands/µL

## 2019-10-15 NOTE — ASSESSMENT & PLAN NOTE
CT of the abdomen     IMPRESSION:  No acute pulmonary disease      Cholelithiasis without cholecystitis      Prostatomegaly with prostatic calcifications and moderately distended bladder which demonstrates circumferential bladder wall thickening and perivesicular fat stranding  Findings are consistent with cystitis  Correlate for bladder outlet obstruction  Moderately distended ureters and renal pelvices with with mild periureteral and perinephric stranding  Findings suggest upper urinary tract infection  Please note that pyelonephritis is difficult to exclude without contrast   Left lower pole renal cyst   No renal calculi  Resolved  CBI was Discontinued  Urology note reviewed recommendations appreciated    As per Urology, will be discharged on Cruz catheter in place and have outpatient follow-up in 3 weeks for cystoscopy and further evaluation of prostate enlargement

## 2019-10-16 VITALS
DIASTOLIC BLOOD PRESSURE: 68 MMHG | OXYGEN SATURATION: 94 % | BODY MASS INDEX: 27.14 KG/M2 | HEIGHT: 72 IN | TEMPERATURE: 99 F | WEIGHT: 200.4 LBS | HEART RATE: 88 BPM | RESPIRATION RATE: 18 BRPM | SYSTOLIC BLOOD PRESSURE: 137 MMHG

## 2019-10-16 LAB
ANION GAP SERPL CALCULATED.3IONS-SCNC: 8 MMOL/L (ref 4–13)
BASOPHILS # BLD AUTO: 0.04 THOUSANDS/ΜL (ref 0–0.1)
BASOPHILS NFR BLD AUTO: 0 % (ref 0–1)
BUN SERPL-MCNC: 38 MG/DL (ref 5–25)
CALCIUM SERPL-MCNC: 8.3 MG/DL (ref 8.3–10.1)
CHLORIDE SERPL-SCNC: 109 MMOL/L (ref 100–108)
CO2 SERPL-SCNC: 25 MMOL/L (ref 21–32)
CREAT SERPL-MCNC: 2.17 MG/DL (ref 0.6–1.3)
EOSINOPHIL # BLD AUTO: 0.35 THOUSAND/ΜL (ref 0–0.61)
EOSINOPHIL NFR BLD AUTO: 4 % (ref 0–6)
ERYTHROCYTE [DISTWIDTH] IN BLOOD BY AUTOMATED COUNT: 13.5 % (ref 11.6–15.1)
GFR SERPL CREATININE-BSD FRML MDRD: 33 ML/MIN/1.73SQ M
GLUCOSE SERPL-MCNC: 112 MG/DL (ref 65–140)
HCT VFR BLD AUTO: 27.9 % (ref 36.5–49.3)
HGB BLD-MCNC: 9 G/DL (ref 12–17)
IMM GRANULOCYTES # BLD AUTO: 0.07 THOUSAND/UL (ref 0–0.2)
IMM GRANULOCYTES NFR BLD AUTO: 1 % (ref 0–2)
LYMPHOCYTES # BLD AUTO: 0.77 THOUSANDS/ΜL (ref 0.6–4.47)
LYMPHOCYTES NFR BLD AUTO: 8 % (ref 14–44)
MCH RBC QN AUTO: 29.6 PG (ref 26.8–34.3)
MCHC RBC AUTO-ENTMCNC: 32.3 G/DL (ref 31.4–37.4)
MCV RBC AUTO: 92 FL (ref 82–98)
MONOCYTES # BLD AUTO: 0.78 THOUSAND/ΜL (ref 0.17–1.22)
MONOCYTES NFR BLD AUTO: 8 % (ref 4–12)
NEUTROPHILS # BLD AUTO: 7.59 THOUSANDS/ΜL (ref 1.85–7.62)
NEUTS SEG NFR BLD AUTO: 79 % (ref 43–75)
NRBC BLD AUTO-RTO: 0 /100 WBCS
PLATELET # BLD AUTO: 301 THOUSANDS/UL (ref 149–390)
PMV BLD AUTO: 10.2 FL (ref 8.9–12.7)
POTASSIUM SERPL-SCNC: 4.2 MMOL/L (ref 3.5–5.3)
RBC # BLD AUTO: 3.04 MILLION/UL (ref 3.88–5.62)
SODIUM SERPL-SCNC: 142 MMOL/L (ref 136–145)
WBC # BLD AUTO: 9.6 THOUSAND/UL (ref 4.31–10.16)

## 2019-10-16 PROCEDURE — 99239 HOSP IP/OBS DSCHRG MGMT >30: CPT | Performed by: FAMILY MEDICINE

## 2019-10-16 PROCEDURE — 85025 COMPLETE CBC W/AUTO DIFF WBC: CPT | Performed by: FAMILY MEDICINE

## 2019-10-16 PROCEDURE — 80048 BASIC METABOLIC PNL TOTAL CA: CPT | Performed by: FAMILY MEDICINE

## 2019-10-16 PROCEDURE — 97535 SELF CARE MNGMENT TRAINING: CPT

## 2019-10-16 PROCEDURE — 99232 SBSQ HOSP IP/OBS MODERATE 35: CPT | Performed by: SURGERY

## 2019-10-16 PROCEDURE — 99233 SBSQ HOSP IP/OBS HIGH 50: CPT | Performed by: INTERNAL MEDICINE

## 2019-10-16 PROCEDURE — 97530 THERAPEUTIC ACTIVITIES: CPT

## 2019-10-16 PROCEDURE — 97110 THERAPEUTIC EXERCISES: CPT

## 2019-10-16 RX ORDER — ACETAMINOPHEN 650 MG/1
650 SUPPOSITORY RECTAL EVERY 4 HOURS PRN
Status: DISCONTINUED | OUTPATIENT
Start: 2019-10-16 | End: 2019-10-16 | Stop reason: HOSPADM

## 2019-10-16 RX ADMIN — DOCUSATE SODIUM 100 MG: 100 CAPSULE, LIQUID FILLED ORAL at 09:48

## 2019-10-16 RX ADMIN — SODIUM CHLORIDE 75 ML/HR: 0.9 INJECTION, SOLUTION INTRAVENOUS at 02:51

## 2019-10-16 RX ADMIN — POLYETHYLENE GLYCOL 3350 17 G: 17 POWDER, FOR SOLUTION ORAL at 09:49

## 2019-10-16 RX ADMIN — OXYBUTYNIN CHLORIDE 5 MG: 5 TABLET ORAL at 09:49

## 2019-10-16 NOTE — UTILIZATION REVIEW
Continued Stay Review    Date:   10/16              Current Patient Class:  IP Current Level of Care: MS    HPI:79 y o  male initially admitted on 10/7    Assessment/Plan: pt admitted w/ ileus appears to have resolved is tolerating surgical soft diet   Did have 1 episode of vomiting over night   + BM this am       10/16 Surgical note   Cont to monitor po tolerance , serial abd exams  DC w/ soto and f/u urology as OP   PT recommending home PT /OT    10/16 Nephrology note   MILE on admission suspect d/t outflow obstruction , pt with large prostate  Currently soto in place   Pt on IVF and enc po fluids now that NG tube has been removed  Plan to recheck renal function in am   BUN improved after IVF given over night   Anemia multifactorial hgb stable at 9      Pertinent Labs/Diagnostic Results:   Results from last 7 days   Lab Units 10/16/19  0650 10/15/19  0503 10/14/19  0427 10/13/19  1450 10/13/19  0457   WBC Thousand/uL 9 60 10 05 13 93*  --  10 19*   HEMOGLOBIN g/dL 9 0* 8 4* 9 1* 10 8* 10 8*   HEMATOCRIT % 27 9* 26 3* 27 9* 35 7* 33 2*   PLATELETS Thousands/uL 301 250 236  --  252   NEUTROS ABS Thousands/µL 7 59 7 71* 11 28*  --  7 90*     Results from last 7 days   Lab Units 10/16/19  0650 10/15/19  0503 10/14/19  0427 10/13/19  0457 10/12/19  0617   SODIUM mmol/L 142 139 137 135* 139   POTASSIUM mmol/L 4 2 4 7 4 6 4 9 4 8   CHLORIDE mmol/L 109* 106 103 100 106   CO2 mmol/L 25 22 26 24 22   ANION GAP mmol/L 8 11 8 11 11   BUN mg/dL 38* 47* 51* 51* 40*   CREATININE mg/dL 2 17* 2 35* 2 63* 2 38* 2 32*   EGFR ml/min/1 73sq m 33 30 26 29 30   CALCIUM mg/dL 8 3 8 4 8 7 9 4 8 5   MAGNESIUM mg/dL  --   --  2 4  --   --      Results from last 7 days   Lab Units 10/14/19  0427   AST U/L 19   ALT U/L 24   ALK PHOS U/L 72   TOTAL PROTEIN g/dL 6 6   ALBUMIN g/dL 2 7*   TOTAL BILIRUBIN mg/dL 0 40   BILIRUBIN DIRECT mg/dL 0 13     Results from last 7 days   Lab Units 10/14/19  2343 10/13/19  2347 10/13/19  1619 10/13/19  1119 10/13/19  0723 10/12/19  2048 10/12/19  1604 10/12/19  1132 10/12/19  0742 10/11/19  2202   POC GLUCOSE mg/dl 97 128 141* 127 128 126 129 131 131 154*     Results from last 7 days   Lab Units 10/16/19  0650 10/15/19  0503 10/14/19  0427 10/13/19  0457 10/12/19  0617 10/11/19  0508 10/10/19  0557   GLUCOSE RANDOM mg/dL 112 101 116 139 118 135 148*       Results from last 7 days   Lab Units 10/14/19  0427 10/13/19  1327   PROCALCITONIN ng/ml 4 64* 2 66*     Vital Signs:   10/16/19 0731  99 5 °F (37 5 °C)  84  18  146/67    97 %  None (Room air)         Medications:     Medications:  cloNIDine 0 1 mg Oral HS   docusate sodium 100 mg Oral BID   donepezil 5 mg Oral HS   labetalol 200 mg Oral HS   oxybutynin 5 mg Oral TID   polyethylene glycol 17 g Oral Daily   pravastatin 40 mg Oral Daily With Dinner   tamsulosin 0 4 mg Oral Daily With Dinner       sodium chloride 75 mL/hr Intravenous Continuous       acetaminophen 650 mg Rectal Q4H PRN   acetaminophen 650 mg Oral Q6H PRN   belladonna-opium 30 mg Rectal Q8H PRN   bisacodyl 10 mg Rectal Daily PRN   calcium carbonate 1,000 mg Oral Daily PRN   HYDROmorphone 0 5 mg Intravenous Q3H PRN   iohexol 50 mL Oral Once in imaging   ondansetron 4 mg Intravenous Q6H PRN       Discharge Plan:     Network Utilization Review Department  Phone: [phone]; Fax [phone]  [email or web address]  ATTENTION: Please call with any questions or concerns to [phone]  and carefully listen to the prompts so that you are directed to the right person  Send all requests for admission clinical reviews, approved or denied determinations and any other requests to fax [phone]   All voicemails are confidential

## 2019-10-16 NOTE — PLAN OF CARE
Problem: OCCUPATIONAL THERAPY ADULT  Goal: Performs self-care activities at highest level of function for planned discharge setting  See evaluation for individualized goals  Description  Treatment Interventions: ADL retraining, Functional transfer training, Endurance training, Patient/family training, Equipment evaluation/education, Compensatory technique education, Continued evaluation, Energy conservation, Activityengagement          See flowsheet documentation for full assessment, interventions and recommendations  Outcome: Progressing  Note:   Limitation: Decreased ADL status, Decreased endurance, Decreased self-care trans, Decreased high-level ADLs  Prognosis: Good  Assessment: Pt  agreeable to skilled OT services with focus on improving independence and safety during functional mobility and self care skills  Pt supine in bed upon start of session with PCA performing Soto care   Pt reported no pain  Pt able to roll side <>side in bed with vc's for use of  bed rails during in bed hygeine  Pt supine >sit with Min A and no reported dizziness  Pt sit >stand with Min A use of RW to steady  Pt very talkative, requiring constant redirection for instruction  Pt will become off-task during ambulation which presents safety risk  Pt able to ambulat > bathroom using RW with Min A demonstrating slow shuffling gait  Pt appears more steady today per PCA  A needed during Amb  For IV pole/ soto cath  mgt  Pt performed ADL seated at sink with cues for sequencing task due to excessive talking  See note for details  Pt demonstrated good recall for hand placement during sit <>stand  Pt able to amb> recliner using RW with no c/o lightheadedness, or LOB noted  Pt demonstrated good activity tolerance  during presented tasks  Pt reviewed/ performed HEP to A with improving UB strength for increased safety during func tasks  Braulio Lira  Pt performance demonstrated fair carryover of learned techniques and strategies to facilitate safety during self care and daily routine, however pt continues to demonstrate deficits in the areas of self care and functional mobility  Pt would continue to benefit from skilled OT POC to facilitate return to PLOF        OT Discharge Recommendation: Short Term Rehab  OT - OK to Discharge: Yes(when medically cleared)

## 2019-10-16 NOTE — OCCUPATIONAL THERAPY NOTE
OCCUPATIONAL THERAPY TREATMENT  NOTE       10/16/19 8830   Restrictions/Precautions   Weight Bearing Precautions Per Order No   Other Precautions Chair Alarm; Bed Alarm;Multiple lines; Fall Risk   Pain Assessment   Pain Assessment No/denies pain   Pain Score No Pain   ADL   Where Assessed Sitting at sink   Grooming Assistance 5  Supervision/Setup   Grooming Deficit Setup;Verbal cueing;Supervision/safety; Increased time to complete   UB Bathing Assistance 5  Supervision/Setup   UB Bathing Deficit Setup;Verbal cueing;Supervision/safety; Increased time to complete   LB Bathing Assistance 3  Moderate Assistance   LB Bathing Deficit Setup;Steadying;Verbal cueing;Supervision/safety; Increased time to complete; Buttocks;Right lower leg including foot; Left lower leg including foot   UB Dressing Assistance 4  Minimal Assistance   UB Dressing Deficit Setup;Verbal cueing;Supervision/safety; Increased time to complete   Functional Standing Tolerance   Time 4 mins    Activity ADL   Bed Mobility   Rolling R 4  Minimal assistance   Additional items Assist x 1;HOB elevated; Increased time required;Verbal cues   Rolling L 4  Minimal assistance   Additional items Assist x 1;HOB elevated; Bedrails; Increased time required;Verbal cues   Supine to Sit 4  Minimal assistance   Additional items Assist x 1;Bedrails; Increased time required;Verbal cues   Transfers   Sit to Stand 4  Minimal assistance   Additional items Assist x 1; Increased time required;Verbal cues   Stand to Sit 4  Minimal assistance   Additional items Assist x 1; Increased time required;Verbal cues   Functional Mobility   Functional Mobility 4  Minimal assistance   Additional Comments Add  A for IV pole mgt   Additional items Rolling walker   Therapeutic Excerise-Strength   UE Strength Yes   Right Upper Extremity- Strength   R Shoulder Flexion;ABduction; Extension;Horizontal ABduction; External rotation; Internal rotation   R Elbow Elbow flexion;Elbow extension   Left Upper Extremity-Strength   L Shoulder Flexion;ABduction; Extension;Horizontal ABduction; External rotation; Internal rotation   L Elbow Elbow flexion;Elbow extension   LUE Strength Comment Pt educated on importance of exercise during recovery   Cognition   Overall Cognitive Status WFL   Arousal/Participation Alert; Cooperative   Attention Within functional limits   Orientation Level Oriented X4   Memory Within functional limits   Following Commands Follows one step commands with increased time or repetition  (pt req  constant redirection to stay on -task)   Comments Pt agreeable to skilled OT tx   Additional Activities   Additional Activities Other (Comment)   Additional Activities Comments pt ed  on safe txfer technique   Activity Tolerance   Activity Tolerance Patient tolerated treatment well   Medical Staff Made Aware Yes  PATY Unger made aware   Assessment   Assessment Pt  agreeable to skilled OT services with focus on improving independence and safety during functional mobility and self care skills  Pt supine in bed upon start of session with PCA performing Soto care   Pt reported no pain  Pt able to roll side <>side in bed with vc's for use of  bed rails during in bed hygeine  Pt supine >sit with Min A and no reported dizziness  Pt sit >stand with Min A use of RW to steady  Pt very talkative, requiring constant redirection for instruction  Pt will become off-task during ambulation which presents safety risk  Pt able to ambulat > bathroom using RW with Min A demonstrating slow shuffling gait  Pt appears more steady today per PCA  A needed during Amb  For IV pole/ soto cath  mgt  Pt performed ADL seated at sink with cues for sequencing task due to excessive talking  See note for details  Pt demonstrated good recall for hand placement during sit <>stand  Pt able to amb> recliner using RW with no c/o lightheadedness, or LOB noted   Pt demonstrated good activity tolerance  during presented tasks  Pt reviewed/ performed HEP to A with improving UB strength for increased safety during func tasks  Anna Eb Pt performance demonstrated fair carryover of learned techniques and strategies to facilitate safety during self care and daily routine, however pt continues to demonstrate deficits in the areas of self care and functional mobility  Pt would continue to benefit from skilled OT POC to facilitate return to PLOF  Plan   Treatment Interventions ADL retraining;Visual perceptual retraining;Functional transfer training;UE strengthening/ROM; Endurance training;Patient/family training;Equipment evaluation/education; Fine motor coordination activities; Compensatory technique education;Continued evaluation; Energy conservation; Activityengagement   Goal Expiration Date 10/29/19   OT Frequency 3-5x/wk   Recommendation   OT Discharge Recommendation Short Term Rehab   OT - OK to Discharge Yes  (when medically cleared)                                                       NATHANIEL Barroso/L

## 2019-10-16 NOTE — PROGRESS NOTES
Progress Note - General Surgery   Thomas Hansen 67 y o  male MRN: [id number]  Unit/Bed#: -01 Encounter: [id number]      Assessment:   43-year-old male with ileus  -  patient is afebrile WBC is within  - patient had bowel movement this morning  - patient is tolerating surgical soft diet, however he did have an episode of vomiting overnight    Plan:  - continue to monitor patient for vomiting and p o  Tolerance  - Serial abdominal exams  - ileus appears to have resolved, patient may be discharged at the discretion of primary team  - patient to be discharged with Cruz catheter and will be discontinued as an outpatient by Urology  - patient will be discharged with visiting nurses  - PT/OT recommended home PT/OT, but daughter does not want services    Subjective/Objective   Chief Complaint:  Feeling great    Subjective:  Patient had an episode of vomiting overnight, but reports feeling much better today  Patient denies any abdominal pain at this time  Patient had a bowel movement this morning  Patient is tolerating surgical soft diet, however nursing reports patient had an episode of vomiting last night, but nausea and vomiting has since resolved  Patient is ambulating, has a Cruz catheter placed  Patient denies any chest pain, shortness of breath, palpitations, fevers, or chills  Objective:     Blood pressure 146/67, pulse 84, temperature 99 5 °F (37 5 °C), temperature source Oral, resp  rate 18, height 6' (1 829 m), weight 90 9 kg (200 lb 6 4 oz), SpO2 97 %  Body mass index is 27 18 kg/m²  I/O       10/14 0701 - 10/15 0700 10/15 0701 - 10/16 0700 10/16 0701 - 10/17 0700    P  O   600 120    I V  (mL/kg) 1797 5 (19 8) 150 (1 7)     NG/      IV Piggyback 100      Total Intake(mL/kg) 1997 5 (22) 750 (8 3) 120 (1 3)    Urine (mL/kg/hr) 1425 (0 7) 1510 (0 7) 250 (1)    Emesis/NG output 300 400     Stool 0      Total Output 1725 1910 250    Net +272 5 -1160 -130           Unmeasured Stool Occurrence 1 x            Invasive Devices     Peripheral Intravenous Line            Peripheral IV 10/16/19 Left;Proximal;Ventral (anterior) Forearm less than 1 day          Drain            Continuous Bladder Irrigation Three-way 7 days                Physical Exam: /67 (BP Location: Right arm)   Pulse 84   Temp 99 5 °F (37 5 °C) (Oral)   Resp 18   Ht 6' (1 829 m)   Wt 90 9 kg (200 lb 6 4 oz)   SpO2 97%   BMI 27 18 kg/m²   General appearance: alert and oriented, in no acute distress  Lungs: clear to auscultation bilaterally  Chest wall: no tenderness  Abdomen: soft, non-tender; bowel sounds normal; no masses,  no organomegaly  Extremities: extremities normal, warm and well-perfused; no cyanosis, clubbing, or edema    Labs   Recent Results (from the past 24 hour(s))   Basic metabolic panel    Collection Time: 10/16/19  6:50 AM   Result Value Ref Range    Sodium 142 136 - 145 mmol/L    Potassium 4 2 3 5 - 5 3 mmol/L    Chloride 109 (H) 100 - 108 mmol/L    CO2 25 21 - 32 mmol/L    ANION GAP 8 4 - 13 mmol/L    BUN 38 (H) 5 - 25 mg/dL    Creatinine 2 17 (H) 0 60 - 1 30 mg/dL    Glucose 112 65 - 140 mg/dL    Calcium 8 3 8 3 - 10 1 mg/dL    eGFR 33 ml/min/1 73sq m   CBC and differential    Collection Time: 10/16/19  6:50 AM   Result Value Ref Range    WBC 9 60 4 31 - 10 16 Thousand/uL    RBC 3 04 (L) 3 88 - 5 62 Million/uL    Hemoglobin 9 0 (L) 12 0 - 17 0 g/dL    Hematocrit 27 9 (L) 36 5 - 49 3 %    MCV 92 82 - 98 fL    MCH 29 6 26 8 - 34 3 pg    MCHC 32 3 31 4 - 37 4 g/dL    RDW 13 5 11 6 - 15 1 %    MPV 10 2 8 9 - 12 7 fL    Platelets 576 350 - 986 Thousands/uL    nRBC 0 /100 WBCs    Neutrophils Relative 79 (H) 43 - 75 %    Immat GRANS % 1 0 - 2 %    Lymphocytes Relative 8 (L) 14 - 44 %    Monocytes Relative 8 4 - 12 %    Eosinophils Relative 4 0 - 6 %    Basophils Relative 0 0 - 1 %    Neutrophils Absolute 7 59 1 85 - 7 62 Thousands/µL    Immature Grans Absolute 0 07 0 00 - 0 20 Thousand/uL Lymphocytes Absolute 0 77 0 60 - 4 47 Thousands/µL    Monocytes Absolute 0 78 0 17 - 1 22 Thousand/µL    Eosinophils Absolute 0 35 0 00 - 0 61 Thousand/µL    Basophils Absolute 0 04 0 00 - 0 10 Thousands/µL        Imaging and other studies:  Ct Chest Abdomen Pelvis Wo Contrast    Result Date: 10/7/2019  Impression: No acute pulmonary disease  Cholelithiasis without cholecystitis  Prostatomegaly with prostatic calcifications and moderately distended bladder which demonstrates circumferential bladder wall thickening and perivesicular fat stranding  Findings are consistent with cystitis  Correlate for bladder outlet obstruction  Moderately distended ureters and renal pelvices with with mild periureteral and perinephric stranding  Findings suggest upper urinary tract infection  Please note that pyelonephritis is difficult to exclude without contrast  Left lower pole renal cyst   No renal calculi  I personally discussed this study with Garett Machuca on 10/7/2019 at 9:14 PM  Workstation performed: [id number]     Xr Chest 2 Views    Result Date: 10/8/2019  Impression: No acute cardiopulmonary disease  Cardiomegaly  Workstation performed: [id number]     Us Kidney And Bladder    Result Date: 10/11/2019  Impression: No evidence of obstruction  Bladder wall thickening likely related to chronic bladder outlet obstruction   Workstation performed: [id number]     VTE Mechanical Prophylaxis: sequential compression device    Ramona Burden PA-C  10/16/2019

## 2019-10-16 NOTE — PROGRESS NOTES
NEPHROLOGY PROGRESS NOTE    Patient: Solange Serrato               Sex: male          DOA: 10/7/2019  4:48 PM   Date of Birth: @        Age: @        LOS:  LOS: 9 days   10/16/2019    REASON FOR THE CONSULTATION:  Further management of MILE    HPI     This is a 67 y o  male admitted for Urinary retention     SUBJECTIVE     - reviewed surgical note with the recommendations  Now NG tube has been removed and started on clear liquid diet  Patient denies nausea, vomiting, headache or dizziness today    - Reviewed last 24 hrs events     CURRENT MEDICATIONS       Current Facility-Administered Medications:     acetaminophen (TYLENOL) rectal suppository 650 mg, 650 mg, Rectal, Q4H PRN, Aggie Carpenter MD    acetaminophen (TYLENOL) tablet 650 mg, 650 mg, Oral, Q6H PRN, Denilson Vargas MD    belladonna-opium (B&O SUPPOSITORY) 16 2-30 mg suppository 1 suppository, 30 mg, Rectal, Q8H PRN, Denilson Vargas MD, 1 suppository at 10/10/19 0259    bisacodyl (DULCOLAX) rectal suppository 10 mg, 10 mg, Rectal, Daily PRN, Denilson Vargas MD, 10 mg at 10/13/19 1517    calcium carbonate (TUMS) chewable tablet 1,000 mg, 1,000 mg, Oral, Daily PRN, Denilson Vargas MD, 1,000 mg at 10/13/19 0424    cloNIDine (CATAPRES) tablet 0 1 mg, 0 1 mg, Oral, HS, Denilson Vargas MD, Stopped at 10/15/19 2159    docusate sodium (COLACE) capsule 100 mg, 100 mg, Oral, BID, Denilson Vargas MD, 100 mg at 10/16/19 0948    donepezil (ARICEPT) tablet 5 mg, 5 mg, Oral, HS, Denilson Vargas MD, Stopped at 10/15/19 2200    HYDROmorphone (DILAUDID) injection 0 5 mg, 0 5 mg, Intravenous, Q3H PRN, Denilson Vargas MD, 0 5 mg at 10/10/19 0635    iohexol (OMNIPAQUE) 240 MG/ML solution 50 mL, 50 mL, Oral, Once in imaging, Denilson Vargas MD    labetalol (NORMODYNE) tablet 200 mg, 200 mg, Oral, HS, Denilson Vargas MD, Stopped at 10/15/19 2201    ondansetron (ZOFRAN) injection 4 mg, 4 mg, Intravenous, Q6H PRN, Denilson Vargas MD, 4 mg at 10/15/19 2149   oxybutynin (DITROPAN) tablet 5 mg, 5 mg, Oral, TID, Arturo Roth MD, 5 mg at 10/16/19 0949    polyethylene glycol (MIRALAX) packet 17 g, 17 g, Oral, Daily, Arturo Roth MD, 17 g at 10/16/19 0949    pravastatin (PRAVACHOL) tablet 40 mg, 40 mg, Oral, Daily With Randall Barragan MD, 40 mg at 10/15/19 1539    sodium chloride 0 9 % infusion, 75 mL/hr, Intravenous, Continuous, Arturo Roth MD, Last Rate: 75 mL/hr at 10/16/19 0251, 75 mL/hr at 10/16/19 0251    tamsulosin (FLOMAX) capsule 0 4 mg, 0 4 mg, Oral, Daily With Randall Barragan MD, 0 4 mg at 10/15/19 1539    OBJECTIVE     Current Weight: Weight - Scale: 90 9 kg (200 lb 6 4 oz)  Vitals:    10/16/19 0731   BP: 146/67   Pulse: 84   Resp: 18   Temp: 99 5 °F (37 5 °C)   SpO2: 97%     Body mass index is 27 18 kg/m²  Intake/Output Summary (Last 24 hours) at 10/16/2019 1148  Last data filed at 10/16/2019 0929  Gross per 24 hour   Intake 600 ml   Output 1760 ml   Net -1160 ml       PHYSICAL EXAMINATION     Physical Exam   Constitutional: No distress  HENT:   Head: Normocephalic and atraumatic  Eyes: No scleral icterus  Neck: Neck supple  No JVD present  Cardiovascular: Normal rate  Pulmonary/Chest: No accessory muscle usage  No respiratory distress  He has no wheezes  Abdominal: Soft  He exhibits no distension  Musculoskeletal:        Right hand: He exhibits no laceration  Left hand: He exhibits no laceration  Neurological: He is alert  Skin: Skin is warm  No cyanosis  Psychiatric: He is not combative  He expresses no suicidal ideation           LAB RESULTS     Results from last 7 days   Lab Units 10/16/19  0650 10/15/19  0503 10/14/19  0427 10/13/19  1450 10/13/19  0457 10/12/19  0617 10/11/19  0508 10/10/19  0557   WBC Thousand/uL 9 60 10 05 13 93*  --  10 19* 7 58 8 04 7 16   HEMOGLOBIN g/dL 9 0* 8 4* 9 1* 10 8* 10 8* 9 8* 9 3* 10 0*   HEMATOCRIT % 27 9* 26 3* 27 9* 35 7* 33 2* 30 5* 29 0* 31 3*   PLATELETS Thousands/uL 301 250 236  --  252 187 158 147*   SODIUM mmol/L 142 139 137  --  135* 139 137 138   POTASSIUM mmol/L 4 2 4 7 4 6  --  4 9 4 8 4 5 4 1   CHLORIDE mmol/L 109* 106 103  --  100 106 106 107   CO2 mmol/L 25 22 26  --  24 22 18* 18*   BUN mg/dL 38* 47* 51*  --  51* 40* 55* 69*   CREATININE mg/dL 2 17* 2 35* 2 63*  --  2 38* 2 32* 2 70* 3 62*   EGFR ml/min/1 73sq m 33 30 26  --  29 30 25 18   CALCIUM mg/dL 8 3 8 4 8 7  --  9 4 8 5 8 3 8 0*   MAGNESIUM mg/dL  --   --  2 4  --   --   --   --   --        I have personally reviewed the old medical records and patient's previously known baseline creatinine level is unknown    RADIOLOGY RESULTS      CT abdomen pelvis wo contrast   Final Result by Celina Hein MD (35/02 0961)      Although stomach and bowel loops are distended with intermittent areas of collapsed loops, the pattern is more suspicious for or diffuse ileus than for bowel obstruction  Marked thickening of the wall of the urinary bladder which is collapsed around a Cruz catheter balloon  Groundglass and reticular prominence in the left lung base consistent with either atelectasis or developing left lower lobe pneumonia  Cardiomegaly  Workstation performed: [id number]         XR chest portable   Final Result by Chelo Sanchez MD (51/10 6435)      No acute cardiopulmonary disease  Workstation performed: [id number]         XR abdomen 1 view kub   Final Result by Valentina Ca MD (10/13 9793)      Nonspecific small bowel distention and wall thickening in the right more than left abdomen  Partial small bowel obstruction versus enteritis is suspected  Consider follow-up CT evaluation to determine a possible transition point  The study was marked in [facility] for immediate notification  Workstation performed: [id number]         US kidney and bladder   Final Result by Juan Manuel Hernandez MD (10/11 1207)      No evidence of obstruction        Bladder wall thickening likely related to chronic bladder outlet obstruction  Workstation performed: [id number]         CT chest abdomen pelvis wo contrast   Final Result by Torres Limon MD (10/07 2114)   No acute pulmonary disease  Cholelithiasis without cholecystitis  Prostatomegaly with prostatic calcifications and moderately distended bladder which demonstrates circumferential bladder wall thickening and perivesicular fat stranding  Findings are consistent with cystitis  Correlate for bladder outlet obstruction  Moderately distended ureters and renal pelvices with with mild periureteral and perinephric stranding  Findings suggest upper urinary tract infection  Please note that pyelonephritis is difficult to exclude without contrast       Left lower pole renal cyst   No renal calculi  I personally discussed this study with Mary Pedraza on 10/7/2019 at 9:14 PM                      Workstation performed: [id number]         XR chest 2 views   Final Result by Neetu Escalona MD (10/08 1059)      No acute cardiopulmonary disease  Cardiomegaly  Workstation performed: [id number]             PLAN / RECOMMENDATIONS      1  MILE  Present on admission  Suspected due to bladder outflow obstruction in the setting of and large prostate  Currently have Cruz catheter in place  Patient is also receiving IV fluid and breathing has remained stable and at baseline  Patient earlier developed ileus and also currently been followed by surgical team   NG tube has been removed and patient was started on clear liquid diet  Encouraged patient to increase fluid intake  Overnight patient's renal function has improved to current creatinine of 2 17  Clinically patient is not in volume overload state and for time being continue IV fluid if remains in the hospital   Recheck renal function with AM labs  2  Azotemia  Multifactorial and suspected due to MILE    Overnight received IV fluid and BUN level has also improved to 38  Patient has remained asymptomatic from azotemia perspective  Continue IV fluid if remains in the hospital   Also encouraged patient to increase oral fluid intake  Monitor BUN level  3  Anemia  Multifactorial   Current hemoglobin is 9 0 which is better than yesterday  No active signs of bleeding seen either in last 24 hours  Monitor hemoglobin level and consider blood transfusion if hemoglobin level drops below 7     4  Hypertension  Overnight blood pressure has remained under good control and monitor hypertension with labetalol 200 mg PO daily along with clonidine 0 1 mg PO daily  Disposition:  Stable from renal standpoint for discharge  Overall above mentioned plan was also d/w Kaci Skinner MD  Nephrology  10/16/2019        Portions of the record may have been created with voice recognition software  Occasional wrong word or "sound a like" substitutions may have occurred due to the inherent limitations of voice recognition software  Read the chart carefully and recognize, using context, where substitutions have occurred

## 2019-10-17 NOTE — ASSESSMENT & PLAN NOTE
Cr continuing to improved since admission, currently 2 17  Nephrology following, recommendations appreciated  Avoid hypotension/nephrotoxins medication  Renal Ultrasound showing bladder wall thickening suggestive of chronic outflow obstruction

## 2019-10-17 NOTE — ASSESSMENT & PLAN NOTE
CT of the abdomen     IMPRESSION:  No acute pulmonary disease      Cholelithiasis without cholecystitis      Prostatomegaly with prostatic calcifications and moderately distended bladder which demonstrates circumferential bladder wall thickening and perivesicular fat stranding  Findings are consistent with cystitis  Correlate for bladder outlet obstruction  Moderately distended ureters and renal pelvices with with mild periureteral and perinephric stranding  Findings suggest upper urinary tract infection  Please note that pyelonephritis is difficult to exclude without contrast   Left lower pole renal cyst   No renal calculi  Resolved    Urology note reviewed recommendations appreciated    As per Urology, will be discharged on Cruz catheter in place and have outpatient follow-up in 3 weeks for cystoscopy and further evaluation of prostate enlargement

## 2019-10-17 NOTE — UTILIZATION REVIEW
Notification of Discharge  This is a Notification of Discharge from our facility [address]  Please be advised that this patient has been discharge from our facility  Below you will find the admission and discharge date and time including the patients disposition  PRESENTATION DATE: 10/7/2019  4:48 PM  OBS ADMISSION DATE:   IP ADMISSION DATE: 10/7/19 2224   DISCHARGE DATE: 10/16/2019  5:38 PM  DISPOSITION: Home with [facility] with [facility]   Admission Orders listed below:  Admission Orders (From admission, onward)     Ordered        10/07/19 2224  Inpatient Admission  Once                   Please contact the UR Department if additional information is required to close this patient's authorization/case  [address]  Utilization Review Department  Phone: [phone]; Fax [phone]  [email or web address]  ATTENTION: Please call with any questions or concerns to [phone]  and carefully listen to the prompts so that you are directed to the right person  Send all requests for admission clinical reviews, approved or denied determinations and any other requests to fax [phone]   All voicemails are confidential

## 2019-10-17 NOTE — ASSESSMENT & PLAN NOTE
Acute blood loss anemia in the setting of gross hematuria as evidenced by hgb 15 1>10 0>8 4>9  Hematuria resolved    Hemoglobin is stable

## 2019-10-17 NOTE — DISCHARGE SUMMARY
Discharge- Solange Serrato 1940, 67 y o  male MRN: [id number]    Unit/Bed#: -01 Encounter: [id number]    Primary Care Provider: Unruly Badillo MD   Date and time admitted to hospital: 10/7/2019  4:48 PM        Ambulatory dysfunction  Assessment & Plan  PT/OT evaluation completed  Patient is not interested in the short-term rehab at this time    Acute blood loss anemia  Assessment & Plan  Acute blood loss anemia in the setting of gross hematuria as evidenced by hgb 15 1>10 0>8 4>9  Hematuria resolved  Hemoglobin is stable        Hematuria, gross  Assessment & Plan  CT of the abdomen     IMPRESSION:  No acute pulmonary disease      Cholelithiasis without cholecystitis      Prostatomegaly with prostatic calcifications and moderately distended bladder which demonstrates circumferential bladder wall thickening and perivesicular fat stranding  Findings are consistent with cystitis  Correlate for bladder outlet obstruction  Moderately distended ureters and renal pelvices with with mild periureteral and perinephric stranding  Findings suggest upper urinary tract infection  Please note that pyelonephritis is difficult to exclude without contrast   Left lower pole renal cyst   No renal calculi  Resolved    Urology note reviewed recommendations appreciated    As per Urology, will be discharged on Cruz catheter in place and have outpatient follow-up in 3 weeks for cystoscopy and further evaluation of prostate enlargement      MILE (acute kidney injury) ([address] )  Assessment & Plan  Cr continuing to improved since admission, currently 2 17  Nephrology following, recommendations appreciated  Avoid hypotension/nephrotoxins medication  Renal Ultrasound showing bladder wall thickening suggestive of chronic outflow obstruction    Other hyperlipidemia  Assessment & Plan  Continue pravastatin     Pyelonephritis  Assessment & Plan  Resolved, completed 7 day course of antibiotics  Urine culture positive for corynebacterium     Essential hypertension  Assessment & Plan  Continue with labetalol and clonidine     * Urinary retentionresolved as of 10/10/2019  Assessment & Plan  Now resolved with soto insertion  Hematuria resolved  Continue with soto, follow up urology recommendations          Discharge Summary - [facility]    Patient Information: Cortney Guillory 67 y o  male MRN: [id number]  Unit/Bed#: -01 Encounter: [id number]    Discharging Physician / Practitioner: Rome Nair MD  PCP: Milly Abrams MD  Admission Date: 10/7/2019  Discharge Date: 10/16/19    Reason for Admission:  Urinary retention    Discharge Diagnoses:     Principal Problem (Resolved):    Urinary retention  Active Problems:    Essential hypertension    Pyelonephritis    Other hyperlipidemia    MILE (acute kidney injury) ([address] )    Hematuria, gross    Acute blood loss anemia    Ambulatory dysfunction    BPH (benign prostatic hyperplasia)    Abdominal distention  Resolved Problems:    SOB (shortness of breath)      Consultations During Hospital Stay:  · Urology  · Nephrology  · Surgery    Procedures Performed:     · Indwelling Urinary catheter insertion    Significant Findings / Test Results:     Urine culture positive: Corynebacterium riegelii  CT abdomen: Although stomach and bowel loops are distended with intermittent areas of collapsed loops, the pattern is more suspicious for or diffuse ileus than for bowel obstruction      Marked thickening of the wall of the urinary bladder which is collapsed around a Soto catheter balloon      Incidental Findings:   · Cardiomegaly    Test Results Pending at Discharge (will require follow up): · None     Outpatient Tests Requested:  · None    Complications:  None    Hospital Course:     Cortney Guillory is a 67 y o  male patient who originally presented to the hospital on 10/7/2019 due to acute onset abdominal and testicular pain prompting visit to ED   Daughter does states that for the past 2-3 days he has made frequent trips to the bathroom and had complaints of inability to go to the bathroom  Daughter states she assumed he was referring to his bowel so she bought him some stool softeners  CT completed in ED revealed bladder distension and bladder wall thickening as well as moderately distended ureters and renal pelvices  Was also noted to have creatinine at 4 74  Unknown baseline creatinine as previous records not available  During the course of the hospital stay, patient was seen by the urologist   Indwelling urinary catheter has been placed and will remain intact until patient gets re-evaluated by the urologist on the outpatient basis  His acute kidney injury has resolved  He was found to have urinary tract infection and has completed a course of antibiotics  Initially, he was also noted to have hematuria with a drop in hemoglobin  His hematuria has resolved completely and hemoglobin remained stable at the time of the discharge  CT scan of the abdomen showed a suspicion for ileus  Patient was able to tolerate diet well, had bowel movements and flatus and did not require any surgical interventions  Condition at Discharge: stable     Discharge Day Visit / Exam:     Subjective:  Patient was seen and examined today  He is having lunch at this time  He denies any pain or discomfort  He states that he will call his family to come pick him up  Vitals: Blood Pressure: 137/68 (10/16/19 1459)  Pulse: 88 (10/16/19 1459)  Temperature: 99 °F (37 2 °C) (10/16/19 1459)  Temp Source: Oral (10/16/19 1459)  Respirations: 18 (10/16/19 1459)  Height: 6' (182 9 cm) (10/07/19 1624)  Weight - Scale: 90 9 kg (200 lb 6 4 oz) (10/09/19 0500)  SpO2: 94 % (10/16/19 1459)  Exam:   Physical Exam   Constitutional: He appears well-developed and well-nourished  Neck: Normal range of motion  Cardiovascular: Normal rate, regular rhythm and normal heart sounds     Pulmonary/Chest: Effort normal and breath sounds normal  No respiratory distress  Abdominal: Soft  Bowel sounds are normal  He exhibits no distension  There is no tenderness  Genitourinary:   Genitourinary Comments: Cruz catheter in   Musculoskeletal: He exhibits no edema or deformity  Neurological: He is alert  Skin: Skin is warm  Capillary refill takes less than 2 seconds  No rash noted  No erythema  Psychiatric: He has a normal mood and affect  Discharge instructions/Information to patient and family:   See after visit summary for information provided to patient and family  Provisions for Follow-Up Care:  See after visit summary for information related to follow-up care and any pertinent home health orders  Disposition:     Home with VNA Services (Reminder: Complete face to face encounter)    For Discharges to [facility] SNF:   · Not Applicable to this Patient - Not Applicable to this Patient    Planned Readmission:  No     Discharge Statement:  I spent 40 minutes discharging the patient  This time was spent on the day of discharge  I had direct contact with the patient on the day of discharge  Greater than 50% of the total time was spent examining patient, answering all patient questions, arranging and discussing plan of care with patient as well as directly providing post-discharge instructions  Additional time then spent on discharge activities  Discharge Medications:  See after visit summary for reconciled discharge medications provided to patient and family        ** Please Note: This note has been constructed using a voice recognition system **

## 2019-10-17 NOTE — ASSESSMENT & PLAN NOTE
Now resolved with soto insertion  Hematuria resolved  Continue with soto, follow up urology recommendations

## 2019-10-18 ENCOUNTER — TELEPHONE (OUTPATIENT)
Dept: GERIATRICS | Age: 79
End: 2019-10-18

## 2019-10-18 NOTE — TELEPHONE ENCOUNTER
- Patient began at-home nursing care and physical therapy 10-18-19  - Caller required to report warning that patient's medications Labetalol and Clonidine, if stopped abruptly, may cause severe hypertension

## 2019-10-19 NOTE — TELEPHONE ENCOUNTER
I haven't met this pt as yet as I see him next month fo rthe first time  Can you let the caller know this pls   Thanks

## 2019-11-04 ENCOUNTER — OFFICE VISIT (OUTPATIENT)
Dept: GERIATRICS | Age: 79
End: 2019-11-04
Payer: COMMERCIAL

## 2019-11-04 VITALS
SYSTOLIC BLOOD PRESSURE: 128 MMHG | RESPIRATION RATE: 18 BRPM | HEART RATE: 65 BPM | BODY MASS INDEX: 29.71 KG/M2 | TEMPERATURE: 97.8 F | WEIGHT: 200.6 LBS | DIASTOLIC BLOOD PRESSURE: 60 MMHG | HEIGHT: 69 IN | OXYGEN SATURATION: 98 %

## 2019-11-04 DIAGNOSIS — M25.561 CHRONIC PAIN OF BOTH KNEES: ICD-10-CM

## 2019-11-04 DIAGNOSIS — D62 ACUTE BLOOD LOSS ANEMIA: ICD-10-CM

## 2019-11-04 DIAGNOSIS — F03.90 DEMENTIA WITHOUT BEHAVIORAL DISTURBANCE, UNSPECIFIED DEMENTIA TYPE (HCC): ICD-10-CM

## 2019-11-04 DIAGNOSIS — N18.30 STAGE 3 CHRONIC KIDNEY DISEASE (HCC): ICD-10-CM

## 2019-11-04 DIAGNOSIS — M25.562 CHRONIC PAIN OF BOTH KNEES: ICD-10-CM

## 2019-11-04 DIAGNOSIS — R26.2 AMBULATORY DYSFUNCTION: ICD-10-CM

## 2019-11-04 DIAGNOSIS — E78.49 OTHER HYPERLIPIDEMIA: ICD-10-CM

## 2019-11-04 DIAGNOSIS — R31.0 HEMATURIA, GROSS: Primary | ICD-10-CM

## 2019-11-04 DIAGNOSIS — I10 ESSENTIAL HYPERTENSION: ICD-10-CM

## 2019-11-04 DIAGNOSIS — G89.29 CHRONIC PAIN OF BOTH KNEES: ICD-10-CM

## 2019-11-04 DIAGNOSIS — R15.9 INCONTINENCE OF FECES, UNSPECIFIED FECAL INCONTINENCE TYPE: ICD-10-CM

## 2019-11-04 PROCEDURE — 3078F DIAST BP <80 MM HG: CPT | Performed by: STUDENT IN AN ORGANIZED HEALTH CARE EDUCATION/TRAINING PROGRAM

## 2019-11-04 PROCEDURE — 3074F SYST BP LT 130 MM HG: CPT | Performed by: STUDENT IN AN ORGANIZED HEALTH CARE EDUCATION/TRAINING PROGRAM

## 2019-11-04 PROCEDURE — 99205 OFFICE O/P NEW HI 60 MIN: CPT | Performed by: STUDENT IN AN ORGANIZED HEALTH CARE EDUCATION/TRAINING PROGRAM

## 2019-11-04 RX ORDER — CLONIDINE HYDROCHLORIDE 0.1 MG/1
0.1 TABLET ORAL
Qty: 30 TABLET | Refills: 0 | Status: SHIPPED | OUTPATIENT
Start: 2019-11-04 | End: 2019-12-05 | Stop reason: ALTCHOICE

## 2019-11-04 NOTE — PROGRESS NOTES
[facility]  [address] [facility], [address] [place] [address], [facility] 81 ADULTS  New patient      NAME: Rocio Triana  AGE: 67 y o  SEX: male    DATE OF ENCOUNTER: 11/4/2019    Assessment and Plan     Problem List Items Addressed This Visit        Cardiovascular and Mediastinum    Essential hypertension     Blood pressure 128/60  Continue labetalol 200 mg p o  Daily  Continue clonidine 0 1 mg p o  HS  Patient was previously on Jimmy which has since been discontinued after his hospitalization  Patient was previously seen by Nephrology during his hospitalization with recommendation to continue above antihypertensive therapy  Discussed in detail with patient and daughter, adverse effects of clonidine in the elderly including bradycardia, AV block, syncope, hypotension, depression, insomnia, constipation and dizziness to name a few  Recommended patient be weaned off clonidine in the future         Relevant Medications    cloNIDine (CATAPRES) 0 1 mg tablet       Nervous and Auditory    Dementia without behavioral disturbance ([facility] [address] )     Per patient's medical records, patient had questionable vascular dementia  Currently on donezepil 5 mg p o  HS  Discussed with patient and daughter, recommendation for a comprehensive geriatric assessment  Patient to schedule an appointment in the future at our office  Recommend reorientation and redirection as needed  Manage chronic conditions  Maintain [place] precautions  Encourage patient to remain active mentally, physically and socially  Also encourage patient to participate in cognitively challenging exercises  Will continue to monitor            Genitourinary    Hematuria, gross - Primary     No further episodes of hematuria  Hemoglobin had remained stable  Recent CT abdomen showed prostatomegaly with prostatic calcifications, moderately distended bladder with bladder wall thickening    Moderately distended ureters and renal pelvis Renal ultrasound showed bladder wall thickening likely related to chronic bladder outlet obstruction  Patient was seen by Urology during his hospitalization  Cruz catheter remains in situ  Patient to follow up with Urology tomorrow for possible cystoscopy  Will continue to monitor         Stage 3 chronic kidney disease ([address] )     Most recent GFR 33  Medications to be renally dosed  Avoid nephrotoxic agents  Will monitor BMP periodically            Other    Other hyperlipidemia     Patient to continue on simvastatin 20 mg daily  Recommend adherence to a heart healthy diet           Acute blood loss anemia     Acute blood loss secondary to gross hematuria  Hemoglobin has since remained stable  Patient denies any acute symptoms of anemia         Ambulatory dysfunction     Patient recently completed short-term rehabilitation stay at a nursing home  Maintain [place] precautions  Recommend use of an assistive device such as cane/walker  Will continue to monitor         Chronic pain of both knees     Patient with a chronic history of bilateral knee pain  Discussed with patient in detail the importance of avoiding NSAIDs due to renal impairment and increased risk for GI bleeding in the elderly  Requisition for referral to Orthopedics given to patient  Tylenol as needed for pain in the interim  Also recommended topical Aspercreme as needed  Will continue to monitor         Relevant Orders    Ambulatory referral to Orthopedic Surgery    Stool incontinence     Patient gives a chronic history of stool incontinence since 2013  No acute symptoms of cauda equina  Requisition given to patient for referral to GI as patient stated he previously followed with GI while still [place] however has not followed up           Relevant Orders    Ambulatory referral to Gastroenterology          - Counseling Documentation: patient was counseled regarding: diagnostic results, instructions for management, risk factor reductions, prognosis, patient and family education, impressions, risks and benefits of treatment options and importance of compliance with treatment  Discussed in detail with patient and daughter  Chief Complaint     Patient presents to establish care at our primary care practice for older adults    History of Present Illness     HPI     This is a 81-year-old male with the past medical history of BPH, HTN, HLD, secondary hyperparathyroidism, CKD 3, prior history of colon polyps and benign colonic tumor, gout and  dementia  Patient is present in the office with his daughter with whom he recently moved from [place] to live with just a little over 1 month ago prior to his recent hospitalization  During that hospitalization, patient was admitted for acute blood loss anemia in the setting of gross hematuria and was also noted to have an MILE  Prior to this, patient lived in [place] with his girlfriend where he was her primary caretaker however she recently passed away  Per daughter, patient has been noted to become progressively more and more forgetful  Patient has not had a comprehensive geriatric assessment in the past however he does carry a diagnosis of vascular dementia  Patient and daughter admit that patient had been seeing a [place] herbalist who was not an actual medical doctor while patient lived in [place]  Daughter admits that since patient has moved in with her, she has found multiple  medications as it appeared that the patient was often noncompliant with his medication regimen likely secondary to forgetfulness  Patient currently has a Cruz catheter in situ since his hospital discharge and is scheduled to see Urology tomorrow for possible cystoscopy and rectal ultrasound in the future  Patient does admit to having history of urinary and stool incontinence chronically since   During today's visit, patient notably forgetful with frequent repetitions of the same sentence    Required reorientation and redirection often  Today the patient complains of bilateral chronic knee pain  He states pain is graded at 6-7/10 in intensity, constant, dull, worse with movement and nonradiating  He is also requesting a referral to Gastroenterology for follow-up of his stool incontinence     The following portions of the patient's history were reviewed and updated as appropriate: allergies, current medications, past family history, past medical history, past social history, past surgical history and problem list     Review of Systems     Review of Systems   Constitutional: Positive for activity change (2/2 recent hospitalization) and fatigue  Negative for chills and fever  HENT: Negative for congestion, ear pain, rhinorrhea, sore throat and trouble swallowing  Eyes: Negative for discharge  Respiratory: Negative for cough, chest tightness, shortness of breath, wheezing and stridor  Cardiovascular: Negative for chest pain, palpitations and leg swelling  Gastrointestinal: Negative for abdominal pain, constipation, diarrhea, nausea and vomiting  History of stool incontinence x6 years   Genitourinary: Negative for decreased urine volume and dysuria  Cruz catheter in situ  History of urinary incontinence   Musculoskeletal: Positive for arthralgias and gait problem (Uses cane)  Negative for neck stiffness  Skin: Negative for color change, pallor, rash and wound  Neurological: Positive for weakness (Since recent hospitalization)  Negative for dizziness, light-headedness and headaches  Hematological: Does not bruise/bleed easily  Psychiatric/Behavioral: Positive for confusion (At baseline) and decreased concentration  Negative for sleep disturbance and suicidal ideas  The patient is not nervous/anxious          Active Problem List     Patient Active Problem List   Diagnosis    Essential hypertension    Pyelonephritis    Other hyperlipidemia    MILE (acute kidney injury) ([facility] [address] )    Hematuria, gross  Metabolic acidosis    Acute blood loss anemia    Azotemia    Ambulatory dysfunction    BPH (benign prostatic hyperplasia)    Abdominal distention    Chronic pain of both knees    Stool incontinence    Stage 3 chronic kidney disease (HCC)    Dementia without behavioral disturbance (HCC)     Past Surgical History:   Procedure Laterality Date    COLON SURGERY      COLONOSCOPY      TUMOR REMOVAL       Family History   Problem Relation Age of Onset    Heart failure Brother     Dementia Maternal Uncle      Allergies   Allergen Reactions    Strawberry C [Ascorbate] Hives     Social History     Socioeconomic History    Marital status:      Spouse name: Not on file    Number of children: Not on file    Years of education: Not on file    Highest education level: Not on file   Occupational History    Not on file   Social Needs    Financial resource strain: Not on file    Food insecurity:     Worry: Not on file     Inability: Not on file    Transportation needs:     Medical: Not on file     Non-medical: Not on file   Tobacco Use    Smoking status: Passive Smoke Exposure - Never Smoker    Smokeless tobacco: Never Used   Substance and Sexual Activity    Alcohol use: Never     Frequency: Never    Drug use: Never    Sexual activity: Not on file   Lifestyle    Physical activity:     Days per week: Not on file     Minutes per session: Not on file    Stress: Not on file   Relationships    Social connections:     Talks on phone: Not on file     Gets together: Not on file     Attends Gnosticism service: Not on file     Active member of club or organization: Not on file     Attends meetings of clubs or organizations: Not on file     Relationship status: Not on file    Intimate partner violence:     Fear of current or ex partner: Not on file     Emotionally abused: Not on file     Physically abused: Not on file     Forced sexual activity: Not on file   Other Topics Concern    Not on file Social History Narrative    Not on file       Objective     /60 (BP Location: Left arm, Patient Position: Sitting, Cuff Size: Standard)   Pulse 65   Temp 97 8 °F (36 6 °C) (Skin)   Resp 18   Ht 5' 9" (1 753 m)   Wt 91 kg (200 lb 9 6 oz)   SpO2 98%   BMI 29 62 kg/m²     Physical Exam   Constitutional: He appears well-developed and well-nourished  No distress  HENT:   Head: Normocephalic and atraumatic  Right Ear: External ear normal    Left Ear: External ear normal    Nose: Nose normal    Mouth/Throat: Oropharynx is clear and moist  No oropharyngeal exudate  Eyes: Conjunctivae are normal  Right eye exhibits no discharge  Left eye exhibits no discharge  No scleral icterus  Neck: Normal range of motion  Neck supple  No JVD present  Cardiovascular: Normal rate, regular rhythm and intact distal pulses  Exam reveals no gallop and no friction rub  Murmur (ESM 2/6) heard  Pulmonary/Chest: Effort normal and breath sounds normal  No stridor  No respiratory distress  He has no wheezes  He has no rales  Abdominal: Soft  Bowel sounds are normal  He exhibits no distension and no mass  There is no tenderness  There is no rebound and no guarding  Genitourinary:   Genitourinary Comments: Cruz catheter in situ   Musculoskeletal: Normal range of motion  He exhibits no edema, tenderness or deformity  Neurological: He is alert  He has normal reflexes  No cranial nerve deficit  Oriented x2  Follows all commands readily  Moving all limbs   Skin: Skin is warm  No rash noted  He is not diaphoretic  No erythema  No pallor  Psychiatric:   Patient notably very talkative   Vitals reviewed        Pertinent Laboratory/Diagnostic Studies:  Lab Results   Component Value Date    WBC 9 60 10/16/2019    HGB 9 0 (L) 10/16/2019    HCT 27 9 (L) 10/16/2019    MCV 92 10/16/2019     10/16/2019     Lab Results   Component Value Date    SODIUM 142 10/16/2019    K 4 2 10/16/2019     (H) 10/16/2019    CO2 25 10/16/2019    BUN 38 (H) 10/16/2019    CREATININE 2 17 (H) 10/16/2019    GLUC 112 10/16/2019    CALCIUM 8 3 10/16/2019     EKG (10/09/2019): Showed sinus tachycardia, normal QTC    Echocardiogram (10/08/2019):  EF 97%, grade 1 diastolic dysfunction, mild-moderate MR, mild AR, mild TR, mild MS    Current Medications     Current Outpatient Medications:     allopurinol (ZYLOPRIM) 300 mg tablet, Take 300 mg by mouth daily, Disp: , Rfl:     cloNIDine (CATAPRES) 0 1 mg tablet, Take 1 tablet (0 1 mg total) by mouth daily at bedtime, Disp: 30 tablet, Rfl: 0    donepezil (ARICEPT) 5 mg tablet, Take 5 mg by mouth daily at bedtime, Disp: , Rfl:     labetalol (NORMODYNE) 200 mg tablet, Take 200 mg by mouth daily at bedtime, Disp: , Rfl:     Multiple Vitamins-Minerals (MULTIVITAMIN WITH MINERALS) tablet, Take 1 tablet by mouth daily, Disp: , Rfl:     omega-3-acid ethyl esters (LOVAZA) 1 g capsule, Take 1 g by mouth daily, Disp: , Rfl:     simvastatin (ZOCOR) 20 mg tablet, Take 20 mg by mouth daily at bedtime, Disp: , Rfl:     Health Maintenance     Health Maintenance   Topic Date Due    Depression Screening PHQ  1940    Medicare Annual Wellness Visit (AWV)  1940    BMI: Followup Plan  08/11/1959    HEPATITIS B VACCINES (1 of 3 - Risk 3-dose series) 08/11/1960    DTaP,Tdap,and Td Vaccines (1 - Tdap) 08/11/1962    Fall Risk  08/11/2006    Pneumococcal Vaccine: 65+ Years (1 of 2 - PCV13) 08/11/2006    INFLUENZA VACCINE  07/01/2019    BMI: Adult  11/04/2020    Pneumococcal Vaccine: Pediatrics (0 to 5 Years) and At-Risk Patients (6 to 59 Years)  Aged Out       There is no immunization history on file for this patient      Milly Abrams MD  Geriatrics   11/4/2019 11:42 PM

## 2019-11-05 ENCOUNTER — PROCEDURE VISIT (OUTPATIENT)
Dept: UROLOGY | Facility: CLINIC | Age: 79
End: 2019-11-05
Payer: COMMERCIAL

## 2019-11-05 VITALS
BODY MASS INDEX: 29.92 KG/M2 | SYSTOLIC BLOOD PRESSURE: 130 MMHG | DIASTOLIC BLOOD PRESSURE: 68 MMHG | WEIGHT: 202 LBS | HEART RATE: 88 BPM | HEIGHT: 69 IN

## 2019-11-05 DIAGNOSIS — R31.0 HEMATURIA, GROSS: ICD-10-CM

## 2019-11-05 DIAGNOSIS — F03.90 DEMENTIA WITHOUT BEHAVIORAL DISTURBANCE, UNSPECIFIED DEMENTIA TYPE (HCC): ICD-10-CM

## 2019-11-05 DIAGNOSIS — R33.9 URINARY RETENTION: Primary | ICD-10-CM

## 2019-11-05 DIAGNOSIS — R31.0 GROSS HEMATURIA: ICD-10-CM

## 2019-11-05 LAB — POST-VOID RESIDUAL VOLUME, ML POC: 198 ML

## 2019-11-05 PROCEDURE — 51798 US URINE CAPACITY MEASURE: CPT | Performed by: UROLOGY

## 2019-11-05 PROCEDURE — 52000 CYSTOURETHROSCOPY: CPT | Performed by: UROLOGY

## 2019-11-05 PROCEDURE — 76872 US TRANSRECTAL: CPT | Performed by: UROLOGY

## 2019-11-05 PROCEDURE — 99215 OFFICE O/P EST HI 40 MIN: CPT | Performed by: UROLOGY

## 2019-11-05 RX ORDER — AMLODIPINE AND OLMESARTAN MEDOXOMIL 10; 40 MG/1; MG/1
1 TABLET ORAL
COMMUNITY
Start: 2019-01-30 | End: 2019-11-21 | Stop reason: ALTCHOICE

## 2019-11-05 RX ORDER — TAMSULOSIN HYDROCHLORIDE 0.4 MG/1
0.4 CAPSULE ORAL
Qty: 30 CAPSULE | Refills: 6 | Status: ON HOLD | OUTPATIENT
Start: 2019-11-05 | End: 2019-12-13

## 2019-11-05 RX ORDER — FINASTERIDE 5 MG/1
5 TABLET, FILM COATED ORAL DAILY
Qty: 30 TABLET | Refills: 6 | Status: ON HOLD | OUTPATIENT
Start: 2019-11-05 | End: 2019-12-13

## 2019-11-05 RX ORDER — DORZOLAMIDE HYDROCHLORIDE AND TIMOLOL MALEATE 20; 5 MG/ML; MG/ML
SOLUTION/ DROPS OPHTHALMIC
COMMUNITY
Start: 2015-03-18 | End: 2019-12-09

## 2019-11-05 RX ORDER — HYDROCHLOROTHIAZIDE 12.5 MG/1
CAPSULE, GELATIN COATED ORAL
COMMUNITY
Start: 2017-07-11 | End: 2019-11-21 | Stop reason: ALTCHOICE

## 2019-11-05 NOTE — ASSESSMENT & PLAN NOTE
No further episodes of hematuria  Hemoglobin had remained stable  Recent CT abdomen showed prostatomegaly with prostatic calcifications, moderately distended bladder with bladder wall thickening    Moderately distended ureters and renal pelvis   Renal ultrasound showed bladder wall thickening likely related to chronic bladder outlet obstruction  Patient was seen by Urology during his hospitalization  Cruz catheter remains in situ  Patient to follow up with Urology tomorrow for possible cystoscopy  Will continue to monitor

## 2019-11-05 NOTE — ASSESSMENT & PLAN NOTE
Patient with a chronic history of bilateral knee pain  Discussed with patient in detail the importance of avoiding NSAIDs due to renal impairment and increased risk for GI bleeding in the elderly  Requisition for referral to Orthopedics given to patient  Tylenol as needed for pain in the interim  Also recommended topical Aspercreme as needed  Will continue to monitor

## 2019-11-05 NOTE — ASSESSMENT & PLAN NOTE
Patient recently completed short-term rehabilitation stay at a nursing home  Maintain [place] precautions  Recommend use of an assistive device such as cane/walker  Will continue to monitor

## 2019-11-05 NOTE — ASSESSMENT & PLAN NOTE
Blood pressure 128/60  Continue labetalol 200 mg p o  Daily  Continue clonidine 0 1 mg p o  HS  Patient was previously on Jimmy which has since been discontinued after his hospitalization  Patient was previously seen by Nephrology during his hospitalization with recommendation to continue above antihypertensive therapy  Discussed in detail with patient and daughter, adverse effects of clonidine in the elderly including bradycardia, AV block, syncope, hypotension, depression, insomnia, constipation and dizziness to name a few    Recommended patient be weaned off clonidine in the future

## 2019-11-05 NOTE — ASSESSMENT & PLAN NOTE
Per patient's medical records, patient had questionable vascular dementia  Currently on donezepil 5 mg p o   HS  Discussed with patient and daughter, recommendation for a comprehensive geriatric assessment  Patient to schedule an appointment in the future at our office  Recommend reorientation and redirection as needed  Manage chronic conditions  Maintain [place] precautions  Encourage patient to remain active mentally, physically and socially  Also encourage patient to participate in cognitively challenging exercises  Will continue to monitor

## 2019-11-05 NOTE — ASSESSMENT & PLAN NOTE
Acute blood loss secondary to gross hematuria  Hemoglobin has since remained stable  Patient denies any acute symptoms of anemia

## 2019-11-05 NOTE — ASSESSMENT & PLAN NOTE
Patient gives a chronic history of stool incontinence since 2013  No acute symptoms of cauda equina  Requisition given to patient for referral to GI as patient stated he previously followed with GI while still [place] however has not followed up

## 2019-11-05 NOTE — PROGRESS NOTES
[address] FOLLOW UP NOTE     CHIEF COMPLAINT   Lucian Gonzales is a 67 y o  male with a complaint of   Chief Complaint   Patient presents with    Cystoscopy    Gross Hematuria       History of Present Illness:     67 y o  male with a history of recent admission to the hospital for shortness of breath and urinary symptoms  Patient was found to have a very large prostate on CT scan with a distended bladder concerning for retention  After catheter placement for 200 cc, gross hematuria was noted  Patient has remained catheter dependent since  He presents today with his daughter and son-in-law  Patient has some underlying dementia      Past Medical History:     Past Medical History:   Diagnosis Date    Anemia     BPH (benign prostatic hyperplasia)     Chronic kidney disease     Dementia ([facility] [address] )     Hypertension     Osteoarthritis        PAST SURGICAL HISTORY:     Past Surgical History:   Procedure Laterality Date    COLON SURGERY      COLONOSCOPY      TUMOR REMOVAL         CURRENT MEDICATIONS:     Current Outpatient Medications   Medication Sig Dispense Refill    allopurinol (ZYLOPRIM) 300 mg tablet Take 300 mg by mouth daily      cloNIDine (CATAPRES) 0 1 mg tablet Take 1 tablet (0 1 mg total) by mouth daily at bedtime 30 tablet 0    donepezil (ARICEPT) 5 mg tablet Take 5 mg by mouth daily at bedtime      labetalol (NORMODYNE) 200 mg tablet Take 200 mg by mouth daily at bedtime      Multiple Vitamins-Minerals (MULTIVITAMIN WITH MINERALS) tablet Take 1 tablet by mouth daily      omega-3-acid ethyl esters (LOVAZA) 1 g capsule Take 1 g by mouth daily      simvastatin (ZOCOR) 20 mg tablet Take 20 mg by mouth daily at bedtime      amlodipine-olmesartan (KALEB) 10-40 MG Take 1 tablet by mouth      aspirin 81 MG tablet       brimonidine (ALPHAGAN P) 0 1 %       dorzolamide-timolol (COSOPT) 22 3-6 8 MG/ML ophthalmic solution       finasteride (PROSCAR) 5 mg tablet Take 1 tablet (5 mg total) by mouth daily for 30 doses 30 tablet 6    hydrochlorothiazide (MICROZIDE) 12 5 mg capsule take 1 capsule by mouth every morning      tamsulosin (FLOMAX) 0 4 mg Take 1 capsule (0 4 mg total) by mouth daily with dinner for 30 doses 30 capsule 6     No current facility-administered medications for this visit  ALLERGIES:     Allergies   Allergen Reactions    Strawberry C [Ascorbate] Hives       SOCIAL HISTORY:     Social History     Socioeconomic History    Marital status:      Spouse name: None    Number of children: None    Years of education: None    Highest education level: None   Occupational History    None   Social Needs    Financial resource strain: None    Food insecurity:     Worry: None     Inability: None    Transportation needs:     Medical: None     Non-medical: None   Tobacco Use    Smoking status: Passive Smoke Exposure - Never Smoker    Smokeless tobacco: Never Used   Substance and Sexual Activity    Alcohol use: Never     Frequency: Never    Drug use: Never    Sexual activity: None   Lifestyle    Physical activity:     Days per week: None     Minutes per session: None    Stress: None   Relationships    Social connections:     Talks on phone: None     Gets together: None     Attends [employer] service: None     Active member of club or organization: None     Attends meetings of clubs or organizations: None     Relationship status: None    Intimate partner violence:     Fear of current or ex partner: None     Emotionally abused: None     Physically abused: None     Forced sexual activity: None   Other Topics Concern    None   Social History Narrative    None       SOCIAL HISTORY:     Family History   Problem Relation Age of Onset    Heart failure Brother     Dementia Maternal Uncle        REVIEW OF SYSTEMS:     Review of Systems   Constitutional: Negative  Respiratory: Negative  Cardiovascular: Negative  Gastrointestinal: Negative      Genitourinary: Positive for difficulty urinating and hematuria  Musculoskeletal: Negative  Skin: Negative  Psychiatric/Behavioral: Positive for confusion  PHYSICAL EXAM:     /68   Pulse 88   Ht 5' 9" (1 753 m)   Wt 91 6 kg (202 lb)   BMI 29 83 kg/m²     General:  Healthy appearing  male in no acute distress  They have a normal affect  There is not appear to be any gross neurologic defects or abnormalities  HEENT:  Normocephalic, atraumatic  Neck is supple without any palpable lymphadenopathy  Cardiovascular:  Patient has normal palpable distal radial pulses  There is no significant peripheral edema  No JVD is noted  Respiratory:  Patient has unlabored respirations  There is no audible wheeze or rhonchi  Abdomen:  Abdomen is with healed midline surgical scars  Abdomen is soft and nontender  There is no tympany  Inguinal and umbilical hernia are not appreciated  Genitourinary:   Circumcised phallus, orthotopic meatus, testicles are smooth and descended, prostate is enlarged at 50 g but smooth without nodules or induration    Musculoskeletal:  Patient does not have significant CVA tenderness in the  flank with palpation or percussion  They full range of motion in all 4 extremities  Strength in all 4 extremities appears congruent  Patient is able to ambulate without assistance or difficulty  Dermatologic:  Patient has no skin abnormalities or rashes        LABS:     CBC:   Lab Results   Component Value Date    WBC 9 60 10/16/2019    HGB 9 0 (L) 10/16/2019    HCT 27 9 (L) 10/16/2019    MCV 92 10/16/2019     10/16/2019       BMP:   Lab Results   Component Value Date    CALCIUM 8 3 10/16/2019    K 4 2 10/16/2019    CO2 25 10/16/2019     (H) 10/16/2019    BUN 38 (H) 10/16/2019    CREATININE 2 17 (H) 10/16/2019     Urine Culture >100,000 cfu/ml Corynebacterium riegeliiAbnormal           Susceptibility      Corynebacterium riegelii     MIKIE     ZID Performed Yes Specimen Collected: 10/07/19  9:50 PM Last Resulted: 10/10/19  7:26 AM        No results found for: PSA    IMAGING:     10/7/19  CT CHEST, ABDOMEN AND PELVIS WITHOUT IV CONTRAST     INDICATION:   looking for infection      COMPARISON:  Chest x-ray from October 7, 2019      TECHNIQUE: CT examination of the chest, abdomen and pelvis was performed without intravenous contrast   Axial, sagittal, and coronal 2D reformatted images were created from the source data and submitted for interpretation       Radiation dose length product (DLP) for this visit:  655 mGy-cm   This examination, like all CT scans performed in the [facility], was performed utilizing techniques to minimize radiation dose exposure, including the use of iterative   reconstruction and automated exposure control       Enteric contrast was not administered       FINDINGS:     CHEST     LUNGS: Respiratory motion limits optimal assessment for small pulmonary nodules  No pulmonary consolidation  Lungs are clear  There is no tracheal or endobronchial lesion      PLEURA:  Unremarkable      HEART/GREAT VESSELS:  Cardiomegaly  Small anterior pericardial effusion      MEDIASTINUM AND XANDER:  Enlarged right thyroid lobe  No discrete nodules identified      CHEST WALL AND LOWER NECK:   Unremarkable      ABDOMEN     LIVER/BILIARY TREE:  Unremarkable      GALLBLADDER:  Cholelithiasis without cholecystitis      SPLEEN:  Unremarkable      PANCREAS:  Unremarkable      ADRENAL GLANDS:  Unremarkable      KIDNEYS/URETERS:  Bilateral perinephric stranding  Left lower pole medially located exophytic cyst   No nephrolithiasis  Moderately dilated ureters bilaterally       STOMACH AND BOWEL:  Small hiatal hernia    Normal small and large bowel loops         APPENDIX:  Appendectomy      ABDOMINOPELVIC CAVITY:  Small amount of free fluid in the deep pelvis      VESSELS:  Unremarkable for patient's age      PELVIS     REPRODUCTIVE ORGANS:  Prostatomegaly with prostatic calcifications      URINARY BLADDER:  Markedly distended with circumferential wall thickening  Mild stranding of the perivesicular fat      ABDOMINAL WALL/INGUINAL REGIONS:  Unremarkable      OSSEOUS STRUCTURES:  Degenerative changes of the lumbar spine with mild S-shaped scoliosis  Facet arthropathy, disc bulging, disc space narrowing, diskal gas and endplate osteophytic ridging results in multilevel spinal stenosis  In addition, there is   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 with mildly prominent epidural lipomatosis      IMPRESSION:  No acute pulmonary disease      Cholelithiasis without cholecystitis      Prostatomegaly with prostatic calcifications and moderately distended bladder which demonstrates circumferential bladder wall thickening and perivesicular fat stranding  Findings are consistent with cystitis  Correlate for bladder outlet obstruction      Moderately distended ureters and renal pelvices with with mild periureteral and perinephric stranding  Findings suggest upper urinary tract infection  Please note that pyelonephritis is difficult to exclude without contrast      Left lower pole renal cyst   No renal calculi  PROCEDURE:     SEE NOTE    ASSESSMENT:     67 y o  male with enlarged prostate and gross hematuria during prior hospitalization    PLAN:       Patient has enlargement of his prostate with signs of outlet obstruction  It is likely that his gross hematuria was related to the size of his prostate and/or urinary tract infection during his hospitalization as I do not see any abnormalities in the lower tract  Given the patient's kidney dysfunction, contrast imaging is not available  I will recommend that we start medication to control the patient's outlet including Flomax 0 4 mg at night and Proscar 5 mg      Given the patient's ongoing outlet obstruction and retention, I recommended we proceed to the operating room for cystoscopy with implantation of UroLift and bilateral retrograde pyelograms for complete upper tract evaluation  Risks and benefits of the procedure were discussed with the patient and his daughter  She agrees to proceed as she is his power of  and has signed informed consent  Patient underwent cystoscopy, transrectal measurement of his prostate, attempted uroflow, he has failed medical therapy of his urinary retention and has no allergy to nickel

## 2019-11-05 NOTE — PROGRESS NOTES
Cystoscopy  Date/Time: 11/5/2019 8:53 AM  Performed by: Arielle Magdaleno MD  Authorized by: Arielle Magdaleno MD     Procedure details: cystoscopy    Patient tolerance: Patient tolerated the procedure well with no immediate complications    Additional Procedure Details:   Cystoscopy procedure note:    Risk and benefits of flexible cystoscopy were discussed  Informed consent was obtained  Urethral catheter was removed prior to cystoscopy  The patient was placed in the supine position  His genitalia was prepped with Betadine and draped in a sterile fashion  Viscous 2% lidocaine jelly was instilled into the urethra and allowed dwell time for local anesthesia  Flexible cystoscopy was then performed using a 16F scope  The distal urethra  Was normal   In the anterior bubble urethra, there was an area of prior trauma from likely attempts at catheter placement  This was well healed  There was no stricture noted  Prostate was noted to be approximately 4 cm in length with coaptation of the bilateral lobes  There was very minimal intravesical component on entrance into the bladder or retroflexion  Once inside the bladder, it was carefully inspected in a 360 degree fashion  There was no evidence of mucosal abnormalities, lesions, diverticula or stones  There was some particulate appearing urine and mild trabeculation but no tumors noted  Both ureteral orifices in their orthotopic location were visualized with clear efflux of urine  Retroflexion for evaluation of the bladder neck was normal      Overall this was a negative cystoscopy  Patient was then turned the left lateral decubitus position  Transrectal ultrasound measurement was performed and the prostate measured 49 1 to g      patient was unable to void for uroflow measurement  Postvoid residual demonstrated 250 cc which was the volume that we had placed during cystoscopy    Eighteen Italian coude tip Cruz catheter was replaced in the patient's bladder and the sterile condition with 10 cc of sterile water placed in the balloon  Catheter hooked to gravity drainage with clear urine output

## 2019-11-05 NOTE — ASSESSMENT & PLAN NOTE
Most recent GFR 33  Medications to be renally dosed  Avoid nephrotoxic agents  Will monitor BMP periodically

## 2019-11-06 NOTE — PROGRESS NOTES
Problem: Patient Care Overview  Goal: Plan of Care Review   11/06/19 0500   Coping/Psychosocial   Plan of Care Reviewed With patient   Plan of Care Review   Progress improving   OTHER   Outcome Summary Pt VS stable throughout shift,HR is NSR on tele monitor. pt had frequent bowel movements during shift, hemoglobin improved post blood transfusion, pt completed bowel prep for colonoscopy today. pt also scheduled to have dialysis today. Pt mentation back at baseline. will continue to monitor.           Progress Note - Flori Pearl 1940, 67 y o  male MRN: [id number]    Unit/Bed#:  Encounter: [id number]    Primary Care Provider: No primary care provider on file  Date and time admitted to hospital: 10/7/2019  4:48 PM        Hematuria, gross  Assessment & Plan  CT of the abdomen     IMPRESSION:  No acute pulmonary disease      Cholelithiasis without cholecystitis      Prostatomegaly with prostatic calcifications and moderately distended bladder which demonstrates circumferential bladder wall thickening and perivesicular fat stranding  Findings are consistent with cystitis  Correlate for bladder outlet obstruction  Moderately distended ureters and renal pelvices with with mild periureteral and perinephric stranding  Findings suggest upper urinary tract infection  Please note that pyelonephritis is difficult to exclude without contrast   Left lower pole renal cyst   No renal calculi  Resolved  Cbi discontinued  Urology note reviewed recommendations appreciated  As per Urology will be discharged on Cruz catheter in place and have outpatient follow-up in 3 weeks for cystoscopy and further evaluation of prostate enlargement      BPH (benign prostatic hyperplasia)  Assessment & Plan  CT scan of the abdomen demonstrated severe prostatomegaly with calcifications, over distention of urinary bladder, bladder thickening  Repeat ultrasound of the kidneys and bladder did not demonstrate any hydronephrosis  Outpatient follow-up needed for cystoscopy in rectal ultrasound    Urology recommending to continue Cruz catheter on discharge      Ambulatory dysfunction  Assessment & Plan  PT/OT ordered and pending    Acute blood loss anemia  Assessment & Plan  Acute blood loss anemia in the setting of gross hematuria as evidenced by hgb 15 1>10 0, treated with CBI and frequent monitoring of CBC's  Hb now stable  Hematuria resolved      MILE (acute kidney injury) ([address] )  Assessment & Plan  Cr continuing to improved to 2 3  Nephrology following, recommendations appreciated      Other hyperlipidemia  Assessment & Plan  Continue pravastatin     Pyelonephritis  Assessment & Plan  urine culture + for corynebacterium   Blood cultures negative, afebrile and hemodynamically stable  On ceftriaxone- Day 5    Essential hypertension  Assessment & Plan  Continue with labetalol and clonidine     VTE Pharmacologic Prophylaxis:   Pharmacologic: Held secondary to hematuria  Mechanical VTE Prophylaxis in Place: Yes    Patient Centered Rounds: I have performed bedside rounds with nursing staff today  Discussions with Specialists or Other Care Team Provider:  Case management    Education and Discussions with Family / Patient:  Patient's daughter at the bedside    Time Spent for Care: 30 minutes  More than 50% of total time spent on counseling and coordination of care as described above  Current Length of Stay: 5 day(s)    Current Patient Status: Inpatient   Certification Statement: The patient will continue to require additional inpatient hospital stay due to PT/OT    Discharge Plan:  Anticipate discharge tomorrow    Code Status: Level 1 - Full Code      Subjective:   Patient seen and examined  Denies any complaints at this time  Had normal bowel movement this morning  Denies any abdominal pain nausea vomiting  No fevers or chills  No back pain  Objective:     Vitals:   Temp (24hrs), Av °F (37 2 °C), Min:98 7 °F (37 1 °C), Max:99 2 °F (37 3 °C)    Temp:  [98 7 °F (37 1 °C)-99 2 °F (37 3 °C)] 99 °F (37 2 °C)  HR:  [71-83] 83  Resp:  [18] 18  BP: (126-138)/(69-71) 134/69  SpO2:  [93 %-96 %] 96 %  Body mass index is 27 18 kg/m²  Input and Output Summary (last 24 hours):        Intake/Output Summary (Last 24 hours) at 10/12/2019 1419  Last data filed at 10/12/2019 3250  Gross per 24 hour   Intake    Output 475 ml   Net -475 ml       Physical Exam:     Physical Exam   Constitutional: He is oriented to person, place, and time  He appears well-developed and well-nourished  HENT:   Head: Normocephalic and atraumatic  Eyes: Pupils are equal, round, and reactive to light  EOM are normal    Neck: Normal range of motion  Neck supple  Cardiovascular: Normal rate and regular rhythm  Pulmonary/Chest: Effort normal and breath sounds normal    Abdominal: Soft  Bowel sounds are normal    Musculoskeletal: Normal range of motion  Neurological: He is alert and oriented to person, place, and time  Skin: Skin is warm and dry  Additional Data:     Labs:    Results from last 7 days   Lab Units 10/12/19  0617   WBC Thousand/uL 7 58   HEMOGLOBIN g/dL 9 8*   HEMATOCRIT % 30 5*   PLATELETS Thousands/uL 187   NEUTROS PCT % 68   LYMPHS PCT % 14   MONOS PCT % 11   EOS PCT % 5     Results from last 7 days   Lab Units 10/12/19  0617  10/09/19  0608  10/07/19  1718   SODIUM mmol/L 139   < > 138   < > 141   POTASSIUM mmol/L 4 8   < > 5 1   < > 5 1   CHLORIDE mmol/L 106   < > 107   < > 105   CO2 mmol/L 22   < > 15*   < > 19*   BUN mg/dL 40*   < > 76*   < > 70*   CREATININE mg/dL 2 32*   < > 4 39*   < > 4 74*   ANION GAP mmol/L 11   < > 16*   < > 17*   CALCIUM mg/dL 8 5   < > 7 9*   < > 9 6   ALBUMIN g/dL  --   --  2 6*  --  4 0   TOTAL BILIRUBIN mg/dL  --   --   --   --  1 00   ALK PHOS U/L  --   --   --   --  98   ALT U/L  --   --   --   --  17   AST U/L  --   --   --   --  27   GLUCOSE RANDOM mg/dL 118   < > 117   < > 120    < > = values in this interval not displayed  Results from last 7 days   Lab Units 10/07/19  1718   INR  1 10     Results from last 7 days   Lab Units 10/12/19  1132 10/12/19  0742 10/11/19  2202 10/11/19  1607 10/11/19  1054 10/11/19  0747 10/10/19  1551   POC GLUCOSE mg/dl 131 131 154* 131 149* 125 155*         Results from last 7 days   Lab Units 10/07/19  1910 10/07/19  1718   LACTIC ACID mmol/L  --  1 5   PROCALCITONIN ng/ml 0 36*  --            * I Have Reviewed All Lab Data Listed Above    * Additional Pertinent Lab Tests Reviewed: All [facility] Admission Reviewed    Imaging:    Imaging Reports Reviewed Today Include:  Ultrasound of the kidney and bladder  Imaging Personally Reviewed by Myself Includes:  CT of the abdomen    Recent Cultures (last 7 days):     Results from last 7 days   Lab Units 10/07/19  2150 10/07/19  1910 10/07/19  1718   BLOOD CULTURE   --  No Growth After 4 Days  No Growth After 4 Days  URINE CULTURE  >100,000 cfu/ml Corynebacterium riegelii*  --   --        Last 24 Hours Medication List:     Current Facility-Administered Medications:  acetaminophen 650 mg Oral Q6H PRN Rawleigh Lena, CRNP    belladonna-opium 30 mg Rectal Q8H PRN Rawleigh Lena, CRNP    bisacodyl 10 mg Rectal Daily PRN Rawleigh Lena, CRNP    calcium carbonate 1,000 mg Oral Daily PRN Rawleigh Lena, CRNP    cefTRIAXone 1,000 mg Intravenous Q24H Rawleigh Lena, CRNP Last Rate: 1,000 mg (10/12/19 0043)   cloNIDine 0 1 mg Oral HS Keith Henriquez MD    docusate sodium 100 mg Oral BID Figueroa Ann MD    donepezil 5 mg Oral HS Rawleigh Lena, CRNP    HYDROmorphone 0 5 mg Intravenous Q3H PRN Rawleigh Lena, CRNP    labetalol 200 mg Oral HS Iris Nuñez MD    ondansetron 4 mg Intravenous Q6H PRN Rawleigh Lena, CRNP    oxybutynin 5 mg Oral TID Rawleigh Lena, CRNP    polyethylene glycol 17 g Oral Daily Figueroa Ann MD    pravastatin 40 mg Oral Daily With Deepak Kowalski, TAMRA    sodium bicarbonate 650 mg Oral TID after meals Iris Nuñez MD    tamsulosin 0 4 mg Oral Daily With [address] [place], [place] [address]TAMRA         Today, Patient Was Seen By: Figueroa Ann MD    ** Please Note: Dictation voice to text software may have been used in the creation of this document   **

## 2019-11-20 ENCOUNTER — TELEPHONE (OUTPATIENT)
Dept: GERIATRICS | Age: 79
End: 2019-11-20

## 2019-11-20 NOTE — TELEPHONE ENCOUNTER
Please have an MA call patient and take 100 labetalol stat    Recheck blood pressure and call the office in 2 hours

## 2019-11-20 NOTE — TELEPHONE ENCOUNTER
Patients daughter Michelle Odell was notified to give an additional 100mg of labetalol, recheck BP in 2 hours and call back office with result  If BP goes down to 160 this is fine Dr Tamar Mchugh will be seeing him tomorrow 11/21/19   If BP remains the same or increase Daughter was instructed to call office

## 2019-11-20 NOTE — TELEPHONE ENCOUNTER
Daughter called to report father's blood pressure:  - 184/121: Blood pressure on 11/20/19 at 6:45am  - 181/110: BP on 11/19/19 at approx   11:00pm

## 2019-11-20 NOTE — TELEPHONE ENCOUNTER
Patients Daughter Dariela Petty called to inform us that pt's BP is now 184/104  I did contact Dr Mily Morejon and Dariela Petty was informed to give patient another 100 mg labetalol and avoid salt  She should take pt to ED if he complains of headache or weakness  Patient will be seen tomorrow 11/21/19 in office

## 2019-11-21 ENCOUNTER — OFFICE VISIT (OUTPATIENT)
Dept: GERIATRICS | Age: 79
End: 2019-11-21
Payer: COMMERCIAL

## 2019-11-21 VITALS
HEART RATE: 72 BPM | WEIGHT: 201.6 LBS | BODY MASS INDEX: 28.86 KG/M2 | DIASTOLIC BLOOD PRESSURE: 96 MMHG | OXYGEN SATURATION: 97 % | HEIGHT: 70 IN | RESPIRATION RATE: 12 BRPM | TEMPERATURE: 97.1 F | SYSTOLIC BLOOD PRESSURE: 174 MMHG

## 2019-11-21 DIAGNOSIS — I10 ESSENTIAL HYPERTENSION: ICD-10-CM

## 2019-11-21 DIAGNOSIS — H54.7 VISION IMPAIRMENT: ICD-10-CM

## 2019-11-21 DIAGNOSIS — Z13.21 ENCOUNTER FOR VITAMIN DEFICIENCY SCREENING: ICD-10-CM

## 2019-11-21 DIAGNOSIS — R31.0 HEMATURIA, GROSS: Primary | ICD-10-CM

## 2019-11-21 DIAGNOSIS — N40.0 BENIGN PROSTATIC HYPERPLASIA WITHOUT LOWER URINARY TRACT SYMPTOMS: ICD-10-CM

## 2019-11-21 DIAGNOSIS — F03.90 DEMENTIA WITHOUT BEHAVIORAL DISTURBANCE, UNSPECIFIED DEMENTIA TYPE (HCC): ICD-10-CM

## 2019-11-21 DIAGNOSIS — R31.0 GROSS HEMATURIA: ICD-10-CM

## 2019-11-21 DIAGNOSIS — R26.2 AMBULATORY DYSFUNCTION: ICD-10-CM

## 2019-11-21 DIAGNOSIS — H54.7 VISUAL IMPAIRMENT: ICD-10-CM

## 2019-11-21 DIAGNOSIS — N18.30 STAGE 3 CHRONIC KIDNEY DISEASE (HCC): ICD-10-CM

## 2019-11-21 PROCEDURE — 99215 OFFICE O/P EST HI 40 MIN: CPT | Performed by: STUDENT IN AN ORGANIZED HEALTH CARE EDUCATION/TRAINING PROGRAM

## 2019-11-21 RX ORDER — AMLODIPINE BESYLATE 10 MG/1
10 TABLET ORAL DAILY
Qty: 30 TABLET | Refills: 1 | Status: SHIPPED | OUTPATIENT
Start: 2019-11-21 | End: 2020-01-20 | Stop reason: SDUPTHER

## 2019-11-21 NOTE — PATIENT INSTRUCTIONS
Will start amlodipine    Take half tablet ( 10 mg) once daily  Call Dr Guy Nieves  On Monday 25 November at [phone] and leave a message with staff about patient's blood pressures   Avoid added salt to foods   No NSAID medications for pain -no Motrin /Advil/ Aleve/ ibuprofen  Tylenol as needed for pain

## 2019-11-22 NOTE — ASSESSMENT & PLAN NOTE
Patient to follow-up with our clinic for a comprehensive geriatric assessment in the future  Recommend reorientation and redirection as needed  Encouraged patient to remain active mentally, physically and socially  Patient also encouraged to participate in cognitively challenging exercises such as cross words and puzzles  Manage chronic conditions  Maintain [place] precautions  Will order vitamin B12, folate, vitamin-D level and HbA1c as part of memory loss workup  Will continue to monitor

## 2019-11-22 NOTE — ASSESSMENT & PLAN NOTE
Patient continues to follow with Urology with assessment of enlarged prostate with signs of outlet obstruction  Continue tamsulosin 0 4 mg p o  Daily  Continue finasteride 5 mg p o   Daily  To follow up with Urology as routinely scheduled

## 2019-11-22 NOTE — ASSESSMENT & PLAN NOTE
Patient complains of chronic decline in vision over the past 2 years    Referral given to patient to consult with Ophthalmology for annual eye exam  Maintain [place] precautions  Will continue to monitor

## 2019-11-22 NOTE — ASSESSMENT & PLAN NOTE
Most recent GFR was 33  Requisition given to patient for repeat CMP  Medications to be renally dosed  Avoid nephrotoxic agents  Will continue to monitor routinely

## 2019-11-22 NOTE — ASSESSMENT & PLAN NOTE
Patient continues to follow with Urology  Recent cystoscopy in office negative  Patient is scheduled for cystoscopy with implantation of urine lift and bilateral retrograde pyelograms for complete upper tract evaluation  Continue tamsulosin and Proscar for BPH  Will order CBC, CMP, HbA1c, TSH with reflex T4 as part of preoperative clearance labs  Will also order chest x-ray and EKG  Will follow up with patient in 2 weeks for preop clearance visit

## 2019-11-22 NOTE — ASSESSMENT & PLAN NOTE
Patient recently completed a course of physical therapy  Maintain Falls precautions  Will continue to monitor for gait instability  Encouraged patient to use recommended assistive device when ambulating

## 2019-11-22 NOTE — ASSESSMENT & PLAN NOTE
Patient currently on labetalol 200 mg daily and clonidine 0 1 mg p o  HS  Blood pressures continue to be elevated, greaterthan 033E systolics and 1 episode of systolic blood pressure in the 200s 1 week ago  Patient denies any headaches, new changes in vision, weakness or focal neurological deficits  Will start amlodipine 5 mg daily and up titrate as needed  Daughter to call in 3 days with blood pressure readings and well then determine whether amlodipine should be increased    Discussed with patient and daughter, that after patient's urolith procedure, goal would be to discontinue clonidine and transition to a different hypertensive agent  Recommend adherence to a low-sodium, heart healthy diet

## 2019-11-26 ENCOUNTER — TELEPHONE (OUTPATIENT)
Dept: GERIATRICS | Age: 79
End: 2019-11-26

## 2019-11-26 NOTE — TELEPHONE ENCOUNTER
The patient should increased from 5 milligrams amlodipine to 10 milligrams amlodipine    And daughter should call the office on Friday

## 2019-11-26 NOTE — TELEPHONE ENCOUNTER
Zaki Bah ([phone]) from [place] [address] [facility] called to report patient's blood pressure is elevated    Left Arm: 180/100  Right Arm: 170/100 no

## 2019-12-03 ENCOUNTER — TELEPHONE (OUTPATIENT)
Dept: GERIATRICS | Age: 79
End: 2019-12-03

## 2019-12-03 DIAGNOSIS — I10 ESSENTIAL HYPERTENSION: Primary | ICD-10-CM

## 2019-12-03 RX ORDER — HYDRALAZINE HYDROCHLORIDE 25 MG/1
25 TABLET, FILM COATED ORAL DAILY
Qty: 15 TABLET | Refills: 0 | Status: SHIPPED | OUTPATIENT
Start: 2019-12-03 | End: 2019-12-18 | Stop reason: SDUPTHER

## 2019-12-03 NOTE — PROGRESS NOTES
Informed by MA that patient's blood pressure continues to be in the 160s with most recent blood pressure 160/90mmHg  Will start hydralazine 25 mg p o   Daily and up titrate as needed  Daughter to call with weekly blood pressure logs

## 2019-12-04 ENCOUNTER — APPOINTMENT (OUTPATIENT)
Dept: LAB | Facility: CLINIC | Age: 79
End: 2019-12-04
Payer: COMMERCIAL

## 2019-12-04 ENCOUNTER — APPOINTMENT (OUTPATIENT)
Dept: RADIOLOGY | Facility: CLINIC | Age: 79
End: 2019-12-04
Payer: COMMERCIAL

## 2019-12-04 ENCOUNTER — ANESTHESIA EVENT (OUTPATIENT)
Dept: PERIOP | Facility: AMBULARY SURGERY CENTER | Age: 79
End: 2019-12-04
Payer: COMMERCIAL

## 2019-12-04 ENCOUNTER — CLINICAL SUPPORT (OUTPATIENT)
Dept: URGENT CARE | Facility: CLINIC | Age: 79
End: 2019-12-04
Payer: COMMERCIAL

## 2019-12-04 DIAGNOSIS — F03.90 DEMENTIA WITHOUT BEHAVIORAL DISTURBANCE, UNSPECIFIED DEMENTIA TYPE (HCC): ICD-10-CM

## 2019-12-04 DIAGNOSIS — R31.0 GROSS HEMATURIA: Primary | ICD-10-CM

## 2019-12-04 DIAGNOSIS — Z13.21 ENCOUNTER FOR VITAMIN DEFICIENCY SCREENING: ICD-10-CM

## 2019-12-04 DIAGNOSIS — R31.0 GROSS HEMATURIA: ICD-10-CM

## 2019-12-04 LAB
25(OH)D3 SERPL-MCNC: 41.9 NG/ML (ref 30–100)
ALBUMIN SERPL BCP-MCNC: 3.8 G/DL (ref 3.5–5)
ALP SERPL-CCNC: 87 U/L (ref 46–116)
ALT SERPL W P-5'-P-CCNC: 19 U/L (ref 12–78)
ANION GAP SERPL CALCULATED.3IONS-SCNC: 7 MMOL/L (ref 4–13)
AST SERPL W P-5'-P-CCNC: 9 U/L (ref 5–45)
BASOPHILS # BLD AUTO: 0.09 THOUSANDS/ΜL (ref 0–0.1)
BASOPHILS NFR BLD AUTO: 2 % (ref 0–1)
BILIRUB SERPL-MCNC: 0.34 MG/DL (ref 0.2–1)
BUN SERPL-MCNC: 27 MG/DL (ref 5–25)
CALCIUM SERPL-MCNC: 9.2 MG/DL (ref 8.3–10.1)
CHLORIDE SERPL-SCNC: 115 MMOL/L (ref 100–108)
CO2 SERPL-SCNC: 22 MMOL/L (ref 21–32)
CREAT SERPL-MCNC: 1.83 MG/DL (ref 0.6–1.3)
EOSINOPHIL # BLD AUTO: 0.41 THOUSAND/ΜL (ref 0–0.61)
EOSINOPHIL NFR BLD AUTO: 7 % (ref 0–6)
ERYTHROCYTE [DISTWIDTH] IN BLOOD BY AUTOMATED COUNT: 14.5 % (ref 11.6–15.1)
EST. AVERAGE GLUCOSE BLD GHB EST-MCNC: 97 MG/DL
FOLATE SERPL-MCNC: 11 NG/ML (ref 3.1–17.5)
GFR SERPL CREATININE-BSD FRML MDRD: 40 ML/MIN/1.73SQ M
GLUCOSE P FAST SERPL-MCNC: 121 MG/DL (ref 65–99)
HBA1C MFR BLD: 5 % (ref 4.2–6.3)
HCT VFR BLD AUTO: 39.4 % (ref 36.5–49.3)
HGB BLD-MCNC: 12.3 G/DL (ref 12–17)
IMM GRANULOCYTES # BLD AUTO: 0.01 THOUSAND/UL (ref 0–0.2)
IMM GRANULOCYTES NFR BLD AUTO: 0 % (ref 0–2)
LYMPHOCYTES # BLD AUTO: 1.21 THOUSANDS/ΜL (ref 0.6–4.47)
LYMPHOCYTES NFR BLD AUTO: 21 % (ref 14–44)
MCH RBC QN AUTO: 28 PG (ref 26.8–34.3)
MCHC RBC AUTO-ENTMCNC: 31.2 G/DL (ref 31.4–37.4)
MCV RBC AUTO: 90 FL (ref 82–98)
MONOCYTES # BLD AUTO: 0.5 THOUSAND/ΜL (ref 0.17–1.22)
MONOCYTES NFR BLD AUTO: 9 % (ref 4–12)
NEUTROPHILS # BLD AUTO: 3.47 THOUSANDS/ΜL (ref 1.85–7.62)
NEUTS SEG NFR BLD AUTO: 61 % (ref 43–75)
NRBC BLD AUTO-RTO: 0 /100 WBCS
PLATELET # BLD AUTO: 191 THOUSANDS/UL (ref 149–390)
PMV BLD AUTO: 11.6 FL (ref 8.9–12.7)
POTASSIUM SERPL-SCNC: 3.5 MMOL/L (ref 3.5–5.3)
PROT SERPL-MCNC: 7.5 G/DL (ref 6.4–8.2)
RBC # BLD AUTO: 4.39 MILLION/UL (ref 3.88–5.62)
SODIUM SERPL-SCNC: 144 MMOL/L (ref 136–145)
TSH SERPL DL<=0.05 MIU/L-ACNC: 2.29 UIU/ML (ref 0.36–3.74)
VIT B12 SERPL-MCNC: 696 PG/ML (ref 100–900)
WBC # BLD AUTO: 5.69 THOUSAND/UL (ref 4.31–10.16)

## 2019-12-04 PROCEDURE — 71046 X-RAY EXAM CHEST 2 VIEWS: CPT

## 2019-12-04 PROCEDURE — 82607 VITAMIN B-12: CPT

## 2019-12-04 PROCEDURE — 84443 ASSAY THYROID STIM HORMONE: CPT

## 2019-12-04 PROCEDURE — 83036 HEMOGLOBIN GLYCOSYLATED A1C: CPT

## 2019-12-04 PROCEDURE — 82746 ASSAY OF FOLIC ACID SERUM: CPT

## 2019-12-04 PROCEDURE — 85025 COMPLETE CBC W/AUTO DIFF WBC: CPT

## 2019-12-04 PROCEDURE — 82306 VITAMIN D 25 HYDROXY: CPT

## 2019-12-04 PROCEDURE — 36415 COLL VENOUS BLD VENIPUNCTURE: CPT

## 2019-12-04 PROCEDURE — 80053 COMPREHEN METABOLIC PANEL: CPT

## 2019-12-05 ENCOUNTER — TELEPHONE (OUTPATIENT)
Dept: GERIATRICS | Age: 79
End: 2019-12-05

## 2019-12-05 ENCOUNTER — OFFICE VISIT (OUTPATIENT)
Dept: GERIATRICS | Age: 79
End: 2019-12-05
Payer: COMMERCIAL

## 2019-12-05 VITALS
DIASTOLIC BLOOD PRESSURE: 88 MMHG | WEIGHT: 202.2 LBS | TEMPERATURE: 97.4 F | RESPIRATION RATE: 12 BRPM | HEART RATE: 88 BPM | HEIGHT: 69 IN | SYSTOLIC BLOOD PRESSURE: 162 MMHG | BODY MASS INDEX: 29.95 KG/M2 | OXYGEN SATURATION: 97 %

## 2019-12-05 DIAGNOSIS — R31.0 GROSS HEMATURIA: Primary | ICD-10-CM

## 2019-12-05 DIAGNOSIS — R26.2 AMBULATORY DYSFUNCTION: ICD-10-CM

## 2019-12-05 DIAGNOSIS — R15.9 INCONTINENCE OF FECES, UNSPECIFIED FECAL INCONTINENCE TYPE: ICD-10-CM

## 2019-12-05 DIAGNOSIS — H54.7 VISUAL IMPAIRMENT: ICD-10-CM

## 2019-12-05 DIAGNOSIS — R33.9 URINARY RETENTION: ICD-10-CM

## 2019-12-05 DIAGNOSIS — R41.89 COGNITIVE IMPAIRMENT: ICD-10-CM

## 2019-12-05 DIAGNOSIS — D62 ACUTE BLOOD LOSS ANEMIA: ICD-10-CM

## 2019-12-05 DIAGNOSIS — I10 ESSENTIAL HYPERTENSION: ICD-10-CM

## 2019-12-05 PROBLEM — R31.9 HEMATURIA: Status: ACTIVE | Noted: 2019-10-09

## 2019-12-05 PROCEDURE — 99215 OFFICE O/P EST HI 40 MIN: CPT | Performed by: STUDENT IN AN ORGANIZED HEALTH CARE EDUCATION/TRAINING PROGRAM

## 2019-12-05 NOTE — PROGRESS NOTES
[address]  [address] [facility], [address] [place] [address]    [facility]ACT[facility] FOR OLDER ADULTS  Preoperative clearance            NAME: Shilpi De Leon  AGE: 67 y o  SEX: male    DATE OF ENCOUNTER: 12/5/2019    Assessment and Plan     Problem List Items Addressed This Visit        Cardiovascular and Mediastinum    Essential hypertension     Blood pressure 162/88mmHg  Blood pressure still elevated however much improved as patient's prior systolic blood pressures upon joining our practice was in the 200s with titration of medications since then  Daughter explains that clonidine was not refilled  Plan was to wean patient off clonidine  Therefore since he has been off of this, will discontinue this agent at this time  Continue labetalol 200 mg daily HS  Continue amlodipine 10 mg daily  Patient started on hydralazine 25 mg daily  Will likely uptitrate this agent if blood pressures continue to be elevated  Daughter to call me in 3 days with blood pressure readings  Recommended adherence to a low-sodium, heart healthy diet  Will continue to monitor closely                  Genitourinary    Urinary retention     Cruz catheter remains in situ  No obvious hematuria  Renal function improved though remains at CKD 3  Continue finasteride 5 mg daily  Continue tamsulosin 0 4 mg p o   Daily  Patient is scheduled for urolift procedure and retrograde pyelogram on 12/13/2019  Continue follow-up with Urology as routinely scheduled            Other    Hematuria - Primary     Patient currently following with Urology  Had recent cystoscopy at urology is office which was negative  Patient is scheduled for urolift and retrograde pyelograms for complete upper tract evaluation  Continue tamsulosin and Proscar for BPH  Reviewed CBC, hemoglobin stable at 12 3  Patient remains at CKD 3 with GFR 40  Chest x-ray showed no acute cardiopulmonary disease  EKG showed normal QTC, normal sinus rhythm  Patient to discontinue Omega 3 acid supplements, multivitamins, aspirin 7 days prior to procedure  Patient is at low-moderate risk for this procedure given his age and comorbidities  Would recommend implementing delirium precautions postoperatively  Patient is cleared to proceed with  Urolift and retrograde pyelograms  Acute blood loss anemia     Patient denies any hematuria or other acute blood loss  No current symptoms of anemia  Hemoglobin stable at 12 3  Will continue to monitor         Ambulatory dysfunction     Patient looks much better at this visit from prior visit  Notably more stable with ambulation  Recently completed a course of physical therapy  Educated on falls precautions  Vitamin-D levels within normal limits  Will continue to monitor         Stool incontinence     Patient previously followed with Gastroenterology [facility] records have still yet not been obtained despite patient's signing a release  Patient to follow up with Gastroenterology in the [address] [place] [address] scheduled toileting every 2-3 hours  Will continue to monitor         Cognitive impairment     CBC, TSH, vitamin B12, folate levels reviewed with no obvious abnormalities  CMP showed CKD 3  Patient will require a comprehensive geriatric assessment in the future  Though patient has other specialty visits planned    Daughter agrees to defer at this time  Since prior visit, patient notably more steady on his feet, is more alert, has not been tangential as pronounced in his speech  Recommend reorientation and redirection as needed  Patient encouraged to remain active mentally, physically and socially  Patient should participate in cognitively challenging exercises such as cross words and puzzles  Manage chronic conditions  Maintain Falls precautions  Will continue to monitor         Visual impairment     Requisition given to patient for referral to Ophthalmology a prior visit  Maintain Falls precautions             - Counseling Documentation: patient was counseled regarding: diagnostic results, instructions for management, risk factor reductions, prognosis, patient and family education, impressions, risks and benefits of treatment options and importance of compliance with treatment  Discussed with patient and daughter  Chief Complaint     Patient presents for preoperative clearance for urolift and retrograde pyelogram    History of Present Illness     HPI  This is a 71-year-old male with gross hematuria, BPH, HTN, cognitive impairment, anemia and visual impairment amongst multiple other medical issues who presents for preoperative clearance for a Urolift and retrograde pyelogram procedure  Patient was previously hospitalized for urinary retention and gross hematuria and has been following with Urology where cystoscopy was negative during previous Urology visit  Patient is in the office with his daughter and looks much better today than prior visit  He is more steady when ambulating and conversation is not as tangential and nonsensical as prior conversations  Daughter states he has been compliant with a healthy diet, has been taking his medications and remains physically active  He denies any recent episodes of hematuria, dysuria, fever, chills, abdominal pain, falls or vomiting  He has had no focal neurological deficits, shortness of breath or dizziness  Daughter states he has been eating and sleeping well as well as having regular bowel movements  Blood pressure has been slowly improving  When the patient presented to establish care at our office 2 visits ago, systolic blood pressures were in the 200s  Since then, blood pressures have been slowly improving as medications were titrated  The patient has not had any recent falls or hospitalizations  For his history of hypertension, he continues on amlodipine, labetalol and was recently started on hydralazine    He also continues on tamsulosin and finasteride for a history of BPH, urinary retention and hematuria  He has also been compliant with allopurinol for a history of gout with no acute complaints  The following portions of the patient's history were reviewed and updated as appropriate: allergies, current medications, past family history, past medical history, past social history, past surgical history and problem list     Review of Systems     Review of Systems   Constitutional: Positive for activity change (Since hospitalization)  Negative for appetite change, chills and fever  HENT: Negative for congestion, ear pain, hearing loss, nosebleeds, rhinorrhea, sore throat and trouble swallowing  Eyes: Negative for discharge  Patient previously worked glasses, history of visual impairment   Respiratory: Negative for cough, chest tightness, shortness of breath, wheezing and stridor  Cardiovascular: Negative for chest pain, palpitations and leg swelling  Gastrointestinal: Negative for abdominal distention, abdominal pain, blood in stool, constipation, diarrhea, nausea and vomiting  Genitourinary: Negative for decreased urine volume, dysuria and hematuria  Musculoskeletal: Positive for arthralgias (Chronic), back pain (Chronic) and gait problem (Uses cane)  Negative for joint swelling, myalgias and neck stiffness  Skin: Negative for color change, pallor, rash and wound  Neurological: Positive for weakness (Improving)  Negative for dizziness, syncope, light-headedness and headaches  Hematological: Does not bruise/bleed easily  Psychiatric/Behavioral: Positive for confusion (As per his baseline)  Negative for decreased concentration, dysphoric mood and suicidal ideas         Active Problem List     Patient Active Problem List   Diagnosis    Essential hypertension    Pyelonephritis    Other hyperlipidemia    MILE (acute kidney injury) ([facility] [address] )    Hematuria, gross    Metabolic acidosis    Acute blood loss anemia    Azotemia    Ambulatory dysfunction    BPH (benign prostatic hyperplasia)    Abdominal distention    Chronic pain of both knees    Stool incontinence    Stage 3 chronic kidney disease (HCC)    Dementia without behavioral disturbance (HCC)    Gross hematuria    Urinary retention    Visual impairment       Objective     /88 (BP Location: Left arm, Patient Position: Sitting, Cuff Size: Large)   Pulse 88   Temp (!) 97 4 °F (36 3 °C) (Temporal)   Resp 12   Ht 5' 9 09" (1 755 m) Comment: with shoes  Wt 91 7 kg (202 lb 3 2 oz) Comment: with shoes  SpO2 97%   BMI 29 78 kg/m²     Physical Exam   Constitutional: He appears well-developed and well-nourished  No distress  Elderly male sitting quietly in wheelchair in no obvious painful /cardiorespiratory distress     HENT:   Head: Normocephalic and atraumatic  Right Ear: External ear normal    Left Ear: External ear normal    Nose: Nose normal    Mouth/Throat: Oropharynx is clear and moist  No oropharyngeal exudate  Eyes: Conjunctivae are normal  Right eye exhibits no discharge  Left eye exhibits no discharge  No scleral icterus  Neck: Normal range of motion  Neck supple  No JVD present  Cardiovascular: Normal rate, regular rhythm and intact distal pulses  Exam reveals no gallop and no friction rub  Murmur (ESM 2/6) heard  Pulmonary/Chest: Effort normal and breath sounds normal  No stridor  No respiratory distress  He has no wheezes  He has no rales  Abdominal: Soft  Bowel sounds are normal  He exhibits no distension and no mass  There is no tenderness  There is no guarding  Genitourinary:   Genitourinary Comments: Cruz catheter in situ   Musculoskeletal: Normal range of motion  He exhibits edema (Almost negligible lower extremity edema)  He exhibits no tenderness or deformity  Neurological: He is alert  He has normal reflexes  No cranial nerve deficit  Oriented x2  Follows commands readily  Moving all limbs   Skin: Skin is warm  No rash noted   He is not diaphoretic  No erythema  No pallor  Psychiatric:   Pleasantly confused at times  Calm and cooperative   Nursing note and vitals reviewed  Pertinent Laboratory/Diagnostic Studies:  CBC:   Lab Results   Component Value Date/Time    WBC 5 69 12/04/2019 08:33 AM    RBC 4 39 12/04/2019 08:33 AM    HGB 12 3 12/04/2019 08:33 AM    HCT 39 4 12/04/2019 08:33 AM    MCV 90 12/04/2019 08:33 AM    MCH 28 0 12/04/2019 08:33 AM    MCHC 31 2 (L) 12/04/2019 08:33 AM    RDW 14 5 12/04/2019 08:33 AM    MPV 11 6 12/04/2019 08:33 AM     12/04/2019 08:33 AM    NRBC 0 12/04/2019 08:33 AM    NEUTOPHILPCT 61 12/04/2019 08:33 AM    LYMPHOPCT 21 12/04/2019 08:33 AM    MONOPCT 9 12/04/2019 08:33 AM    EOSPCT 7 (H) 12/04/2019 08:33 AM    BASOPCT 2 (H) 12/04/2019 08:33 AM    NEUTROABS 3 47 12/04/2019 08:33 AM    LYMPHSABS 1 21 12/04/2019 08:33 AM    MONOSABS 0 50 12/04/2019 08:33 AM    EOSABS 0 41 12/04/2019 08:33 AM     Chemistry Profile:   Lab Results   Component Value Date/Time    K 3 5 12/04/2019 08:33 AM     (H) 12/04/2019 08:33 AM    CO2 22 12/04/2019 08:33 AM    BUN 27 (H) 12/04/2019 08:33 AM    CREATININE 1 83 (H) 12/04/2019 08:33 AM    GLUC 112 10/16/2019 06:50 AM    GLUF 121 (H) 12/04/2019 08:33 AM    CALCIUM 9 2 12/04/2019 08:33 AM    MG 2 4 10/14/2019 04:27 AM    PHOS 5 7 (H) 10/09/2019 06:08 AM    AST 9 12/04/2019 08:33 AM    ALT 19 12/04/2019 08:33 AM    ALKPHOS 87 12/04/2019 08:33 AM    EGFR 40 12/04/2019 08:33 AM     Endocrine Studies:   Lab Results   Component Value Date/Time    HGBA1C 5 0 12/04/2019 08:33 AM    NBO2LPKXDJHK 2 290 12/04/2019 08:33 AM    TRIG 72 10/08/2019 05:45 AM    CHOLESTEROL 115 10/08/2019 05:45 AM    HDL 39 (L) 10/08/2019 05:45 AM    LDLCALC 62 10/08/2019 05:45 AM    NONHDLC 76 10/08/2019 05:45 AM    ICEY27NLGMJF 41 9 12/04/2019 08:33 AM     Chest x-ray showed no acute cardiopulmonary disease    I personally reviewed the images on PACS    EKG:  Normal sinus rhythm, QTc 420     Current Medications     Current Outpatient Medications:     allopurinol (ZYLOPRIM) 300 mg tablet, Take 300 mg by mouth daily, Disp: , Rfl:     amLODIPine (NORVASC) 10 mg tablet, Take 1 tablet (10 mg total) by mouth daily, Disp: 30 tablet, Rfl: 1    aspirin 81 MG tablet, , Disp: , Rfl:     brimonidine (ALPHAGAN P) 0 1 %, , Disp: , Rfl:     cloNIDine (CATAPRES) 0 1 mg tablet, Take 1 tablet (0 1 mg total) by mouth daily at bedtime, Disp: 30 tablet, Rfl: 0    donepezil (ARICEPT) 5 mg tablet, Take 5 mg by mouth daily at bedtime, Disp: , Rfl:     dorzolamide-timolol (COSOPT) 22 3-6 8 MG/ML ophthalmic solution, , Disp: , Rfl:     finasteride (PROSCAR) 5 mg tablet, Take 1 tablet (5 mg total) by mouth daily for 30 doses, Disp: 30 tablet, Rfl: 6    hydrALAZINE (APRESOLINE) 25 mg tablet, Take 1 tablet (25 mg total) by mouth daily, Disp: 15 tablet, Rfl: 0    labetalol (NORMODYNE) 200 mg tablet, Take 200 mg by mouth daily at bedtime, Disp: , Rfl:     Multiple Vitamins-Minerals (MULTIVITAMIN WITH MINERALS) tablet, Take 1 tablet by mouth daily, Disp: , Rfl:     omega-3-acid ethyl esters (LOVAZA) 1 g capsule, Take 1 g by mouth daily, Disp: , Rfl:     simvastatin (ZOCOR) 20 mg tablet, Take 20 mg by mouth daily at bedtime, Disp: , Rfl:     tamsulosin (FLOMAX) 0 4 mg, Take 1 capsule (0 4 mg total) by mouth daily with dinner for 30 doses, Disp: 30 capsule, Rfl: 6    Health Maintenance     Health Maintenance   Topic Date Due    Depression Screening PHQ  1940    Medicare Annual Wellness Visit (AWV)  1940    BMI: Followup Plan  08/11/1959    Fall Risk  08/11/2006    Pneumococcal Vaccine: 65+ Years (1 of 2 - PCV13) 08/11/2006    Influenza Vaccine  07/01/2019    BMI: Adult  11/21/2020    DTaP,Tdap,and Td Vaccines (2 - Tdap) 08/31/2021    Pneumococcal Vaccine: Pediatrics (0 to 5 Years) and At-Risk Patients (6 to 59 Years)  Aged Out    HIB Vaccine  Aged Out    Hepatitis B Vaccine  Aged Out    IPV Vaccine  Aged Out    Hepatitis A Vaccine  Aged Out    Meningococcal ACWY Vaccine  Aged Out    HPV Vaccine  Aged Out       There is no immunization history on file for this patient      Reg Manuel MD  Geriatrics   12/5/2019 1:07 PM

## 2019-12-05 NOTE — H&P (VIEW-ONLY)
[facility]  [address] [facility], [address] [place] [address], [address] [place] [address]    PRIMARY CARE PRACTICE FOR OLDER ADULTS  Preoperative clearance            NAME: Ebenezer Mistry  AGE: 67 y o  SEX: male    DATE OF ENCOUNTER: 12/5/2019    Assessment and Plan     Problem List Items Addressed This Visit        Cardiovascular and Mediastinum    Essential hypertension     Blood pressure 162/88mmHg  Blood pressure still elevated however much improved as patient's prior systolic blood pressures upon joining our practice was in the 200s with titration of medications since then  Daughter explains that clonidine was not refilled  Plan was to wean patient off clonidine  Therefore since he has been off of this, will discontinue this agent at this time  Continue labetalol 200 mg daily HS  Continue amlodipine 10 mg daily  Patient started on hydralazine 25 mg daily  Will likely uptitrate this agent if blood pressures continue to be elevated  Daughter to call me in 3 days with blood pressure readings  Recommended adherence to a low-sodium, heart healthy diet  Will continue to monitor closely                  Genitourinary    Urinary retention     Cruz catheter remains in situ  No obvious hematuria  Renal function improved though remains at CKD 3  Continue finasteride 5 mg daily  Continue tamsulosin 0 4 mg p o   Daily  Patient is scheduled for urolift procedure and retrograde pyelogram on 12/13/2019  Continue follow-up with Urology as routinely scheduled            Other    Hematuria - Primary     Patient currently following with Urology  Had recent cystoscopy at urology is office which was negative  Patient is scheduled for urolift and retrograde pyelograms for complete upper tract evaluation  Continue tamsulosin and Proscar for BPH  Reviewed CBC, hemoglobin stable at 12 3  Patient remains at CKD 3 with GFR 40  Chest x-ray showed no acute cardiopulmonary disease  EKG showed normal QTC, normal sinus rhythm  Patient to discontinue Omega 3 acid supplements, multivitamins, aspirin 7 days prior to procedure  Patient is at low-moderate risk for this procedure given his age and comorbidities  Would recommend implementing delirium precautions postoperatively  Patient is cleared to proceed with  Urolift and retrograde pyelograms  Acute blood loss anemia     Patient denies any hematuria or other acute blood loss  No current symptoms of anemia  Hemoglobin stable at 12 3  Will continue to monitor         Ambulatory dysfunction     Patient looks much better at this visit from prior visit  Notably more stable with ambulation  Recently completed a course of physical therapy  Educated on falls precautions  Vitamin-D levels within normal limits  Will continue to monitor         Stool incontinence     Patient previously followed with Gastroenterology [facility] records have still yet not been obtained despite patient's signing a release  Patient to follow up with Gastroenterology in the [address] [place] [address] scheduled toileting every 2-3 hours  Will continue to monitor         Cognitive impairment     CBC, TSH, vitamin B12, folate levels reviewed with no obvious abnormalities  CMP showed CKD 3  Patient will require a comprehensive geriatric assessment in the future  Though patient has other specialty visits planned    Daughter agrees to defer at this time  Since prior visit, patient notably more steady on his feet, is more alert, has not been tangential as pronounced in his speech  Recommend reorientation and redirection as needed  Patient encouraged to remain active mentally, physically and socially  Patient should participate in cognitively challenging exercises such as cross words and puzzles  Manage chronic conditions  Maintain Falls precautions  Will continue to monitor         Visual impairment     Requisition given to patient for referral to Ophthalmology a prior visit  Maintain Falls precautions             - Counseling Documentation: patient was counseled regarding: diagnostic results, instructions for management, risk factor reductions, prognosis, patient and family education, impressions, risks and benefits of treatment options and importance of compliance with treatment  Discussed with patient and daughter  Chief Complaint     Patient presents for preoperative clearance for urolift and retrograde pyelogram    History of Present Illness     HPI  This is a 61-year-old male with gross hematuria, BPH, HTN, cognitive impairment, anemia and visual impairment amongst multiple other medical issues who presents for preoperative clearance for a Urolift and retrograde pyelogram procedure  Patient was previously hospitalized for urinary retention and gross hematuria and has been following with Urology where cystoscopy was negative during previous Urology visit  Patient is in the office with his daughter and looks much better today than prior visit  He is more steady when ambulating and conversation is not as tangential and nonsensical as prior conversations  Daughter states he has been compliant with a healthy diet, has been taking his medications and remains physically active  He denies any recent episodes of hematuria, dysuria, fever, chills, abdominal pain, falls or vomiting  He has had no focal neurological deficits, shortness of breath or dizziness  Daughter states he has been eating and sleeping well as well as having regular bowel movements  Blood pressure has been slowly improving  When the patient presented to establish care at our office 2 visits ago, systolic blood pressures were in the 200s  Since then, blood pressures have been slowly improving as medications were titrated  The patient has not had any recent falls or hospitalizations  For his history of hypertension, he continues on amlodipine, labetalol and was recently started on hydralazine    He also continues on tamsulosin and finasteride for a history of BPH, urinary retention and hematuria  He has also been compliant with allopurinol for a history of gout with no acute complaints  The following portions of the patient's history were reviewed and updated as appropriate: allergies, current medications, past family history, past medical history, past social history, past surgical history and problem list     Review of Systems     Review of Systems   Constitutional: Positive for activity change (Since hospitalization)  Negative for appetite change, chills and fever  HENT: Negative for congestion, ear pain, hearing loss, nosebleeds, rhinorrhea, sore throat and trouble swallowing  Eyes: Negative for discharge  Patient previously worked glasses, history of visual impairment   Respiratory: Negative for cough, chest tightness, shortness of breath, wheezing and stridor  Cardiovascular: Negative for chest pain, palpitations and leg swelling  Gastrointestinal: Negative for abdominal distention, abdominal pain, blood in stool, constipation, diarrhea, nausea and vomiting  Genitourinary: Negative for decreased urine volume, dysuria and hematuria  Musculoskeletal: Positive for arthralgias (Chronic), back pain (Chronic) and gait problem (Uses cane)  Negative for joint swelling, myalgias and neck stiffness  Skin: Negative for color change, pallor, rash and wound  Neurological: Positive for weakness (Improving)  Negative for dizziness, syncope, light-headedness and headaches  Hematological: Does not bruise/bleed easily  Psychiatric/Behavioral: Positive for confusion (As per his baseline)  Negative for decreased concentration, dysphoric mood and suicidal ideas         Active Problem List     Patient Active Problem List   Diagnosis    Essential hypertension    Pyelonephritis    Other hyperlipidemia    MILE (acute kidney injury) ([facility] [address] )    Hematuria, gross    Metabolic acidosis    Acute blood loss anemia    Azotemia    Ambulatory dysfunction    BPH (benign prostatic hyperplasia)    Abdominal distention    Chronic pain of both knees    Stool incontinence    Stage 3 chronic kidney disease (HCC)    Dementia without behavioral disturbance (HCC)    Gross hematuria    Urinary retention    Visual impairment       Objective     /88 (BP Location: Left arm, Patient Position: Sitting, Cuff Size: Large)   Pulse 88   Temp (!) 97 4 °F (36 3 °C) (Temporal)   Resp 12   Ht 5' 9 09" (1 755 m) Comment: with shoes  Wt 91 7 kg (202 lb 3 2 oz) Comment: with shoes  SpO2 97%   BMI 29 78 kg/m²     Physical Exam   Constitutional: He appears well-developed and well-nourished  No distress  Elderly male sitting quietly in wheelchair in no obvious painful /cardiorespiratory distress     HENT:   Head: Normocephalic and atraumatic  Right Ear: External ear normal    Left Ear: External ear normal    Nose: Nose normal    Mouth/Throat: Oropharynx is clear and moist  No oropharyngeal exudate  Eyes: Conjunctivae are normal  Right eye exhibits no discharge  Left eye exhibits no discharge  No scleral icterus  Neck: Normal range of motion  Neck supple  No JVD present  Cardiovascular: Normal rate, regular rhythm and intact distal pulses  Exam reveals no gallop and no friction rub  Murmur (ESM 2/6) heard  Pulmonary/Chest: Effort normal and breath sounds normal  No stridor  No respiratory distress  He has no wheezes  He has no rales  Abdominal: Soft  Bowel sounds are normal  He exhibits no distension and no mass  There is no tenderness  There is no guarding  Genitourinary:   Genitourinary Comments: Cruz catheter in situ   Musculoskeletal: Normal range of motion  He exhibits edema (Almost negligible lower extremity edema)  He exhibits no tenderness or deformity  Neurological: He is alert  He has normal reflexes  No cranial nerve deficit  Oriented x2  Follows commands readily  Moving all limbs   Skin: Skin is warm  No rash noted   He is not diaphoretic  No erythema  No pallor  Psychiatric:   Pleasantly confused at times  Calm and cooperative   Nursing note and vitals reviewed  Pertinent Laboratory/Diagnostic Studies:  CBC:   Lab Results   Component Value Date/Time    WBC 5 69 12/04/2019 08:33 AM    RBC 4 39 12/04/2019 08:33 AM    HGB 12 3 12/04/2019 08:33 AM    HCT 39 4 12/04/2019 08:33 AM    MCV 90 12/04/2019 08:33 AM    MCH 28 0 12/04/2019 08:33 AM    MCHC 31 2 (L) 12/04/2019 08:33 AM    RDW 14 5 12/04/2019 08:33 AM    MPV 11 6 12/04/2019 08:33 AM     12/04/2019 08:33 AM    NRBC 0 12/04/2019 08:33 AM    NEUTOPHILPCT 61 12/04/2019 08:33 AM    LYMPHOPCT 21 12/04/2019 08:33 AM    MONOPCT 9 12/04/2019 08:33 AM    EOSPCT 7 (H) 12/04/2019 08:33 AM    BASOPCT 2 (H) 12/04/2019 08:33 AM    NEUTROABS 3 47 12/04/2019 08:33 AM    LYMPHSABS 1 21 12/04/2019 08:33 AM    MONOSABS 0 50 12/04/2019 08:33 AM    EOSABS 0 41 12/04/2019 08:33 AM     Chemistry Profile:   Lab Results   Component Value Date/Time    K 3 5 12/04/2019 08:33 AM     (H) 12/04/2019 08:33 AM    CO2 22 12/04/2019 08:33 AM    BUN 27 (H) 12/04/2019 08:33 AM    CREATININE 1 83 (H) 12/04/2019 08:33 AM    GLUC 112 10/16/2019 06:50 AM    GLUF 121 (H) 12/04/2019 08:33 AM    CALCIUM 9 2 12/04/2019 08:33 AM    MG 2 4 10/14/2019 04:27 AM    PHOS 5 7 (H) 10/09/2019 06:08 AM    AST 9 12/04/2019 08:33 AM    ALT 19 12/04/2019 08:33 AM    ALKPHOS 87 12/04/2019 08:33 AM    EGFR 40 12/04/2019 08:33 AM     Endocrine Studies:   Lab Results   Component Value Date/Time    HGBA1C 5 0 12/04/2019 08:33 AM    ZTB6ZZUYGISQ 2 290 12/04/2019 08:33 AM    TRIG 72 10/08/2019 05:45 AM    CHOLESTEROL 115 10/08/2019 05:45 AM    HDL 39 (L) 10/08/2019 05:45 AM    LDLCALC 62 10/08/2019 05:45 AM    NONHDLC 76 10/08/2019 05:45 AM    CDBB26FTWGRK 41 9 12/04/2019 08:33 AM     Chest x-ray showed no acute cardiopulmonary disease    I personally reviewed the images on PACS    EKG:  Normal sinus rhythm, QTc 420     Current Medications     Current Outpatient Medications:     allopurinol (ZYLOPRIM) 300 mg tablet, Take 300 mg by mouth daily, Disp: , Rfl:     amLODIPine (NORVASC) 10 mg tablet, Take 1 tablet (10 mg total) by mouth daily, Disp: 30 tablet, Rfl: 1    aspirin 81 MG tablet, , Disp: , Rfl:     brimonidine (ALPHAGAN P) 0 1 %, , Disp: , Rfl:     cloNIDine (CATAPRES) 0 1 mg tablet, Take 1 tablet (0 1 mg total) by mouth daily at bedtime, Disp: 30 tablet, Rfl: 0    donepezil (ARICEPT) 5 mg tablet, Take 5 mg by mouth daily at bedtime, Disp: , Rfl:     dorzolamide-timolol (COSOPT) 22 3-6 8 MG/ML ophthalmic solution, , Disp: , Rfl:     finasteride (PROSCAR) 5 mg tablet, Take 1 tablet (5 mg total) by mouth daily for 30 doses, Disp: 30 tablet, Rfl: 6    hydrALAZINE (APRESOLINE) 25 mg tablet, Take 1 tablet (25 mg total) by mouth daily, Disp: 15 tablet, Rfl: 0    labetalol (NORMODYNE) 200 mg tablet, Take 200 mg by mouth daily at bedtime, Disp: , Rfl:     Multiple Vitamins-Minerals (MULTIVITAMIN WITH MINERALS) tablet, Take 1 tablet by mouth daily, Disp: , Rfl:     omega-3-acid ethyl esters (LOVAZA) 1 g capsule, Take 1 g by mouth daily, Disp: , Rfl:     simvastatin (ZOCOR) 20 mg tablet, Take 20 mg by mouth daily at bedtime, Disp: , Rfl:     tamsulosin (FLOMAX) 0 4 mg, Take 1 capsule (0 4 mg total) by mouth daily with dinner for 30 doses, Disp: 30 capsule, Rfl: 6    Health Maintenance     Health Maintenance   Topic Date Due    Depression Screening PHQ  1940    Medicare Annual Wellness Visit (AWV)  1940    BMI: Followup Plan  08/11/1959    Fall Risk  08/11/2006    Pneumococcal Vaccine: 65+ Years (1 of 2 - PCV13) 08/11/2006    Influenza Vaccine  07/01/2019    BMI: Adult  11/21/2020    DTaP,Tdap,and Td Vaccines (2 - Tdap) 08/31/2021    Pneumococcal Vaccine: Pediatrics (0 to 5 Years) and At-Risk Patients (6 to 59 Years)  Aged Out    HIB Vaccine  Aged Out    Hepatitis B Vaccine  Aged Out    IPV Vaccine  Aged Out    Hepatitis A Vaccine  Aged Out    Meningococcal ACWY Vaccine  Aged Out    HPV Vaccine  Aged Out       There is no immunization history on file for this patient      Yuni Jessica MD  Geriatrics   12/5/2019 1:07 PM

## 2019-12-05 NOTE — TELEPHONE ENCOUNTER
Spoke with Laura Mayes at [facility] [facility] for [facility] ([phone])  Their office does not require a pre-operative clearance form to be completed for [facility] surgery  However, in office note, please include if the patient is cleared for surgery  The paperwork that patient brought to 12/5 office appointment is for patient only; does not need to be completed by PCP  Patient's surgeons will be Oneida Moore MD and Carlos Lawrence MD on 12/13/19 at [address] [place] [address]

## 2019-12-06 ENCOUNTER — TELEPHONE (OUTPATIENT)
Dept: GERIATRICS | Age: 79
End: 2019-12-06

## 2019-12-06 NOTE — ASSESSMENT & PLAN NOTE
CBC, TSH, vitamin B12, folate levels reviewed with no obvious abnormalities  CMP showed CKD 3  Patient will require a comprehensive geriatric assessment in the future  Though patient has other specialty visits planned    Daughter agrees to defer at this time  Since prior visit, patient notably more steady on his feet, is more alert, has not been tangential as pronounced in his speech  Recommend reorientation and redirection as needed  Patient encouraged to remain active mentally, physically and socially  Patient should participate in cognitively challenging exercises such as cross words and puzzles  Manage chronic conditions  Maintain Falls precautions  Will continue to monitor

## 2019-12-06 NOTE — ASSESSMENT & PLAN NOTE
Patient looks much better at this visit from prior visit  Notably more stable with ambulation  Recently completed a course of physical therapy  Educated on falls precautions  Vitamin-D levels within normal limits     Will continue to monitor

## 2019-12-06 NOTE — ASSESSMENT & PLAN NOTE
Blood pressure 162/88mmHg  Blood pressure still elevated however much improved as patient's prior systolic blood pressures upon joining our practice was in the 200s with titration of medications since then  Daughter explains that clonidine was not refilled  Plan was to wean patient off clonidine  Therefore since he has been off of this, will discontinue this agent at this time  Continue labetalol 200 mg daily HS  Continue amlodipine 10 mg daily  Patient started on hydralazine 25 mg daily    Will likely uptitrate this agent if blood pressures continue to be elevated  Daughter to call me in 3 days with blood pressure readings  Recommended adherence to a low-sodium, heart healthy diet  Will continue to monitor closely

## 2019-12-06 NOTE — ASSESSMENT & PLAN NOTE
Patient denies any hematuria or other acute blood loss  No current symptoms of anemia  Hemoglobin stable at 12 3  Will continue to monitor

## 2019-12-06 NOTE — TELEPHONE ENCOUNTER
----- Message from Chris Hale MD sent at 12/5/2019  8:58 PM EST -----  Can you please confirm with patient that he needs to discontinue aspirin, Omega acid and multivitamin 7 days prior to surgical procedure    So she should stop them from Saturday  Thank you

## 2019-12-06 NOTE — ASSESSMENT & PLAN NOTE
Patient previously followed with Gastroenterology [facility] records have still yet not been obtained despite patient's signing a release  Patient to follow up with Gastroenterology in the [facility] scheduled toileting every 2-3 hours  Will continue to monitor

## 2019-12-06 NOTE — ASSESSMENT & PLAN NOTE
Requisition given to patient for referral to Ophthalmology a prior visit  Maintain Falls precautions

## 2019-12-06 NOTE — ASSESSMENT & PLAN NOTE
Patient currently following with Urology  Had recent cystoscopy at urology is office which was negative  Patient is scheduled for urolift and retrograde pyelograms for complete upper tract evaluation  Continue tamsulosin and Proscar for BPH  Reviewed CBC, hemoglobin stable at 12 3  Patient remains at CKD 3 with GFR 40  Chest x-ray showed no acute cardiopulmonary disease  EKG showed normal QTC, normal sinus rhythm  Patient to discontinue Omega 3 acid supplements, multivitamins, aspirin 7 days prior to procedure  Patient is at low-moderate risk for this procedure given his age and comorbidities  Would recommend implementing delirium precautions postoperatively  Patient is cleared to proceed with  Urolift and retrograde pyelograms

## 2019-12-06 NOTE — ASSESSMENT & PLAN NOTE
Cruz catheter remains in situ  No obvious hematuria  Renal function improved though remains at CKD 3  Continue finasteride 5 mg daily  Continue tamsulosin 0 4 mg p o   Daily  Patient is scheduled for urolift procedure and retrograde pyelogram on 12/13/2019  Continue follow-up with Urology as routinely scheduled

## 2019-12-06 NOTE — TELEPHONE ENCOUNTER
Left 2 messages asking daughter to call office  Second message I left detailed physician medication instructions as noted

## 2019-12-06 NOTE — TELEPHONE ENCOUNTER
Left message for daughter to call the office  Per provider, patient needs to discontinue aspirin, omega-3 acids and multivitamin 7 days prior to surgical procedure and may restart after procedure

## 2019-12-09 ENCOUNTER — APPOINTMENT (OUTPATIENT)
Dept: LAB | Facility: CLINIC | Age: 79
End: 2019-12-09
Payer: COMMERCIAL

## 2019-12-09 DIAGNOSIS — R31.0 GROSS HEMATURIA: ICD-10-CM

## 2019-12-09 DIAGNOSIS — R33.9 URINARY RETENTION: ICD-10-CM

## 2019-12-09 PROCEDURE — 87186 SC STD MICRODIL/AGAR DIL: CPT

## 2019-12-09 PROCEDURE — 87086 URINE CULTURE/COLONY COUNT: CPT

## 2019-12-09 PROCEDURE — 87077 CULTURE AEROBIC IDENTIFY: CPT

## 2019-12-09 NOTE — PRE-PROCEDURE INSTRUCTIONS
Pre-Surgery Instructions:   Medication Instructions    allopurinol (ZYLOPRIM) 300 mg tablet Instructed patient per Anesthesia Guidelines   amLODIPine (NORVASC) 10 mg tablet Instructed patient per Anesthesia Guidelines   aspirin 81 MG tablet Patient was instructed by Physician and understands   donepezil (ARICEPT) 5 mg tablet Instructed patient per Anesthesia Guidelines   finasteride (PROSCAR) 5 mg tablet Instructed patient per Anesthesia Guidelines   hydrALAZINE (APRESOLINE) 25 mg tablet Instructed patient per Anesthesia Guidelines   labetalol (NORMODYNE) 200 mg tablet Instructed patient per Anesthesia Guidelines   Multiple Vitamins-Minerals (MULTIVITAMIN WITH MINERALS) tablet Patient was instructed by Physician and understands   omega-3-acid ethyl esters (LOVAZA) 1 g capsule Patient was instructed by Physician and understands   simvastatin (ZOCOR) 20 mg tablet Instructed patient per Anesthesia Guidelines   tamsulosin (FLOMAX) 0 4 mg Instructed patient per Anesthesia Guidelines  Pre op and bathing instructions reviewed   Pt has hibiclens

## 2019-12-11 ENCOUNTER — TELEPHONE (OUTPATIENT)
Dept: GERIATRICS | Age: 79
End: 2019-12-11

## 2019-12-11 ENCOUNTER — TELEPHONE (OUTPATIENT)
Dept: UROLOGY | Facility: CLINIC | Age: 79
End: 2019-12-11

## 2019-12-11 DIAGNOSIS — I10 ESSENTIAL HYPERTENSION: Primary | ICD-10-CM

## 2019-12-11 DIAGNOSIS — N40.0 BENIGN PROSTATIC HYPERPLASIA WITHOUT LOWER URINARY TRACT SYMPTOMS: ICD-10-CM

## 2019-12-11 DIAGNOSIS — N12 PYELONEPHRITIS: Primary | ICD-10-CM

## 2019-12-11 RX ORDER — LABETALOL 200 MG/1
200 TABLET, FILM COATED ORAL
Qty: 30 TABLET | Refills: 3 | Status: SHIPPED | OUTPATIENT
Start: 2019-12-11 | End: 2020-04-07 | Stop reason: SDUPTHER

## 2019-12-11 RX ORDER — SULFAMETHOXAZOLE AND TRIMETHOPRIM 800; 160 MG/1; MG/1
1 TABLET ORAL EVERY 12 HOURS SCHEDULED
Qty: 12 TABLET | Refills: 0 | Status: SHIPPED | OUTPATIENT
Start: 2019-12-11 | End: 2019-12-17

## 2019-12-11 RX ORDER — LABETALOL 200 MG/1
200 TABLET, FILM COATED ORAL
Qty: 30 TABLET | Refills: 0 | Status: CANCELLED | OUTPATIENT
Start: 2019-12-11 | End: 2020-01-10

## 2019-12-11 NOTE — TELEPHONE ENCOUNTER
Needs refill for: labetalol (NORMODYNE) 200 mg tablet   Patient does not have any medication remaining    Pharmacy: [facility] in [place]

## 2019-12-11 NOTE — TELEPHONE ENCOUNTER
Called and left message for patient (per communicaiton consent) in message stated that Royal Zarate sent in an antibiotic Bactrim DS to his pharmacy for him to start in preporation for his surgery on Friday  Patient is to call with any questions or concerns  Office number was left in the message

## 2019-12-12 LAB
BACTERIA UR CULT: ABNORMAL

## 2019-12-13 ENCOUNTER — APPOINTMENT (OUTPATIENT)
Dept: RADIOLOGY | Facility: AMBULARY SURGERY CENTER | Age: 79
End: 2019-12-13
Payer: COMMERCIAL

## 2019-12-13 ENCOUNTER — ANESTHESIA (OUTPATIENT)
Dept: PERIOP | Facility: AMBULARY SURGERY CENTER | Age: 79
End: 2019-12-13
Payer: COMMERCIAL

## 2019-12-13 ENCOUNTER — TELEPHONE (OUTPATIENT)
Dept: UROLOGY | Facility: CLINIC | Age: 79
End: 2019-12-13

## 2019-12-13 ENCOUNTER — HOSPITAL ENCOUNTER (OUTPATIENT)
Facility: AMBULARY SURGERY CENTER | Age: 79
Setting detail: OUTPATIENT SURGERY
Discharge: HOME | End: 2019-12-13
Attending: UROLOGY | Admitting: UROLOGY
Payer: COMMERCIAL

## 2019-12-13 ENCOUNTER — TELEPHONE (OUTPATIENT)
Dept: GERIATRICS | Age: 79
End: 2019-12-13

## 2019-12-13 VITALS
WEIGHT: 201 LBS | RESPIRATION RATE: 18 BRPM | HEIGHT: 69 IN | DIASTOLIC BLOOD PRESSURE: 64 MMHG | SYSTOLIC BLOOD PRESSURE: 126 MMHG | BODY MASS INDEX: 29.77 KG/M2 | TEMPERATURE: 97.7 F | HEART RATE: 64 BPM | OXYGEN SATURATION: 93 %

## 2019-12-13 DIAGNOSIS — R31.0 GROSS HEMATURIA: ICD-10-CM

## 2019-12-13 PROCEDURE — 52441 CYSTO INSJ TRNSPRSTC 1 IMPLT: CPT | Performed by: UROLOGY

## 2019-12-13 PROCEDURE — L8699 PROSTHETIC IMPLANT NOS: HCPCS | Performed by: UROLOGY

## 2019-12-13 PROCEDURE — C1758 CATHETER, URETERAL: HCPCS | Performed by: UROLOGY

## 2019-12-13 PROCEDURE — 74420 UROGRAPHY RTRGR +-KUB: CPT

## 2019-12-13 PROCEDURE — 52442 CYSTO INS TRNSPRSTC IMPLT EA: CPT | Performed by: UROLOGY

## 2019-12-13 PROCEDURE — C1769 GUIDE WIRE: HCPCS | Performed by: UROLOGY

## 2019-12-13 DEVICE — IMPLANT URO PROSTATE UROLIFT: Type: IMPLANTABLE DEVICE | Site: PROSTATE | Status: FUNCTIONAL

## 2019-12-13 RX ORDER — ATROPA BELLADONNA AND OPIUM 16.2; 3 MG/1; MG/1
30 SUPPOSITORY RECTAL ONCE
Status: DISCONTINUED | OUTPATIENT
Start: 2019-12-13 | End: 2019-12-13 | Stop reason: HOSPADM

## 2019-12-13 RX ORDER — FENTANYL CITRATE/PF 50 MCG/ML
25 SYRINGE (ML) INJECTION
Status: DISCONTINUED | OUTPATIENT
Start: 2019-12-13 | End: 2019-12-13 | Stop reason: HOSPADM

## 2019-12-13 RX ORDER — SODIUM CHLORIDE, SODIUM LACTATE, POTASSIUM CHLORIDE, CALCIUM CHLORIDE 600; 310; 30; 20 MG/100ML; MG/100ML; MG/100ML; MG/100ML
125 INJECTION, SOLUTION INTRAVENOUS CONTINUOUS
Status: DISCONTINUED | OUTPATIENT
Start: 2019-12-13 | End: 2019-12-13 | Stop reason: HOSPADM

## 2019-12-13 RX ORDER — CEFAZOLIN SODIUM 2 G/50ML
2000 SOLUTION INTRAVENOUS ONCE
Status: DISCONTINUED | OUTPATIENT
Start: 2019-12-13 | End: 2019-12-13

## 2019-12-13 RX ORDER — FINASTERIDE 5 MG/1
5 TABLET, FILM COATED ORAL DAILY
Qty: 30 TABLET | Refills: 11 | Status: SHIPPED | OUTPATIENT
Start: 2019-12-13 | End: 2020-09-04 | Stop reason: SDUPTHER

## 2019-12-13 RX ORDER — OXYBUTYNIN CHLORIDE 5 MG/1
10 TABLET, EXTENDED RELEASE ORAL DAILY
Status: DISCONTINUED | OUTPATIENT
Start: 2019-12-13 | End: 2019-12-13 | Stop reason: HOSPADM

## 2019-12-13 RX ORDER — TAMSULOSIN HYDROCHLORIDE 0.4 MG/1
0.4 CAPSULE ORAL
Qty: 30 CAPSULE | Refills: 11 | Status: SHIPPED | OUTPATIENT
Start: 2019-12-13 | End: 2020-07-07

## 2019-12-13 RX ORDER — PHENAZOPYRIDINE HYDROCHLORIDE 100 MG/1
100 TABLET, FILM COATED ORAL ONCE
Status: DISCONTINUED | OUTPATIENT
Start: 2019-12-13 | End: 2019-12-13 | Stop reason: HOSPADM

## 2019-12-13 RX ORDER — PROPOFOL 10 MG/ML
INJECTION, EMULSION INTRAVENOUS AS NEEDED
Status: DISCONTINUED | OUTPATIENT
Start: 2019-12-13 | End: 2019-12-13 | Stop reason: SURG

## 2019-12-13 RX ORDER — PROPOFOL 10 MG/ML
INJECTION, EMULSION INTRAVENOUS CONTINUOUS PRN
Status: DISCONTINUED | OUTPATIENT
Start: 2019-12-13 | End: 2019-12-13 | Stop reason: SURG

## 2019-12-13 RX ORDER — ONDANSETRON 2 MG/ML
4 INJECTION INTRAMUSCULAR; INTRAVENOUS ONCE AS NEEDED
Status: DISCONTINUED | OUTPATIENT
Start: 2019-12-13 | End: 2019-12-13 | Stop reason: HOSPADM

## 2019-12-13 RX ORDER — FENTANYL CITRATE 50 UG/ML
INJECTION, SOLUTION INTRAMUSCULAR; INTRAVENOUS AS NEEDED
Status: DISCONTINUED | OUTPATIENT
Start: 2019-12-13 | End: 2019-12-13 | Stop reason: SURG

## 2019-12-13 RX ORDER — LIDOCAINE HYDROCHLORIDE 20 MG/ML
JELLY TOPICAL AS NEEDED
Status: DISCONTINUED | OUTPATIENT
Start: 2019-12-13 | End: 2019-12-13 | Stop reason: HOSPADM

## 2019-12-13 RX ORDER — TAMSULOSIN HYDROCHLORIDE 0.4 MG/1
0.4 CAPSULE ORAL ONCE
Status: DISCONTINUED | OUTPATIENT
Start: 2019-12-13 | End: 2019-12-13 | Stop reason: HOSPADM

## 2019-12-13 RX ADMIN — FENTANYL CITRATE 50 MCG: 50 INJECTION, SOLUTION INTRAMUSCULAR; INTRAVENOUS at 08:32

## 2019-12-13 RX ADMIN — PROPOFOL 80 MG: 10 INJECTION, EMULSION INTRAVENOUS at 08:32

## 2019-12-13 RX ADMIN — Medication 2000 MG: at 08:30

## 2019-12-13 RX ADMIN — PROPOFOL 100 MCG/KG/MIN: 10 INJECTION, EMULSION INTRAVENOUS at 08:33

## 2019-12-13 NOTE — INTERVAL H&P NOTE
H&P reviewed  After examining the patient I find no changes in the patients condition since the H&P had been written  Consent re-completed  Will utilized preoperative ampicillin given culture data  Vitals pending  There were no vitals filed for this visit

## 2019-12-13 NOTE — ANESTHESIA POSTPROCEDURE EVALUATION
Post-Op Assessment Note    CV Status:  Stable  Pain Score: 0    Pain management: adequate     Mental Status:  Sleepy   Hydration Status:  Stable and euvolemic   PONV Controlled:  None   Airway Patency:  Patent   Post Op Vitals Reviewed:  Yes              BP   101/62   Temp   97 2   Pulse  65   Resp   18   SpO2   98

## 2019-12-13 NOTE — DISCHARGE INSTRUCTIONS
Discharge Instructions Following UroLift® Transprostatic Implant Treatment    GENERAL EXPECTATIONS  Some men may experience discomfort after the procedure  On occasion, some bloody discharge may be apparent from the penis  You may have soreness in the lower abdomen, and it may be uncomfortable to sit  You may experience the need to urinate more frequently and with greater urgency  These are all normal reactions to the procedure  It is important to take care of yourself the next couple of days to facilitate a speedy recovery  The following are some suggestions:    1  Have someone drive you home after the procedure  2  Drink plenty of water  3  Take your medication as prescribed  4   Do not engage in strenuous activity until your catheter has been removed  You may take a shower but avoid a bath while you have a catheter  5  You can place Vaseline (not Neosporin) around the tip of the soto catheter to facilitate movement      MEDICATIONS  You may receive some of the following medications, take as directed: You should continue on your previously prescribed BPH meds until followup (FLOMAX/PROSCAR/etc)  Antibiotics as prescribed   Colace 1 tab twice daily  Pyridium 1 tab up to three times daily *as needed* for bladder pain (will turn urine ORANGE)     When taking pain medications, you may experience dizziness or drowsiness  Do not drink alcohol or drive when you are taking these medications  If you are given an antibiotic to prevent urinary tract infection, it is important to finish all medications as directed  COMPLICATIONS  You should contact your physician, at [phone] if you experience any of the followin  Temperature above 101 5° (taken by mouth)  2  Excessive urinary bleeding or bleeding from the penis  3  Continuous bladder spasms  4  Painful, swollen and/or inflated testicle(s) or scrotum  5  Unable to void spontaneously or the indwelling catheter is not draining urine or is blocked    If you need immediate attention, go to the hospital emergency room for treatment  Always call your physician before going to the emergency room  If your doctor suggests that you go to the emergency room or other facility for catheterization for inability to urinate, be sure to tell the facility personnel to use a Coudé (pronounced -day) tipped catheter

## 2019-12-13 NOTE — ANESTHESIA PREPROCEDURE EVALUATION
Review of Systems/Medical History  Patient summary reviewed    No history of anesthetic complications     Cardiovascular  Exercise tolerance (METS): <4,  Hyperlipidemia, Hypertension , Valvular heart disease , mitral regurgitation,    Pulmonary  Not a smoker , No recent URI ,        GI/Hepatic    No GERD ,        Chronic kidney disease ,        Endo/Other  Negative endo/other ROS      GYN       Hematology   Musculoskeletal    Arthritis     Neurology  Negative neurology ROS      Psychology     Dementia         TTE 10/2019:  LEFT VENTRICLE:  Systolic function was normal  Ejection fraction was estimated to be 60 %  There were no regional wall motion abnormalities  Wall thickness was mildly increased  Doppler parameters were consistent with abnormal left ventricular relaxation (grade 1 diastolic dysfunction)      MITRAL VALVE:  There was mild to moderate regurgitation      AORTIC VALVE:  There was mild regurgitation      TRICUSPID VALVE:  There was mild regurgitation      PULMONIC VALVE:  There was trace regurgitation      PERICARDIUM:  A small pericardial effusion was identified  Physical Exam    Airway    Mallampati score: II         Dental   No notable dental hx     Cardiovascular      Pulmonary      Other Findings        Anesthesia Plan  ASA Score- 3     Anesthesia Type- IV sedation with anesthesia with ASA Monitors  Additional Monitors:   Airway Plan:     Comment: Discussed risk of IV sedation with pt and his daughter Annamaria Naylor  Questions answered  Consent signed  Plan Factors-    Induction- intravenous  Postoperative Plan-     Informed Consent- Anesthetic plan and risks discussed with patient and daughter Mati Pereira  I personally reviewed this patient with the CRNA  Discussed and agreed on the Anesthesia Plan with the CRNA  Wilfrido Chapman

## 2019-12-13 NOTE — OP NOTE
OPERATIVE REPORT  PATIENT NAME: Luna Muse    :  1940  MRN: [id number]  Pt Location: [facility] 04    SURGERY DATE: 2019    Surgeon(s) and Role:     Yosvany Motta MD - Primary    Preop Diagnosis:  Gross hematuria [R31 0]  Urinary retention [R33 9]    Post-Op Diagnosis Codes:     * Gross hematuria [R31 0]     * Urinary retention [R33 9]    Procedure(s) (LRB):  CYSTOSCOPY WITH INSERTION UROLIFT, BILATERAL RETROGRADE PYELOGRAM (N/A)    Specimen(s):  * No specimens in log *    Estimated Blood Loss:   Minimal    Drains:  Urethral Catheter Coude 20 Fr  (Active)   Number of days: 0       Continuous Bladder Irrigation Three-way (Active)   Number of days: 66       Anesthesia Type:   Choice    Operative Indications:  Gross hematuria [R31 0]  Urinary retention [R33 9]      Operative Findings:  1  6 implants placed in the prostate with good anterior channel noted  2  Bilateral retrograde pyelogram normal, J-hooking of distal right ureter    Complications:   None    Procedure and Technique:  INDICATIONS:  Luna Muse  is a 67y o  -year-old male with benign prostatic hyperplasia and bothersome voiding symptoms  He developed gross hematuria and urinary retention in the hospital   Specifically, transrectal US prostate volume of 49 1  Office cystoscopy excluded a significant median lobe component  His symptoms have failed to improve on medical therapy and he was unable to void  After discussion of surgical treatment options, the patient elected a prostatic urethral lift procedure in which permanent transprostatic implants are installed to create a wider channel by which to void  Other surgical alternatives were rejected due to known adverse sexual side effects  PROCEDURE:  On 2019 , patient was taken to the operating room and laid supine on the operating room table  Preprocedure timeout was performed with all parties present  Patient was given perioperative antibiotics   He was anesthetized with monitored anesthesia care anesthestic  Urethral catheter was removed  An 20F cystoscope sheath and zero degree lens was inserted into the bladder  The cystoscopy bridge was replaced with a UroLift delivery device  The first treatment site was the patient's left side approximately 1 5cm distal to the bladder neck  The distal tip of the delivery device was then angled laterally approximately 20 degrees at this position to compress the lateral lobe  The trigger was pulled, thereby deploying a needle containing the implant through the prostate  The needle was then retracted, allowing one end of the implant to be delivered to the capsular surface of the prostate  The implant was then tensioned to assure capsular seating and removal of slack monofilament  The device was then angled back toward midline and slowly advanced proximally (typically 3 to 4 mm) until cystoscopic verification of the monofilament being centered in the delivery bay  The urethral end piece was then affixed to the monofilament thereby tailoring the size of the implant  Excess filament was then severed  The delivery device was then re-advanced into the bladder  The delivery device was then replaced with cystoscope and bridge and the implant location and opening effect was confirmed cystoscopically  The same procedure was then repeated on the right side, and two additional implants were delivered just proximal to the veru montanum, again one on right and one on left side of the prostate, following the same technique  Cystoscopy then revealed a persistent area of obstruction, and two more implants were delivered in the mid prostate  A final cystoscopy was conducted first to inspect the location and state of each implant and second, to confirm the presence of a continuous anterior channel was present through the prostatic urethra with irrigation flow turned off       Total number of implants: 6    Because the patient had gross hematuria but renal function precluded intravenous contrast, we opted to perform retrograde pyelograms at the completion of the case  We had difficulty cannulating the distal right ureteral orifice given J hooking  Ultimately a Bard solo +wire was introduced and advanced to the renal pelvis and the dual-lumen catheter was introduced in the distal ureter  Retrograde pyelogram was performed confirming no luminal abnormalities or filling defects  A 5 [place] open-ended catheter was introduced into the left ureteral orifice and retrograde pyelogram was performed and normal     All instruments were removed  2% viscous lidocaine gel was placed per urethra and 20F soto catheter was placed  Belladonna and opium suppository placed per rectum  Urine was clear  Patient was then taken out of dorsal lithotomy, extubated and transferred to PACU in stable condition       Plan-Soto catheter removal on Monday for trial of void     I was present for the entire procedure    Patient Disposition:  PACU     SIGNATURE: Little Simms MD  DATE: December 13, 2019  TIME: 9:23 AM

## 2019-12-13 NOTE — PROGRESS NOTES
Dr Ayaan Stark to bedside irrigating soto w/ sterile water for reduced u/o & bloodier looking urine/ clots removed, urine now clear light pink w/ good output

## 2019-12-13 NOTE — PROGRESS NOTES
Episodes paola arrhytyhmia noted on mojnitor, anesthesia aware, evaluated pt, pt mentating, BP stable at 127/70 - pt ok for tx to phase II

## 2019-12-13 NOTE — TELEPHONE ENCOUNTER
Now status post UroLift  Please arrange early morning catheter removal and return to the office in the afternoon for postvoid residual   Patient has urinary retention    [facility]

## 2019-12-13 NOTE — PERIOPERATIVE NURSING NOTE
Detailed written and verbal instructions given to pt  And daughter about soto care and changing to a leg bag in the am per Dr Jeane Rubio  All questions answered

## 2019-12-13 NOTE — PROGRESS NOTES
Dr Jabier Gupta to bedside evaluating pt slowed urine output, irrigated bladder withsterile water & placed traction to urinary catheter w/ tape   Urine now light pink & clear,

## 2019-12-13 NOTE — TELEPHONE ENCOUNTER
Martha from [place] [address] [facility] call to inform that patient missed appointment 12/12/19  No one was home when she arrived at the patient's home

## 2019-12-16 ENCOUNTER — TELEPHONE (OUTPATIENT)
Dept: UROLOGY | Facility: AMBULATORY SURGERY CENTER | Age: 79
End: 2019-12-16

## 2019-12-16 NOTE — TELEPHONE ENCOUNTER
Called and spoke to patients daughter who does patients scheduling for him  Explained to her void trial and the need for 2 appointments in the same day  Patient is scheduled for a void trial on Wednesday at 8:30 for soto removal and 2:00 for PVR at the [facility] office  Daughter is aware of time and location of appointments

## 2019-12-16 NOTE — TELEPHONE ENCOUNTER
Shalonda from [place] visiting nurse is calling to say he is being discharged for missed dates  Dec 10, 12, 16

## 2019-12-18 ENCOUNTER — PROCEDURE VISIT (OUTPATIENT)
Dept: UROLOGY | Facility: CLINIC | Age: 79
End: 2019-12-18
Payer: COMMERCIAL

## 2019-12-18 VITALS
WEIGHT: 201.8 LBS | SYSTOLIC BLOOD PRESSURE: 118 MMHG | HEART RATE: 78 BPM | HEIGHT: 72 IN | DIASTOLIC BLOOD PRESSURE: 76 MMHG | BODY MASS INDEX: 27.33 KG/M2

## 2019-12-18 DIAGNOSIS — M1A.09X0 CHRONIC GOUT OF MULTIPLE SITES, UNSPECIFIED CAUSE: Primary | ICD-10-CM

## 2019-12-18 DIAGNOSIS — I10 ESSENTIAL HYPERTENSION: ICD-10-CM

## 2019-12-18 DIAGNOSIS — N40.0 BENIGN PROSTATIC HYPERPLASIA WITHOUT LOWER URINARY TRACT SYMPTOMS: Primary | ICD-10-CM

## 2019-12-18 LAB — POST-VOID RESIDUAL VOLUME, ML POC: 65 ML

## 2019-12-18 PROCEDURE — 99211 OFF/OP EST MAY X REQ PHY/QHP: CPT

## 2019-12-18 PROCEDURE — 51798 US URINE CAPACITY MEASURE: CPT

## 2019-12-18 RX ORDER — HYDRALAZINE HYDROCHLORIDE 25 MG/1
25 TABLET, FILM COATED ORAL DAILY
Qty: 30 TABLET | Refills: 1 | Status: SHIPPED | OUTPATIENT
Start: 2019-12-18 | End: 2020-01-16 | Stop reason: SDUPTHER

## 2019-12-18 RX ORDER — ALLOPURINOL 300 MG/1
300 TABLET ORAL DAILY
Qty: 30 TABLET | Refills: 1 | Status: SHIPPED | OUTPATIENT
Start: 2019-12-18 | End: 2020-02-16 | Stop reason: SDUPTHER

## 2019-12-18 NOTE — PROGRESS NOTES
12/18/2019    Arnetha [place]  1940  [id number]    Diagnosis  Chief Complaint     Benign Prostatic Hypertrophy; Urinary Retention          Patient presents for void trial managed by Dr Meyers Persons  Follow up with [facility] for post op Uro lift appointment as scheduled    Procedure Cruz removal/voiding trial    Cruz catheter removed after deflation of an intact balloon  Patient tolerated well  Encouraged patient to hydrate well and return this afternoon for post void residual   he knows he may return early if uncomfortable and unable to urinate  Patient agrees to this plan  Patient returned this afternoon  Patient states able to void multiple times today  Patient voided 30 minutes ago prior to leaving to come into the office  Bladder ultrasound performed and PVR measured 65ml      Recent Results (from the past 1 hour(s))   POCT Measure PVR    Collection Time: 12/18/19  1:57 PM   Result Value Ref Range    POST-VOID RESIDUAL VOLUME, ML POC 65 mL         Vitals:    12/18/19 0833   BP: 118/76   BP Location: Right arm   Patient Position: Sitting   Cuff Size: Standard   Pulse: 78   Weight: 91 5 kg (201 lb 12 8 oz)   Height: 6' (1 829 m)           Alanna Christensen RN

## 2020-01-08 NOTE — PROGRESS NOTES
Assessment and plan:       1  Status post Urolift performed on 12/13/2019 by Dr Christa Guo  - Patient's AUA score today is 6 with an overall bother score of 1 and he is emptying excellently with a PVR of 12 mL  - Patient is very happy with his lower urinary tract symptoms status post Urolift  - He will return in approximately 6 months for symptom reassessment with Dr Christa Barragan PA-C      Chief Complaint     Chief Complaint   Patient presents with    Benign Prostatic Hypertrophy         History of Present Illness     Oscar Oliveira is a 67 y o  male patient who underwent Urolift on 12/13/2019 performed by Dr Christa Guo  Preoperatively patient was in urinary retention  AUA symptom score today is 6 with an overall bother score of 1  He is emptying excellently today with a PVR of 12 mL and did not produce enough urine for an accurate uroflow  He is very happy with his lower urinary tract symptoms status post Urolift  He has no complaints at today's visit  Laboratory     Lab Results   Component Value Date    CREATININE 1 83 (H) 12/04/2019       No results found for: PSA    Recent Results (from the past 1 hour(s))   POCT Measure PVR    Collection Time: 01/10/20  1:32 PM   Result Value Ref Range    POST-VOID RESIDUAL VOLUME, ML POC 12 mL         Review of Systems     Review of Systems   Constitutional: Negative for chills and fever  HENT: Negative  Eyes: Negative  Respiratory: Negative for shortness of breath  Cardiovascular: Negative for chest pain  Gastrointestinal: Negative for constipation, diarrhea, nausea and vomiting  Genitourinary: Negative for difficulty urinating, dysuria, enuresis, flank pain, frequency, hematuria and urgency  Musculoskeletal: Negative  AUA SYMPTOM SCORE      Most Recent Value   AUA SYMPTOM SCORE   How often have you had a sensation of not emptying your bladder completely after you finished urinating?   2   How often have you had to urinate again less than two hours after you finished urinating? 2   How often have you found you stopped and started again several times when you urinate?  0   How often have you found it difficult to postpone urination? 1   How often have you had a weak urinary stream?  0   How often have you had to push or strain to begin urination? 0   How many times did you most typically get up to urinate from the time you went to bed at night until the time you got up in the morning? 1   Quality of Life: If you were to spend the rest of your life with your urinary condition just the way it is now, how would you feel about that?  1   AUA SYMPTOM SCORE  6                  Allergies     Allergies   Allergen Reactions    Strawberry C [Ascorbate] Hives       Physical Exam     Physical Exam   Constitutional: He is oriented to person, place, and time  He appears well-developed and well-nourished  No distress  HENT:   Head: Normocephalic and atraumatic  Right Ear: External ear normal    Left Ear: External ear normal    Nose: Nose normal    Eyes: Right eye exhibits no discharge  Left eye exhibits no discharge  No scleral icterus  Cardiovascular: Normal rate  Pulmonary/Chest: Effort normal    Musculoskeletal:   Ambulates with a cane for assistance   Neurological: He is alert and oriented to person, place, and time  Skin: Skin is warm and dry  He is not diaphoretic  Psychiatric: He has a normal mood and affect  His behavior is normal  Judgment and thought content normal    Nursing note and vitals reviewed          Vital Signs     Vitals:    01/10/20 1327   BP: 142/90   Pulse: 86   Weight: 93 2 kg (205 lb 6 4 oz)   Height: 6' (1 829 m)         Current Medications       Current Outpatient Medications:     allopurinol (ZYLOPRIM) 300 mg tablet, Take 1 tablet (300 mg total) by mouth daily, Disp: 30 tablet, Rfl: 1    amLODIPine (NORVASC) 10 mg tablet, Take 1 tablet (10 mg total) by mouth daily, Disp: 30 tablet, Rfl: 1    donepezil (ARICEPT) 5 mg tablet, Take 5 mg by mouth daily at bedtime, Disp: , Rfl:     finasteride (PROSCAR) 5 mg tablet, Take 1 tablet (5 mg total) by mouth daily for 30 doses, Disp: 30 tablet, Rfl: 11    hydrALAZINE (APRESOLINE) 25 mg tablet, Take 1 tablet (25 mg total) by mouth daily, Disp: 30 tablet, Rfl: 1    labetalol (NORMODYNE) 200 mg tablet, Take 1 tablet (200 mg total) by mouth daily at bedtime, Disp: 30 tablet, Rfl: 3    Multiple Vitamins-Minerals (MULTIVITAMIN WITH MINERALS) tablet, Take 1 tablet by mouth daily, Disp: , Rfl:     omega-3-acid ethyl esters (LOVAZA) 1 g capsule, Take 1 g by mouth daily, Disp: , Rfl:     simvastatin (ZOCOR) 20 mg tablet, Take 20 mg by mouth daily at bedtime, Disp: , Rfl:     tamsulosin (FLOMAX) 0 4 mg, Take 1 capsule (0 4 mg total) by mouth daily with dinner for 30 doses, Disp: 30 capsule, Rfl: 11      Active Problems     Patient Active Problem List   Diagnosis    Essential hypertension    Pyelonephritis    Other hyperlipidemia    [facility]) [facility] [address] [facility]    Hematuria    Metabolic acidosis    Acute blood loss anemia    Azotemia    Ambulatory dysfunction    BPH (benign prostatic hyperplasia)    Abdominal distention    Chronic pain of both knees    Stool incontinence    Stage 3 chronic kidney disease (HCC)    Cognitive impairment    Urinary retention    Visual impairment    Gross hematuria         Past Medical History     Past Medical History:   Diagnosis Date    Anemia     BPH (benign prostatic hyperplasia)     Chronic kidney disease     Dementia ([address] )     Hyperlipidemia     Hypertension     Incontinence     Osteoarthritis          Surgical History     Past Surgical History:   Procedure Laterality Date    COLON SURGERY      COLONOSCOPY      FL RETROGRADE PYELOGRAM  12/13/2019    GA CYSTOURETHRO W/IMPLANT N/A 12/13/2019    Procedure: CYSTOSCOPY WITH INSERTION UROLIFT, BILATERAL RETROGRADE PYELOGRAM;  Surgeon: Rosio Cedillo Viktoria Baum MD;  [facility];  Service: Urology    TUMOR REMOVAL           Family History     Family History   Problem Relation Age of Onset    Heart failure Brother     Dementia Maternal Uncle          Social History     Social History       Radiology

## 2020-01-10 ENCOUNTER — OFFICE VISIT (OUTPATIENT)
Dept: UROLOGY | Facility: CLINIC | Age: 80
End: 2020-01-10
Payer: COMMERCIAL

## 2020-01-10 VITALS
BODY MASS INDEX: 27.82 KG/M2 | SYSTOLIC BLOOD PRESSURE: 142 MMHG | HEIGHT: 72 IN | WEIGHT: 205.4 LBS | DIASTOLIC BLOOD PRESSURE: 90 MMHG | HEART RATE: 86 BPM

## 2020-01-10 DIAGNOSIS — N40.0 BENIGN PROSTATIC HYPERPLASIA WITHOUT LOWER URINARY TRACT SYMPTOMS: Primary | ICD-10-CM

## 2020-01-10 LAB — POST-VOID RESIDUAL VOLUME, ML POC: 12 ML

## 2020-01-10 PROCEDURE — 99213 OFFICE O/P EST LOW 20 MIN: CPT | Performed by: PHYSICIAN ASSISTANT

## 2020-01-10 PROCEDURE — 51798 US URINE CAPACITY MEASURE: CPT | Performed by: PHYSICIAN ASSISTANT

## 2020-01-14 ENCOUNTER — OFFICE VISIT (OUTPATIENT)
Dept: GASTROENTEROLOGY | Facility: CLINIC | Age: 80
End: 2020-01-14
Payer: COMMERCIAL

## 2020-01-14 VITALS
SYSTOLIC BLOOD PRESSURE: 138 MMHG | DIASTOLIC BLOOD PRESSURE: 78 MMHG | WEIGHT: 205.4 LBS | HEART RATE: 81 BPM | BODY MASS INDEX: 27.22 KG/M2 | HEIGHT: 73 IN

## 2020-01-14 DIAGNOSIS — R19.7 DIARRHEA, UNSPECIFIED TYPE: Primary | ICD-10-CM

## 2020-01-14 PROCEDURE — 99203 OFFICE O/P NEW LOW 30 MIN: CPT | Performed by: PHYSICIAN ASSISTANT

## 2020-01-14 NOTE — PROGRESS NOTES
[facility] - Outpatient Consultation  Harjinder Bautista 67 y o  male MRN: [id number]  Encounter: [id number]          ASSESSMENT AND PLAN:      1  Diarrhea, unspecified type  1 loose very urgent stool daily without pain or blood  This has been consistent since a hemicolectomy 10 years ago  His daughter has become concerned as he is wearing depends  He insists he is not incontinent of stool but has been incontinent of urine    Start fiber and probiotic  Consider use of Questran  If worsening or watery will plan stool cultures  Consider repeat colonoscopy    ______________________________________________________________________    HPI:  50-year-old male presents for evaluation of loose stools  He has suffered with this since he underwent a hemicolectomy approximately 10 years ago  This surgery is performed due to a large benign polyp growing in the colon  He denies and his daughter denies that it was a colon cancer  He does not believe he has had a colonoscopy since that time  Recently that he moved in with his daughter in she noted he has been wearing depends  He insists that this is because he is incontinent of urine and has had no incontinence of stool  He states he usually has a bowel movement once a day and although it is loose it does have some form to it  It is not watery  He denies rectal bleeding or unexpected weight loss  He admits that the stool is urgent but again reaffirmed that he does not have incontinence of stool  His daughter also notes that when he was hospitalized recently with kidney failure he was having incontinence of stool at that time  Review of records from that time  Showed no documentation of this and there were no stool cultures taken  He does take donepezil which is known to cause diarrhea  His daughter believes that he is going to be weaned off of this    The patient clearly suffers from dementia as he has a difficult time providing an accurate history  REVIEW OF SYSTEMS:    CONSTITUTIONAL: Denies any fever, chills, rigors, and weight loss  HEENT: No earache or tinnitus  Denies hearing loss or visual disturbances  CARDIOVASCULAR: No chest pain or palpitations  RESPIRATORY: Denies any cough, hemoptysis, shortness of breath or dyspnea on exertion  GASTROINTESTINAL: As noted in the History of Present Illness  GENITOURINARY: No problems with urination  Denies any hematuria or dysuria  NEUROLOGIC: No dizziness or vertigo, denies headaches  MUSCULOSKELETAL: Denies any muscle or joint pain  SKIN: Denies skin rashes or itching  ENDOCRINE: Denies excessive thirst  Denies intolerance to heat or cold  PSYCHOSOCIAL: Denies depression or anxiety  Denies any recent memory loss         Historical Information   Past Medical History:   Diagnosis Date    Anemia     BPH (benign prostatic hyperplasia)     Chronic kidney disease     Dementia ([facility] [address]     Hyperlipidemia     Hypertension     Incontinence     Osteoarthritis      Past Surgical History:   Procedure Laterality Date    COLON SURGERY      COLONOSCOPY      FL RETROGRADE PYELOGRAM  12/13/2019    OK CYSTOURETHRO W/IMPLANT N/A 12/13/2019    Procedure: CYSTOSCOPY WITH INSERTION UROLIFT, BILATERAL RETROGRADE PYELOGRAM;  Surgeon: Bry Mckeon MD;  Location[facility];  Service: Urology    TUMOR REMOVAL       Social History   Social History     Substance and Sexual Activity   Alcohol Use Never    Frequency: Never     Social History     Substance and Sexual Activity   Drug Use Never     Social History     Tobacco Use   Smoking Status Passive Smoke Exposure - Never Smoker   Smokeless Tobacco Never Used     Family History   Problem Relation Age of Onset    Heart failure Brother     Dementia Maternal Uncle        Meds/Allergies       Current Outpatient Medications:     allopurinol (ZYLOPRIM) 300 mg tablet    amLODIPine (NORVASC) 10 mg tablet    donepezil (ARICEPT) 5 mg tablet   hydrALAZINE (APRESOLINE) 25 mg tablet    labetalol (NORMODYNE) 200 mg tablet    Multiple Vitamins-Minerals (MULTIVITAMIN WITH MINERALS) tablet    omega-3-acid ethyl esters (LOVAZA) 1 g capsule    simvastatin (ZOCOR) 20 mg tablet    tamsulosin (FLOMAX) 0 4 mg    finasteride (PROSCAR) 5 mg tablet    Allergies   Allergen Reactions    Strawberry C [Ascorbate] Hives           Objective     Blood pressure 138/78, pulse 81, height 6' 1" (1 854 m), weight 93 2 kg (205 lb 6 4 oz)  Body mass index is 27 1 kg/m²  PHYSICAL EXAM:      General Appearance:   Alert, cooperative, no distress   HEENT:   Normocephalic, atraumatic, anicteric      Neck:  Supple, symmetrical, trachea midline   Lungs:   Clear to auscultation bilaterally; no rales, rhonchi or wheezing; respirations unlabored    Heart[de-identified]   Regular rate and rhythm; no murmur, rub, or gallop  Abdomen:   Soft, non-tender, non-distended; normal bowel sounds; no masses, no organomegaly    Genitalia:   Deferred    Rectal:   Deferred    Extremities:  No cyanosis, clubbing or edema    Pulses:  2+ and symmetric    Skin:  No jaundice, rashes, or lesions    Lymph nodes:  No palpable cervical lymphadenopathy        Lab Results:   No visits with results within 1 Day(s) from this visit  Latest known visit with results is:   Office Visit on 01/10/2020   Component Date Value    POST-VOID RESIDUAL VOLUM* 01/10/2020 12          Radiology Results:   No results found

## 2020-01-16 ENCOUNTER — OFFICE VISIT (OUTPATIENT)
Dept: GERIATRICS | Age: 80
End: 2020-01-16
Payer: COMMERCIAL

## 2020-01-16 VITALS
OXYGEN SATURATION: 96 % | HEIGHT: 73 IN | SYSTOLIC BLOOD PRESSURE: 138 MMHG | WEIGHT: 204.2 LBS | TEMPERATURE: 97.3 F | BODY MASS INDEX: 27.06 KG/M2 | DIASTOLIC BLOOD PRESSURE: 74 MMHG | HEART RATE: 83 BPM

## 2020-01-16 DIAGNOSIS — R33.9 URINARY RETENTION: ICD-10-CM

## 2020-01-16 DIAGNOSIS — M25.561 CHRONIC PAIN OF BOTH KNEES: ICD-10-CM

## 2020-01-16 DIAGNOSIS — G89.29 CHRONIC PAIN OF BOTH KNEES: ICD-10-CM

## 2020-01-16 DIAGNOSIS — G30.1 LATE ONSET ALZHEIMER'S DISEASE WITHOUT BEHAVIORAL DISTURBANCE (HCC): Primary | ICD-10-CM

## 2020-01-16 DIAGNOSIS — R41.3 MEMORY LOSS: ICD-10-CM

## 2020-01-16 DIAGNOSIS — N18.30 STAGE 3 CHRONIC KIDNEY DISEASE (HCC): ICD-10-CM

## 2020-01-16 DIAGNOSIS — R15.9 INCONTINENCE OF FECES, UNSPECIFIED FECAL INCONTINENCE TYPE: ICD-10-CM

## 2020-01-16 DIAGNOSIS — M25.562 CHRONIC PAIN OF BOTH KNEES: ICD-10-CM

## 2020-01-16 DIAGNOSIS — F02.80 LATE ONSET ALZHEIMER'S DISEASE WITHOUT BEHAVIORAL DISTURBANCE (HCC): Primary | ICD-10-CM

## 2020-01-16 DIAGNOSIS — R26.2 AMBULATORY DYSFUNCTION: ICD-10-CM

## 2020-01-16 DIAGNOSIS — N40.0 BENIGN PROSTATIC HYPERPLASIA WITHOUT LOWER URINARY TRACT SYMPTOMS: ICD-10-CM

## 2020-01-16 DIAGNOSIS — H54.7 VISUAL IMPAIRMENT: ICD-10-CM

## 2020-01-16 DIAGNOSIS — N25.81 SECONDARY HYPERPARATHYROIDISM (HCC): ICD-10-CM

## 2020-01-16 DIAGNOSIS — I10 ESSENTIAL HYPERTENSION: ICD-10-CM

## 2020-01-16 PROCEDURE — 3078F DIAST BP <80 MM HG: CPT | Performed by: STUDENT IN AN ORGANIZED HEALTH CARE EDUCATION/TRAINING PROGRAM

## 2020-01-16 PROCEDURE — 3075F SYST BP GE 130 - 139MM HG: CPT | Performed by: STUDENT IN AN ORGANIZED HEALTH CARE EDUCATION/TRAINING PROGRAM

## 2020-01-16 PROCEDURE — 99215 OFFICE O/P EST HI 40 MIN: CPT | Performed by: STUDENT IN AN ORGANIZED HEALTH CARE EDUCATION/TRAINING PROGRAM

## 2020-01-16 RX ORDER — HYDRALAZINE HYDROCHLORIDE 25 MG/1
25 TABLET, FILM COATED ORAL DAILY
Qty: 30 TABLET | Refills: 2 | Status: SHIPPED | OUTPATIENT
Start: 2020-01-16 | End: 2020-04-07 | Stop reason: SDUPTHER

## 2020-01-16 NOTE — PROGRESS NOTES
[facility]  [address] [facility], [address] [place] [address], [address] [place] [address]    PRIMARY CARE PRACTICE FOR OLDER ADULTS  Follow-up    NAME: Lucian Gonzales  AGE: 67 y o  SEX: male    DATE OF ENCOUNTER:  01/16/2020    Assessment and Plan     Problem List Items Addressed This Visit        Endocrine    Secondary hyperparathyroidism [facility])       Cardiovascular and Mediastinum    Essential hypertension     Blood pressure currently at goal  Continue labetalol 200 mg daily  Continue hydralazine 25 mg daily  Continue amlodipine 10 mg daily  Recommend adherence to a heart healthy diet  Will continue to monitor         Relevant Medications    hydrALAZINE (APRESOLINE) 25 mg tablet       Nervous and Auditory    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) - Primary     MoCA administered today 13/30, GDS 1/15, TUGT 11 sec  Patient and daughter unaware whether he has had any neurovascular events or CVA in the past   Patient does have a family history of Alzheimer's in his uncles  Above screening with [place] places patient at a moderate dementia level which correlates with his clinical findings over the past few office visits    Reviewed prior blood work:  CBC, CMP, TSH with reflex T4, vitamin B12 and folate  Will order neuro quant MRI of brain  Aricept discontinued as this medication is not indicated in moderate dementia  Will discuss with patient and daughter options for medication treatment available once MRI of brain obtained  Recommend reorientation and redirection as needed  Manage chronic conditions  Maintain Falls precautions  Encourage patient remain active mentally, physically and socially  Patient should participate in cognitively challenging exercises such as cross words and puzzles  Will discuss further recommendations for possible referral to speech therapy for cognitive recall exercises at next follow-up visit after MRI is completed  Will continue to follow            Genitourinary BPH (benign prostatic hyperplasia)     Symptoms stable  Continue tamsulosin 0 4 mg daily  Continue finasteride 5 mg daily  Follow up with Urology as routinely scheduled  Will continue to monitor         Stage 3 chronic kidney disease (HCC)     Medications to be renally dosed  Avoid nephrotoxic agents  Will continue to monitor periodically         Urinary retention     Patient recently had a urolift procedure after which she states he feels "so much better"  Does have intermittent episodes of "tingling" over the past few days with passage of urine which patient was advised by Urology may be expected after removal of his Cruz catheter  May apply petroleum jelly topically to genital area  Follow-up with Urology as routinely scheduled            Other    Ambulatory dysfunction     TUGT 11 sec  Educated on falls precautions  Patient has completed a course of physical therapy recently  Will continue to monitor         Chronic pain of both knees     Recommend Tylenol as needed for pain  Also recommend topical Aspercreme t i d   As needed  Patient follow up with Orthopedics for possible knee injections for pain relief  Will continue to monitor         Stool incontinence     Stool incontinence secondary to chronic diarrhea for over 10 years  Patient was recently seen by GI with recommendation for repeat colonoscopy in the future  Also recommended to start fiber, probiotics and Questran  Will continue to monitor         Visual impairment     Maintain Falls precautions  Requisition given to patient for referral to Ophthalmology  He is scheduled for this consultation soon per daughter  Will continue to follow           Other Visit Diagnoses     Memory loss        Relevant Orders    MRI brain NeuroQuant wo contrast          Orders Placed This Encounter   Procedures    MRI brain NeuroQuant wo contrast       - Counseling Documentation: patient was counseled regarding: diagnostic results, instructions for management, risk factor reductions, prognosis, patient and family education, impressions, risks and benefits of treatment options and importance of compliance with treatment  Discussed with patient and daughter    Chief Complaint     Patient presents for follow-up of his acute and chronic conditions    History of Present Illness     HPI    This is a 64-year-old male with hypertension, prior history of BPH and urinary retention s/p urolift, vision impairment, cognitive impairment and hearing loss amongst multiple other medical conditions who presents for follow-up of his chronic conditions  Patient is in the office with his daughter who is his primary caretaker  Patient notably much better in appearance both physically and mentally  He does continue to have frequent forgetfulness and episodes of confusion as per his baseline however he is easily redirectable  Urolift procedure was done on 12/13/2020 after which patient states he feels much better  He does continue to complain of intermittent 'tingling' on passage of urine which daughter explains they were advised by urology to be expected for a couple weeks post procedure and removal of his prior indwelling Cruz catheter  He denies any fever, nausea, vomiting, suprapubic pain other urinary symptoms  The patient was also seen by GI recently for chronic diarrhea with recommendation to start fiber, probiotic and use of Questran in addition to repeat colonoscopy in the future  Patient was successfully weaned off Aricept recently per my instructions given at that his cognitive impairment is likely moderate dementia for which Aricept does not indicated also given side effect profile of this medication  He has had no prior neurovascular events such as a CVA predisposing him to vascular dementia, therefore dementia is likely secondary to Alzheimer's disease in etiology given his age and positive family history for Alzheimer's    Per daughter since starting and up titrating amlodipine, his blood pressure has remained controlled in the 130s  He is scheduled to see orthopedics for his chronic osteoarthritis of his knees, ENT for his hearing loss and Ophthalmology for progressively worsening visual impairment  Suzanna Pacheco He continues to do well  with tamsulosin and finasteride for a history BPH  He also continues on simvastatin 20 mg daily for a history of hyperlipidemia  He has also been compliant with allopurinol for a  history of gout with symptoms remaining stable  The following portions of the patient's history were reviewed and updated as appropriate: allergies, current medications, past family history, past medical history, past social history, past surgical history and problem list     Review of Systems     Review of Systems   Constitutional: Positive for activity change (Much improved)  Negative for chills, fatigue and fever  HENT: Negative for congestion, ear pain, rhinorrhea and sore throat  Eyes: Negative for discharge  Respiratory: Negative for cough, chest tightness, shortness of breath, wheezing and stridor  Cardiovascular: Negative for chest pain, palpitations and leg swelling  Gastrointestinal: Positive for diarrhea (Chronic)  Negative for abdominal pain, constipation, nausea and vomiting  Genitourinary: Positive for dysuria (Intermittent dysuria)  Negative for difficulty urinating, enuresis, flank pain, frequency, hematuria and urgency  Musculoskeletal: Positive for arthralgias (Chronic knee pain), back pain (Chronic) and gait problem (Uses cane)  Negative for neck pain  Skin: Negative for color change, pallor, rash and wound  Neurological: Negative for dizziness, weakness and light-headedness  Psychiatric/Behavioral: Positive for confusion (At baseline)  Negative for hallucinations, sleep disturbance and suicidal ideas  The patient is not nervous/anxious and is not hyperactive          Active Problem List     Patient Active Problem List   Diagnosis    Essential hypertension    Pyelonephritis    Other hyperlipidemia    MILE (acute kidney injury) ([address] )    Hematuria    Metabolic acidosis    Acute blood loss anemia    Azotemia    Ambulatory dysfunction    BPH (benign prostatic hyperplasia)    Abdominal distention    Chronic pain of both knees    Stool incontinence    Stage 3 chronic kidney disease (HCC)    Cognitive impairment    Urinary retention    Visual impairment    Gross hematuria    Secondary hyperparathyroidism ([address] )    Late onset 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 ([facility])       Objective     /74 (BP Location: Left arm, Patient Position: Sitting, Cuff Size: Standard)   Pulse 83   Temp (!) 97 3 °F (36 3 °C) (Tympanic)   Ht 6' 1" (1 854 m)   Wt 92 6 kg (204 lb 3 2 oz)   SpO2 96%   BMI 26 94 kg/m²     Physical Exam   Constitutional: He appears well-developed  No distress  Elderly male sitting in chair in no obvious cardiorespiratory or painful distress   HENT:   Head: Normocephalic and atraumatic  Right Ear: External ear normal    Left Ear: External ear normal    Nose: Nose normal    Mouth/Throat: Oropharynx is clear and moist  No oropharyngeal exudate  Eyes: Conjunctivae are normal  Right eye exhibits no discharge  Left eye exhibits no discharge  No scleral icterus  Neck: Normal range of motion  Neck supple  No JVD present  Cardiovascular: Normal rate, regular rhythm and intact distal pulses  Exam reveals no gallop and no friction rub  Murmur (ESM 2/6) heard  Pulmonary/Chest: Effort normal and breath sounds normal  No stridor  No respiratory distress  He has no wheezes  He has no rales  Abdominal: Soft  Bowel sounds are normal  He exhibits no distension  There is no tenderness  Musculoskeletal: Normal range of motion  He exhibits no edema, tenderness or deformity  Neurological: He is alert  He has normal reflexes  No cranial nerve deficit     Oriented x2  Moving all limbs  Follows commands readily   Skin: Skin is warm  No rash noted  He is not diaphoretic  No erythema  No pallor  Psychiatric: He has a normal mood and affect  Pleasantly confused at baseline and cooperative   Vitals reviewed        Pertinent Laboratory/Diagnostic Studies:  Lab Results   Component Value Date    SODIUM 144 12/04/2019    K 3 5 12/04/2019     (H) 12/04/2019    CO2 22 12/04/2019    BUN 27 (H) 12/04/2019    CREATININE 1 83 (H) 12/04/2019    GLUC 112 10/16/2019    CALCIUM 9 2 12/04/2019       Lab Results   Component Value Date    SODIUM 144 12/04/2019    K 3 5 12/04/2019     (H) 12/04/2019    CO2 22 12/04/2019    AGAP 7 12/04/2019    BUN 27 (H) 12/04/2019    CREATININE 1 83 (H) 12/04/2019    GLUC 112 10/16/2019    GLUF 121 (H) 12/04/2019    CALCIUM 9 2 12/04/2019    AST 9 12/04/2019    ALT 19 12/04/2019    ALKPHOS 87 12/04/2019    TP 7 5 12/04/2019    TBILI 0 34 12/04/2019    EGFR 40 12/04/2019     Lab Results   Component Value Date    GNJ4CRPZYJBH 2 290 12/04/2019     Lab Results   Component Value Date    HGBA1C 5 0 12/04/2019     Vitamin B12, folate within normal limits    Current Medications     Current Outpatient Medications:     allopurinol (ZYLOPRIM) 300 mg tablet, Take 1 tablet (300 mg total) by mouth daily, Disp: 30 tablet, Rfl: 1    amLODIPine (NORVASC) 10 mg tablet, Take 1 tablet (10 mg total) by mouth daily, Disp: 30 tablet, Rfl: 1    hydrALAZINE (APRESOLINE) 25 mg tablet, Take 1 tablet (25 mg total) by mouth daily, Disp: 30 tablet, Rfl: 2    labetalol (NORMODYNE) 200 mg tablet, Take 1 tablet (200 mg total) by mouth daily at bedtime, Disp: 30 tablet, Rfl: 3    Multiple Vitamins-Minerals (MULTIVITAMIN WITH MINERALS) tablet, Take 1 tablet by mouth daily, Disp: , Rfl:     omega-3-acid ethyl esters (LOVAZA) 1 g capsule, Take 1 g by mouth daily, Disp: , Rfl:     simvastatin (ZOCOR) 20 mg tablet, Take 20 mg by mouth daily at bedtime, Disp: , Rfl:     finasteride (PROSCAR) 5 mg tablet, Take 1 tablet (5 mg total) by mouth daily for 30 doses, Disp: 30 tablet, Rfl: 11    tamsulosin (FLOMAX) 0 4 mg, Take 1 capsule (0 4 mg total) by mouth daily with dinner for 30 doses, Disp: 30 capsule, Rfl: 11    Health Maintenance     Health Maintenance   Topic Date Due    Depression Screening PHQ  1940    Medicare Annual Wellness Visit (AWV)  1940    BMI: Followup Plan  08/11/1959    Fall Risk  08/11/2006    Pneumococcal Vaccine: 65+ Years (1 of 2 - PCV13) 08/11/2006    Influenza Vaccine  07/01/2019    BMI: Adult  01/16/2021    DTaP,Tdap,and Td Vaccines (2 - Tdap) 08/31/2021    Pneumococcal Vaccine: Pediatrics (0 to 5 Years) and At-Risk Patients (6 to 59 Years)  Aged Out    HIB Vaccine  Aged Out    Hepatitis B Vaccine  Aged Out    IPV Vaccine  Aged Out    Hepatitis A Vaccine  Aged Out    Meningococcal ACWY Vaccine  Aged Out    HPV Vaccine  Aged Out       There is no immunization history on file for this patient      Dionte Meredith MD  Geriatrics   1/19/2020 1:05 PM

## 2020-01-19 PROBLEM — F02.80 LATE ONSET ALZHEIMER'S DISEASE WITHOUT BEHAVIORAL DISTURBANCE (HCC): Status: ACTIVE | Noted: 2020-01-19

## 2020-01-19 PROBLEM — G30.1 LATE ONSET ALZHEIMER'S DISEASE WITHOUT BEHAVIORAL DISTURBANCE (HCC): Status: ACTIVE | Noted: 2020-01-19

## 2020-01-19 NOTE — ASSESSMENT & PLAN NOTE
MoCA administered today 13/30, GDS 1/15, TUGT 11 sec  Patient and daughter unaware whether he has had any neurovascular events or CVA in the past   Patient does have a family history of Alzheimer's in his uncles  Above screening with [place] places patient at a moderate dementia level which correlates with his clinical findings over the past few office visits    Reviewed prior blood work:  CBC, CMP, TSH with reflex T4, vitamin B12 and folate  Will order neuro quant MRI of brain  Aricept discontinued as this medication is not indicated in moderate dementia  Will discuss with patient and daughter options for medication treatment available once MRI of brain obtained  Recommend reorientation and redirection as needed  Manage chronic conditions  Maintain [place] precautions  Encourage patient remain active mentally, physically and socially  Patient should participate in cognitively challenging exercises such as cross words and puzzles  Will discuss further recommendations for possible referral to speech therapy for cognitive recall exercises at next follow-up visit after MRI is completed  Will continue to follow

## 2020-01-19 NOTE — ASSESSMENT & PLAN NOTE
Symptoms stable  Continue tamsulosin 0 4 mg daily  Continue finasteride 5 mg daily  Follow up with Urology as routinely scheduled  Will continue to monitor

## 2020-01-19 NOTE — ASSESSMENT & PLAN NOTE
Maintain Falls precautions  Requisition given to patient for referral to Ophthalmology  He is scheduled for this consultation soon per daughter  Will continue to follow

## 2020-01-19 NOTE — ASSESSMENT & PLAN NOTE
Recommend Tylenol as needed for pain  Also recommend topical Aspercreme t i d   As needed  Patient follow up with Orthopedics for possible knee injections for pain relief  Will continue to monitor

## 2020-01-19 NOTE — ASSESSMENT & PLAN NOTE
Blood pressure currently at goal  Continue labetalol 200 mg daily  Continue hydralazine 25 mg daily  Continue amlodipine 10 mg daily  Recommend adherence to a heart healthy diet  Will continue to monitor

## 2020-01-19 NOTE — ASSESSMENT & PLAN NOTE
Patient recently had a urolift procedure after which she states he feels "so much better"  Does have intermittent episodes of "tingling" over the past few days with passage of urine which patient was advised by Urology may be expected after removal of his Cruz catheter  May apply petroleum jelly topically to genital area  Follow-up with Urology as routinely scheduled 26

## 2020-01-19 NOTE — ASSESSMENT & PLAN NOTE
Stool incontinence secondary to chronic diarrhea for over 10 years  Patient was recently seen by GI with recommendation for repeat colonoscopy in the future  Also recommended to start fiber, probiotics and Questran  Will continue to monitor

## 2020-01-19 NOTE — ASSESSMENT & PLAN NOTE
TUGT 11 sec  Educated on falls precautions  Patient has completed a course of physical therapy recently  Will continue to monitor

## 2020-01-20 DIAGNOSIS — I10 ESSENTIAL HYPERTENSION: ICD-10-CM

## 2020-01-20 RX ORDER — SIMVASTATIN 20 MG
20 TABLET ORAL
Qty: 30 TABLET | Refills: 2 | Status: SHIPPED | OUTPATIENT
Start: 2020-01-20 | End: 2020-04-29

## 2020-01-20 RX ORDER — AMLODIPINE BESYLATE 10 MG/1
10 TABLET ORAL DAILY
Qty: 30 TABLET | Refills: 0 | Status: SHIPPED | OUTPATIENT
Start: 2020-01-20 | End: 2020-02-16 | Stop reason: SDUPTHER

## 2020-01-26 ENCOUNTER — HOSPITAL ENCOUNTER (OUTPATIENT)
Dept: MRI IMAGING | Facility: HOSPITAL | Age: 80
Discharge: HOME | End: 2020-01-26
Payer: COMMERCIAL

## 2020-01-26 DIAGNOSIS — R41.3 MEMORY LOSS: ICD-10-CM

## 2020-01-26 PROCEDURE — 70551 MRI BRAIN STEM W/O DYE: CPT

## 2020-02-10 ENCOUNTER — APPOINTMENT (OUTPATIENT)
Dept: LAB | Facility: CLINIC | Age: 80
End: 2020-02-10
Payer: COMMERCIAL

## 2020-02-10 ENCOUNTER — TELEPHONE (OUTPATIENT)
Dept: UROLOGY | Facility: MEDICAL CENTER | Age: 80
End: 2020-02-10

## 2020-02-10 DIAGNOSIS — R30.0 DYSURIA: ICD-10-CM

## 2020-02-10 DIAGNOSIS — N40.0 BENIGN PROSTATIC HYPERPLASIA WITHOUT LOWER URINARY TRACT SYMPTOMS: Primary | ICD-10-CM

## 2020-02-10 DIAGNOSIS — N40.0 BENIGN PROSTATIC HYPERPLASIA WITHOUT LOWER URINARY TRACT SYMPTOMS: ICD-10-CM

## 2020-02-10 LAB
BACTERIA UR QL AUTO: ABNORMAL /HPF
BILIRUB UR QL STRIP: NEGATIVE
CLARITY UR: ABNORMAL
COLOR UR: YELLOW
GLUCOSE UR STRIP-MCNC: NEGATIVE MG/DL
HGB UR QL STRIP.AUTO: ABNORMAL
KETONES UR STRIP-MCNC: NEGATIVE MG/DL
LEUKOCYTE ESTERASE UR QL STRIP: ABNORMAL
NITRITE UR QL STRIP: NEGATIVE
NON-SQ EPI CELLS URNS QL MICRO: ABNORMAL /HPF
PH UR STRIP.AUTO: 6 [PH]
PROT UR STRIP-MCNC: ABNORMAL MG/DL
RBC #/AREA URNS AUTO: ABNORMAL /HPF
SP GR UR STRIP.AUTO: 1.02 (ref 1–1.03)
UROBILINOGEN UR QL STRIP.AUTO: 0.2 E.U./DL
WBC #/AREA URNS AUTO: ABNORMAL /HPF

## 2020-02-10 PROCEDURE — 87077 CULTURE AEROBIC IDENTIFY: CPT

## 2020-02-10 PROCEDURE — 81001 URINALYSIS AUTO W/SCOPE: CPT

## 2020-02-10 PROCEDURE — 87186 SC STD MICRODIL/AGAR DIL: CPT

## 2020-02-10 PROCEDURE — 87086 URINE CULTURE/COLONY COUNT: CPT

## 2020-02-10 NOTE — TELEPHONE ENCOUNTER
Patient s/p Urolift 12/13/2019  Passed initial void trial 12/18  Last seen 1/10/2020 and was happy with his symptoms at that time, PVR:12 mL  Called and spoke with Daughter  Per Daughter patient c/o frequency, urgency, burning at the tip of the penis and voiding only small amounts (drops to an ounce at a time )  She reports that he feels like he is emptying  He drinks about 2 bottles of water, OJ, and 1 cup of coffee  Denies hematuria, fever and chills  Reviewed with daughter to increase his water intake  Will likely also send for UA C&S to rule out infection  Stressed that if he feels like he is unable to empty his bladder he should call back for emergent nurse visit with pvr  Daughter also notes slight increase to shortness of breath  Patient always has this but it seems to worsen when he is in pain  She denies chest pain  Will call to try and get symptoms directly from patient  Per patient He develops a painful burning at the tip of the penis that occurs with the urge to urinate  He then urinates and pain radiates down his penis  He feels like he has a good flow and is urinating good amounts and feels he is emptying his bladder but notes that the painful urge and burning does not go away and this is causing frequency  He also notes difficulty controlling his BM's and symptoms worsen with BM pressure  He confirms no blood, fever, chills  Encouraged hydration with water  Will send for UA C&S to rule out infection  Call the office in suprapubic pressure or the inability to void

## 2020-02-10 NOTE — TELEPHONE ENCOUNTER
Patient of Dr Priya Mcgill seen in the [facility] office  Patient daughter called and advised when the patient urinates it is not a lot of urine output  She also advised that the patient is having a burning when he urinates and a the top of his penis  She would like to know if this is normal after having the urolift  Patient daughter advised that he is in more pain than usual   Please advise

## 2020-02-11 DIAGNOSIS — R30.0 DYSURIA: ICD-10-CM

## 2020-02-11 DIAGNOSIS — N40.0 BENIGN PROSTATIC HYPERPLASIA WITHOUT LOWER URINARY TRACT SYMPTOMS: Primary | ICD-10-CM

## 2020-02-11 RX ORDER — CEPHALEXIN 250 MG/1
250 CAPSULE ORAL EVERY 6 HOURS SCHEDULED
Qty: 20 CAPSULE | Refills: 0 | Status: SHIPPED | OUTPATIENT
Start: 2020-02-11 | End: 2020-02-16

## 2020-02-11 NOTE — TELEPHONE ENCOUNTER
I called and left a voicemail, ok per the communication consent  I notified him of the prescription being sent over to his pharmacy, and that continued monitoring of the urine culture would be needed  I left the office number in the voicemail

## 2020-02-12 ENCOUNTER — TELEPHONE (OUTPATIENT)
Dept: UROLOGY | Facility: CLINIC | Age: 80
End: 2020-02-12

## 2020-02-12 LAB — BACTERIA UR CULT: ABNORMAL

## 2020-02-12 NOTE — TELEPHONE ENCOUNTER
----- Message from Lexi Branham PA-C sent at 2/12/2020  3:03 PM EST -----  Patient should complete Keflex as prescribed  Thank you

## 2020-02-12 NOTE — TELEPHONE ENCOUNTER
Called and left message for patient  In message stated that his urine testing was positive for an infection but that it is susceptible to the Keflex so he should complete Keflex as prescribed  Patient is to call the office back with any questions or concerns

## 2020-02-16 DIAGNOSIS — M1A.09X0 CHRONIC GOUT OF MULTIPLE SITES, UNSPECIFIED CAUSE: ICD-10-CM

## 2020-02-16 DIAGNOSIS — I10 ESSENTIAL HYPERTENSION: ICD-10-CM

## 2020-02-18 RX ORDER — ALLOPURINOL 300 MG/1
300 TABLET ORAL DAILY
Qty: 30 TABLET | Refills: 2 | Status: SHIPPED | OUTPATIENT
Start: 2020-02-18 | End: 2020-06-04 | Stop reason: SDUPTHER

## 2020-02-18 RX ORDER — AMLODIPINE BESYLATE 10 MG/1
10 TABLET ORAL DAILY
Qty: 30 TABLET | Refills: 2 | Status: SHIPPED | OUTPATIENT
Start: 2020-02-18 | End: 2020-06-04 | Stop reason: SDUPTHER

## 2020-03-04 ENCOUNTER — APPOINTMENT (OUTPATIENT)
Dept: RADIOLOGY | Facility: MEDICAL CENTER | Age: 80
End: 2020-03-04
Payer: COMMERCIAL

## 2020-03-04 ENCOUNTER — OFFICE VISIT (OUTPATIENT)
Dept: OBGYN CLINIC | Facility: MEDICAL CENTER | Age: 80
End: 2020-03-04
Payer: COMMERCIAL

## 2020-03-04 VITALS
HEIGHT: 73 IN | DIASTOLIC BLOOD PRESSURE: 84 MMHG | WEIGHT: 207.6 LBS | BODY MASS INDEX: 27.51 KG/M2 | HEART RATE: 66 BPM | SYSTOLIC BLOOD PRESSURE: 155 MMHG

## 2020-03-04 DIAGNOSIS — M25.561 RIGHT KNEE PAIN, UNSPECIFIED CHRONICITY: Primary | ICD-10-CM

## 2020-03-04 DIAGNOSIS — M17.0 PRIMARY OSTEOARTHRITIS OF BOTH KNEES: ICD-10-CM

## 2020-03-04 DIAGNOSIS — M25.462 EFFUSION OF BOTH KNEE JOINTS: ICD-10-CM

## 2020-03-04 DIAGNOSIS — M25.562 LEFT KNEE PAIN, UNSPECIFIED CHRONICITY: ICD-10-CM

## 2020-03-04 DIAGNOSIS — M25.461 EFFUSION OF BOTH KNEE JOINTS: ICD-10-CM

## 2020-03-04 DIAGNOSIS — M25.561 RIGHT KNEE PAIN, UNSPECIFIED CHRONICITY: ICD-10-CM

## 2020-03-04 PROCEDURE — 99203 OFFICE O/P NEW LOW 30 MIN: CPT | Performed by: EMERGENCY MEDICINE

## 2020-03-04 PROCEDURE — 73562 X-RAY EXAM OF KNEE 3: CPT

## 2020-03-04 NOTE — PROGRESS NOTES
Assessment/Plan:    Diagnoses and all orders for this visit:    Right knee pain, unspecified chronicity  -     XR knee 3 vw right non injury; Future  -     Ambulatory referral to Physical Therapy; Future    Left knee pain, unspecified chronicity  -     XR knee 3 vw left non injury; Future  -     Ambulatory referral to Physical Therapy; Future    Primary osteoarthritis of both knees  -     Ambulatory referral to Physical Therapy; Future    Severe osteoarthritis seen on x-ray bilaterally mostly on the medial compartment  We will start formal physical therapy for which the patient seems very interested in working on strengthening and mobility  Patient may take Tylenol or OTC NSAIDs as needed follow-up in 6 weeks      Chief Complaint:     Chief Complaint   Patient presents with    Right Knee - Pain    Left Knee - Pain       Subjective:   Patient ID: Thomas Hansen is a 67 y o  male  New patient presents with family for chronic 1 year bilateral knee discomfort and stiffness patient states he is not able to dance like he use to as well as go for long walks due to his symptoms  Patient denies any significant pain  He has not been taking any medications for his symptoms  Review of Systems   Constitutional: Negative for chills and fever  HENT: Negative for sore throat and trouble swallowing  Eyes: Negative for pain and discharge  Respiratory: Negative for choking and shortness of breath  Cardiovascular: Negative for chest pain and palpitations  Gastrointestinal: Negative for abdominal pain and vomiting  Endocrine: Negative for cold intolerance and heat intolerance  Musculoskeletal: Positive for arthralgias  Neurological: Negative for dizziness and weakness  Psychiatric/Behavioral: Negative for self-injury and suicidal ideas  The following portions of the patient's chart were reviewed and updated as appropriate:      Allergie  Allergies   Allergen Reactions    Strawberry C [Ascorbate] Hives   s   Allergen Reactions    Strawberry C [Ascorbate] Hives      Diagnosis Date    Anemia     BPH (benign prostatic hyperplasia)     Chronic kidney disease     Dementia ([address] )     Hyperlipidemia     Hypertension     Incontinence     Osteoarthritis        Past Surgical History:   Procedure Laterality Date    COLON SURGERY      COLONOSCOPY      FL RETROGRADE PYELOGRAM  12/13/2019    [place] CYSTOURETHRO W/IMPLANT N/A 12/13/2019    Procedure: CYSTOSCOPY WITH INSERTION UROLIFT, BILATERAL RETROGRADE PYELOGRAM;  Surgeon: Nat Pond MD;  Location: [facility];  Service: Urology    TUMOR REMOVAL         Social History     Socioeconomic History    Marital status:      Spouse name: Not on file    Number of children: Not on file    Years of education: Not on file    Highest education level: Not on file   Occupational History    Not on file   Social Needs    Financial resource strain: Not on file    Food insecurity:     Worry: Not on file     Inability: Not on file    Transportation needs:     Medical: Not on file     Non-medical: Not on file   Tobacco Use    Smoking status: Passive Smoke Exposure - Never Smoker    Smokeless tobacco: Never Used   Substance and Sexual Activity    Alcohol use: Never     Frequency: Never    Drug use: Never    Sexual activity: Not on file   Lifestyle    Physical activity:     Days per week: Not on file     Minutes per session: Not on file    Stress: Not on file   Relationships    Social connections:     Talks on phone: Not on file     Gets together: Not on file     Attends [employer] service: Not on file     Active member of club or organization: Not on file     Attends meetings of clubs or organizations: Not on file     Relationship status: Not on file    Intimate partner violence:     Fear of current or ex partner: Not on file     Emotionally abused: Not on file     Physically abused: Not on file     Forced sexual activity: Not on file Other Topics Concern    Not on file   Social History Narrative    Not on file       Family History   Problem Relation Age of Onset    Heart failure Brother     Dementia Maternal Uncle        Medications:    Current Outpatient Medications:     allopurinol (ZYLOPRIM) 300 mg tablet, Take 1 tablet (300 mg total) by mouth daily, Disp: 30 tablet, Rfl: 2    amLODIPine (NORVASC) 10 mg tablet, Take 1 tablet (10 mg total) by mouth daily, Disp: 30 tablet, Rfl: 2    finasteride (PROSCAR) 5 mg tablet, , Disp: , Rfl:     hydrALAZINE (APRESOLINE) 25 mg tablet, Take 1 tablet (25 mg total) by mouth daily, Disp: 30 tablet, Rfl: 2    labetalol (NORMODYNE) 200 mg tablet, Take 1 tablet (200 mg total) by mouth daily at bedtime, Disp: 30 tablet, Rfl: 3    Multiple Vitamins-Minerals (MULTIVITAMIN WITH MINERALS) tablet, Take 1 tablet by mouth daily, Disp: , Rfl:     omega-3-acid ethyl esters (LOVAZA) 1 g capsule, Take 1 g by mouth daily, Disp: , Rfl:     simvastatin (ZOCOR) 20 mg tablet, Take 1 tablet (20 mg total) by mouth daily at bedtime, Disp: 30 tablet, Rfl: 2    finasteride (PROSCAR) 5 mg tablet, Take 1 tablet (5 mg total) by mouth daily for 30 doses, Disp: 30 tablet, Rfl: 11    tamsulosin (FLOMAX) 0 4 mg, Take 1 capsule (0 4 mg total) by mouth daily with dinner for 30 doses, Disp: 30 capsule, Rfl: 11    Patient Active Problem List   Diagnosis    Essential hypertension    Pyelonephritis    Other hyperlipidemia    MILE (acute kidney injury) (HCC)    Hematuria    Metabolic acidosis    Acute blood loss anemia    Azotemia    Ambulatory dysfunction    BPH (benign prostatic hyperplasia)    Abdominal distention    Chronic pain of both knees    Stool incontinence    Stage 3 chronic kidney disease (HCC)    Cognitive impairment    Urinary retention    Visual impairment    Gross hematuria    Secondary hyperparathyroidism ([address] )    Late onset 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 ([address] ) Objective:  /84   Pulse 66   Ht 6' 1" (1 854 m)   Wt 94 2 kg (207 lb 9 6 oz)   BMI 27 39 kg/m²      Right Knee Exam     Range of Motion   Extension: normal     Other   Erythema: absent  Sensation: normal  Swelling: mild  Effusion: effusion present    Comments:  Varus by last      Left Knee Exam     Range of Motion   Extension: normal     Other   Erythema: absent  Sensation: normal  Swelling: mild  Effusion: effusion present          Observations   Left Knee   Positive for effusion  Right Knee   Positive for effusion  Physical Exam   Constitutional: He is oriented to person, place, and time  He appears well-developed and well-nourished  HENT:   Head: Normocephalic and atraumatic  Eyes: Conjunctivae are normal    Neck: Neck supple  Pulmonary/Chest: Effort normal    Musculoskeletal:        Right knee: He exhibits effusion  Left knee: He exhibits effusion  Neurological: He is alert and oriented to person, place, and time  Skin: Skin is warm and dry  Psychiatric: He has a normal mood and affect  His behavior is normal    Vitals reviewed  Neurologic Exam     Mental Status   Oriented to person, place, and time  Procedures    I have personally reviewed pertinent films in PACS

## 2020-03-04 NOTE — PATIENT INSTRUCTIONS
You may use Advil (ibuprofen) 600mg every 6 hours OR Aleve (naproxen) 250-500mg every 12 hours as needed for pain and inflammation  You may also take Tylenol 500mg every 4-6 hours as needed  Check with your primary care physician to see if these medications are safe to take and to make sure they do not interfere with your other medications and medical issues  Arthritis   AMBULATORY CARE:   Arthritis  is a disease that causes inflammation in one or more joints  There are many types of arthritis, such as osteoarthritis, rheumatoid arthritis, and septic arthritis  Some types cause inflammation in the joints  Other types wear away the cartilage between joints  This makes the bones of the joint rub together when you move the joint  Your symptoms may be constant, or symptoms may come and go  Arthritis often gets worse over time and can cause permanent joint damage  Common signs and symptoms of arthritis:   · Pain, swelling, or stiffness in the joint    · Limited range of motion in the joint    · Warmth or redness over the joint    · Tenderness when you touch the joint    · Stiff joints in the morning that loosen with movement    · A creaking or grinding sound when you move the joint    · Fever  Seek care immediately if:   · You have a fever and severe joint pain or swelling  · You cannot move the affected joint  · You have severe joint pain you cannot tolerate  Contact your healthcare provider if:   · Your pain or swelling does not get better with treatment  · You have questions or concerns about your condition or care  Treatment  will depend on the type of arthritis you have and if it is severe  You may need any of the following:  · Acetaminophen  decreases pain and fever  It is available without a doctor's order  Ask how much to take and how often to take it  Follow directions  Acetaminophen can cause liver damage if not taken correctly      · NSAIDs , such as ibuprofen, help decrease swelling, pain, and fever  This medicine is available with or without a doctor's order  NSAIDs can cause stomach bleeding or kidney problems in certain people  If you take blood thinner medicine, always ask your healthcare provider if NSAIDs are safe for you  Always read the medicine label and follow directions  · Steroid medicine  helps reduce swelling and pain  · Surgery  may be needed to repair or replace a damaged joint  Manage arthritis:   · Rest your painful joint so it can heal   Your healthcare provider may recommend crutches or a walker if the affected joint is in a leg  · Apply ice or heat to the joint  Both can help decrease swelling and pain  Ice may also help prevent tissue damage  Use an ice pack, or put crushed ice in a plastic bag  Cover it with a towel and place it on your joint for 15 to 20 minutes every hour or as directed  You can apply heat for 20 minutes every 2 hours  Heat treatment includes hot packs or heat lamps  · Elevate your joint  Elevation helps reduce swelling and pain  Raise your joint above the level of your heart as often as you can  Prop your painful joint on pillows to keep it above your heart comfortably  · Go to physical or occupational therapy as directed  A physical therapist can teach you exercises to improve flexibility and range of motion  You may also be shown non-weight-bearing exercises that are safe for your joints, such as swimming  Exercise can help keep your joints flexible and reduce pain  An occupational therapist can help you learn to do your daily activities when your joints are stiff or sore  · Maintain a healthy weight  Extra weight puts increased pressure on your joints  Ask your healthcare provider what you should weigh  If you need to lose weight, he can help you create a weight loss program  Weight loss can help reduce pain and increase your ability to do your activities   The amount of exercise you do may vary each day, depending on your symptoms  · Wear flat or low-heeled shoes  This will help decrease pain and reduce pressure on your ankle, knee, and hip joints  · Use support devices as directed  You may be given splints to wear on your hands to help your joints rest and to decrease inflammation  While you sleep, use a pillow that is firm enough to support your neck and head  Other equipment  that may help you move and prevent falls:  · Orthotic shoes or insoles  help support your feet when you walk  · Crutches, a cane, or a walker  may help decrease your risk for falling  They also decrease stress on affected joints  · Devices to prevent falls  include raised toilet seats and bathtub bars to help you get up from sitting  Handrails can be placed in areas where you need balance and support  Follow up with your healthcare provider or rheumatologist as directed:  Write down your questions so you remember to ask them during your visits  © 2017 [employer] Information is for End User's use only and may not be sold, redistributed or otherwise used for commercial purposes  All illustrations and images included in CareNotes® are the copyrighted property of [employer]  or [employer]  The above information is an  only  It is not intended as medical advice for individual conditions or treatments  Talk to your doctor, nurse or pharmacist before following any medical regimen to see if it is safe and effective for you

## 2020-03-25 ENCOUNTER — TELEPHONE (OUTPATIENT)
Dept: GERIATRICS | Age: 80
End: 2020-03-25

## 2020-03-25 NOTE — TELEPHONE ENCOUNTER
Left message for daughter to cancel in office appt but would like to have a telephone or video visit

## 2020-03-31 NOTE — TELEPHONE ENCOUNTER
Left message on voice mail for patient asking him to contact the office to change his 4/2/20 office visit to a telephone or video visit

## 2020-04-01 ENCOUNTER — TELEPHONE (OUTPATIENT)
Dept: GERIATRICS | Age: 80
End: 2020-04-01

## 2020-04-07 ENCOUNTER — TELEMEDICINE (OUTPATIENT)
Dept: GERIATRICS | Age: 80
End: 2020-04-07
Payer: COMMERCIAL

## 2020-04-07 VITALS
TEMPERATURE: 97.9 F | WEIGHT: 207 LBS | BODY MASS INDEX: 27.31 KG/M2 | SYSTOLIC BLOOD PRESSURE: 154 MMHG | DIASTOLIC BLOOD PRESSURE: 97 MMHG

## 2020-04-07 DIAGNOSIS — N18.30 STAGE 3 CHRONIC KIDNEY DISEASE (HCC): ICD-10-CM

## 2020-04-07 DIAGNOSIS — N40.0 BENIGN PROSTATIC HYPERPLASIA WITHOUT LOWER URINARY TRACT SYMPTOMS: ICD-10-CM

## 2020-04-07 DIAGNOSIS — R15.9 INCONTINENCE OF FECES, UNSPECIFIED FECAL INCONTINENCE TYPE: ICD-10-CM

## 2020-04-07 DIAGNOSIS — N39.46 MIXED STRESS AND URGE URINARY INCONTINENCE: ICD-10-CM

## 2020-04-07 DIAGNOSIS — I10 ESSENTIAL HYPERTENSION: ICD-10-CM

## 2020-04-07 DIAGNOSIS — M25.562 CHRONIC PAIN OF BOTH KNEES: ICD-10-CM

## 2020-04-07 DIAGNOSIS — M25.561 CHRONIC PAIN OF BOTH KNEES: ICD-10-CM

## 2020-04-07 DIAGNOSIS — E78.49 OTHER HYPERLIPIDEMIA: ICD-10-CM

## 2020-04-07 DIAGNOSIS — F01.50 VASCULAR DEMENTIA WITHOUT BEHAVIORAL DISTURBANCE (HCC): Primary | ICD-10-CM

## 2020-04-07 DIAGNOSIS — H54.7 VISUAL IMPAIRMENT: ICD-10-CM

## 2020-04-07 DIAGNOSIS — G89.29 CHRONIC PAIN OF BOTH KNEES: ICD-10-CM

## 2020-04-07 PROBLEM — F03.90 DEMENTIA WITHOUT BEHAVIORAL DISTURBANCE (HCC): Status: ACTIVE | Noted: 2020-01-19

## 2020-04-07 PROBLEM — R32 URINARY INCONTINENCE: Status: ACTIVE | Noted: 2020-04-07

## 2020-04-07 PROCEDURE — 99215 OFFICE O/P EST HI 40 MIN: CPT | Performed by: STUDENT IN AN ORGANIZED HEALTH CARE EDUCATION/TRAINING PROGRAM

## 2020-04-07 RX ORDER — HYDRALAZINE HYDROCHLORIDE 25 MG/1
25 TABLET, FILM COATED ORAL 3 TIMES DAILY
Qty: 90 TABLET | Refills: 2 | Status: SHIPPED | OUTPATIENT
Start: 2020-04-07 | End: 2020-08-25

## 2020-04-07 RX ORDER — LABETALOL 200 MG/1
200 TABLET, FILM COATED ORAL
Qty: 90 TABLET | Refills: 1 | Status: SHIPPED | OUTPATIENT
Start: 2020-04-07 | End: 2020-10-13

## 2020-04-14 ENCOUNTER — TELEMEDICINE (OUTPATIENT)
Dept: GERIATRICS | Age: 80
End: 2020-04-14
Payer: COMMERCIAL

## 2020-04-14 VITALS — DIASTOLIC BLOOD PRESSURE: 90 MMHG | BODY MASS INDEX: 27.71 KG/M2 | WEIGHT: 210 LBS | SYSTOLIC BLOOD PRESSURE: 151 MMHG

## 2020-04-14 DIAGNOSIS — M25.561 CHRONIC PAIN OF BOTH KNEES: ICD-10-CM

## 2020-04-14 DIAGNOSIS — F01.50 VASCULAR DEMENTIA WITHOUT BEHAVIORAL DISTURBANCE (HCC): ICD-10-CM

## 2020-04-14 DIAGNOSIS — M25.562 CHRONIC PAIN OF BOTH KNEES: ICD-10-CM

## 2020-04-14 DIAGNOSIS — G89.29 CHRONIC PAIN OF BOTH KNEES: ICD-10-CM

## 2020-04-14 DIAGNOSIS — N40.0 BENIGN PROSTATIC HYPERPLASIA WITHOUT LOWER URINARY TRACT SYMPTOMS: ICD-10-CM

## 2020-04-14 DIAGNOSIS — E78.49 OTHER HYPERLIPIDEMIA: ICD-10-CM

## 2020-04-14 DIAGNOSIS — I10 ESSENTIAL HYPERTENSION: Primary | ICD-10-CM

## 2020-04-14 DIAGNOSIS — N39.46 MIXED STRESS AND URGE URINARY INCONTINENCE: ICD-10-CM

## 2020-04-14 PROCEDURE — G2012 BRIEF CHECK IN BY MD/QHP: HCPCS | Performed by: STUDENT IN AN ORGANIZED HEALTH CARE EDUCATION/TRAINING PROGRAM

## 2020-04-29 DIAGNOSIS — I10 ESSENTIAL HYPERTENSION: ICD-10-CM

## 2020-04-29 RX ORDER — SIMVASTATIN 20 MG
TABLET ORAL
Qty: 30 TABLET | Refills: 2 | Status: SHIPPED | OUTPATIENT
Start: 2020-04-29 | End: 2020-08-12

## 2020-06-04 DIAGNOSIS — M1A.09X0 CHRONIC GOUT OF MULTIPLE SITES, UNSPECIFIED CAUSE: ICD-10-CM

## 2020-06-04 DIAGNOSIS — I10 ESSENTIAL HYPERTENSION: ICD-10-CM

## 2020-06-04 RX ORDER — AMLODIPINE BESYLATE 10 MG/1
10 TABLET ORAL DAILY
Qty: 30 TABLET | Refills: 0 | Status: SHIPPED | OUTPATIENT
Start: 2020-06-04 | End: 2020-07-09 | Stop reason: SDUPTHER

## 2020-06-04 RX ORDER — ALLOPURINOL 300 MG/1
300 TABLET ORAL DAILY
Qty: 30 TABLET | Refills: 0 | Status: SHIPPED | OUTPATIENT
Start: 2020-06-04 | End: 2020-07-09 | Stop reason: SDUPTHER

## 2020-07-07 ENCOUNTER — OFFICE VISIT (OUTPATIENT)
Dept: UROLOGY | Facility: CLINIC | Age: 80
End: 2020-07-07
Payer: COMMERCIAL

## 2020-07-07 VITALS
BODY MASS INDEX: 29.03 KG/M2 | HEART RATE: 90 BPM | DIASTOLIC BLOOD PRESSURE: 98 MMHG | HEIGHT: 73 IN | WEIGHT: 219 LBS | SYSTOLIC BLOOD PRESSURE: 160 MMHG

## 2020-07-07 DIAGNOSIS — N40.0 BENIGN PROSTATIC HYPERPLASIA WITHOUT LOWER URINARY TRACT SYMPTOMS: Primary | ICD-10-CM

## 2020-07-07 LAB — POST-VOID RESIDUAL VOLUME, ML POC: 75 ML

## 2020-07-07 PROCEDURE — 51741 ELECTRO-UROFLOWMETRY FIRST: CPT | Performed by: UROLOGY

## 2020-07-07 PROCEDURE — 99214 OFFICE O/P EST MOD 30 MIN: CPT | Performed by: UROLOGY

## 2020-07-07 PROCEDURE — 51798 US URINE CAPACITY MEASURE: CPT | Performed by: UROLOGY

## 2020-07-07 RX ORDER — FINASTERIDE 5 MG/1
5 TABLET, FILM COATED ORAL DAILY
Qty: 90 TABLET | Refills: 3 | Status: SHIPPED | OUTPATIENT
Start: 2020-07-07 | End: 2021-06-04

## 2020-07-07 NOTE — LETTER
July 7, 2020     Cem Ewing MD  [address] [address]    Patient: Sherry Posadas   YOB: 1940   Date of Visit: 7/7/2020       Dear Dr Beltre Congress: Thank you for referring Juilann Crum to me for evaluation  Below are my notes for this consultation  If you have questions, please do not hesitate to call me  I look forward to following your patient along with you  Sincerely,        Marilia Pollack MD        CC: MD Marilia Ferreira MD  7/7/2020  9:45 AM  Incomplete  Referring Physician: Cem Ewing MD  A copy of this note was sent to the referring physician  {[email or web address]:[id number]}        Assessment and plan:       1  BPH  - status post Uro lift ( Dr Wendy Hansen)  - has continued on dual medical therapy tamsulosin and finasteride    2  History of urinary retention and hematuria  - completely resolved following Uro lift  -  Emptying bladder to completion     patient is doing very well following his Uro lift procedure with Dr Wendy Hansen  He is currently emptying his bladder to completion of very happy with his lower urinary tract symptoms  Based upon his noninvasive urodynamics voiding assessment performed today and is clinical response I have recommended stopping his alpha-blocker  I did recommend continuing the 5 alpha reductase inhibitor indefinitely especially noting his 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  He is tolerating this well and is amenable to the plan  Refills were provided on his finasteride for the next year  He will follow up with his primary care physician going forward we are happy to re-evaluate him again at any time should the need arise    Marilia Pollack MD      Chief Complaint      BPH follow-up      History of Present Illness     Sherry Posadas is a 67 y o  Male patient of my colleague Dr Wendy Hansen    He previously underwent the Uro lift procedure performed for the indications of urinary retention  He had a history of gross hematuria from the level of the prostate  He tolerated the procedure very well and has been free of any catheter dependent since that time  He has been continued on tamsulosin and finasteride  He is currently completely asymptomatic  Detailed Urologic History     - please refer to HPI    Review of Systems     Review of Systems   Constitutional: Negative for activity change and fatigue  HENT: Negative for congestion  Eyes: Negative for visual disturbance  Respiratory: Negative for shortness of breath and wheezing  Cardiovascular: Negative for chest pain and leg swelling  Gastrointestinal: Negative for abdominal pain  Genitourinary: Negative for flank pain, hematuria and urgency  Musculoskeletal: Negative for back pain  Allergic/Immunologic: Negative for immunocompromised state  Neurological: Negative for dizziness and numbness  Psychiatric/Behavioral: Negative for dysphoric mood  All other systems reviewed and are negative  AUA SYMPTOM SCORE      Most Recent Value   AUA SYMPTOM SCORE   How often have you had a sensation of not emptying your bladder completely after you finished urinating? 0   How often have you had to urinate again less than two hours after you finished urinating? 2   How often have you found you stopped and started again several times when you urinate?  0   How often have you found it difficult to postpone urination? 0   How often have you had a weak urinary stream?  0   How often have you had to push or strain to begin urination? 0   How many times did you most typically get up to urinate from the time you went to bed at night until the time you got up in the morning?   1   Quality of Life: If you were to spend the rest of your life with your urinary condition just the way it is now, how would you feel about that?  0   AUA SYMPTOM SCORE  3              Allergies     Allergies   Allergen Reactions    Strawberry C [Ascorbate] Hives       Physical Exam     Physical Exam   Constitutional: He is oriented to person, place, and time  He appears well-developed and well-nourished  No distress  HENT:   Head: Normocephalic and atraumatic  Eyes: EOM are normal    Neck: Normal range of motion  Cardiovascular:      Bilateral lower extremity edema   Pulmonary/Chest: Effort normal and breath sounds normal    Abdominal: Soft  Musculoskeletal: Normal range of motion  Neurological: He is alert and oriented to person, place, and time  Skin: Skin is warm  Psychiatric: He has a normal mood and affect  His behavior is normal            Vital Signs  There were no vitals filed for this visit        Current Medications       Current Outpatient Medications:     allopurinol (ZYLOPRIM) 300 mg tablet, Take 1 tablet (300 mg total) by mouth daily, Disp: 30 tablet, Rfl: 0    amLODIPine (NORVASC) 10 mg tablet, Take 1 tablet (10 mg total) by mouth daily, Disp: 30 tablet, Rfl: 0    finasteride (PROSCAR) 5 mg tablet, Take 1 tablet (5 mg total) by mouth daily for 30 doses, Disp: 30 tablet, Rfl: 11    finasteride (PROSCAR) 5 mg tablet, , Disp: , Rfl:     hydrALAZINE (APRESOLINE) 25 mg tablet, Take 1 tablet (25 mg total) by mouth 3 (three) times a day, Disp: 90 tablet, Rfl: 2    labetalol (NORMODYNE) 200 mg tablet, Take 1 tablet (200 mg total) by mouth daily at bedtime, Disp: 90 tablet, Rfl: 1    Multiple Vitamins-Minerals (MULTIVITAMIN WITH MINERALS) tablet, Take 1 tablet by mouth daily, Disp: , Rfl:     omega-3-acid ethyl esters (LOVAZA) 1 g capsule, Take 1 g by mouth daily, Disp: , Rfl:     simvastatin (ZOCOR) 20 mg tablet, take 1 tablet by mouth daily at bedtime, Disp: 30 tablet, Rfl: 2    tamsulosin (FLOMAX) 0 4 mg, Take 1 capsule (0 4 mg total) by mouth daily with dinner for 30 doses, Disp: 30 capsule, Rfl: 11      Active Problems     Patient Active Problem List   Diagnosis    Essential hypertension    Pyelonephritis    Other hyperlipidemia    MILE (acute kidney injury) ([place] [address] )    Hematuria    Metabolic acidosis    Acute blood loss anemia    Azotemia    Ambulatory dysfunction    BPH (benign prostatic hyperplasia)    Abdominal distention    Chronic pain of both knees    Stool incontinence    Stage 3 chronic kidney disease (HCC)    Urinary retention    Visual impairment    Gross hematuria    [facility]parathyroidism [facility]    Vascular dementia without behavioral disturbance ([place] [address] )    Urinary incontinence         Past Medical History     Past Medical History:   Diagnosis Date    Anemia     BPH (benign prostatic hyperplasia)     Chronic kidney disease     Dementia ([place] [address] )     Hyperlipidemia     Hypertension     Incontinence     Osteoarthritis          Surgical History     Past Surgical History:   Procedure Laterality Date    COLON SURGERY      COLONOSCOPY      FL RETROGRADE PYELOGRAM  12/13/2019    [place] CYSTOURETHRO W/IMPLANT N/A 12/13/2019    Procedure: CYSTOSCOPY WITH INSERTION UROLIFT, BILATERAL RETROGRADE PYELOGRAM;  Surgeon: Cosme Cueto MD;  [facility]: [facility];  Service: Urology    TUMOR REMOVAL           Family History     Family History   Problem Relation Age of Onset    Heart failure Brother     Dementia Maternal Uncle          Social History     Social History     Social History     Tobacco Use   Smoking Status Passive Smoke Exposure - Never Smoker   Smokeless Tobacco Never Used         Pertinent Lab Values     Lab Results   Component Value Date    CREATININE 1 83 (H) 12/04/2019       No results found for: PSA    @RESULTRCNT(1H])@      Pertinent Imaging      - n/a    Portions of the record may have been created with voice recognition software   Occasional wrong word or "sound a like" substitutions may have occurred due to the inherent limitations of voice recognition software   Read the chart carefully and recognize, using context, where substitutions have occurred  Penny Leija MD  7/7/2020  9:37 AM  Sign at close encounter  Referring Physician: Victoria Clark MD  A copy of this note was sent to the referring physician  {[email or web address]:[id number]}        Assessment and plan:       1  BPH  - status post Uro lift ( Dr Ciro Lisa)  -    2   -  -     3   -  -     Penny Leija MD      Chief Complaint     ***      History of Present Illness     Dory Angel is a 67 y o  Detailed Urologic History     - please refer to HPI    Review of Systems     Review of Systems    AUA SYMPTOM SCORE      Most Recent Value   AUA SYMPTOM SCORE   How often have you had a sensation of not emptying your bladder completely after you finished urinating? 0   How often have you had to urinate again less than two hours after you finished urinating? 2   How often have you found you stopped and started again several times when you urinate?  0   How often have you found it difficult to postpone urination? 0   How often have you had a weak urinary stream?  0   How often have you had to push or strain to begin urination? 0   How many times did you most typically get up to urinate from the time you went to bed at night until the time you got up in the morning? 1   Quality of Life: If you were to spend the rest of your life with your urinary condition just the way it is now, how would you feel about that?  0   AUA SYMPTOM SCORE  3              Allergies     Allergies   Allergen Reactions    Strawberry C [Ascorbate] Hives       Physical Exam     Physical Exam        Vital Signs  There were no vitals filed for this visit        Current Medications       Current Outpatient Medications:     allopurinol (ZYLOPRIM) 300 mg tablet, Take 1 tablet (300 mg total) by mouth daily, Disp: 30 tablet, Rfl: 0    amLODIPine (NORVASC) 10 mg tablet, Take 1 tablet (10 mg total) by mouth daily, Disp: 30 tablet, Rfl: 0    finasteride (PROSCAR) 5 mg tablet, Take 1 tablet (5 mg total) by mouth daily for 30 doses, Disp: 30 tablet, Rfl: 11    finasteride (PROSCAR) 5 mg tablet, , Disp: , Rfl:     hydrALAZINE (APRESOLINE) 25 mg tablet, Take 1 tablet (25 mg total) by mouth 3 (three) times a day, Disp: 90 tablet, Rfl: 2    labetalol (NORMODYNE) 200 mg tablet, Take 1 tablet (200 mg total) by mouth daily at bedtime, Disp: 90 tablet, Rfl: 1    Multiple Vitamins-Minerals (MULTIVITAMIN WITH MINERALS) tablet, Take 1 tablet by mouth daily, Disp: , Rfl:     omega-3-acid ethyl esters (LOVAZA) 1 g capsule, Take 1 g by mouth daily, Disp: , Rfl:     simvastatin (ZOCOR) 20 mg tablet, take 1 tablet by mouth daily at bedtime, Disp: 30 tablet, Rfl: 2    tamsulosin (FLOMAX) 0 4 mg, Take 1 capsule (0 4 mg total) by mouth daily with dinner for 30 doses, Disp: 30 capsule, Rfl: 11      Active Problems     Patient Active Problem List   Diagnosis    Essential hypertension    Pyelonephritis    Other hyperlipidemia    MILE (acute kidney injury) ([address]    Hematuria    Metabolic acidosis    Acute blood loss anemia    Azotemia    Ambulatory dysfunction    BPH (benign prostatic hyperplasia)    Abdominal distention    Chronic pain of both knees    Stool incontinence    Stage 3 chronic kidney disease (HCC)    Urinary retention    Visual impairment    Gross hematuria    Secondary hyperparathyroidism ([address]    Vascular dementia without behavioral disturbance ([address] )    Urinary incontinence         Past Medical History     Past Medical History:   Diagnosis Date    Anemia     BPH (benign prostatic hyperplasia)     Chronic kidney disease     Dementia ([address]     Hyperlipidemia     Hypertension     Incontinence     Osteoarthritis          Surgical History     Past Surgical History:   Procedure Laterality Date    COLON SURGERY      COLONOSCOPY      FL RETROGRADE PYELOGRAM  12/13/2019    [place] CYSTOURETHRO W/IMPLANT N/A 12/13/2019    Procedure: CYSTOSCOPY WITH INSERTION UROLIFT, BILATERAL RETROGRADE PYELOGRAM; Surgeon: Pepito Magana MD;  [facility];  Service: Urology    TUMOR REMOVAL           Family History     Family History   Problem Relation Age of Onset    Heart failure Brother     Dementia Maternal Uncle          Social History     Social History     Social History     Tobacco Use   Smoking Status Passive Smoke Exposure - Never Smoker   Smokeless Tobacco Never Used         Pertinent Lab Values     Lab Results   Component Value Date    CREATININE 1 83 (H) 12/04/2019       No results found for: PSA    @RESULTRCNT(1H])@      Pertinent Imaging      - n/a    Portions of the record may have been created with voice recognition software   Occasional wrong word or "sound a like" substitutions may have occurred due to the inherent limitations of voice recognition software   Read the chart carefully and recognize, using context, where substitutions have occurred

## 2020-07-07 NOTE — PROGRESS NOTES
Referring Physician: Annabelle Florez MD  A copy of this note was sent to the referring physician  Diagnoses and all orders for this visit:    Benign prostatic hyperplasia without lower urinary tract symptoms  -     POCT Measure PVR  -     finasteride (PROSCAR) 5 mg tablet; Take 1 tablet (5 mg total) by mouth daily            Assessment and plan:       1  BPH  - status post Uro lift ( Dr Hali Valle)  - has continued on dual medical therapy tamsulosin and finasteride    2  History of urinary retention and hematuria  - completely resolved following Uro lift  -  Emptying bladder to completion     patient is doing very well following his Uro lift procedure with Dr Hali Valle  He is currently emptying his bladder to completion of very happy with his lower urinary tract symptoms  Based upon his noninvasive urodynamics voiding assessment performed today and is clinical response I have recommended stopping his alpha-blocker  I did recommend continuing the 5 alpha reductase inhibitor indefinitely especially noting his 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  He is tolerating this well and is amenable to the plan  Refills were provided on his finasteride for the next year  He will follow up with his primary care physician going forward we are happy to re-evaluate him again at any time should the need arise    Pedro Boswell MD      Chief Complaint      BPH follow-up      History of Present Illness     Benjamin East is a 67 y o  Male patient of my colleague Dr Hali Valle  He previously underwent the Uro lift procedure performed for the indications of urinary retention  He had a history of gross hematuria from the level of the prostate  He tolerated the procedure very well and has been free of any catheter dependent since that time  He has been continued on tamsulosin and finasteride  He is currently completely asymptomatic      Detailed Urologic History     - please refer to HPI    Review of Systems     Review of Systems   Constitutional: Negative for activity change and fatigue  HENT: Negative for congestion  Eyes: Negative for visual disturbance  Respiratory: Negative for shortness of breath and wheezing  Cardiovascular: Negative for chest pain and leg swelling  Gastrointestinal: Negative for abdominal pain  Genitourinary: Negative for flank pain, hematuria and urgency  Musculoskeletal: Negative for back pain  Allergic/Immunologic: Negative for immunocompromised state  Neurological: Negative for dizziness and numbness  Psychiatric/Behavioral: Negative for dysphoric mood  All other systems reviewed and are negative  AUA SYMPTOM SCORE      Most Recent Value   AUA SYMPTOM SCORE   How often have you had a sensation of not emptying your bladder completely after you finished urinating? 0   How often have you had to urinate again less than two hours after you finished urinating? 2   How often have you found you stopped and started again several times when you urinate?  0   How often have you found it difficult to postpone urination? 0   How often have you had a weak urinary stream?  0   How often have you had to push or strain to begin urination? 0   How many times did you most typically get up to urinate from the time you went to bed at night until the time you got up in the morning? 1   Quality of Life: If you were to spend the rest of your life with your urinary condition just the way it is now, how would you feel about that?  0   AUA SYMPTOM SCORE  3              Allergies     Allergies   Allergen Reactions    Strawberry C [Ascorbate] Hives       Physical Exam     Physical Exam   Constitutional: He is oriented to person, place, and time  He appears well-developed and well-nourished  No distress  HENT:   Head: Normocephalic and atraumatic  Eyes: EOM are normal    Neck: Normal range of motion     Cardiovascular:      Bilateral lower extremity edema Pulmonary/Chest: Effort normal and breath sounds normal    Abdominal: Soft  Musculoskeletal: Normal range of motion  Neurological: He is alert and oriented to person, place, and time  Skin: Skin is warm  Psychiatric: He has a normal mood and affect  His behavior is normal            Vital Signs  There were no vitals filed for this visit        Current Medications       Current Outpatient Medications:     allopurinol (ZYLOPRIM) 300 mg tablet, Take 1 tablet (300 mg total) by mouth daily, Disp: 30 tablet, Rfl: 0    amLODIPine (NORVASC) 10 mg tablet, Take 1 tablet (10 mg total) by mouth daily, Disp: 30 tablet, Rfl: 0    finasteride (PROSCAR) 5 mg tablet, Take 1 tablet (5 mg total) by mouth daily for 30 doses, Disp: 30 tablet, Rfl: 11    finasteride (PROSCAR) 5 mg tablet, , Disp: , Rfl:     hydrALAZINE (APRESOLINE) 25 mg tablet, Take 1 tablet (25 mg total) by mouth 3 (three) times a day, Disp: 90 tablet, Rfl: 2    labetalol (NORMODYNE) 200 mg tablet, Take 1 tablet (200 mg total) by mouth daily at bedtime, Disp: 90 tablet, Rfl: 1    Multiple Vitamins-Minerals (MULTIVITAMIN WITH MINERALS) tablet, Take 1 tablet by mouth daily, Disp: , Rfl:     omega-3-acid ethyl esters (LOVAZA) 1 g capsule, Take 1 g by mouth daily, Disp: , Rfl:     simvastatin (ZOCOR) 20 mg tablet, take 1 tablet by mouth daily at bedtime, Disp: 30 tablet, Rfl: 2    tamsulosin (FLOMAX) 0 4 mg, Take 1 capsule (0 4 mg total) by mouth daily with dinner for 30 doses, Disp: 30 capsule, Rfl: 11      Active Problems     Patient Active Problem List   Diagnosis    Essential hypertension    Pyelonephritis    Other hyperlipidemia    [facility]    Hematuria    Metabolic acidosis    Acute blood loss anemia    Azotemia    Ambulatory dysfunction    BPH (benign prostatic hyperplasia)    Abdominal distention    Chronic pain of both knees    Stool incontinence    Stage 3 chronic kidney disease (HCC)    Urinary retention  Visual impairment    Gross hematuria    [facility]    Vascular dementia without behavioral disturbance [facility]    Urinary incontinence         Past Medical History     Past Medical History:   Diagnosis Date    Anemia     BPH (benign prostatic hyperplasia)     Chronic kidney disease     Dementia [facility]     Hyperlipidemia     Hypertension     Incontinence     Osteoarthritis          Surgical History     Past Surgical History:   Procedure Laterality Date    COLON SURGERY      COLONOSCOPY      FL RETROGRADE PYELOGRAM  12/13/2019    [place] CYSTOURETHRO W/IMPLANT N/A 12/13/2019    Procedure: CYSTOSCOPY WITH INSERTION UROLIFT, BILATERAL RETROGRADE PYELOGRAM;  Surgeon: Rufino Layton MD;  [facility];  Service: Urology    TUMOR REMOVAL           Family History     Family History   Problem Relation Age of Onset    Heart failure Brother     Dementia Maternal Uncle          Social History     Social History     Social History     Tobacco Use   Smoking Status Passive Smoke Exposure - Never Smoker   Smokeless Tobacco Never Used         Pertinent Lab Values     Lab Results   Component Value Date    CREATININE 1 83 (H) 12/04/2019       No results found for: PSA    @RESULTRCNT(1H])@      Pertinent Imaging      - n/a    Portions of the record may have been created with voice recognition software   Occasional wrong word or "sound a like" substitutions may have occurred due to the inherent limitations of voice recognition software   Read the chart carefully and recognize, using context, where substitutions have occurred

## 2020-07-07 NOTE — LETTER
July 7, 2020     Venu Gomez MD  [address] [address]    Patient: Clovis Mcdaniel   YOB: 1940   Date of Visit: 7/7/2020       Dear Dr Brown Boards: Thank you for referring Jose Welch to me for evaluation  Below are my notes for this consultation  If you have questions, please do not hesitate to call me  I look forward to following your patient along with you  Sincerely,        Sebastián Martines MD        CC: MD Sebastián Velazquez MD  7/7/2020  9:46 AM  Sign at close encounter  Referring Physician: Venu Gomez MD  A copy of this note was sent to the referring physician  Diagnoses and all orders for this visit:    Benign prostatic hyperplasia without lower urinary tract symptoms  -     POCT Measure PVR  -     finasteride (PROSCAR) 5 mg tablet; Take 1 tablet (5 mg total) by mouth daily            Assessment and plan:       1  BPH  - status post Uro lift ( Dr Joesph Lomax)  - has continued on dual medical therapy tamsulosin and finasteride    2  History of urinary retention and hematuria  - completely resolved following Uro lift  -  Emptying bladder to completion     patient is doing very well following his Uro lift procedure with Dr Joesph Lomax  He is currently emptying his bladder to completion of very happy with his lower urinary tract symptoms  Based upon his noninvasive urodynamics voiding assessment performed today and is clinical response I have recommended stopping his alpha-blocker  I did recommend continuing the 5 alpha reductase inhibitor indefinitely especially noting his 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  He is tolerating this well and is amenable to the plan  Refills were provided on his finasteride for the next year    He will follow up with his primary care physician going forward we are happy to re-evaluate him again at any time should the need arise    Sebastián Martines MD      Chief Complaint      BPH follow-up      History of Present Illness     Jefferson Guzman is a 67 y o  Male patient of my colleague Dr Kaden Barrientos  He previously underwent the Uro lift procedure performed for the indications of urinary retention  He had a history of gross hematuria from the level of the prostate  He tolerated the procedure very well and has been free of any catheter dependent since that time  He has been continued on tamsulosin and finasteride  He is currently completely asymptomatic  Detailed Urologic History     - please refer to HPI    Review of Systems     Review of Systems   Constitutional: Negative for activity change and fatigue  HENT: Negative for congestion  Eyes: Negative for visual disturbance  Respiratory: Negative for shortness of breath and wheezing  Cardiovascular: Negative for chest pain and leg swelling  Gastrointestinal: Negative for abdominal pain  Genitourinary: Negative for flank pain, hematuria and urgency  Musculoskeletal: Negative for back pain  Allergic/Immunologic: Negative for immunocompromised state  Neurological: Negative for dizziness and numbness  Psychiatric/Behavioral: Negative for dysphoric mood  All other systems reviewed and are negative  AUA SYMPTOM SCORE      Most Recent Value   AUA SYMPTOM SCORE   How often have you had a sensation of not emptying your bladder completely after you finished urinating? 0   How often have you had to urinate again less than two hours after you finished urinating? 2   How often have you found you stopped and started again several times when you urinate?  0   How often have you found it difficult to postpone urination? 0   How often have you had a weak urinary stream?  0   How often have you had to push or strain to begin urination? 0   How many times did you most typically get up to urinate from the time you went to bed at night until the time you got up in the morning?   1   Quality of Life: If you were to spend the rest of your life with your urinary condition just the way it is now, how would you feel about that?  0   AUA SYMPTOM SCORE  3              Allergies     Allergies   Allergen Reactions    Strawberry C [Ascorbate] Hives       Physical Exam     Physical Exam   Constitutional: He is oriented to person, place, and time  He appears well-developed and well-nourished  No distress  HENT:   Head: Normocephalic and atraumatic  Eyes: EOM are normal    Neck: Normal range of motion  Cardiovascular:      Bilateral lower extremity edema   Pulmonary/Chest: Effort normal and breath sounds normal    Abdominal: Soft  Musculoskeletal: Normal range of motion  Neurological: He is alert and oriented to person, place, and time  Skin: Skin is warm  Psychiatric: He has a normal mood and affect  His behavior is normal            Vital Signs  There were no vitals filed for this visit        Current Medications       Current Outpatient Medications:     allopurinol (ZYLOPRIM) 300 mg tablet, Take 1 tablet (300 mg total) by mouth daily, Disp: 30 tablet, Rfl: 0    amLODIPine (NORVASC) 10 mg tablet, Take 1 tablet (10 mg total) by mouth daily, Disp: 30 tablet, Rfl: 0    finasteride (PROSCAR) 5 mg tablet, Take 1 tablet (5 mg total) by mouth daily for 30 doses, Disp: 30 tablet, Rfl: 11    finasteride (PROSCAR) 5 mg tablet, , Disp: , Rfl:     hydrALAZINE (APRESOLINE) 25 mg tablet, Take 1 tablet (25 mg total) by mouth 3 (three) times a day, Disp: 90 tablet, Rfl: 2    labetalol (NORMODYNE) 200 mg tablet, Take 1 tablet (200 mg total) by mouth daily at bedtime, Disp: 90 tablet, Rfl: 1    Multiple Vitamins-Minerals (MULTIVITAMIN WITH MINERALS) tablet, Take 1 tablet by mouth daily, Disp: , Rfl:     omega-3-acid ethyl esters (LOVAZA) 1 g capsule, Take 1 g by mouth daily, Disp: , Rfl:     simvastatin (ZOCOR) 20 mg tablet, take 1 tablet by mouth daily at bedtime, Disp: 30 tablet, Rfl: 2    tamsulosin (FLOMAX) 0 4 mg, Take 1 capsule (0 4 mg total) by mouth daily with dinner for 30 doses, Disp: 30 capsule, Rfl: 11      Active Problems     Patient Active Problem List   Diagnosis    Essential hypertension    Pyelonephritis    Other hyperlipidemia    [facility]    Hematuria    Metabolic acidosis    Acute blood loss anemia    Azotemia    Ambulatory dysfunction    BPH (benign prostatic hyperplasia)    Abdominal distention    Chronic pain of both knees    Stool incontinence    Stage 3 chronic kidney disease (HCC)    Urinary retention    Visual impairment    Gross hematuria    Secondary hyperparathyroidism ([facility] )    Vascular dementia without behavioral disturbance ([facility] )    Urinary incontinence         Past Medical History     Past Medical History:   Diagnosis Date    Anemia     BPH (benign prostatic hyperplasia)     Chronic kidney disease     Dementia ([place] [address] )     Hyperlipidemia     Hypertension     Incontinence     Osteoarthritis          Surgical History     Past Surgical History:   Procedure Laterality Date    COLON SURGERY      COLONOSCOPY      FL RETROGRADE PYELOGRAM  12/13/2019    WV CYSTOURETHRO W/IMPLANT N/A 12/13/2019    Procedure: CYSTOSCOPY WITH INSERTION Charlene Lease, BILATERAL RETROGRADE PYELOGRAM;  Surgeon: Neptali Marie MD;  Location: [facility];  Service: Urology    TUMOR REMOVAL           Family History     Family History   Problem Relation Age of Onset    Heart failure Brother     Dementia Maternal Uncle          Social History     Social History     Social History     Tobacco Use   Smoking Status Passive Smoke Exposure - Never Smoker   Smokeless Tobacco Never Used         Pertinent Lab Values     Lab Results   Component Value Date    CREATININE 1 83 (H) 12/04/2019       No results found for: PSA    @RESULTRCNT(1H])@      Pertinent Imaging      - n/a    Portions of the record may have been created with voice recognition software   Occasional wrong word or "sound a like" substitutions may have occurred due to the inherent limitations of voice recognition software   Read the chart carefully and recognize, using context, where substitutions have occurred

## 2020-07-09 DIAGNOSIS — M1A.09X0 CHRONIC GOUT OF MULTIPLE SITES, UNSPECIFIED CAUSE: ICD-10-CM

## 2020-07-09 DIAGNOSIS — I10 ESSENTIAL HYPERTENSION: ICD-10-CM

## 2020-07-10 RX ORDER — ALLOPURINOL 300 MG/1
300 TABLET ORAL DAILY
Qty: 30 TABLET | Refills: 2 | OUTPATIENT
Start: 2020-07-10 | End: 2020-10-14

## 2020-07-10 RX ORDER — AMLODIPINE BESYLATE 10 MG/1
10 TABLET ORAL DAILY
Qty: 30 TABLET | Refills: 2 | OUTPATIENT
Start: 2020-07-10 | End: 2020-07-11

## 2020-07-10 NOTE — TELEPHONE ENCOUNTER
Phoned in allopurinol and amlodipine to [facility] per the verbal direction of Dr Shannan Mckenna

## 2020-07-11 DIAGNOSIS — I10 ESSENTIAL HYPERTENSION: ICD-10-CM

## 2020-07-11 RX ORDER — AMLODIPINE BESYLATE 10 MG/1
TABLET ORAL
Qty: 30 TABLET | Refills: 2 | Status: SHIPPED | OUTPATIENT
Start: 2020-07-11 | End: 2021-01-13

## 2020-07-13 ENCOUNTER — TELEPHONE (OUTPATIENT)
Dept: GERIATRICS | Age: 80
End: 2020-07-13

## 2020-07-13 NOTE — TELEPHONE ENCOUNTER
Left voice mail message asking daughter Porter Schmidt to call the office  Her father is overdue for a PCP office visit

## 2020-08-12 DIAGNOSIS — I10 ESSENTIAL HYPERTENSION: ICD-10-CM

## 2020-08-12 RX ORDER — SIMVASTATIN 20 MG
TABLET ORAL
Qty: 30 TABLET | Refills: 2 | Status: SHIPPED | OUTPATIENT
Start: 2020-08-12 | End: 2020-11-23

## 2020-08-25 DIAGNOSIS — I10 ESSENTIAL HYPERTENSION: ICD-10-CM

## 2020-08-25 RX ORDER — HYDRALAZINE HYDROCHLORIDE 25 MG/1
TABLET, FILM COATED ORAL
Qty: 90 TABLET | Refills: 2 | Status: SHIPPED | OUTPATIENT
Start: 2020-08-25 | End: 2020-09-18 | Stop reason: DRUGHIGH

## 2020-09-04 ENCOUNTER — APPOINTMENT (OUTPATIENT)
Dept: RADIOLOGY | Facility: CLINIC | Age: 80
End: 2020-09-04
Payer: COMMERCIAL

## 2020-09-04 ENCOUNTER — OFFICE VISIT (OUTPATIENT)
Dept: GERIATRICS | Age: 80
End: 2020-09-04
Payer: COMMERCIAL

## 2020-09-04 VITALS
HEART RATE: 81 BPM | BODY MASS INDEX: 31.67 KG/M2 | OXYGEN SATURATION: 96 % | RESPIRATION RATE: 18 BRPM | DIASTOLIC BLOOD PRESSURE: 60 MMHG | WEIGHT: 221.2 LBS | HEIGHT: 70 IN | TEMPERATURE: 98 F | SYSTOLIC BLOOD PRESSURE: 138 MMHG

## 2020-09-04 DIAGNOSIS — N40.0 BENIGN PROSTATIC HYPERPLASIA WITHOUT LOWER URINARY TRACT SYMPTOMS: ICD-10-CM

## 2020-09-04 DIAGNOSIS — F01.50 VASCULAR DEMENTIA WITHOUT BEHAVIORAL DISTURBANCE (HCC): ICD-10-CM

## 2020-09-04 DIAGNOSIS — I10 ESSENTIAL HYPERTENSION: ICD-10-CM

## 2020-09-04 DIAGNOSIS — R06.00 DYSPNEA, UNSPECIFIED TYPE: Primary | ICD-10-CM

## 2020-09-04 DIAGNOSIS — E78.49 OTHER HYPERLIPIDEMIA: ICD-10-CM

## 2020-09-04 DIAGNOSIS — M25.561 CHRONIC PAIN OF BOTH KNEES: ICD-10-CM

## 2020-09-04 DIAGNOSIS — G89.29 CHRONIC PAIN OF BOTH KNEES: ICD-10-CM

## 2020-09-04 DIAGNOSIS — M25.562 CHRONIC PAIN OF BOTH KNEES: ICD-10-CM

## 2020-09-04 DIAGNOSIS — N18.30 STAGE 3 CHRONIC KIDNEY DISEASE (HCC): ICD-10-CM

## 2020-09-04 DIAGNOSIS — R60.0 BILATERAL LEG EDEMA: ICD-10-CM

## 2020-09-04 DIAGNOSIS — R06.00 DYSPNEA, UNSPECIFIED TYPE: ICD-10-CM

## 2020-09-04 PROCEDURE — 71046 X-RAY EXAM CHEST 2 VIEWS: CPT

## 2020-09-04 PROCEDURE — 99215 OFFICE O/P EST HI 40 MIN: CPT | Performed by: STUDENT IN AN ORGANIZED HEALTH CARE EDUCATION/TRAINING PROGRAM

## 2020-09-04 RX ORDER — FUROSEMIDE 20 MG/1
20 TABLET ORAL DAILY
Qty: 20 TABLET | Refills: 0 | Status: SHIPPED | OUTPATIENT
Start: 2020-09-04 | End: 2020-09-18 | Stop reason: SDUPTHER

## 2020-09-04 NOTE — PROGRESS NOTES
[facility]  [address], [address] [facility], [address] [facility]     PRIMARY CARE PRACTICE FOR OLDER ADULTS  Follow-up     NAME: Julia Hernandez  AGE: 79 y o  SEX: male     DATE OF ENCOUNTER:  09/04/2020      Assessment/Plan:        Dyspnea on exertion   · Per patient's daughter, patient's Josephine Ramp has been going on for the past couple of weeks, in addition to his bilateral leg swelling   · Will get CXR  · BNP ordered   · Begin 5 day course of Lasix  · Echocardiogram in October of 2019 with normal systolic function, EF estimated to be 60%, and Grade 1 diastolic dysfunction  Will repeat Echocardiogram   · Patient's daughter was given ED precautions  Vascular Dementia   · Stable   · Recommend patient increase physical activity  · Continue staying mentally active with activities such as crossword puzzles  · Redirection of unwanted behaviors  · Will continue managing chronic conditions   · Maintain fall precautions     Hypertension  · BP today adequate at 138/60  · Continue on Hydralazine (25mg tid), Labetalol (200mg qd) and Norvasc (10mg qd)  · Continue low sodium/heart healthy diet    Bilateral leg edema   · Begin taking Lasix (20mg qd) X 5 days  · Will check CMP after treatment complete  · Rest of plan as outline above for principal problem     Hyperlipidemia   · Lipid panel -wnl October of 2019  · Continue daily Simvastatin and heart healthy diet  · Repeat lipid panel ordered today    Stage 3 Chronic Kidney Disease  · GFR of 40 and Creatinine  Of 1 8 in December of 2019    · Continue avoiding nephrotoxic agents such Aleve and Motrin  · Avoid hypotension  · Repeat CMP ordered today    BPH  · Stable  · Continue with Finasteride  · Follow up with Urology as scheduled  · Will continue to monitor     Chronic pain of both knees  · Regularly scheduled Tylenol   · Avoid NSAIDS given hx of CKD3  · Maintain fall precautions   · Consider Physical Therapy per Sports Medicine recommendations Subjective:      Patient ID: Birgit Fraser is a 79 y o  male  HPI  Patient presents with his daughter, Anthony Hernandez, who is his primary caregiver  Patient's daughter reports that patient has been having increased swelling in bilateral lower extremities for the past few weeks  She reports that swelling has significantly decreased since she began limiting the amount of sodium in patient's diet  She also reports that she has noticed patient is breathing more loudly that normal  Patient denies shortness of breath and also denies chest pain and chest tightness  Daughter reports that patient's breathing gets worse with walking  Per chart review, patient has gained 15 pounds in the past few months  Patient's daughter reports that she normally check patient BP a couple of times a week  The systolic BP is always below 140, however she cannot recall the d diastolic BP  Patient is currently on 3 drug antihypertensive regimen and patient's daughter reports that she is the one dispensing medications to patient on a daily basis  Patient's daughter reports that since patient had Urolift procedure done in December of 2019, he is no longer experiencing urinary incontinence  Though, patient still prefers to wear Depends, he is not relying on them and is regularly going to the toilet to urinate  Per patient's daughter, he is still taking Finasteride, but no longer takes Flomax (per Urologist's instructions)  Patient and patient's daughter reports that he is extremely sedentary  Patient's daughter feels this is due to his knee pain, however patient denies knee pain  He has been refusing to take Tylenol as prescribed  Patient was not able to get steroid injections for his knees as scheduled in March, due to the Pandemic          The following portions of the patient's history were reviewed and updated as appropriate: allergies, current medications, past family history, past medical history, past social history, past surgical history and problem list     Review of Systems    Constitutional: Negative for activity change, chills, fatigue and fever  HENT: Negative for congestion, ear pain, rhinorrhea, sore throat and trouble swallowing  Eyes: Positive for visual disturbance (Chronic issue)  Negative for discharge  Respiratory: Negative for cough, chest tightness, wheezing and stridor  Daughter reports patient with heavy breathing on exertion  Cardiovascular: Negative for chest pain, palpitations  Positive for leg swelling  Gastrointestinal: Negative for abdominal pain, constipation, diarrhea, nausea and vomiting  Genitourinary: Negative for decreased urine volume, dysuria and hematuria  Musculoskeletal: Positive for arthralgias (Chronic knee pain), back pain (Chronic issue) and gait problem (ambulates cane)  Skin: Negative for color change, pallor, rash and wound  Neurological: Negative for dizziness, seizures, speech difficulty, weakness and light-headedness  Psychiatric/Behavioral: Positive for confusion (At baseline) and sleep disturbance (Insomnia)  Negative for decreased concentration  Objective:  /60 (BP Location: Left arm, Patient Position: Sitting, Cuff Size: Standard)   Pulse 81   Temp 98 °F (36 7 °C) (Temporal)   Resp 18   Ht 5' 9 5" (1 765 m)   Wt 100 kg (221 lb 3 2 oz) Comment: W SHOES  SpO2 96%   BMI 32 20 kg/m²          Physical Exam  Vitals signs reviewed  Constitutional:       General: He is not in acute distress  Appearance: Normal appearance  He is well-developed  He is not ill-appearing, toxic-appearing or diaphoretic  HENT:      Head: Normocephalic and atraumatic  Right Ear: External ear normal       Left Ear: External ear normal       Nose: Nose normal    Eyes:      General: No scleral icterus  Right eye: No discharge  Left eye: No discharge  Extraocular Movements: Extraocular movements intact        Conjunctiva/sclera: Conjunctivae normal  Pupils: Pupils are equal, round, and reactive to light  Neck:      Musculoskeletal: Normal range of motion and neck supple  Cardiovascular:      Rate and Rhythm: Normal rate and regular rhythm  Heart sounds: Normal heart sounds  No murmur  Pulmonary:      Effort: No respiratory distress  Breath sounds: Normal breath sounds  No wheezing  Comments:  increased work of breathing     Abdominal:      General: Bowel sounds are normal  There is no distension  Palpations: Abdomen is soft  Tenderness: There is no abdominal tenderness  Musculoskeletal: Normal range of motion  General: No tenderness or deformity  Right lower leg: Edema (2+ pitting ) present  Left lower leg: Edema (  2+ pitting ) present  Skin:     General: Skin is warm and dry  Capillary Refill: Capillary refill takes less than 2 seconds  Coloration: Skin is not pale  Findings: No erythema or rash  Neurological:      General: No focal deficit present  Mental Status: He is alert  Mental status is at baseline  Coordination: Coordination abnormal       Gait: Gait abnormal (ambulates with cane)  Comments: Oriented to person but no to place or time

## 2020-09-05 ENCOUNTER — APPOINTMENT (OUTPATIENT)
Dept: LAB | Facility: CLINIC | Age: 80
End: 2020-09-05
Payer: COMMERCIAL

## 2020-09-05 LAB
ALBUMIN SERPL BCP-MCNC: 3.6 G/DL (ref 3.5–5)
ALP SERPL-CCNC: 83 U/L (ref 46–116)
ALT SERPL W P-5'-P-CCNC: 22 U/L (ref 12–78)
ANION GAP SERPL CALCULATED.3IONS-SCNC: 7 MMOL/L (ref 4–13)
AST SERPL W P-5'-P-CCNC: 11 U/L (ref 5–45)
BASOPHILS # BLD AUTO: 0.07 THOUSANDS/ΜL (ref 0–0.1)
BASOPHILS NFR BLD AUTO: 1 % (ref 0–1)
BILIRUB SERPL-MCNC: 0.59 MG/DL (ref 0.2–1)
BUN SERPL-MCNC: 21 MG/DL (ref 5–25)
CALCIUM SERPL-MCNC: 8.9 MG/DL (ref 8.3–10.1)
CHLORIDE SERPL-SCNC: 114 MMOL/L (ref 100–108)
CHOLEST SERPL-MCNC: 108 MG/DL (ref 50–200)
CO2 SERPL-SCNC: 22 MMOL/L (ref 21–32)
CREAT SERPL-MCNC: 1.9 MG/DL (ref 0.6–1.3)
EOSINOPHIL # BLD AUTO: 0.31 THOUSAND/ΜL (ref 0–0.61)
EOSINOPHIL NFR BLD AUTO: 5 % (ref 0–6)
ERYTHROCYTE [DISTWIDTH] IN BLOOD BY AUTOMATED COUNT: 14.5 % (ref 11.6–15.1)
GFR SERPL CREATININE-BSD FRML MDRD: 38 ML/MIN/1.73SQ M
GLUCOSE P FAST SERPL-MCNC: 122 MG/DL (ref 65–99)
HCT VFR BLD AUTO: 39.2 % (ref 36.5–49.3)
HDLC SERPL-MCNC: 35 MG/DL
HGB BLD-MCNC: 12.9 G/DL (ref 12–17)
IMM GRANULOCYTES # BLD AUTO: 0.03 THOUSAND/UL (ref 0–0.2)
IMM GRANULOCYTES NFR BLD AUTO: 1 % (ref 0–2)
LDLC SERPL CALC-MCNC: 51 MG/DL (ref 0–100)
LYMPHOCYTES # BLD AUTO: 1.43 THOUSANDS/ΜL (ref 0.6–4.47)
LYMPHOCYTES NFR BLD AUTO: 22 % (ref 14–44)
MCH RBC QN AUTO: 29.9 PG (ref 26.8–34.3)
MCHC RBC AUTO-ENTMCNC: 32.9 G/DL (ref 31.4–37.4)
MCV RBC AUTO: 91 FL (ref 82–98)
MONOCYTES # BLD AUTO: 0.54 THOUSAND/ΜL (ref 0.17–1.22)
MONOCYTES NFR BLD AUTO: 8 % (ref 4–12)
NEUTROPHILS # BLD AUTO: 4.02 THOUSANDS/ΜL (ref 1.85–7.62)
NEUTS SEG NFR BLD AUTO: 63 % (ref 43–75)
NONHDLC SERPL-MCNC: 73 MG/DL
NRBC BLD AUTO-RTO: 0 /100 WBCS
NT-PROBNP SERPL-MCNC: 149 PG/ML
PLATELET # BLD AUTO: 172 THOUSANDS/UL (ref 149–390)
PMV BLD AUTO: 11.7 FL (ref 8.9–12.7)
POTASSIUM SERPL-SCNC: 3.9 MMOL/L (ref 3.5–5.3)
PROT SERPL-MCNC: 7.6 G/DL (ref 6.4–8.2)
RBC # BLD AUTO: 4.31 MILLION/UL (ref 3.88–5.62)
SODIUM SERPL-SCNC: 143 MMOL/L (ref 136–145)
TRIGL SERPL-MCNC: 109 MG/DL
TSH SERPL DL<=0.05 MIU/L-ACNC: 1.8 UIU/ML (ref 0.36–3.74)
WBC # BLD AUTO: 6.4 THOUSAND/UL (ref 4.31–10.16)

## 2020-09-05 PROCEDURE — 83880 ASSAY OF NATRIURETIC PEPTIDE: CPT | Performed by: STUDENT IN AN ORGANIZED HEALTH CARE EDUCATION/TRAINING PROGRAM

## 2020-09-05 PROCEDURE — 36415 COLL VENOUS BLD VENIPUNCTURE: CPT | Performed by: STUDENT IN AN ORGANIZED HEALTH CARE EDUCATION/TRAINING PROGRAM

## 2020-09-05 PROCEDURE — 80053 COMPREHEN METABOLIC PANEL: CPT | Performed by: STUDENT IN AN ORGANIZED HEALTH CARE EDUCATION/TRAINING PROGRAM

## 2020-09-05 PROCEDURE — 85025 COMPLETE CBC W/AUTO DIFF WBC: CPT | Performed by: STUDENT IN AN ORGANIZED HEALTH CARE EDUCATION/TRAINING PROGRAM

## 2020-09-05 PROCEDURE — 80061 LIPID PANEL: CPT | Performed by: STUDENT IN AN ORGANIZED HEALTH CARE EDUCATION/TRAINING PROGRAM

## 2020-09-05 PROCEDURE — 84443 ASSAY THYROID STIM HORMONE: CPT | Performed by: STUDENT IN AN ORGANIZED HEALTH CARE EDUCATION/TRAINING PROGRAM

## 2020-09-08 ENCOUNTER — TELEPHONE (OUTPATIENT)
Dept: GERIATRICS | Age: 80
End: 2020-09-08

## 2020-09-08 NOTE — TELEPHONE ENCOUNTER
Called daughter Imelda Moon and was unable to leave a voice mail message  Message stated caller was unable to accept calls at this time  Called to relay Dr Uzma Malone instructions:  Patient's chest x-ray was negative for any abnormalities   Also his blood work is stable   Would recommend repeating his renal function in 1 month and would also recommend the patient completed 5 days of Lasix for his lower extremity edema  Will follow-up as scheduled

## 2020-09-11 ENCOUNTER — TELEPHONE (OUTPATIENT)
Dept: GERIATRICS | Age: 80
End: 2020-09-11

## 2020-09-11 NOTE — TELEPHONE ENCOUNTER
Left message for patient's daughter to contact office to schedule a "medication check follow up" for the patient on 9/21 or prior to 9/21(as requested by Provider)

## 2020-09-18 ENCOUNTER — OFFICE VISIT (OUTPATIENT)
Dept: GERIATRICS | Age: 80
End: 2020-09-18
Payer: COMMERCIAL

## 2020-09-18 VITALS
DIASTOLIC BLOOD PRESSURE: 80 MMHG | HEART RATE: 95 BPM | RESPIRATION RATE: 20 BRPM | HEIGHT: 70 IN | OXYGEN SATURATION: 95 % | TEMPERATURE: 97.3 F | WEIGHT: 221.6 LBS | SYSTOLIC BLOOD PRESSURE: 150 MMHG | BODY MASS INDEX: 31.73 KG/M2

## 2020-09-18 DIAGNOSIS — J30.89 NON-SEASONAL ALLERGIC RHINITIS DUE TO OTHER ALLERGIC TRIGGER: ICD-10-CM

## 2020-09-18 DIAGNOSIS — N40.0 BENIGN PROSTATIC HYPERPLASIA WITHOUT LOWER URINARY TRACT SYMPTOMS: ICD-10-CM

## 2020-09-18 DIAGNOSIS — M25.561 CHRONIC PAIN OF BOTH KNEES: ICD-10-CM

## 2020-09-18 DIAGNOSIS — R06.83 SNORING: ICD-10-CM

## 2020-09-18 DIAGNOSIS — N18.30 STAGE 3 CHRONIC KIDNEY DISEASE (HCC): ICD-10-CM

## 2020-09-18 DIAGNOSIS — N39.46 MIXED STRESS AND URGE URINARY INCONTINENCE: ICD-10-CM

## 2020-09-18 DIAGNOSIS — I10 ESSENTIAL HYPERTENSION: ICD-10-CM

## 2020-09-18 DIAGNOSIS — R60.0 BILATERAL LEG EDEMA: ICD-10-CM

## 2020-09-18 DIAGNOSIS — R26.2 AMBULATORY DYSFUNCTION: ICD-10-CM

## 2020-09-18 DIAGNOSIS — F01.50 VASCULAR DEMENTIA WITHOUT BEHAVIORAL DISTURBANCE (HCC): Primary | ICD-10-CM

## 2020-09-18 DIAGNOSIS — G89.29 CHRONIC PAIN OF BOTH KNEES: ICD-10-CM

## 2020-09-18 DIAGNOSIS — M25.562 CHRONIC PAIN OF BOTH KNEES: ICD-10-CM

## 2020-09-18 PROCEDURE — 99215 OFFICE O/P EST HI 40 MIN: CPT | Performed by: STUDENT IN AN ORGANIZED HEALTH CARE EDUCATION/TRAINING PROGRAM

## 2020-09-18 RX ORDER — FLUTICASONE PROPIONATE 50 MCG
1 SPRAY, SUSPENSION (ML) NASAL DAILY
Qty: 1 BOTTLE | Refills: 2 | Status: SHIPPED | OUTPATIENT
Start: 2020-09-18 | End: 2021-03-24 | Stop reason: SDUPTHER

## 2020-09-18 RX ORDER — UNDERPADS 23" X 36"
EACH MISCELLANEOUS 4 TIMES DAILY
Qty: 120 EACH | Refills: 5 | Status: SHIPPED | OUTPATIENT
Start: 2020-09-18

## 2020-09-18 RX ORDER — HYDRALAZINE HYDROCHLORIDE 25 MG/1
25 TABLET, FILM COATED ORAL 4 TIMES DAILY
Qty: 120 TABLET | Refills: 2 | Status: SHIPPED | OUTPATIENT
Start: 2020-09-18 | End: 2020-12-30

## 2020-09-18 RX ORDER — FUROSEMIDE 20 MG/1
20 TABLET ORAL DAILY
Qty: 14 TABLET | Refills: 0 | Status: SHIPPED | OUTPATIENT
Start: 2020-09-18 | End: 2020-10-29 | Stop reason: HOSPADM

## 2020-09-18 NOTE — PATIENT INSTRUCTIONS
1)INCREASE  HYDRALLAZINE TO 25 MG FOUR TIMES A DAY:  TAKE AT 05:00, NOON, 17:00, 23:00  2) CONTINUE LASIX 20 MG DAILY FOR 2 ADDITIONAL WEEKS THEN DISCONTINUE      3) USE COMPRESSION SOCKS AND ELEVATE LEGS WHEN SITTING

## 2020-09-18 NOTE — PROGRESS NOTES
[facility]  [address], [address] [place] [address], [address] [place] [address] [facility]      NAME: Britany Li  AGE: 79 y o  SEX: male    DATE OF ENCOUNTER:  09/18/2020    Assessment and Plan     Problem List Items Addressed This Visit        Respiratory    Allergic rhinitis     Avoid antihistamines due to its side effect profile in elderly frail demented patients  Will start Flonase, 1 spray to each nostril  Avoid allergy triggers  Will continue to follow         Relevant Medications    fluticasone (FLONASE) 50 mcg/act nasal spray       Cardiovascular and Mediastinum    Essential hypertension     Blood pressure 150/80  Blood pressure logs persisting in the 150s per daughter at home  Will increase hydralazine to 25 milligrams q i d    Daughter to keep a log of blood pressures at home 3 times weekly  To call the office in 2 weeks to report blood pressure readings  Recommend adherence to a heart healthy diet  Will continue to follow         Relevant Medications    hydrALAZINE (APRESOLINE) 25 mg tablet    furosemide (LASIX) 20 mg tablet       Nervous and Auditory    Vascular dementia without behavioral disturbance (HCC) - Primary     Stable  Recommend reorientation and redirection as needed  Manage chronic conditions  Maintain [place] precautions  Encouraged patient to remain active mentally, physically and socially  Patient to participate in cognitively challenging activities as able  Will continue to follow         Relevant Medications    Incontinence Supply Disposable (Incontinence Brief Large) MISC       Genitourinary    BPH (benign prostatic hyperplasia)     Stable  Continues to follow with Urology  Urinary incontinence persisting  Will order incontinence briefs  Continue finasteride 5 milligrams daily  Recommend scheduled toileting every 2 hours  Avoid fluid intake 2 hours prior to bedtime to avoid nocturnal incontinence  Will continue to follow         Stage 3 chronic kidney disease (HCC)     Stable  Avoid nephrotoxic agents  Medications to be renally dosed  Will continue to monitor periodically         Relevant Medications    furosemide (LASIX) 20 mg tablet       Other    Ambulatory dysfunction     Improved  Maintain [place] precautions  Continue use of cane for stability  Will continue to follow         Chronic pain of both knees     Stable  Maintain [place] precautions  Continue Tylenol as needed or topical agents such as Aspercreme/Biofreeze  Will continue to follow         Snoring     Patient with symptoms of snoring and excessive leg movement during sleep  Will refer to Sleep Medicine for sleep study  Will continue to follow         Relevant Orders    Ambulatory referral to Sleep Medicine    Mixed stress and urge urinary incontinence    Relevant Medications    Incontinence Supply Disposable (Incontinence Brief Large) MISC    Bilateral leg edema     Markedly improved  Will continue Lasix 20 milligrams daily for 2 more weeks  Elevate legs when able  Recommend use of compression socks  Echocardiogram pending  Prior chest x-ray negative  BNP normal  Will continue to monitor           Relevant Medications    furosemide (LASIX) 20 mg tablet            - Counseling Documentation: patient was counseled regarding: diagnostic results, instructions for management, risk factor reductions, prognosis, patient and family education, impressions, risks and benefits of treatment options and importance of compliance with treatment   Discussed with patient and daughter    Chief Complaint     Patient presents for follow-up of acute and chronic conditions as well as for review of blood work    History of Present Illness     HPI    Patient presents for routine follow-up of acute and chronic conditions  He is present with his daughter who is his primary caretaker  Patient denies any persisting shortness of breath, URI or urinary symptoms    Reviewed recent chest x-ray and blood work with x-ray showing no signs of volume overload and BNP within normal limits  The patient has had significant improvement in his lower extremity edema with diuresis  Daughter explains today that the patient's blood pressure has remained in the 150s at home  He denies any headaches, change in vision or focal neurological deficits  The patient has also been complaining of excessive sneezing, itchy nose and throat  He does have a history of allergic rhinitis however I did review the side effect profile of antihistamine therapy in elderly frail patients with dementia, as a result of which I would recommend avoiding these agents in this patient  Per daughter, the patient remains independent in most ADLs however he does continue to have urinary incontinence for which she is requesting a prescription for incontinence pads  The patient has not had any overt changes in personality or behavior, continues to tolerate oral intake, has been sleeping well and has had no recent falls  The patient continues on finasteride for a history of BPH  He also continues on allopurinol for a history of gout  He continues to do well on simvastatin for a history of hyperlipidemia      The following portions of the patient's history were reviewed and updated as appropriate: allergies, current medications, past family history, past medical history, past social history, past surgical history and problem list     Review of Systems     Review of Systems   Unable to perform ROS: Dementia       Active Problem List     Patient Active Problem List   Diagnosis    Essential hypertension    Pyelonephritis    Other hyperlipidemia    MILE (acute kidney injury) ([address]    Hematuria    Metabolic acidosis    Acute blood loss anemia    Azotemia    Ambulatory dysfunction    BPH (benign prostatic hyperplasia)    Abdominal distention    Chronic pain of both knees    Stool incontinence    Stage 3 chronic kidney disease ([address] )    Urinary retention    Visual impairment    Gross hematuria    Secondary hyperparathyroidism ([address] )    Vascular dementia without behavioral disturbance (HCC)    Urinary incontinence    Snoring    Allergic rhinitis    Mixed stress and urge urinary incontinence    Bilateral leg edema       Objective     /80 (BP Location: Left arm, Patient Position: Sitting, Cuff Size: Standard)   Pulse 95   Temp (!) 97 3 °F (36 3 °C) (Temporal)   Resp 20   Ht 5' 9 5" (1 765 m) Comment: as per last visit  Wt 101 kg (221 lb 9 6 oz) Comment: w shoes  SpO2 95%   BMI 32 26 kg/m²     Physical Exam  Vitals signs reviewed  Constitutional:       General: He is not in acute distress  Appearance: Normal appearance  He is well-developed  He is not diaphoretic  Comments: Elderly male sitting comfortably in chair in no obvious cardiorespiratory or painful distress   HENT:      Head: Normocephalic and atraumatic  Right Ear: Tympanic membrane and external ear normal       Left Ear: Tympanic membrane and external ear normal       Nose: Nose normal       Mouth/Throat:      Mouth: Mucous membranes are moist       Pharynx: Oropharynx is clear  No oropharyngeal exudate  Eyes:      General: No scleral icterus  Right eye: No discharge  Left eye: No discharge  Conjunctiva/sclera: Conjunctivae normal    Neck:      Musculoskeletal: Normal range of motion and neck supple  Vascular: No JVD  Cardiovascular:      Rate and Rhythm: Normal rate and regular rhythm  Heart sounds: Murmur (ESM 2/6) present  No friction rub  No gallop  Pulmonary:      Effort: Pulmonary effort is normal  No respiratory distress  Breath sounds: Normal breath sounds  No wheezing or rales  Abdominal:      General: Bowel sounds are normal  There is no distension  Palpations: Abdomen is soft  Tenderness: There is no abdominal tenderness  There is no guarding  Musculoskeletal: Normal range of motion           General: No tenderness or deformity  Right lower leg: Edema (Improved, persisting trace) present  Left lower leg: Edema (Improved) present  Skin:     General: Skin is warm  Coloration: Skin is not pale  Findings: No erythema or rash  Neurological:      General: No focal deficit present  Mental Status: He is alert  Mental status is at baseline  Cranial Nerves: No cranial nerve deficit  Gait: Gait abnormal       Deep Tendon Reflexes: Reflexes are normal and symmetric        Comments: Oriented to self only  Moving all limbs  Follows commands readily   Psychiatric:         Mood and Affect: Mood normal       Comments: Pleasantly confused at baseline         Pertinent Laboratory/Diagnostic Studies:  Reviewed prior blood work  Will continue to monitor    Current Medications     Current Outpatient Medications:     allopurinol (ZYLOPRIM) 300 mg tablet, Take 1 tablet (300 mg total) by mouth daily, Disp: 30 tablet, Rfl: 2    amLODIPine (NORVASC) 10 mg tablet, take 1 tablet by mouth daily, Disp: 30 tablet, Rfl: 2    finasteride (PROSCAR) 5 mg tablet, Take 1 tablet (5 mg total) by mouth daily, Disp: 90 tablet, Rfl: 3    fluticasone (FLONASE) 50 mcg/act nasal spray, 1 spray into each nostril daily, Disp: 1 Bottle, Rfl: 2    furosemide (LASIX) 20 mg tablet, Take 1 tablet (20 mg total) by mouth daily, Disp: 14 tablet, Rfl: 0    hydrALAZINE (APRESOLINE) 25 mg tablet, Take 1 tablet (25 mg total) by mouth 4 (four) times a day, Disp: 120 tablet, Rfl: 2    Incontinence Supply Disposable (Incontinence Brief Large) MISC, by Does not apply route 4 (four) times a day PLEASE SUPPLY EXTRA-LARGE , Disp: 120 each, Rfl: 5    labetalol (NORMODYNE) 200 mg tablet, Take 1 tablet (200 mg total) by mouth daily at bedtime, Disp: 90 tablet, Rfl: 1    Multiple Vitamins-Minerals (MULTIVITAMIN WITH MINERALS) tablet, Take 1 tablet by mouth daily, Disp: , Rfl:     omega-3-acid ethyl esters (LOVAZA) 1 g capsule, Take 1 g by mouth daily, Disp: , Rfl:     simvastatin (ZOCOR) 20 mg tablet, take 1 tablet by mouth daily at bedtime, Disp: 30 tablet, Rfl: 2    Health Maintenance     Health Maintenance   Topic Date Due    Medicare Annual Wellness Visit (AWV)  1940    Depression Screening PHQ  08/11/1953    BMI: Followup Plan  08/11/1959    Fall Risk  08/11/2006    Influenza Vaccine  07/01/2020    DTaP,Tdap,and Td Vaccines (2 - Tdap) 08/31/2021    BMI: Adult  09/18/2021    Pneumococcal Vaccine: 65+ Years  Completed    HIB Vaccine  Aged Out    Hepatitis B Vaccine  Aged Out    IPV Vaccine  Aged Out    Hepatitis A Vaccine  Aged Out    Meningococcal ACWY Vaccine  Aged Out    HPV Vaccine  Aged Out       There is no immunization history on file for this patient      Yousif Brooke MD  Geriatrics   9/19/2020 10:07 PM

## 2020-09-19 PROBLEM — R60.0 BILATERAL LEG EDEMA: Status: ACTIVE | Noted: 2020-09-19

## 2020-09-19 PROBLEM — J30.9 ALLERGIC RHINITIS: Status: ACTIVE | Noted: 2020-09-18

## 2020-09-20 NOTE — ASSESSMENT & PLAN NOTE
Stable  Continues to follow with Urology  Urinary incontinence persisting  Will order incontinence briefs  Continue finasteride 5 milligrams daily  Recommend scheduled toileting every 2 hours  Avoid fluid intake 2 hours prior to bedtime to avoid nocturnal incontinence  Will continue to follow

## 2020-09-20 NOTE — ASSESSMENT & PLAN NOTE
Stable  Maintain [place] precautions  Continue Tylenol as needed or topical agents such as Aspercreme/Biofreeze  Will continue to follow

## 2020-09-20 NOTE — ASSESSMENT & PLAN NOTE
Blood pressure 150/80  Blood pressure logs persisting in the 150s per daughter at home  Will increase hydralazine to 25 milligrams q i d    Daughter to keep a log of blood pressures at home 3 times weekly  To call the office in 2 weeks to report blood pressure readings  Recommend adherence to a heart healthy diet  Will continue to follow

## 2020-09-20 NOTE — ASSESSMENT & PLAN NOTE
Patient with symptoms of snoring and excessive leg movement during sleep  Will refer to Sleep Medicine for sleep study  Will continue to follow

## 2020-09-20 NOTE — ASSESSMENT & PLAN NOTE
Avoid antihistamines due to its side effect profile in elderly frail demented patients  Will start Flonase, 1 spray to each nostril  Avoid allergy triggers  Will continue to follow

## 2020-09-20 NOTE — ASSESSMENT & PLAN NOTE
Stable  Avoid nephrotoxic agents  Medications to be renally dosed  Will continue to monitor periodically

## 2020-09-20 NOTE — ASSESSMENT & PLAN NOTE
Markedly improved  Will continue Lasix 20 milligrams daily for 2 more weeks  Elevate legs when able  Recommend use of compression socks  Echocardiogram pending  Prior chest x-ray negative  BNP normal  Will continue to monitor

## 2020-09-20 NOTE — ASSESSMENT & PLAN NOTE
Stable  Recommend reorientation and redirection as needed  Manage chronic conditions  Maintain [place] precautions  Encouraged patient to remain active mentally, physically and socially  Patient to participate in cognitively challenging activities as able  Will continue to follow

## 2020-09-23 ENCOUNTER — HOSPITAL ENCOUNTER (OUTPATIENT)
Dept: NON INVASIVE DIAGNOSTICS | Facility: CLINIC | Age: 80
Discharge: HOME | End: 2020-09-23
Payer: COMMERCIAL

## 2020-09-23 DIAGNOSIS — R06.00 DYSPNEA, UNSPECIFIED TYPE: ICD-10-CM

## 2020-09-23 PROCEDURE — 93306 TTE W/DOPPLER COMPLETE: CPT | Performed by: INTERNAL MEDICINE

## 2020-09-23 PROCEDURE — 93306 TTE W/DOPPLER COMPLETE: CPT

## 2020-10-13 DIAGNOSIS — I10 ESSENTIAL HYPERTENSION: ICD-10-CM

## 2020-10-13 RX ORDER — LABETALOL 200 MG/1
TABLET, FILM COATED ORAL
Qty: 90 TABLET | Refills: 1 | Status: SHIPPED | OUTPATIENT
Start: 2020-10-13 | End: 2021-03-24 | Stop reason: SDUPTHER

## 2020-10-14 DIAGNOSIS — M1A.09X0 CHRONIC GOUT OF MULTIPLE SITES, UNSPECIFIED CAUSE: ICD-10-CM

## 2020-10-14 RX ORDER — ALLOPURINOL 300 MG/1
TABLET ORAL
Qty: 30 TABLET | Refills: 2 | Status: SHIPPED | OUTPATIENT
Start: 2020-10-14 | End: 2021-01-13

## 2020-10-27 ENCOUNTER — TELEPHONE (OUTPATIENT)
Dept: UROLOGY | Facility: MEDICAL CENTER | Age: 80
End: 2020-10-27

## 2020-10-27 DIAGNOSIS — R31.0 GROSS HEMATURIA: Primary | ICD-10-CM

## 2020-10-28 ENCOUNTER — LAB (OUTPATIENT)
Dept: LAB | Facility: CLINIC | Age: 80
End: 2020-10-28
Payer: COMMERCIAL

## 2020-10-28 ENCOUNTER — OFFICE VISIT (OUTPATIENT)
Dept: GERIATRICS | Age: 80
End: 2020-10-28
Payer: COMMERCIAL

## 2020-10-28 VITALS
RESPIRATION RATE: 20 BRPM | BODY MASS INDEX: 33.18 KG/M2 | OXYGEN SATURATION: 97 % | HEIGHT: 69 IN | WEIGHT: 224 LBS | HEART RATE: 93 BPM | DIASTOLIC BLOOD PRESSURE: 76 MMHG | SYSTOLIC BLOOD PRESSURE: 142 MMHG | TEMPERATURE: 97.9 F

## 2020-10-28 DIAGNOSIS — N18.32 STAGE 3B CHRONIC KIDNEY DISEASE (HCC): ICD-10-CM

## 2020-10-28 DIAGNOSIS — R26.2 AMBULATORY DYSFUNCTION: ICD-10-CM

## 2020-10-28 DIAGNOSIS — N39.46 MIXED STRESS AND URGE URINARY INCONTINENCE: ICD-10-CM

## 2020-10-28 DIAGNOSIS — R15.9 INCONTINENCE OF FECES, UNSPECIFIED FECAL INCONTINENCE TYPE: ICD-10-CM

## 2020-10-28 DIAGNOSIS — I10 ESSENTIAL HYPERTENSION: ICD-10-CM

## 2020-10-28 DIAGNOSIS — Z23 NEED FOR INFLUENZA VACCINATION: ICD-10-CM

## 2020-10-28 DIAGNOSIS — R06.00 DYSPNEA ON EXERTION: Primary | ICD-10-CM

## 2020-10-28 DIAGNOSIS — R31.0 GROSS HEMATURIA: ICD-10-CM

## 2020-10-28 DIAGNOSIS — L60.0 INGROWN TOENAIL: ICD-10-CM

## 2020-10-28 DIAGNOSIS — E78.49 OTHER HYPERLIPIDEMIA: ICD-10-CM

## 2020-10-28 DIAGNOSIS — N40.0 BENIGN PROSTATIC HYPERPLASIA WITHOUT LOWER URINARY TRACT SYMPTOMS: ICD-10-CM

## 2020-10-28 DIAGNOSIS — H54.7 VISUAL IMPAIRMENT: ICD-10-CM

## 2020-10-28 PROBLEM — R06.09 DYSPNEA ON EXERTION: Status: ACTIVE | Noted: 2019-10-08

## 2020-10-28 LAB
BACTERIA UR QL AUTO: NORMAL /HPF
BILIRUB UR QL STRIP: NEGATIVE
CLARITY UR: CLEAR
COLOR UR: YELLOW
GLUCOSE UR STRIP-MCNC: NEGATIVE MG/DL
HGB UR QL STRIP.AUTO: NEGATIVE
HYALINE CASTS #/AREA URNS LPF: NORMAL /LPF
KETONES UR STRIP-MCNC: NEGATIVE MG/DL
LEUKOCYTE ESTERASE UR QL STRIP: NEGATIVE
NITRITE UR QL STRIP: NEGATIVE
NON-SQ EPI CELLS URNS QL MICRO: NORMAL /HPF
PH UR STRIP.AUTO: 6 [PH]
PROT UR STRIP-MCNC: NEGATIVE MG/DL
RBC #/AREA URNS AUTO: NORMAL /HPF
SP GR UR STRIP.AUTO: 1.01 (ref 1–1.03)
UROBILINOGEN UR QL STRIP.AUTO: 0.2 E.U./DL
WBC #/AREA URNS AUTO: NORMAL /HPF

## 2020-10-28 PROCEDURE — 87086 URINE CULTURE/COLONY COUNT: CPT

## 2020-10-28 PROCEDURE — G0008 ADMIN INFLUENZA VIRUS VAC: HCPCS

## 2020-10-28 PROCEDURE — 99215 OFFICE O/P EST HI 40 MIN: CPT | Performed by: STUDENT IN AN ORGANIZED HEALTH CARE EDUCATION/TRAINING PROGRAM

## 2020-10-28 PROCEDURE — 81001 URINALYSIS AUTO W/SCOPE: CPT

## 2020-10-28 PROCEDURE — 90662 IIV NO PRSV INCREASED AG IM: CPT

## 2020-10-28 RX ORDER — ACETAMINOPHEN 500 MG
500 TABLET ORAL EVERY 6 HOURS PRN
COMMUNITY

## 2020-10-29 LAB — BACTERIA UR CULT: NORMAL

## 2020-11-23 DIAGNOSIS — I10 ESSENTIAL HYPERTENSION: ICD-10-CM

## 2020-11-23 RX ORDER — SIMVASTATIN 20 MG
TABLET ORAL
Qty: 30 TABLET | Refills: 2 | Status: SHIPPED | OUTPATIENT
Start: 2020-11-23 | End: 2021-02-26

## 2020-12-30 DIAGNOSIS — I10 ESSENTIAL HYPERTENSION: ICD-10-CM

## 2020-12-30 RX ORDER — HYDRALAZINE HYDROCHLORIDE 25 MG/1
TABLET, FILM COATED ORAL
Qty: 120 TABLET | Refills: 2 | Status: SHIPPED | OUTPATIENT
Start: 2020-12-30 | End: 2021-01-15 | Stop reason: DRUGHIGH

## 2021-01-13 ENCOUNTER — TELEPHONE (OUTPATIENT)
Dept: GERIATRICS | Age: 81
End: 2021-01-13

## 2021-01-13 DIAGNOSIS — I10 ESSENTIAL HYPERTENSION: ICD-10-CM

## 2021-01-13 DIAGNOSIS — M1A.09X0 CHRONIC GOUT OF MULTIPLE SITES, UNSPECIFIED CAUSE: ICD-10-CM

## 2021-01-13 RX ORDER — ALLOPURINOL 300 MG/1
TABLET ORAL
Qty: 30 TABLET | Refills: 2 | Status: SHIPPED | OUTPATIENT
Start: 2021-01-13 | End: 2021-04-06

## 2021-01-13 RX ORDER — AMLODIPINE BESYLATE 10 MG/1
TABLET ORAL
Qty: 30 TABLET | Refills: 2 | Status: SHIPPED | OUTPATIENT
Start: 2021-01-13 | End: 2021-04-06

## 2021-01-13 NOTE — TELEPHONE ENCOUNTER
Left voicemail for patient's daughter Tg to call office    Need to move Christiano's 1/15/21 2:30PM appt to 2PM

## 2021-01-15 ENCOUNTER — TELEMEDICINE (OUTPATIENT)
Dept: GERIATRICS | Age: 81
End: 2021-01-15
Payer: COMMERCIAL

## 2021-01-15 DIAGNOSIS — M25.562 CHRONIC PAIN OF BOTH KNEES: ICD-10-CM

## 2021-01-15 DIAGNOSIS — M25.561 CHRONIC PAIN OF BOTH KNEES: ICD-10-CM

## 2021-01-15 DIAGNOSIS — F02.80 LATE ONSET ALZHEIMER'S DISEASE WITHOUT BEHAVIORAL DISTURBANCE (HCC): ICD-10-CM

## 2021-01-15 DIAGNOSIS — G89.29 CHRONIC PAIN OF BOTH KNEES: ICD-10-CM

## 2021-01-15 DIAGNOSIS — I10 ESSENTIAL HYPERTENSION: Primary | ICD-10-CM

## 2021-01-15 DIAGNOSIS — R06.00 DYSPNEA ON EXERTION: ICD-10-CM

## 2021-01-15 DIAGNOSIS — M17.0 PRIMARY OSTEOARTHRITIS OF BOTH KNEES: ICD-10-CM

## 2021-01-15 DIAGNOSIS — N39.46 MIXED STRESS AND URGE URINARY INCONTINENCE: ICD-10-CM

## 2021-01-15 DIAGNOSIS — N18.32 STAGE 3B CHRONIC KIDNEY DISEASE (HCC): ICD-10-CM

## 2021-01-15 DIAGNOSIS — G30.1 LATE ONSET ALZHEIMER'S DISEASE WITHOUT BEHAVIORAL DISTURBANCE (HCC): ICD-10-CM

## 2021-01-15 DIAGNOSIS — E78.49 OTHER HYPERLIPIDEMIA: ICD-10-CM

## 2021-01-15 DIAGNOSIS — R26.2 AMBULATORY DYSFUNCTION: ICD-10-CM

## 2021-01-15 PROBLEM — M17.9 OSTEOARTHRITIS OF KNEE: Status: ACTIVE | Noted: 2021-01-15

## 2021-01-15 PROBLEM — M17.10 OSTEOARTHRITIS OF KNEE: Status: ACTIVE | Noted: 2021-01-15

## 2021-01-15 PROCEDURE — 99215 OFFICE O/P EST HI 40 MIN: CPT | Performed by: STUDENT IN AN ORGANIZED HEALTH CARE EDUCATION/TRAINING PROGRAM

## 2021-01-15 RX ORDER — HYDRALAZINE HYDROCHLORIDE 50 MG/1
50 TABLET, FILM COATED ORAL 3 TIMES DAILY
Qty: 90 TABLET | Refills: 2 | Status: SHIPPED | OUTPATIENT
Start: 2021-01-15 | End: 2021-04-11

## 2021-01-15 NOTE — PROGRESS NOTES
Virtual Regular Visit      Assessment/Plan:    Problem List Items Addressed This Visit        Cardiovascular and Mediastinum    Essential hypertension - Primary     Per daughter, blood pressure remains elevated  Patient currently on labetalol 200 mg daily  Will increase hydralazine to 50 mg t i d    Continue amlodipine 10 mg daily  Recommend adherence to a low-sodium, heart healthy diet  Physical activity as able as part of an exercise routine           Relevant Medications    hydrALAZINE (APRESOLINE) 50 mg tablet       Nervous and Auditory    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)     Stable  Recommend reorientation and redirection as needed  Manage chronic conditions  Maintain [place] precautions  Encourage patient to remain active mentally, physically and socially  Patient to participate in cognitively challenging exercises such as cross words and puzzles            Musculoskeletal and Integument    Osteoarthritis of knee    Relevant Medications    Diclofenac Sodium (VOLTAREN) 1 %       Genitourinary    Stage 3 chronic kidney disease     Lab Results   Component Value Date    EGFR 38 09/05/2020    EGFR 40 12/04/2019    EGFR 33 10/16/2019    CREATININE 1 90 (H) 09/05/2020    CREATININE 1 83 (H) 12/04/2019    CREATININE 2 17 (H) 10/16/2019   Stable  Avoid nephrotoxic agents  Medications to be renally dosed  Will continue to monitor periodically            Other    Dyspnea on exertion     Per daughter, patient continues to have occasional hyper breathing with exertion  Patient denies any current cardiorespiratory distress  Echocardiogram showing EF 56%, grade 1 diastolic dysfunction, mild-to-moderate MR, mild pulmonary hypertension  Patient follow-up with Cardiology as scheduled  Recommend adherence to a heart healthy diet  Should symptoms persist after cardiology review will consider referral to pulmonology if needed         Other hyperlipidemia     Continue simvastatin 20 mg daily  Recommend adherence to a heart healthy diet  Will continue to monitor periodically         Ambulatory dysfunction     Improved  Maintain [place] precautions  Continue ambulation with assistive device         Chronic pain of both knees     Improved  Continue Tylenol 975 mg q 8h p r n  Continue topical agents such as Aspercreme, Voltaren once daily only  Maintain [place] precautions         Mixed stress and urge urinary incontinence     Recommend scheduled toileting every 2 hours once awake  Avoid fluid intake 2 hours prior to bedtime to avoid nocturnal incontinence                    Reason for visit is   Chief Complaint   Patient presents with    Virtual Regular Visit        Encounter provider Chris Hale MD    Provider located at [address] [place] [address] [address]      Recent Visits  Date Type Provider Dept   01/15/21 Rachael Altman  [address]   01/13/21 Telephone [facility]   Showing recent visits within past 7 days and meeting all other requirements     Future Appointments  No visits were found meeting these conditions  Showing future appointments within next 150 days and meeting all other requirements        The patient was identified by name and date of birth  Sherin Gaines was informed that this is a telemedicine visit and that the visit is being conducted through [employer] and patient was informed that this is a secure, HIPAA-compliant platform  He agrees to proceed     My office door was closed  No one else was in the room  He acknowledged consent and understanding of privacy and security of the video platform  The patient has agreed to participate and understands they can discontinue the visit at any time  Patient is aware this is a billable service       Subjective  Sherin Gaines is a 79 y o  male who presents for routine follow-up of acute and chronic conditions      HPI   This is a very pleasant 69-year-old male with Alzheimer's dementia, chronic knee pain, HTN, HLD, CKD 3, dyspnea on exertion and urinary incontinence amongst multiple other medical conditions who presents for routine follow-up of acute and chronic conditions  Patient's daughter with whom he lives is present during the entire interview  Per daughter, the patient has had a significant improvement in his bilateral knee pain since using Voltaren topically once daily  Patient and daughter were educated on using this agent once only given his history of CKD 3  The patient does occasionally have dyspnea on exertion and is scheduled to see Cardiology  Prior echocardiogram showed mild-to-moderate MR and mild pulmonary hypertension  Today daughter denies the patient having any cardiorespiratory distress, chest pain, dizziness, syncope or palpitations  Per daughter, patient's blood pressure remains in the high 150s on his current regimen of hydralazine, amlodipine and labetalol  No headaches or signs of any focal neurological deficits  The patient continues to tolerate oral intake, has been sleeping well and having regular bowel movements  No recent falls or hospitalization  He does continue to have urinary incontinence but has been able to remain active mentally, physically and socially given his history of dementia  The patient has been compliant with simvastatin 20 mg daily for a history of hyperlipidemia which remains controlled  He has been compliant with hydralazine, amlodipine and labetalol for a history of hypertension with blood pressures remaining elevated  The patient continues to do well on allopurinol for a history of gout which remains controlled        Past Medical History:   Diagnosis Date    Anemia     BPH (benign prostatic hyperplasia)     Chronic kidney disease     Dementia ([facility] [address] [facility] )     Hyperlipidemia     Hypertension     Incontinence     Osteoarthritis        Past Surgical History:   Procedure Laterality Date    COLON SURGERY      COLONOSCOPY      FL RETROGRADE PYELOGRAM  12/13/2019    MO CYSTOURETHRO W/IMPLANT N/A 12/13/2019    Procedure: CYSTOSCOPY WITH INSERTION UROLIFT, BILATERAL RETROGRADE PYELOGRAM;  Surgeon: Fermin Mitchell MD;  [facility]: [facility];  Service: Urology    TUMOR REMOVAL         Current Outpatient Medications   Medication Sig Dispense Refill    acetaminophen (TYLENOL) 500 mg tablet Take 500 mg by mouth every 6 (six) hours as needed for mild pain      allopurinol (ZYLOPRIM) 300 mg tablet take 1 tablet by mouth once daily 30 tablet 2    amLODIPine (NORVASC) 10 mg tablet take 1 tablet by mouth daily 30 tablet 2    Diclofenac Sodium (VOLTAREN) 1 % Apply 2 g topically 4 (four) times a day 1 Tube 2    finasteride (PROSCAR) 5 mg tablet Take 1 tablet (5 mg total) by mouth daily 90 tablet 3    fluticasone (FLONASE) 50 mcg/act nasal spray 1 spray into each nostril daily 1 Bottle 2    hydrALAZINE (APRESOLINE) 50 mg tablet Take 1 tablet (50 mg total) by mouth 3 (three) times a day 90 tablet 2    Incontinence Supply Disposable (Incontinence Brief Large) MISC by Does not apply route 4 (four) times a day PLEASE SUPPLY EXTRA-LARGE  120 each 5    labetalol (NORMODYNE) 200 mg tablet take 1 tablet by mouth daily at bedtime 90 tablet 1    Multiple Vitamins-Minerals (MULTIVITAMIN WITH MINERALS) tablet Take 1 tablet by mouth daily      omega-3-acid ethyl esters (LOVAZA) 1 g capsule Take 1 g by mouth daily      simvastatin (ZOCOR) 20 mg tablet take 1 tablet by mouth at bedtime 30 tablet 2     No current facility-administered medications for this visit  Allergies   Allergen Reactions    Strawberry C [Ascorbate] Hives       Review of Systems   Unable to perform ROS: Dementia       Video Exam    There were no vitals filed for this visit  Physical Exam  Constitutional:       General: He is not in acute distress       Appearance: Normal appearance  He is not ill-appearing or diaphoretic  Comments: Elderly male sitting comfortably in chair in no obvious cardiorespiratory or painful distress   HENT:      Head: Normocephalic and atraumatic  Right Ear: External ear normal       Left Ear: External ear normal       Nose: Nose normal  No rhinorrhea  Mouth/Throat:      Mouth: Mucous membranes are moist    Eyes:      General:         Right eye: No discharge  Left eye: No discharge  Conjunctiva/sclera: Conjunctivae normal    Neck:      Musculoskeletal: Neck supple  Pulmonary:      Effort: Pulmonary effort is normal  No respiratory distress  Abdominal:      General: There is no distension  Palpations: Abdomen is soft  Tenderness: There is no abdominal tenderness  Musculoskeletal: Normal range of motion  General: No swelling  Right lower leg: No edema  Left lower leg: No edema  Skin:     General: Skin is dry  Neurological:      General: No focal deficit present  Mental Status: He is alert  Mental status is at baseline  He is disoriented  Psychiatric:         Mood and Affect: Mood normal       Comments: Pleasantly confused at baseline          I spent 50 minutes directly with the patient during this visit, via video and phone      VIRTUAL VISIT DISCLAIMER    Kellie Clark acknowledges that he has consented to an online visit or consultation  He understands that the online visit is based solely on information provided by him, and that, in the absence of a face-to-face physical evaluation by the physician, the diagnosis he receives is both limited and provisional in terms of accuracy and completeness  This is not intended to replace a full medical face-to-face evaluation by the physician  Kellie Clark understands and accepts these terms

## 2021-01-18 NOTE — ASSESSMENT & PLAN NOTE
Per daughter, patient continues to have occasional hyper breathing with exertion  Patient denies any current cardiorespiratory distress  Echocardiogram showing EF 09%, grade 1 diastolic dysfunction, mild-to-moderate MR, mild pulmonary hypertension  Patient follow-up with Cardiology as scheduled  Recommend adherence to a heart healthy diet  Should symptoms persist after cardiology review will consider referral to pulmonology if needed

## 2021-01-18 NOTE — ASSESSMENT & PLAN NOTE
Improved  Continue Tylenol 975 mg q 8h p r n    Continue topical agents such as Aspercreme, Voltaren once daily only  Maintain [place] precautions

## 2021-01-18 NOTE — ASSESSMENT & PLAN NOTE
Recommend scheduled toileting every 2 hours once awake  Avoid fluid intake 2 hours prior to bedtime to avoid nocturnal incontinence

## 2021-01-18 NOTE — ASSESSMENT & PLAN NOTE
Lab Results   Component Value Date    EGFR 38 09/05/2020    EGFR 40 12/04/2019    EGFR 33 10/16/2019    CREATININE 1 90 (H) 09/05/2020    CREATININE 1 83 (H) 12/04/2019    CREATININE 2 17 (H) 10/16/2019   Stable  Avoid nephrotoxic agents  Medications to be renally dosed  Will continue to monitor periodically

## 2021-01-18 NOTE — ASSESSMENT & PLAN NOTE
Stable  Recommend reorientation and redirection as needed  Manage chronic conditions  Maintain [place] precautions  Encourage patient to remain active mentally, physically and socially  Patient to participate in cognitively challenging exercises such as cross words and puzzles

## 2021-01-18 NOTE — ASSESSMENT & PLAN NOTE
Continue simvastatin 20 mg daily  Recommend adherence to a heart healthy diet  Will continue to monitor periodically

## 2021-01-18 NOTE — ASSESSMENT & PLAN NOTE
Per daughter, blood pressure remains elevated  Patient currently on labetalol 200 mg daily  Will increase hydralazine to 50 mg t i d    Continue amlodipine 10 mg daily  Recommend adherence to a low-sodium, heart healthy diet  Physical activity as able as part of an exercise routine

## 2021-01-19 ENCOUNTER — TELEPHONE (OUTPATIENT)
Dept: GERIATRICS | Age: 81
End: 2021-01-19

## 2021-01-19 DIAGNOSIS — M17.0 PRIMARY OSTEOARTHRITIS OF BOTH KNEES: ICD-10-CM

## 2021-01-19 NOTE — TELEPHONE ENCOUNTER
[facility] called to relay patient needs a medication prior authorization form be completed for ordered Diclofenac Sodium  Pharmacy will fax a prior auth form to office for use in completing prior auth

## 2021-01-20 ENCOUNTER — OFFICE VISIT (OUTPATIENT)
Dept: AUDIOLOGY | Age: 81
End: 2021-01-20

## 2021-01-20 ENCOUNTER — OFFICE VISIT (OUTPATIENT)
Dept: AUDIOLOGY | Age: 81
End: 2021-01-20
Payer: COMMERCIAL

## 2021-01-20 DIAGNOSIS — H90.3 SENSORY HEARING LOSS, BILATERAL: Primary | ICD-10-CM

## 2021-01-20 PROCEDURE — 92567 TYMPANOMETRY: CPT | Performed by: AUDIOLOGIST

## 2021-01-20 PROCEDURE — 92557 COMPREHENSIVE HEARING TEST: CPT | Performed by: AUDIOLOGIST

## 2021-01-20 NOTE — PROGRESS NOTES
Patient plans to pursue hearing aids through [facility]  Insurance verification completed prior to seeing patient reveals $500 benefit every 6 months

## 2021-01-20 NOTE — PROGRESS NOTES
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   He denies tinnitus currently  His daughter reports he is unstable and receives physical therapy to help improve his balance  EVALUATION:    Otoscopic Evaluation:   Right Ear: Minimum cerumen    Left Ear: Minimum cerumen     Tympanometry:   Right: Type A - normal middle ear pressure and compliance   Left: Type A - normal middle ear pressure and compliance    Audiogram Results:  Mild sloping to severe sensorineural hearing loss bilaterally  WRS was poor bilaterally when presented at 80dB due to patient's inability to tolerate any additional volume  *see attached audiogram      RECOMMENDATIONS:  Annual hearing eval, Return to [facility]  for F/U and Copy to Patient/Caregiver    PATIENT EDUCATION:   Discussed results and recommendations with patient and his daughter  Patient would like to pursue potential HA benefit through  [facility]  Questions were addressed and the patient was encouraged to contact our department should concerns arise        Connie Lo ,  CCC-A  Clinical Audiologist

## 2021-01-22 NOTE — TELEPHONE ENCOUNTER
Left voicemail message at pharmacy to request medication pre-authorization form for Diclofenac  Relayed we had not received this form as requested from 1/19/21 phone conversation  Provided our office fax number on voicemail

## 2021-01-26 ENCOUNTER — TELEPHONE (OUTPATIENT)
Dept: GERIATRICS | Age: 81
End: 2021-01-26

## 2021-01-27 NOTE — TELEPHONE ENCOUNTER
Prior [employer] was attempted on cover my meds for patient's voltaren gel  Determination was that the auth cannot be processed for the plan submitted  It was suggested that the patient contact the insurance company via the number on the back of the card  The medication is now OTC  This writer did call the patient and leave a message for the patient that he could purchase this medication OTC at the drug store as the prior authorization was unsuccessful; and to call the office with any further questions

## 2021-02-03 ENCOUNTER — CONSULT (OUTPATIENT)
Dept: CARDIOLOGY CLINIC | Facility: CLINIC | Age: 81
End: 2021-02-03
Payer: COMMERCIAL

## 2021-02-03 VITALS
RESPIRATION RATE: 18 BRPM | BODY MASS INDEX: 32.21 KG/M2 | HEART RATE: 87 BPM | HEIGHT: 70 IN | SYSTOLIC BLOOD PRESSURE: 146 MMHG | DIASTOLIC BLOOD PRESSURE: 84 MMHG | OXYGEN SATURATION: 96 % | WEIGHT: 225 LBS

## 2021-02-03 DIAGNOSIS — I11.9 HYPERTENSIVE HEART DISEASE WITHOUT HEART FAILURE: ICD-10-CM

## 2021-02-03 DIAGNOSIS — N18.32 STAGE 3B CHRONIC KIDNEY DISEASE (HCC): ICD-10-CM

## 2021-02-03 DIAGNOSIS — R06.00 DYSPNEA ON EXERTION: Primary | ICD-10-CM

## 2021-02-03 DIAGNOSIS — E78.2 MIXED HYPERLIPIDEMIA: ICD-10-CM

## 2021-02-03 PROCEDURE — 99204 OFFICE O/P NEW MOD 45 MIN: CPT | Performed by: INTERNAL MEDICINE

## 2021-02-03 RX ORDER — FUROSEMIDE 40 MG/1
40 TABLET ORAL DAILY
Qty: 90 TABLET | Refills: 3 | Status: SHIPPED | OUTPATIENT
Start: 2021-02-03

## 2021-02-03 NOTE — PROGRESS NOTES
Cardiology Consultation     Tana Roman  [id number]  1940  [address] [place] [id number]    1  Dyspnea on exertion  Ambulatory referral to Cardiology    NM myocardial perfusion spect (rx stress and/or rest)   2  Hypertensive heart disease without heart failure     3  Mixed hyperlipidemia     4  Stage 3b chronic kidney disease         Chief Complaint:   shortness of breath    HPI:   64-year-old male with hypertension, hyperlipidemia, chronic kidney disease, mild dementia was referred from primary care physician's office for evaluation of shortness of breath     patient is living in [place] with daughter for a year  Patient denies any shortness of breath but is noted to have shortness of breath on exertion by daughter on multiple occasions  As per daughter patient does get shortness of breath even walking less than half a block which is going on for almost a year without any progression  patient denies chest pain, shortness of breath at rest, palpitation, dizziness, orthopnea, leg edema, paroxysmal nocturnal dyspnea or loss of consciousness     patient did gained about 20 lb in a year     patient denies personal history of myocardial infarction, TIA/ CVA, arrhythmia, heart failure or peripheral vascular disease    Social History:  Denies smoking, illicit drug use  Social alcohol intake  Patient had secondhand smoking while he was living in [address] [place] [address] [place] History: brother had heart failure no family history of coronary artery disease     current medications reviewed  Recently patient's hydralazine was titrated up by primary care physician   labs from September 2020 reviewed creatinine 1 9  LDL 51   NT proBNP within normal limit in September 2020     as per daughter patient is evaluated by Pulmonary without any significant findings    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      Review of Systems:   all review of system negative except as mentioned above    Patient Active Problem List   Diagnosis    Dyspnea on exertion    Essential hypertension    Pyelonephritis    Other hyperlipidemia    [facility]LE [facility]acute [facility] injury) ([facility] )    Hematuria    Metabolic acidosis    Acute blood loss anemia    Azotemia    Ambulatory dysfunction    BPH (benign prostatic hyperplasia)    Abdominal distention    Chronic pain of both knees    Stool incontinence    Stage 3 chronic kidney disease    Urinary retention    Visual impairment    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)    Snoring    Allergic rhinitis    Mixed stress and urge urinary incontinence    Bilateral leg edema    Ingrown toenail    Need for influenza vaccination    Osteoarthritis of knee     Past Medical History:   Diagnosis Date    Anemia     BPH (benign prostatic hyperplasia)     Chronic kidney disease     Dementia ([facility] )     Hyperlipidemia     Hypertension     Incontinence     Osteoarthritis      Social History     Socioeconomic History    Marital status:      Spouse name: Not on file    Number of children: Not on file    Years of education: Not on file    Highest education level: Not on file   Occupational History    Not on file   Social Needs    Financial resource strain: Not on file    Food insecurity     Worry: Not on file     Inability: Not on file    Transportation needs     Medical: Not on file     Non-medical: Not on file   Tobacco Use    Smoking status: Passive Smoke Exposure - Never Smoker    Smokeless tobacco: Never Used   Substance and Sexual Activity    Alcohol use: Never     Frequency: Never    Drug use: Never    Sexual activity: Not on file   Lifestyle    Physical activity     Days per week: Not on file     Minutes per session: Not on file    Stress: Not on file   Relationships    Social connections     Talks on phone: Not on file     Gets together: Not on file     Attends [employer] service: Not on file     Active member of club or organization: Not on file     Attends meetings of clubs or organizations: Not on file     Relationship status: Not on file    Intimate partner violence     Fear of current or ex partner: Not on file     Emotionally abused: Not on file     Physically abused: Not on file     Forced sexual activity: Not on file   Other Topics Concern    Not on file   Social History Narrative    Not on file      Family History   Problem Relation Age of Onset    Heart failure Brother     Dementia Maternal Uncle      Past Surgical History:   Procedure Laterality Date    COLON SURGERY      COLONOSCOPY      FL RETROGRADE PYELOGRAM  12/13/2019    NC CYSTOURETHRO W/IMPLANT N/A 12/13/2019    Procedure: CYSTOSCOPY WITH INSERTION Rashel Knuckles, BILATERAL RETROGRADE PYELOGRAM;  Surgeon: Miguel Gannon MD;  [facility];  Service: Urology    TUMOR REMOVAL         Current Outpatient Medications:     acetaminophen (TYLENOL) 500 mg tablet, Take 500 mg by mouth every 6 (six) hours as needed for mild pain, Disp: , Rfl:     allopurinol (ZYLOPRIM) 300 mg tablet, take 1 tablet by mouth once daily, Disp: 30 tablet, Rfl: 2    amLODIPine (NORVASC) 10 mg tablet, take 1 tablet by mouth daily, Disp: 30 tablet, Rfl: 2    Diclofenac Sodium (VOLTAREN) 1 %, Apply 2 g topically 4 (four) times a day, Disp: 1 Tube, Rfl: 2    finasteride (PROSCAR) 5 mg tablet, Take 1 tablet (5 mg total) by mouth daily, Disp: 90 tablet, Rfl: 3    fluticasone (FLONASE) 50 mcg/act nasal spray, 1 spray into each nostril daily, Disp: 1 Bottle, Rfl: 2    hydrALAZINE (APRESOLINE) 50 mg tablet, Take 1 tablet (50 mg total) by mouth 3 (three) times a day, Disp: 90 tablet, Rfl: 2    Incontinence Supply Disposable (Incontinence Brief Large) MISC, by Does not apply route 4 (four) times a day PLEASE SUPPLY EXTRA-LARGE , Disp: 120 each, Rfl: 5   labetalol (NORMODYNE) 200 mg tablet, take 1 tablet by mouth daily at bedtime, Disp: 90 tablet, Rfl: 1    Multiple Vitamins-Minerals (MULTIVITAMIN WITH MINERALS) tablet, Take 1 tablet by mouth daily, Disp: , Rfl:     omega-3-acid ethyl esters (LOVAZA) 1 g capsule, Take 1 g by mouth daily, Disp: , Rfl:     simvastatin (ZOCOR) 20 mg tablet, take 1 tablet by mouth at bedtime, Disp: 30 tablet, Rfl: 2  Allergies   Allergen Reactions    Strawberry C [Ascorbate] Hives     Vitals:    02/03/21 1301   BP: 146/84   Pulse: 87   Resp: 18   SpO2: 96%   Weight: 102 kg (225 lb)   Height: 5' 9 5" (1 765 m)         Labs:  Lab on 10/28/2020   Component Date Value    Urine Culture 10/28/2020 No Growth <1000 cfu/mL     Clarity, UA 10/28/2020 Clear     Color, UA 10/28/2020 Yellow     Specific Gravity, UA 10/28/2020 1 012     pH, UA 10/28/2020 6 0     Glucose, UA 10/28/2020 Negative     Ketones, UA 10/28/2020 Negative     Blood, UA 10/28/2020 Negative     Protein, UA 10/28/2020 Negative     Nitrite, UA 10/28/2020 Negative     Bilirubin, UA 10/28/2020 Negative     Urobilinogen, UA 10/28/2020 0 2     Leukocytes, UA 10/28/2020 Negative     WBC, UA 10/28/2020 None Seen     RBC, UA 10/28/2020 None Seen     Hyaline Casts, UA 10/28/2020 None Seen     Bacteria, UA 10/28/2020 None Seen     Epithelial Cells 10/28/2020 None Seen    Office Visit on 09/04/2020   Component Date Value    Cholesterol 09/05/2020 108     Triglycerides 09/05/2020 109     HDL, Direct 09/05/2020 35*    LDL Calculated 09/05/2020 51     Non-HDL-Chol (CHOL-HDL) 09/05/2020 73     Sodium 09/05/2020 143     Potassium 09/05/2020 3 9     Chloride 09/05/2020 114*    CO2 09/05/2020 22     ANION GAP 09/05/2020 7     BUN 09/05/2020 21     Creatinine 09/05/2020 1 90*    Glucose, Fasting 09/05/2020 122*    Calcium 09/05/2020 8 9     AST 09/05/2020 11     ALT 09/05/2020 22     Alkaline Phosphatase 09/05/2020 83     Total Protein 09/05/2020 7 6     Albumin 09/05/2020 3 6     Total Bilirubin 09/05/2020 0 59     eGFR 09/05/2020 38     NT-proBNP 09/05/2020 149     WBC 09/05/2020 6 40     RBC 09/05/2020 4 31     Hemoglobin 09/05/2020 12 9     Hematocrit 09/05/2020 39 2     MCV 09/05/2020 91     MCH 09/05/2020 29 9     MCHC 09/05/2020 32 9     RDW 09/05/2020 14 5     MPV 09/05/2020 11 7     Platelets 49/95/7651 172     nRBC 09/05/2020 0     Neutrophils Relative 09/05/2020 63     Immat GRANS % 09/05/2020 1     Lymphocytes Relative 09/05/2020 22     Monocytes Relative 09/05/2020 8     Eosinophils Relative 09/05/2020 5     Basophils Relative 09/05/2020 1     Neutrophils Absolute 09/05/2020 4 02     Immature Grans Absolute 09/05/2020 0 03     Lymphocytes Absolute 09/05/2020 1 43     Monocytes Absolute 09/05/2020 0 54     Eosinophils Absolute 09/05/2020 0 31     Basophils Absolute 09/05/2020 0 07     TSH 3RD GENERATON 09/05/2020 1 800      Imaging: No results found  Physical Exam:  General:  Obese, awake, alert and oriented x3, not in distress  Neck: supple, no JVD  Eyes: PERRL, conjunctiva normal  Lungs:  Bilateral air entry positive, no wheeze/rhonchi or crackle  Heart:  S1-S2 normal, no murmur  Abdomen:  Soft ,nondistended ,nontender, bowel sounds positive  Extremities:   Bilateral leg edema 1+, no deformity, ROM normal  Neuro:  Moving all extremities, speech clear  Skin: warm, no rash    /84   Pulse 87   Resp 18   Ht 5' 9 5" (1 765 m)   Wt 102 kg (225 lb)   SpO2 96%   BMI 32 75 kg/m²       Cardiographics :   echocardiogram in September 2020 showed EF 60%, mild concentric LVH, grade 1 diastolic dysfunction, mildly dilated LA, mild to moderate MR, mild AI, estimated peak pulmonary artery systolic pressure 37  Mm mercury, mild TR, mild PI      Assessment:    1  Dyspnea on exertion  could be anginal equivalent  Patient also had weight gain of 20 lb in last year which could be contributing to it    2    Hypertensive heart disease   blood pressure is not well controlled  3   Hyperlipidemia  LDL at goal  4  Chronic kidney disease at least stage III  5  Mild dementia  6  Mild pulmonary hypertension on echocardiogram    Recommendations:     at present time etiology for dyspnea on exertion is not clear  It could be multifactorial   Patient does have multiple cardiac risk factors for coronary artery disease  I will get  Vasodilator MPI stress test for further evaluation      patient will benefit from Lasix but in view of his CKD  for trial of Lasix with close monitoring of BMP   patient to continue Norvasc 10 mg, hydralazine 50 mg thrice a day, increase labetalol to 200 mg twice a day  Continue simvastatin 20 mg     patient advised to take low-salt, low-fat / low-cholesterol diet and try to lose weight     return to clinic in 6 months or early if needed   above all discussed with patient and daughter in the clinic today    They understands and agrees

## 2021-02-12 DIAGNOSIS — Z23 ENCOUNTER FOR IMMUNIZATION: ICD-10-CM

## 2021-02-26 DIAGNOSIS — I10 ESSENTIAL HYPERTENSION: ICD-10-CM

## 2021-02-26 RX ORDER — SIMVASTATIN 20 MG
TABLET ORAL
Qty: 30 TABLET | Refills: 2 | Status: SHIPPED | OUTPATIENT
Start: 2021-02-26 | End: 2021-07-08

## 2021-03-24 DIAGNOSIS — I10 ESSENTIAL HYPERTENSION: ICD-10-CM

## 2021-03-24 DIAGNOSIS — J30.89 NON-SEASONAL ALLERGIC RHINITIS DUE TO OTHER ALLERGIC TRIGGER: ICD-10-CM

## 2021-03-25 RX ORDER — LABETALOL 200 MG/1
200 TABLET, FILM COATED ORAL
Qty: 90 TABLET | Refills: 3 | Status: SHIPPED | OUTPATIENT
Start: 2021-03-25 | End: 2022-02-09

## 2021-03-25 RX ORDER — FLUTICASONE PROPIONATE 50 MCG
1 SPRAY, SUSPENSION (ML) NASAL DAILY
Qty: 1 BOTTLE | Refills: 3 | Status: SHIPPED | OUTPATIENT
Start: 2021-03-25

## 2021-04-02 ENCOUNTER — TELEPHONE (OUTPATIENT)
Dept: GERIATRICS | Facility: CLINIC | Age: 81
End: 2021-04-02

## 2021-04-06 DIAGNOSIS — I10 ESSENTIAL HYPERTENSION: ICD-10-CM

## 2021-04-06 DIAGNOSIS — M1A.09X0 CHRONIC GOUT OF MULTIPLE SITES, UNSPECIFIED CAUSE: ICD-10-CM

## 2021-04-06 RX ORDER — AMLODIPINE BESYLATE 10 MG/1
TABLET ORAL
Qty: 90 TABLET | Refills: 2 | Status: SHIPPED | OUTPATIENT
Start: 2021-04-06 | End: 2021-12-12

## 2021-04-06 RX ORDER — ALLOPURINOL 300 MG/1
TABLET ORAL
Qty: 90 TABLET | Refills: 2 | Status: SHIPPED | OUTPATIENT
Start: 2021-04-06 | End: 2021-12-04

## 2021-04-11 DIAGNOSIS — I10 ESSENTIAL HYPERTENSION: ICD-10-CM

## 2021-04-11 RX ORDER — HYDRALAZINE HYDROCHLORIDE 50 MG/1
TABLET, FILM COATED ORAL
Qty: 90 TABLET | Refills: 2 | Status: SHIPPED | OUTPATIENT
Start: 2021-04-11 | End: 2021-07-15

## 2021-04-12 ENCOUNTER — TELEPHONE (OUTPATIENT)
Dept: OTHER | Facility: OTHER | Age: 81
End: 2021-04-12

## 2021-04-13 ENCOUNTER — TELEPHONE (OUTPATIENT)
Dept: GERIATRICS | Age: 81
End: 2021-04-13

## 2021-04-13 NOTE — TELEPHONE ENCOUNTER
[phone]/ Shalonda/ Daughter/ Uzair Xiomara 1940/ BP is high 233/120 and doesn't have an appetite

## 2021-04-13 NOTE — TELEPHONE ENCOUNTER
Spoke with daughter Jessy Brenner as follow up to yesterday's episode of hypertension  Jessy Brenner stated last night patient's color was off and he wouldn't eat dinner  She checked his blood pressure and it was 233/120  Rechecked after waiting a while and reading was 165/74  Jessy Brenner stated patient looks better today and is feeling fine  She did not take his blood pressure this morning  Per Dr Louise Multani recommendation, asked Jessy Brenner to check patient's blood pressure daily until appointment 4/16/21  If patient becomes symptomatic and/or blood pressure is elevated asked her to call the office

## 2021-04-15 ENCOUNTER — TELEPHONE (OUTPATIENT)
Dept: GERIATRICS | Age: 81
End: 2021-04-15

## 2021-04-15 NOTE — TELEPHONE ENCOUNTER
Spoke with Ambrocio Maradiaga today and offer to R/S appt 04/16 to [facility] will call the office only if there is any conflict after discussing it with other family members

## 2021-04-16 ENCOUNTER — TELEMEDICINE (OUTPATIENT)
Dept: GERIATRICS | Age: 81
End: 2021-04-16
Payer: COMMERCIAL

## 2021-04-16 VITALS — SYSTOLIC BLOOD PRESSURE: 161 MMHG | DIASTOLIC BLOOD PRESSURE: 89 MMHG | BODY MASS INDEX: 31.44 KG/M2 | WEIGHT: 216 LBS

## 2021-04-16 DIAGNOSIS — R26.2 AMBULATORY DYSFUNCTION: ICD-10-CM

## 2021-04-16 DIAGNOSIS — F02.80 LATE ONSET ALZHEIMER'S DISEASE WITHOUT BEHAVIORAL DISTURBANCE (HCC): ICD-10-CM

## 2021-04-16 DIAGNOSIS — H53.9 VISION CHANGES: ICD-10-CM

## 2021-04-16 DIAGNOSIS — G30.1 LATE ONSET ALZHEIMER'S DISEASE WITHOUT BEHAVIORAL DISTURBANCE (HCC): ICD-10-CM

## 2021-04-16 DIAGNOSIS — I10 ESSENTIAL HYPERTENSION: Primary | ICD-10-CM

## 2021-04-16 DIAGNOSIS — H54.7 VISUAL IMPAIRMENT: ICD-10-CM

## 2021-04-16 PROCEDURE — 99442 PR PHYS/QHP TELEPHONE EVALUATION 11-20 MIN: CPT | Performed by: STUDENT IN AN ORGANIZED HEALTH CARE EDUCATION/TRAINING PROGRAM

## 2021-04-16 NOTE — PROGRESS NOTES
Virtual Regular Visit      Assessment/Plan:    Problem List Items Addressed This Visit     None               Reason for visit is   Chief Complaint   Patient presents with    Virtual Regular Visit        Encounter provider Khai Perez MD    Provider located at [address] [place] [address] Road  [address] [place] [address]  [phone]      Recent Visits  Date Type Provider Dept   04/15/21 Telephone [facility]   04/13/21 Telephone Carrera Moody, [address] recent visits within past 7 days and meeting all other requirements     Today's Visits  Date Type Provider Dept   04/16/21 Nila Gardner MD [facility]   Showing today's visits and meeting all other requirements     Future Appointments  No visits were found meeting these conditions  Showing future appointments within next 150 days and meeting all other requirements        The patient was identified by name and date of birth  Birgit Fraser was informed that this is a telemedicine visit and that the visit is being conducted through {AMB CORONAVIRUS VISIT RPDCXR:35552}  {Telemedicine confidentiality :19463} {Telemedicine participants:24022}  He acknowledged consent and understanding of privacy and security of the video platform  The patient has agreed to participate and understands they can discontinue the visit at any time  Patient is aware this is a billable service  Subjective  Birgit Fraser is a 79 y o  male ***         HPI     Past Medical History:   Diagnosis Date    Anemia     BPH (benign prostatic hyperplasia)     Chronic kidney disease     Dementia ([address] )     Hyperlipidemia     Hypertension     Incontinence     Osteoarthritis        Past Surgical History:   Procedure Laterality Date    COLON SURGERY      COLONOSCOPY      FL RETROGRADE PYELOGRAM  12/13/2019    NE CYSTOURETHRO W/IMPLANT N/A 12/13/2019 Procedure: CYSTOSCOPY WITH INSERTION UROLIFT, BILATERAL RETROGRADE PYELOGRAM;  Surgeon: Rex Baxter MD;  [facility];  Service: Urology    TUMOR REMOVAL         Current Outpatient Medications   Medication Sig Dispense Refill    acetaminophen (TYLENOL) 500 mg tablet Take 500 mg by mouth every 6 (six) hours as needed for mild pain      allopurinol (ZYLOPRIM) 300 mg tablet take 1 tablet by mouth once daily 90 tablet 2    amLODIPine (NORVASC) 10 mg tablet take 1 tablet by mouth daily 90 tablet 2    Diclofenac Sodium (VOLTAREN) 1 % Apply 2 g topically 4 (four) times a day 1 Tube 2    finasteride (PROSCAR) 5 mg tablet Take 1 tablet (5 mg total) by mouth daily 90 tablet 3    fluticasone (FLONASE) 50 mcg/act nasal spray 1 spray into each nostril daily 1 Bottle 3    furosemide (LASIX) 40 mg tablet Take 1 tablet (40 mg total) by mouth daily 90 tablet 3    hydrALAZINE (APRESOLINE) 50 mg tablet take 1 tablet by mouth three times a day 90 tablet 2    Incontinence Supply Disposable (Incontinence Brief Large) MISC by Does not apply route 4 (four) times a day PLEASE SUPPLY EXTRA-LARGE  120 each 5    labetalol (NORMODYNE) 200 mg tablet Take 1 tablet (200 mg total) by mouth daily at bedtime 90 tablet 3    Multiple Vitamins-Minerals (MULTIVITAMIN WITH MINERALS) tablet Take 1 tablet by mouth daily      omega-3-acid ethyl esters (LOVAZA) 1 g capsule Take 1 g by mouth daily      simvastatin (ZOCOR) 20 mg tablet take 1 tablet by mouth at bedtime 30 tablet 2     No current facility-administered medications for this visit           Allergies   Allergen Reactions    Strawberry C [Ascorbate - Food Allergy] Hives       Review of Systems    Video Exam    Vitals:    04/16/21 1601   BP: 161/89   BP Location: Right arm   Patient Position: Sitting   Cuff Size: Standard   Weight: 98 kg (216 lb)       Physical Exam     {covid time spent:76532}      VIRTUAL VISIT DISCLAIMER    Ronak Hsieh acknowledges that he has consented to an online visit or consultation  He understands that the online visit is based solely on information provided by him, and that, in the absence of a face-to-face physical evaluation by the physician, the diagnosis he receives is both limited and provisional in terms of accuracy and completeness  This is not intended to replace a full medical face-to-face evaluation by the physician  Birgit Fraser understands and accepts these terms

## 2021-04-16 NOTE — PROGRESS NOTES
Virtual Brief Visit    Assessment/Plan:    Problem List Items Addressed This Visit        Cardiovascular and Mediastinum    Essential hypertension - Primary     Blood pressure has been improving over the last few days   Patient to receive a new blood pressure monitoring unit next few days  Continue hydralazine 50 mg t i d     Continue amlodipine 10 mg daily   Continue labetalol 200 mg at bedtime   Recommend adherence to a low-sodium, heart healthy diet   Physical activity daily as part of an exercise routine   Will follow-up in 2 weeks              Nervous and Auditory    Late onset 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 ([facility] [address] )     Continues to be forgetful and repetitive  Recommend reorientation and redirection as needed  Manage chronic conditions  Maintain Falls precautions   Patient encouraged to participate in cognitively challenging exercises as able  Patient should remain active mentally, physically and socially  Will refer to speech therapy for cognitive rehabilitation  Will consider vitamin-E/ Namenda at next neurocognitive assessment visit            Other    Ambulatory dysfunction      Maintain Falls precautions  Will refer to physical therapy for gait training, balance and strengthening   Recommend a falls Alert device as a safety precaution         Vision changes      Patient with a history of chronic vision impairment   Will refer to Ophthalmology for further management  Maintain Falls precautions         RESOLVED: Visual impairment    Relevant Orders    Ambulatory referral to Ophthalmology                Reason for visit is   Chief Complaint   Patient presents with    Virtual Regular Visit    Virtual Brief Visit        Encounter provider Sravani Davila MD    Provider located at [address] [place] [address] [address] [place] [address]  [phone]    Recent Visits  Date Type Provider Dept   04/16/21 [address], MD [address] Murali Susan   04/15/21 Telephone 5 [facility]   04/13/21 Telephone Corrine Files, [address] recent visits within past 7 days and meeting all other requirements     Future Appointments  No visits were found meeting these conditions  Showing future appointments within next 150 days and meeting all other requirements        After connecting through telephone, the patient was identified by name and date of birth  Jennifer Winston was informed that this is a telemedicine visit and that the visit is being conducted through telephone  My office door was closed  No one else was in the room  He acknowledged consent and understanding of privacy and security of the platform  The patient has agreed to participate and understands he can discontinue the visit at any time  Patient is aware this is a billable service  Subjective    Jennifer Winston is a 79 y o  male who presents for follow-up of his blood pressures  HPI     This is a very pleasant 67-year-old male with ambulatory dysfunction, BPH, chronic osteoarthritis of the knees, HTN, HLD, CKD 3 and Alzheimer's disease who presents for follow-up of blood pressures  Patient's granddaughter and daughter are present during his visit today, both of whom are involved in his primary care taking  Over the past weekend, granddaughter explains that patient's blood pressure was documented  As 233/120mmHg as a result of which she called our on-call service  Since then, blood pressures have fluctuated between the 115 and 150s  Patient has been compliant with his antihypertensive regimen  Per family, the patient had been using a blood pressure measuring unit around his wrist with higher than normal readings  Family has ordered another blood pressure measuring device which he will be receiving soon  No headaches, new vision changes or focal neurological deficits    Of note the patient has had chronic vision impairment for which we will refer to Ophthalmology  The patient did receive his 2nd dose of the COVID vaccine a few days ago after which he has been complaining of overall tiredness and fatigue  He also has had a slight decrease in appetite since his vaccination  No cardiorespiratory distress, nausea, vomiting, fever, chills, lethargy, URI or urinary symptoms        Past Medical History:   Diagnosis Date    Anemia     BPH (benign prostatic hyperplasia)     Chronic kidney disease     Dementia ([facility] [address] )     Hyperlipidemia     Hypertension     Incontinence     Osteoarthritis        Past Surgical History:   Procedure Laterality Date    COLON SURGERY      COLONOSCOPY      FL RETROGRADE PYELOGRAM  12/13/2019    ND CYSTOURETHRO W/IMPLANT N/A 12/13/2019    Procedure: CYSTOSCOPY WITH INSERTION UROLIFT, BILATERAL RETROGRADE PYELOGRAM;  Surgeon: Kenroy Clarke MD;  Location: [facility];  Service: Urology    TUMOR REMOVAL         Current Outpatient Medications   Medication Sig Dispense Refill    acetaminophen (TYLENOL) 500 mg tablet Take 500 mg by mouth every 6 (six) hours as needed for mild pain      allopurinol (ZYLOPRIM) 300 mg tablet take 1 tablet by mouth once daily 90 tablet 2    amLODIPine (NORVASC) 10 mg tablet take 1 tablet by mouth daily 90 tablet 2    Diclofenac Sodium (VOLTAREN) 1 % Apply 2 g topically 4 (four) times a day 1 Tube 2    finasteride (PROSCAR) 5 mg tablet Take 1 tablet (5 mg total) by mouth daily 90 tablet 3    fluticasone (FLONASE) 50 mcg/act nasal spray 1 spray into each nostril daily 1 Bottle 3    furosemide (LASIX) 40 mg tablet Take 1 tablet (40 mg total) by mouth daily 90 tablet 3    hydrALAZINE (APRESOLINE) 50 mg tablet take 1 tablet by mouth three times a day 90 tablet 2    Incontinence Supply Disposable (Incontinence Brief Large) MISC by Does not apply route 4 (four) times a day PLEASE SUPPLY EXTRA-LARGE  120 each 5    labetalol (NORMODYNE) 200 mg tablet Take 1 tablet (200 mg total) by mouth daily at bedtime 90 tablet 3    Multiple Vitamins-Minerals (MULTIVITAMIN WITH MINERALS) tablet Take 1 tablet by mouth daily      omega-3-acid ethyl esters (LOVAZA) 1 g capsule Take 1 g by mouth daily      simvastatin (ZOCOR) 20 mg tablet take 1 tablet by mouth at bedtime 30 tablet 2     No current facility-administered medications for this visit  Allergies   Allergen Reactions    Strawberry C [Ascorbate - Food Allergy] Hives       Review of Systems   Unable to perform ROS: Dementia       Vitals:    04/16/21 1601   BP: 161/89   BP Location: Right arm   Patient Position: Sitting   Cuff Size: Standard   Weight: 98 kg (216 lb)         I spent 20 minutes directly with the patient during this visit    VIRTUAL VISIT [address] acknowledges that he has consented to an online visit or consultation  He understands that the online visit is based solely on information provided by him, and that, in the absence of a face-to-face physical evaluation by the physician, the diagnosis he receives is both limited and provisional in terms of accuracy and completeness  This is not intended to replace a full medical face-to-face evaluation by the physician  Rita Ramirez understands and accepts these terms

## 2021-04-17 PROBLEM — H54.7 VISUAL IMPAIRMENT: Status: RESOLVED | Noted: 2019-11-21 | Resolved: 2021-04-17

## 2021-04-18 NOTE — ASSESSMENT & PLAN NOTE
Maintain Falls precautions  Will refer to physical therapy for gait training, balance and strengthening   Recommend a falls Alert device as a safety precaution

## 2021-04-18 NOTE — ASSESSMENT & PLAN NOTE
Blood pressure has been improving over the last few days   Patient to receive a new blood pressure monitoring unit next few days  Continue hydralazine 50 mg t i d     Continue amlodipine 10 mg daily   Continue labetalol 200 mg at bedtime   Recommend adherence to a low-sodium, heart healthy diet   Physical activity daily as part of an exercise routine   Will follow-up in 2 weeks

## 2021-04-18 NOTE — ASSESSMENT & PLAN NOTE
Continues to be forgetful and repetitive  Recommend reorientation and redirection as needed  Manage chronic conditions  Maintain Falls precautions   Patient encouraged to participate in cognitively challenging exercises as able  Patient should remain active mentally, physically and socially  Will refer to speech therapy for cognitive rehabilitation  Will consider vitamin-E/ Namenda at next neurocognitive assessment visit

## 2021-04-18 NOTE — ASSESSMENT & PLAN NOTE
Patient with a history of chronic vision impairment   Will refer to Ophthalmology for further management  Maintain Falls precautions

## 2021-04-19 ENCOUNTER — TELEPHONE (OUTPATIENT)
Dept: GERIATRICS | Age: 81
End: 2021-04-19

## 2021-04-19 NOTE — TELEPHONE ENCOUNTER
----- Message from Sabra Gutierrez MD sent at 4/17/2021 11:36 PM EDT -----   Please schedule a follow-up appointment in 3 weeks for blood pressure check

## 2021-05-09 ENCOUNTER — HOSPITAL ENCOUNTER (INPATIENT)
Facility: HOSPITAL | Age: 81
LOS: 11 days | Discharge: HOME HEALTH | DRG: 637 | End: 2021-05-20
Attending: EMERGENCY MEDICINE | Admitting: ANESTHESIOLOGY
Payer: COMMERCIAL

## 2021-05-09 ENCOUNTER — APPOINTMENT (EMERGENCY)
Dept: CT IMAGING | Facility: HOSPITAL | Age: 81
DRG: 637 | End: 2021-05-09
Payer: COMMERCIAL

## 2021-05-09 DIAGNOSIS — D62 ACUTE BLOOD LOSS ANEMIA (ABLA): ICD-10-CM

## 2021-05-09 DIAGNOSIS — E11.65 HYPEROSMOLAR HYPERGLYCEMIC STATE (HHS) (HCC): ICD-10-CM

## 2021-05-09 DIAGNOSIS — E11.9 NEW ONSET TYPE 2 DIABETES MELLITUS (HCC): ICD-10-CM

## 2021-05-09 DIAGNOSIS — K25.9 GASTRIC ULCER: ICD-10-CM

## 2021-05-09 DIAGNOSIS — K92.0 HEMATEMESIS, PRESENCE OF NAUSEA NOT SPECIFIED: ICD-10-CM

## 2021-05-09 DIAGNOSIS — R57.9 SHOCK (HCC): ICD-10-CM

## 2021-05-09 DIAGNOSIS — K92.0 HEMATEMESIS: ICD-10-CM

## 2021-05-09 DIAGNOSIS — R73.9 HYPERGLYCEMIA: ICD-10-CM

## 2021-05-09 DIAGNOSIS — N40.0 BENIGN PROSTATIC HYPERPLASIA WITHOUT LOWER URINARY TRACT SYMPTOMS: ICD-10-CM

## 2021-05-09 DIAGNOSIS — T17.908A ASPIRATION INTO AIRWAY: ICD-10-CM

## 2021-05-09 DIAGNOSIS — R06.00 DYSPNEA ON EXERTION: ICD-10-CM

## 2021-05-09 DIAGNOSIS — F02.80 LATE ONSET ALZHEIMER'S DISEASE WITHOUT BEHAVIORAL DISTURBANCE (HCC): ICD-10-CM

## 2021-05-09 DIAGNOSIS — R41.82 ALTERED MENTAL STATUS: Primary | ICD-10-CM

## 2021-05-09 DIAGNOSIS — D64.9 ANEMIA: ICD-10-CM

## 2021-05-09 DIAGNOSIS — E11.00 HYPEROSMOLAR HYPERGLYCEMIC STATE (HHS) (HCC): ICD-10-CM

## 2021-05-09 DIAGNOSIS — G30.1 LATE ONSET ALZHEIMER'S DISEASE WITHOUT BEHAVIORAL DISTURBANCE (HCC): ICD-10-CM

## 2021-05-09 PROBLEM — I16.9 HYPERTENSIVE CRISIS: Status: ACTIVE | Noted: 2021-05-09

## 2021-05-09 PROBLEM — N18.9 CHRONIC KIDNEY DISEASE: Status: ACTIVE | Noted: 2019-11-04

## 2021-05-09 PROBLEM — R13.10 DYSPHAGIA: Status: ACTIVE | Noted: 2021-05-09

## 2021-05-09 PROBLEM — R29.90 STROKE-LIKE SYMPTOMS: Status: ACTIVE | Noted: 2021-05-09

## 2021-05-09 LAB
ALBUMIN SERPL BCP-MCNC: 3.6 G/DL (ref 3.5–5)
ALP SERPL-CCNC: 114 U/L (ref 46–116)
ALT SERPL W P-5'-P-CCNC: 29 U/L (ref 12–78)
ANION GAP SERPL CALCULATED.3IONS-SCNC: 14 MMOL/L (ref 4–13)
APAP SERPL-MCNC: <2 UG/ML (ref 10–20)
APTT PPP: 32 SECONDS (ref 23–37)
AST SERPL W P-5'-P-CCNC: 17 U/L (ref 5–45)
ATRIAL RATE: 88 BPM
BASE EX.OXY STD BLDV CALC-SCNC: 87.1 % (ref 60–80)
BASE EXCESS BLDV CALC-SCNC: -4.1 MMOL/L
BASOPHILS # BLD AUTO: 0.05 THOUSANDS/ΜL (ref 0–0.1)
BASOPHILS NFR BLD AUTO: 1 % (ref 0–1)
BETA-HYDROXYBUTYRATE: 3.7 MMOL/L
BILIRUB SERPL-MCNC: 0.7 MG/DL (ref 0.2–1)
BUN SERPL-MCNC: 42 MG/DL (ref 5–25)
CALCIUM SERPL-MCNC: 8.9 MG/DL (ref 8.3–10.1)
CHLORIDE SERPL-SCNC: 95 MMOL/L (ref 100–108)
CO2 SERPL-SCNC: 23 MMOL/L (ref 21–32)
CREAT SERPL-MCNC: 2.63 MG/DL (ref 0.6–1.3)
EOSINOPHIL # BLD AUTO: 0.12 THOUSAND/ΜL (ref 0–0.61)
EOSINOPHIL NFR BLD AUTO: 2 % (ref 0–6)
ERYTHROCYTE [DISTWIDTH] IN BLOOD BY AUTOMATED COUNT: 13.4 % (ref 11.6–15.1)
ETHANOL SERPL-MCNC: <3 MG/DL (ref 0–3)
GFR SERPL CREATININE-BSD FRML MDRD: 26 ML/MIN/1.73SQ M
GLUCOSE SERPL-MCNC: 246 MG/DL (ref 65–140)
GLUCOSE SERPL-MCNC: 796 MG/DL (ref 65–140)
GLUCOSE SERPL-MCNC: >500 MG/DL (ref 65–140)
HCO3 BLDV-SCNC: 21.2 MMOL/L (ref 24–30)
HCT VFR BLD AUTO: 42.3 % (ref 36.5–49.3)
HGB BLD-MCNC: 13.9 G/DL (ref 12–17)
IMM GRANULOCYTES # BLD AUTO: 0.03 THOUSAND/UL (ref 0–0.2)
IMM GRANULOCYTES NFR BLD AUTO: 1 % (ref 0–2)
INR PPP: 1.03 (ref 0.84–1.19)
LACTATE SERPL-SCNC: 1.6 MMOL/L (ref 0.5–2)
LYMPHOCYTES # BLD AUTO: 0.96 THOUSANDS/ΜL (ref 0.6–4.47)
LYMPHOCYTES NFR BLD AUTO: 15 % (ref 14–44)
MCH RBC QN AUTO: 28.2 PG (ref 26.8–34.3)
MCHC RBC AUTO-ENTMCNC: 32.9 G/DL (ref 31.4–37.4)
MCV RBC AUTO: 86 FL (ref 82–98)
MONOCYTES # BLD AUTO: 0.48 THOUSAND/ΜL (ref 0.17–1.22)
MONOCYTES NFR BLD AUTO: 8 % (ref 4–12)
NEUTROPHILS # BLD AUTO: 4.74 THOUSANDS/ΜL (ref 1.85–7.62)
NEUTS SEG NFR BLD AUTO: 73 % (ref 43–75)
NRBC BLD AUTO-RTO: 0 /100 WBCS
O2 CT BLDV-SCNC: 17.3 ML/DL
P AXIS: 54 DEGREES
PCO2 BLDV: 40.1 MM HG (ref 42–50)
PH BLDV: 7.34 [PH] (ref 7.3–7.4)
PLATELET # BLD AUTO: 142 THOUSANDS/UL (ref 149–390)
PMV BLD AUTO: 12.5 FL (ref 8.9–12.7)
PO2 BLDV: 57.1 MM HG (ref 35–45)
POTASSIUM SERPL-SCNC: 4.8 MMOL/L (ref 3.5–5.3)
PR INTERVAL: 146 MS
PROT SERPL-MCNC: 7.3 G/DL (ref 6.4–8.2)
PROTHROMBIN TIME: 13 SECONDS (ref 11.6–14.5)
QRS AXIS: 5 DEGREES
QRSD INTERVAL: 86 MS
QT INTERVAL: 384 MS
QTC INTERVAL: 464 MS
RBC # BLD AUTO: 4.93 MILLION/UL (ref 3.88–5.62)
SALICYLATES SERPL-MCNC: <3 MG/DL (ref 3–20)
SODIUM SERPL-SCNC: 132 MMOL/L (ref 136–145)
T WAVE AXIS: 33 DEGREES
TSH SERPL DL<=0.05 MIU/L-ACNC: 0.8 UIU/ML (ref 0.36–3.74)
VENTRICULAR RATE: 88 BPM
WBC # BLD AUTO: 6.38 THOUSAND/UL (ref 4.31–10.16)

## 2021-05-09 PROCEDURE — 70450 CT HEAD/BRAIN W/O DYE: CPT

## 2021-05-09 PROCEDURE — 82805 BLOOD GASES W/O2 SATURATION: CPT | Performed by: PHYSICIAN ASSISTANT

## 2021-05-09 PROCEDURE — 82077 ASSAY SPEC XCP UR&BREATH IA: CPT | Performed by: PHYSICIAN ASSISTANT

## 2021-05-09 PROCEDURE — 85730 THROMBOPLASTIN TIME PARTIAL: CPT | Performed by: PHYSICIAN ASSISTANT

## 2021-05-09 PROCEDURE — 83605 ASSAY OF LACTIC ACID: CPT | Performed by: PHYSICIAN ASSISTANT

## 2021-05-09 PROCEDURE — 82948 REAGENT STRIP/BLOOD GLUCOSE: CPT

## 2021-05-09 PROCEDURE — 80143 DRUG ASSAY ACETAMINOPHEN: CPT | Performed by: PHYSICIAN ASSISTANT

## 2021-05-09 PROCEDURE — 80179 DRUG ASSAY SALICYLATE: CPT | Performed by: PHYSICIAN ASSISTANT

## 2021-05-09 PROCEDURE — 87040 BLOOD CULTURE FOR BACTERIA: CPT | Performed by: PHYSICIAN ASSISTANT

## 2021-05-09 PROCEDURE — 99285 EMERGENCY DEPT VISIT HI MDM: CPT

## 2021-05-09 PROCEDURE — 36415 COLL VENOUS BLD VENIPUNCTURE: CPT | Performed by: PHYSICIAN ASSISTANT

## 2021-05-09 PROCEDURE — 82010 KETONE BODYS QUAN: CPT | Performed by: PHYSICIAN ASSISTANT

## 2021-05-09 PROCEDURE — 84443 ASSAY THYROID STIM HORMONE: CPT | Performed by: PHYSICIAN ASSISTANT

## 2021-05-09 PROCEDURE — 93005 ELECTROCARDIOGRAM TRACING: CPT

## 2021-05-09 PROCEDURE — 85610 PROTHROMBIN TIME: CPT | Performed by: PHYSICIAN ASSISTANT

## 2021-05-09 PROCEDURE — 80053 COMPREHEN METABOLIC PANEL: CPT | Performed by: PHYSICIAN ASSISTANT

## 2021-05-09 PROCEDURE — 93010 ELECTROCARDIOGRAM REPORT: CPT | Performed by: INTERNAL MEDICINE

## 2021-05-09 PROCEDURE — 83036 HEMOGLOBIN GLYCOSYLATED A1C: CPT | Performed by: PHYSICIAN ASSISTANT

## 2021-05-09 PROCEDURE — 99223 1ST HOSP IP/OBS HIGH 75: CPT | Performed by: STUDENT IN AN ORGANIZED HEALTH CARE EDUCATION/TRAINING PROGRAM

## 2021-05-09 PROCEDURE — 84145 PROCALCITONIN (PCT): CPT | Performed by: PHYSICIAN ASSISTANT

## 2021-05-09 PROCEDURE — 99285 EMERGENCY DEPT VISIT HI MDM: CPT | Performed by: PHYSICIAN ASSISTANT

## 2021-05-09 PROCEDURE — 80048 BASIC METABOLIC PNL TOTAL CA: CPT | Performed by: STUDENT IN AN ORGANIZED HEALTH CARE EDUCATION/TRAINING PROGRAM

## 2021-05-09 PROCEDURE — 96360 HYDRATION IV INFUSION INIT: CPT

## 2021-05-09 PROCEDURE — 85025 COMPLETE CBC W/AUTO DIFF WBC: CPT | Performed by: PHYSICIAN ASSISTANT

## 2021-05-09 RX ORDER — DOCUSATE SODIUM 100 MG/1
100 CAPSULE, LIQUID FILLED ORAL 2 TIMES DAILY
Status: DISCONTINUED | OUTPATIENT
Start: 2021-05-09 | End: 2021-05-20 | Stop reason: HOSPADM

## 2021-05-09 RX ORDER — PRAVASTATIN SODIUM 40 MG
40 TABLET ORAL
Status: DISCONTINUED | OUTPATIENT
Start: 2021-05-09 | End: 2021-05-20 | Stop reason: HOSPADM

## 2021-05-09 RX ORDER — HEPARIN SODIUM 5000 [USP'U]/ML
5000 INJECTION, SOLUTION INTRAVENOUS; SUBCUTANEOUS EVERY 8 HOURS SCHEDULED
Status: DISCONTINUED | OUTPATIENT
Start: 2021-05-09 | End: 2021-05-12

## 2021-05-09 RX ORDER — ASPIRIN 81 MG/1
81 TABLET ORAL DAILY
Status: DISCONTINUED | OUTPATIENT
Start: 2021-05-09 | End: 2021-05-12

## 2021-05-09 RX ORDER — ONDANSETRON 2 MG/ML
4 INJECTION INTRAMUSCULAR; INTRAVENOUS EVERY 6 HOURS PRN
Status: DISCONTINUED | OUTPATIENT
Start: 2021-05-09 | End: 2021-05-20 | Stop reason: HOSPADM

## 2021-05-09 RX ORDER — HYDRALAZINE HYDROCHLORIDE 20 MG/ML
5 INJECTION INTRAMUSCULAR; INTRAVENOUS EVERY 6 HOURS PRN
Status: DISCONTINUED | OUTPATIENT
Start: 2021-05-09 | End: 2021-05-10

## 2021-05-09 RX ORDER — SENNOSIDES 8.6 MG
1 TABLET ORAL DAILY
Status: DISCONTINUED | OUTPATIENT
Start: 2021-05-10 | End: 2021-05-20 | Stop reason: HOSPADM

## 2021-05-09 RX ORDER — ACETAMINOPHEN 325 MG/1
650 TABLET ORAL EVERY 6 HOURS PRN
Status: DISCONTINUED | OUTPATIENT
Start: 2021-05-09 | End: 2021-05-20 | Stop reason: HOSPADM

## 2021-05-09 RX ORDER — FINASTERIDE 5 MG/1
5 TABLET, FILM COATED ORAL DAILY
Status: DISCONTINUED | OUTPATIENT
Start: 2021-05-10 | End: 2021-05-20 | Stop reason: HOSPADM

## 2021-05-09 RX ORDER — CALCIUM CARBONATE 200(500)MG
1000 TABLET,CHEWABLE ORAL DAILY PRN
Status: DISCONTINUED | OUTPATIENT
Start: 2021-05-09 | End: 2021-05-20 | Stop reason: HOSPADM

## 2021-05-09 RX ADMIN — SODIUM CHLORIDE 10 UNITS/HR: 9 INJECTION, SOLUTION INTRAVENOUS at 19:18

## 2021-05-09 RX ADMIN — SODIUM CHLORIDE 1000 ML: 0.9 INJECTION, SOLUTION INTRAVENOUS at 17:56

## 2021-05-09 RX ADMIN — ASPIRIN 81 MG: 81 TABLET, COATED ORAL at 21:23

## 2021-05-09 NOTE — ASSESSMENT & PLAN NOTE
· As elevated as 471 systolic at home, patient's daughter checks regularly   · Follows with PCP has been adjusting his home meds without much improvement  · Start permissive hypertension for now, blood pressure improving, now in the 160's

## 2021-05-09 NOTE — ASSESSMENT & PLAN NOTE
· Self reported facial droop, visual changes, and confusion  · Neuro exam intact, 5/5 motor strength, cranial nerves 2-12 intact, does have slight right sided resting facial droop but on smile and frown test droop resolves  · CT imaging negative, order Brain MRI, start permissive hypertension, give dose of aspirin, and continue statin   · Suspecting stroke like symptoms are secondary to severe hyperglycemia

## 2021-05-09 NOTE — ASSESSMENT & PLAN NOTE
Lab Results   Component Value Date    HGBA1C 5 0 12/04/2019       Recent Labs     05/09/21  1850   POCGLU >500*       Blood Sugar Average: Last 72 hrs:  · Glucose level >700 with lethargic mental status now improving with insulin infusion  · Last A1c in 2019 and normal  Likely new onset diabetes   · Consult endocrine, continue insulin gtt, monitor lytes

## 2021-05-09 NOTE — ED PROVIDER NOTES
History  Chief Complaint   Patient presents with    Altered Mental Status     Per daughter, pt woke up this morning slightly confused, unsure of who she was and c/o blurry vision  Symptoms have since resolved but still having vision problems      80 yo with h/o CKD, HTN, HLD here for evaluation of AMS  Apparently woke up this morning confused  Disoriented  Incontinent of urine (more than usual)  Complained of difficulty thinking clearly  Also having blurred vision  All symptoms have improved  Per daughter he seems to be nearly back to baseline  In the past 2-3 days he has been acting unusual as well  Noticed he may have had a facial droop for the past two days  No falls or trauma recently  No prior h/o similar  No h/o stroke  History provided by:  Patient   used: No    Altered Mental Status  Presenting symptoms: confusion and disorientation    Severity:  Moderate  Most recent episode: Today  Episode history:  Single  Duration:  1 day  Timing:  Constant  Progression:  Unchanged  Chronicity:  New  Context: not alcohol use, not dementia, not drug use, not head injury, not homeless, taking medications as prescribed, not nursing home resident, not recent change in medication, not recent illness and not recent infection    Associated symptoms: headaches    Associated symptoms: no abdominal pain, no fever, no light-headedness, no nausea, no palpitations, no rash, no vomiting and no weakness        Prior to Admission Medications   Prescriptions Last Dose Informant Patient Reported? Taking? Diclofenac Sodium (VOLTAREN) 1 %  Child No No   Sig: Apply 2 g topically 4 (four) times a day   Incontinence Supply Disposable (Incontinence Brief Large) MISC  Child No No   Sig: by Does not apply route 4 (four) times a day PLEASE SUPPLY EXTRA-LARGE     Multiple Vitamins-Minerals (MULTIVITAMIN WITH MINERALS) tablet  Child Yes No   Sig: Take 1 tablet by mouth daily   acetaminophen (TYLENOL) 500 mg tablet Child Yes No   Sig: Take 500 mg by mouth every 6 (six) hours as needed for mild pain   allopurinol (ZYLOPRIM) 300 mg tablet   No No   Sig: take 1 tablet by mouth once daily   amLODIPine (NORVASC) 10 mg tablet   No No   Sig: take 1 tablet by mouth daily   finasteride (PROSCAR) 5 mg tablet  Child No No   Sig: Take 1 tablet (5 mg total) by mouth daily   fluticasone (FLONASE) 50 mcg/act nasal spray   No No   Si spray into each nostril daily   furosemide (LASIX) 40 mg tablet   No No   Sig: Take 1 tablet (40 mg total) by mouth daily   hydrALAZINE (APRESOLINE) 50 mg tablet   No No   Sig: take 1 tablet by mouth three times a day   labetalol (NORMODYNE) 200 mg tablet   No No   Sig: Take 1 tablet (200 mg total) by mouth daily at bedtime   omega-3-acid ethyl esters (LOVAZA) 1 g capsule  Child Yes No   Sig: Take 1 g by mouth daily   simvastatin (ZOCOR) 20 mg tablet   No No   Sig: take 1 tablet by mouth at bedtime      Facility-Administered Medications: None       Past Medical History:   Diagnosis Date    Anemia     BPH (benign prostatic hyperplasia)     Chronic kidney disease     Dementia ([facility] [address]     Hyperlipidemia     Hypertension     Incontinence     Osteoarthritis        Past Surgical History:   Procedure Laterality Date    COLON SURGERY      COLONOSCOPY      FL RETROGRADE PYELOGRAM  2019    MN CYSTOURETHRO W/IMPLANT N/A 2019    Procedure: CYSTOSCOPY WITH INSERTION UROLIFT, BILATERAL RETROGRADE PYELOGRAM;  Surgeon: Lourdes Figueroa MD;  [facility]: [facility];  Service: Urology    TUMOR REMOVAL         Family History   Problem Relation Age of Onset    Heart failure Brother     Dementia Maternal Uncle      I have reviewed and agree with the history as documented      E-Cigarette/Vaping     E-Cigarette/Vaping Substances     Social History     Tobacco Use    Smoking status: Passive Smoke Exposure - Never Smoker    Smokeless tobacco: Never Used   Substance Use Topics    Alcohol use: Never Frequency: Never    Drug use: Never       Review of Systems   Constitutional: Negative for activity change, appetite change, chills, diaphoresis, fatigue, fever and unexpected weight change  HENT: Negative for congestion, rhinorrhea, sinus pressure, sore throat and trouble swallowing  Eyes: Negative for photophobia and visual disturbance  Respiratory: Negative for apnea, cough, choking, chest tightness, shortness of breath, wheezing and stridor  Cardiovascular: Negative for chest pain, palpitations and leg swelling  Gastrointestinal: Negative for abdominal distention, abdominal pain, blood in stool, constipation, diarrhea, nausea and vomiting  Genitourinary: Positive for enuresis  Negative for decreased urine volume, difficulty urinating, dysuria, flank pain, frequency, hematuria and urgency  Musculoskeletal: Negative for arthralgias, myalgias, neck pain and neck stiffness  Skin: Negative for color change, pallor, rash and wound  Allergic/Immunologic: Negative  Neurological: Positive for headaches  Negative for dizziness, tremors, syncope, weakness, light-headedness and numbness  Hematological: Negative  Psychiatric/Behavioral: Positive for confusion  All other systems reviewed and are negative  Physical Exam  Physical Exam  Vitals signs and nursing note reviewed  Constitutional:       General: He is not in acute distress  Appearance: Normal appearance  He is well-developed  He is not ill-appearing, toxic-appearing or diaphoretic  HENT:      Head: Normocephalic and atraumatic  Eyes:      General: Lids are normal       Pupils: Pupils are equal, round, and reactive to light  Neck:      Musculoskeletal: Normal range of motion and neck supple  Cardiovascular:      Rate and Rhythm: Normal rate and regular rhythm  Pulses: Normal pulses  No decreased pulses  Radial pulses are 2+ on the right side and 2+ on the left side        Heart sounds: Normal heart sounds, S1 normal and S2 normal  Heart sounds not distant  No murmur  No friction rub  No gallop  Pulmonary:      Effort: Pulmonary effort is normal  No tachypnea, bradypnea, accessory muscle usage or respiratory distress  Breath sounds: Normal breath sounds  No decreased breath sounds, wheezing, rhonchi or rales  Abdominal:      General: There is no distension  Palpations: Abdomen is soft  Abdomen is not rigid  Tenderness: There is no abdominal tenderness  There is no guarding or rebound  Musculoskeletal: Normal range of motion  General: No tenderness or deformity  Skin:     General: Skin is warm and dry  Coloration: Skin is not pale  Findings: No erythema or rash  Neurological:      Mental Status: He is alert and oriented to person, place, and time  GCS: GCS eye subscore is 4  GCS verbal subscore is 5  GCS motor subscore is 6  Cranial Nerves: No cranial nerve deficit  Comments: GCS 15  AAOx3  CN II-XII grossly intact  No focal neuro deficits       Psychiatric:         Speech: Speech normal          Vital Signs  ED Triage Vitals   Temperature Pulse Respirations Blood Pressure SpO2   05/09/21 1608 05/09/21 1608 05/09/21 1608 05/09/21 1608 05/09/21 1608   98 1 °F (36 7 °C) 87 16 149/75 95 %      Temp src Heart Rate Source Patient Position - Orthostatic VS BP Location FiO2 (%)   -- 05/09/21 1730 05/09/21 1730 05/09/21 1730 --    Monitor Lying Right arm       Pain Score       --                  Vitals:    05/09/21 1608 05/09/21 1715 05/09/21 1730   BP: 149/75 154/74 167/85   Pulse: 87 80 83   Patient Position - Orthostatic VS:   Lying         Visual Acuity      ED Medications  Medications   sodium chloride 0 9 % bolus 1,000 mL (1,000 mL Intravenous New Bag 5/9/21 1756)   insulin regular (HumuLIN R,NovoLIN R) 1 Units/mL in sodium chloride 0 9 % 100 mL infusion (has no administration in time range)       Diagnostic Studies  Results Reviewed     Procedure Component Value Units Date/Time    Hemoglobin A1C [[id number]] Collected: 05/09/21 1628    Lab Status: In process Specimen: Blood from Arm, Right Updated: 05/09/21 1852    Fingerstick Glucose (POCT) [[id number]]  (Abnormal) Collected: 05/09/21 1850    Lab Status: Final result Updated: 05/09/21 1851     POC Glucose >500 mg/dl     Beta Hydroxybutyrate [[id number]]  (Abnormal) Collected: 05/09/21 1728    Lab Status: Final result Specimen: Blood from Arm, Left Updated: 05/09/21 1740     BETA-HYDROXYBUTYRATE 3 7 mmol/L     Blood gas, venous [[id number]]  (Abnormal) Collected: 05/09/21 1728    Lab Status: Final result Specimen: Blood from Arm, Left Updated: 05/09/21 1738     pH, Joaquim 7 342     pCO2, Joaquim 40 1 mm Hg      pO2, Joaquim 57 1 mm Hg      HCO3, Joaquim 21 2 mmol/L      Base Excess, Joaquim -4 1 mmol/L      O2 Content, Joaquim 17 3 ml/dL      O2 HGB, VENOUS 87 1 %     Blood culture #1 [[id number]] Collected: 05/09/21 1728    Lab Status: In process Specimen: Blood from Arm, Right Updated: 05/09/21 1732    Lactic acid [[id number]]  (Normal) Collected: 05/09/21 1628    Lab Status: Final result Specimen: Blood from Arm, Right Updated: 05/09/21 1730     LACTIC ACID 1 6 mmol/L     Narrative:      Result may be elevated if tourniquet was used during collection      Comprehensive metabolic panel [[id number]]  (Abnormal) Collected: 05/09/21 1628    Lab Status: Final result Specimen: Blood from Arm, Right Updated: 05/09/21 1713     Sodium 132 mmol/L      Potassium 4 8 mmol/L      Chloride 95 mmol/L      CO2 23 mmol/L      ANION GAP 14 mmol/L      BUN 42 mg/dL      Creatinine 2 63 mg/dL      Glucose 796 mg/dL      Calcium 8 9 mg/dL      AST 17 U/L      ALT 29 U/L      Alkaline Phosphatase 114 U/L      Total Protein 7 3 g/dL      Albumin 3 6 g/dL      Total Bilirubin 0 70 mg/dL      eGFR 26 ml/min/1 73sq m     Narrative:      [place] guidelines for Chronic Kidney Disease (CKD):     Stage 1 with normal or high GFR (GFR > 90 mL/min/1 73 square meters)    Stage 2 Mild CKD (GFR = 60-89 mL/min/1 73 square meters)    Stage 3A Moderate CKD (GFR = 45-59 mL/min/1 73 square meters)    Stage 3B Moderate CKD (GFR = 30-44 mL/min/1 73 square meters)    Stage 4 Severe CKD (GFR = 15-29 mL/min/1 73 square meters)    Stage 5 End Stage CKD (GFR <15 mL/min/1 73 square meters)  Note: GFR calculation is accurate only with a steady state creatinine    TSH [[id number]]  (Normal) Collected: 05/09/21 1628    Lab Status: Final result Specimen: Blood from Arm, Right Updated: 05/09/21 1713     TSH 3RD GENERATON 0 804 uIU/mL     Narrative:      Patients undergoing fluorescein dye angiography may retain small amounts of fluorescein in the body for 48-72 hours post procedure  Samples containing fluorescein can produce falsely depressed TSH values  If the patient had this procedure,a specimen should be resubmitted post fluorescein clearance  Salicylate level [[id number]]  (Abnormal) Collected: 05/09/21 1628    Lab Status: Final result Specimen: Blood from Arm, Right Updated: 67/58/52 0345     Salicylate Lvl <3 mg/dL     Acetaminophen level-If concentration is detectable, please discuss with medical  on call   [[id number]]  (Abnormal) Collected: 05/09/21 1628    Lab Status: Final result Specimen: Blood from Arm, Right Updated: 05/09/21 1713     Acetaminophen Level <2 0 ug/mL     Protime-INR [[id number]]  (Normal) Collected: 05/09/21 1628    Lab Status: Final result Specimen: Blood from Arm, Right Updated: 05/09/21 1655     Protime 13 0 seconds      INR 1 03    APTT [[id number]]  (Normal) Collected: 05/09/21 1628    Lab Status: Final result Specimen: Blood from Arm, Right Updated: 05/09/21 1655     PTT 32 seconds     Ethanol [[id number]]  (Normal) Collected: 05/09/21 1628    Lab Status: Final result Specimen: Blood from Arm, Right Updated: 05/09/21 1653     Ethanol Lvl <3 mg/dL     CBC and differential [[id number]]  (Abnormal) Collected: 05/09/21 1628 Lab Status: Final result Specimen: Blood from Arm, Right Updated: 05/09/21 1642     WBC 6 38 Thousand/uL      RBC 4 93 Million/uL      Hemoglobin 13 9 g/dL      Hematocrit 42 3 %      MCV 86 fL      MCH 28 2 pg      MCHC 32 9 g/dL      RDW 13 4 %      MPV 12 5 fL      Platelets 080 Thousands/uL      nRBC 0 /100 WBCs      Neutrophils Relative 73 %      Immat GRANS % 1 %      Lymphocytes Relative 15 %      Monocytes Relative 8 %      Eosinophils Relative 2 %      Basophils Relative 1 %      Neutrophils Absolute 4 74 Thousands/µL      Immature Grans Absolute 0 03 Thousand/uL      Lymphocytes Absolute 0 96 Thousands/µL      Monocytes Absolute 0 48 Thousand/µL      Eosinophils Absolute 0 12 Thousand/µL      Basophils Absolute 0 05 Thousands/µL     Procalcitonin with AM Reflex [[id number]] Collected: 05/09/21 1628    Lab Status: In process Specimen: Blood from Arm, Right Updated: 05/09/21 1637    Blood culture #2 [[id number]] Collected: 05/09/21 1628    Lab Status: In process Specimen: Blood from Arm, Right Updated: 05/09/21 1635    UA w Reflex to Microscopic w Reflex to Culture [[id number]]     Lab Status: No result Specimen: Urine                  CT head without contrast   Final Result by Leigh Lane DO (05/09 1724)      No acute intracranial abnormality  Microangiopathic changes  Cerebral volume loss  Workstation performed: [id number]                    Procedures  Procedures         ED Course                             SBIRT 20yo+      Most Recent Value   SBIRT (22 yo +)   In order to provide better care to our patients, we are screening all of our patients for alcohol and drug use  Would it be okay to ask you these screening questions?   No Filed at: 05/09/2021 1634                    MDM  Number of Diagnoses or Management Options  Altered mental status: new and requires workup  Diagnosis management comments: DDx including but not limited to: metabolic abnormality, intracranial etiology, cardiac etiology, substance abuse, infectious etiology including UTI, thyroid disease, hyperammonemia, delirium, dementia, overmedication  Plan: CT head  Labs  Cardiac workup  Dispo pending  Amount and/or Complexity of Data Reviewed  Clinical lab tests: ordered and reviewed  Tests in the radiology section of CPT®: ordered and reviewed  Independent visualization of images, tracings, or specimens: yes    Risk of Complications, Morbidity, and/or Mortality  Presenting problems: moderate  Management options: low  General comments: 80 yo male pt with AMS  ?facial droop yesterday but seems to have improved today  His labs reveal an MILE with hyperglycemia  Normal pH  Not in dka  Started on insulin infusion  CT head negative  Will admit for profound hyperglycemia and AMS  Patient Progress  Patient progress: stable      Disposition  Final diagnoses: Altered mental status     Time reflects when diagnosis was documented in both MDM as applicable and the Disposition within this note     Time User Action Codes Description Comment    5/9/2021  6:23 PM Dmitry Clear Add [I16 0] Hypertensive urgency     5/9/2021  6:23 PM Washington Grayson [I16 0] Hypertensive urgency     5/9/2021  6:44 PM Dmitry Clear Add [R41 82] Altered mental status       ED Disposition     ED Disposition Condition Date/Time Comment    Admit Stable Sun May 9, 2021  6:22 PM Case was discussed with Dr Cesar Colindres and the patient's admission status was agreed to be Admission Status: inpatient status to the service of Dr Cesar Colindres   Follow-up Information    None         Patient's Medications   Discharge Prescriptions    No medications on file     No discharge procedures on file      PDMP Review     None          ED Provider  Electronically Signed by           Santos Nix PA-C  05/09/21 4910

## 2021-05-09 NOTE — ASSESSMENT & PLAN NOTE
Lab Results   Component Value Date    EGFR 26 05/09/2021    EGFR 38 09/05/2020    EGFR 40 12/04/2019    CREATININE 2 63 (H) 05/09/2021    CREATININE 1 90 (H) 09/05/2020    CREATININE 1 83 (H) 12/04/2019   · MILE on CKD   · Likely in setting of polyuria from uncontrolled hyperglycemia   · Start IV hydration, monitor glucose levels

## 2021-05-10 ENCOUNTER — TELEPHONE (OUTPATIENT)
Dept: GERIATRICS | Age: 81
End: 2021-05-10

## 2021-05-10 ENCOUNTER — APPOINTMENT (INPATIENT)
Dept: MRI IMAGING | Facility: HOSPITAL | Age: 81
DRG: 637 | End: 2021-05-10
Payer: COMMERCIAL

## 2021-05-10 PROBLEM — E87.6 HYPOKALEMIA: Status: ACTIVE | Noted: 2021-05-10

## 2021-05-10 PROBLEM — F03.90 DEMENTIA (HCC): Status: ACTIVE | Noted: 2021-05-10

## 2021-05-10 LAB
ANION GAP SERPL CALCULATED.3IONS-SCNC: 10 MMOL/L (ref 4–13)
ANION GAP SERPL CALCULATED.3IONS-SCNC: 11 MMOL/L (ref 4–13)
BACTERIA UR QL AUTO: ABNORMAL /HPF
BILIRUB UR QL STRIP: NEGATIVE
BUN SERPL-MCNC: 34 MG/DL (ref 5–25)
BUN SERPL-MCNC: 37 MG/DL (ref 5–25)
CALCIUM SERPL-MCNC: 9 MG/DL (ref 8.3–10.1)
CALCIUM SERPL-MCNC: 9.2 MG/DL (ref 8.3–10.1)
CHLORIDE SERPL-SCNC: 106 MMOL/L (ref 100–108)
CHLORIDE SERPL-SCNC: 107 MMOL/L (ref 100–108)
CLARITY UR: CLEAR
CO2 SERPL-SCNC: 26 MMOL/L (ref 21–32)
CO2 SERPL-SCNC: 27 MMOL/L (ref 21–32)
COLOR UR: ABNORMAL
CREAT SERPL-MCNC: 2.07 MG/DL (ref 0.6–1.3)
CREAT SERPL-MCNC: 2.3 MG/DL (ref 0.6–1.3)
ERYTHROCYTE [DISTWIDTH] IN BLOOD BY AUTOMATED COUNT: 13.3 % (ref 11.6–15.1)
EST. AVERAGE GLUCOSE BLD GHB EST-MCNC: 306 MG/DL
GFR SERPL CREATININE-BSD FRML MDRD: 30 ML/MIN/1.73SQ M
GFR SERPL CREATININE-BSD FRML MDRD: 34 ML/MIN/1.73SQ M
GLUCOSE SERPL-MCNC: 116 MG/DL (ref 65–140)
GLUCOSE SERPL-MCNC: 123 MG/DL (ref 65–140)
GLUCOSE SERPL-MCNC: 162 MG/DL (ref 65–140)
GLUCOSE SERPL-MCNC: 257 MG/DL (ref 65–140)
GLUCOSE SERPL-MCNC: 268 MG/DL (ref 65–140)
GLUCOSE SERPL-MCNC: 343 MG/DL (ref 65–140)
GLUCOSE SERPL-MCNC: 362 MG/DL (ref 65–140)
GLUCOSE SERPL-MCNC: 76 MG/DL (ref 65–140)
GLUCOSE SERPL-MCNC: 81 MG/DL (ref 65–140)
GLUCOSE UR STRIP-MCNC: ABNORMAL MG/DL
HBA1C MFR BLD: 12.3 %
HCT VFR BLD AUTO: 41.6 % (ref 36.5–49.3)
HGB BLD-MCNC: 14 G/DL (ref 12–17)
HGB UR QL STRIP.AUTO: ABNORMAL
KETONES UR STRIP-MCNC: ABNORMAL MG/DL
LEUKOCYTE ESTERASE UR QL STRIP: ABNORMAL
MAGNESIUM SERPL-MCNC: 2.1 MG/DL (ref 1.6–2.6)
MCH RBC QN AUTO: 28.6 PG (ref 26.8–34.3)
MCHC RBC AUTO-ENTMCNC: 33.7 G/DL (ref 31.4–37.4)
MCV RBC AUTO: 85 FL (ref 82–98)
NITRITE UR QL STRIP: NEGATIVE
NON-SQ EPI CELLS URNS QL MICRO: ABNORMAL /HPF
OTHER STN SPEC: ABNORMAL
PH UR STRIP.AUTO: 6 [PH]
PLATELET # BLD AUTO: 145 THOUSANDS/UL (ref 149–390)
PMV BLD AUTO: 12.1 FL (ref 8.9–12.7)
POTASSIUM SERPL-SCNC: 3.1 MMOL/L (ref 3.5–5.3)
POTASSIUM SERPL-SCNC: 3.3 MMOL/L (ref 3.5–5.3)
PROCALCITONIN SERPL-MCNC: 0.22 NG/ML
PROCALCITONIN SERPL-MCNC: 0.28 NG/ML
PROT UR STRIP-MCNC: NEGATIVE MG/DL
RBC # BLD AUTO: 4.9 MILLION/UL (ref 3.88–5.62)
RBC #/AREA URNS AUTO: ABNORMAL /HPF
SODIUM SERPL-SCNC: 142 MMOL/L (ref 136–145)
SODIUM SERPL-SCNC: 145 MMOL/L (ref 136–145)
SP GR UR STRIP.AUTO: 1.01 (ref 1–1.03)
UROBILINOGEN UR QL STRIP.AUTO: 0.2 E.U./DL
WBC # BLD AUTO: 7.26 THOUSAND/UL (ref 4.31–10.16)
WBC #/AREA URNS AUTO: ABNORMAL /HPF

## 2021-05-10 PROCEDURE — 36415 COLL VENOUS BLD VENIPUNCTURE: CPT | Performed by: PHYSICIAN ASSISTANT

## 2021-05-10 PROCEDURE — 84145 PROCALCITONIN (PCT): CPT | Performed by: PHYSICIAN ASSISTANT

## 2021-05-10 PROCEDURE — G1004 CDSM NDSC: HCPCS

## 2021-05-10 PROCEDURE — 82948 REAGENT STRIP/BLOOD GLUCOSE: CPT

## 2021-05-10 PROCEDURE — 99232 SBSQ HOSP IP/OBS MODERATE 35: CPT | Performed by: INTERNAL MEDICINE

## 2021-05-10 PROCEDURE — 85027 COMPLETE CBC AUTOMATED: CPT | Performed by: STUDENT IN AN ORGANIZED HEALTH CARE EDUCATION/TRAINING PROGRAM

## 2021-05-10 PROCEDURE — 92610 EVALUATE SWALLOWING FUNCTION: CPT

## 2021-05-10 PROCEDURE — 83735 ASSAY OF MAGNESIUM: CPT | Performed by: STUDENT IN AN ORGANIZED HEALTH CARE EDUCATION/TRAINING PROGRAM

## 2021-05-10 PROCEDURE — 81001 URINALYSIS AUTO W/SCOPE: CPT | Performed by: PHYSICIAN ASSISTANT

## 2021-05-10 PROCEDURE — 70551 MRI BRAIN STEM W/O DYE: CPT

## 2021-05-10 PROCEDURE — 80048 BASIC METABOLIC PNL TOTAL CA: CPT | Performed by: STUDENT IN AN ORGANIZED HEALTH CARE EDUCATION/TRAINING PROGRAM

## 2021-05-10 RX ORDER — SODIUM CHLORIDE, SODIUM LACTATE, POTASSIUM CHLORIDE, CALCIUM CHLORIDE 600; 310; 30; 20 MG/100ML; MG/100ML; MG/100ML; MG/100ML
100 INJECTION, SOLUTION INTRAVENOUS CONTINUOUS
Status: DISCONTINUED | OUTPATIENT
Start: 2021-05-10 | End: 2021-05-13

## 2021-05-10 RX ORDER — INSULIN GLARGINE 100 [IU]/ML
20 INJECTION, SOLUTION SUBCUTANEOUS EVERY MORNING
Status: DISCONTINUED | OUTPATIENT
Start: 2021-05-10 | End: 2021-05-12

## 2021-05-10 RX ORDER — INSULIN GLARGINE 100 [IU]/ML
15 INJECTION, SOLUTION SUBCUTANEOUS
Status: DISCONTINUED | OUTPATIENT
Start: 2021-05-10 | End: 2021-05-10

## 2021-05-10 RX ORDER — HYDRALAZINE HYDROCHLORIDE 20 MG/ML
5 INJECTION INTRAMUSCULAR; INTRAVENOUS EVERY 6 HOURS PRN
Status: DISCONTINUED | OUTPATIENT
Start: 2021-05-10 | End: 2021-05-20 | Stop reason: HOSPADM

## 2021-05-10 RX ORDER — POTASSIUM CHLORIDE 20 MEQ/1
40 TABLET, EXTENDED RELEASE ORAL ONCE
Status: COMPLETED | OUTPATIENT
Start: 2021-05-10 | End: 2021-05-10

## 2021-05-10 RX ORDER — AMLODIPINE BESYLATE 5 MG/1
5 TABLET ORAL DAILY
Status: DISCONTINUED | OUTPATIENT
Start: 2021-05-10 | End: 2021-05-12

## 2021-05-10 RX ADMIN — AMLODIPINE BESYLATE 5 MG: 5 TABLET ORAL at 13:09

## 2021-05-10 RX ADMIN — FINASTERIDE 5 MG: 5 TABLET, FILM COATED ORAL at 09:27

## 2021-05-10 RX ADMIN — POTASSIUM CHLORIDE 40 MEQ: 1500 TABLET, EXTENDED RELEASE ORAL at 13:06

## 2021-05-10 RX ADMIN — SODIUM CHLORIDE, SODIUM LACTATE, POTASSIUM CHLORIDE, AND CALCIUM CHLORIDE 50 ML/HR: .6; .31; .03; .02 INJECTION, SOLUTION INTRAVENOUS at 13:12

## 2021-05-10 RX ADMIN — HEPARIN SODIUM 5000 UNITS: 5000 INJECTION INTRAVENOUS; SUBCUTANEOUS at 05:00

## 2021-05-10 RX ADMIN — INSULIN LISPRO 3 UNITS: 100 INJECTION, SOLUTION INTRAVENOUS; SUBCUTANEOUS at 21:33

## 2021-05-10 RX ADMIN — INSULIN LISPRO 4 UNITS: 100 INJECTION, SOLUTION INTRAVENOUS; SUBCUTANEOUS at 18:32

## 2021-05-10 RX ADMIN — SODIUM CHLORIDE, SODIUM LACTATE, POTASSIUM CHLORIDE, AND CALCIUM CHLORIDE 50 ML/HR: .6; .31; .03; .02 INJECTION, SOLUTION INTRAVENOUS at 22:44

## 2021-05-10 RX ADMIN — HEPARIN SODIUM 5000 UNITS: 5000 INJECTION INTRAVENOUS; SUBCUTANEOUS at 16:17

## 2021-05-10 RX ADMIN — DOCUSATE SODIUM 100 MG: 100 CAPSULE ORAL at 09:27

## 2021-05-10 RX ADMIN — INSULIN GLARGINE 20 UNITS: 100 INJECTION, SOLUTION SUBCUTANEOUS at 13:06

## 2021-05-10 RX ADMIN — HEPARIN SODIUM 5000 UNITS: 5000 INJECTION INTRAVENOUS; SUBCUTANEOUS at 22:44

## 2021-05-10 RX ADMIN — INSULIN LISPRO 2 UNITS: 100 INJECTION, SOLUTION INTRAVENOUS; SUBCUTANEOUS at 13:49

## 2021-05-10 RX ADMIN — STANDARDIZED SENNA CONCENTRATE 8.6 MG: 8.6 TABLET ORAL at 09:27

## 2021-05-10 RX ADMIN — ASPIRIN 81 MG: 81 TABLET, COATED ORAL at 09:27

## 2021-05-10 NOTE — PLAN OF CARE
Recommendations: regular diet and thin liquids     Recommended Form of Meds: as tolerated     Aspiration precautions and compensatory swallowing strategies: upright posture, only feed when fully alert, slow rate of feeding, small bites/sips and alternating bites and sips    Dysphagia Goals: pt will tolerate regular textures with thin liquids without s/s of aspiration x2-3

## 2021-05-10 NOTE — SPEECH THERAPY NOTE
Speech-Language Pathology Bedside Swallow Evaluation        Patient Name: Sada Bills    JAPQH'I Date: 5/10/2021     Problem List  Patient Active Problem List   Diagnosis    Dyspnea on exertion    Essential hypertension    Pyelonephritis    Other hyperlipidemia    [facility]LE [facility] injury) ([facility] [address]    Hematuria    Metabolic acidosis    Acute blood loss anemia    Azotemia    Ambulatory dysfunction    BPH (benign prostatic hyperplasia)    Abdominal distention    Chronic pain of both knees    Stool incontinence    Chronic kidney disease    Urinary retention    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)    Snoring    Allergic rhinitis    Mixed stress and urge urinary incontinence    Bilateral leg edema    Ingrown toenail    Need for influenza vaccination    Osteoarthritis of knee    Vision changes    Stroke-like symptoms    Hyperosmolar hyperglycemic state (HHS) ([facility] [address]    Hypertensive crisis    Dysphagia    Hypokalemia    Dementia ([facility] [address] )       Past Medical History  Past Medical History:   Diagnosis Date    Anemia     BPH (benign prostatic hyperplasia)     Chronic kidney disease     Dementia ([facility] [address]     Hyperlipidemia     Hypertension     Incontinence     Osteoarthritis        Past Surgical History  Past Surgical History:   Procedure Laterality Date    COLON SURGERY      COLONOSCOPY      FL RETROGRADE PYELOGRAM  12/13/2019    [place] CYSTOURETHRO W/IMPLANT N/A 12/13/2019    Procedure: CYSTOSCOPY WITH INSERTION UROLIFT, BILATERAL RETROGRADE PYELOGRAM;  Surgeon: Saintclair Lah, MD;  Location: [facility];  Service: Urology    TUMOR REMOVAL           Current Medical Status  Pt is a 79 y o  male who presented [facility] with  polyuria, uncontrolled blood pressure and right sided facial droop  Patient lives with his daughter who takes care of him  She brought him to the ED for AMS and hypertension  He was found to be hyperglycemic   ST consult requested to assess swallow after patient reports dysphagia with thin liquids  He has since had RN dysphagia screen and bedside swallow with MD earlier today  No sxs of aspiration on either  Per RN patient taking whole meds without difficulty  Upon my arrival patient initially reported "I have to raise my hands up when I drink" however when asked again he denied any dysphagia  He denies any GERD sxs or history  Past medical history:   Please see H&P for details    Special Studies:  MRI brain pending    5/9 CT Head: No acute intracranial abnormality  Microangiopathic changes  Cerebral volume loss  Social/Education/Vocational Hx:  Pt lives with family    Swallow Information   Current Risks for Dysphagia & Aspiration: AMS     Current Symptoms/Concerns: none reported    Current Diet: puree/level 1 diet and thin liquids      Baseline Diet: regular diet and thin liquids      Baseline Assessment   Behavior/Cognition: alert    Speech/Language Status: able to participate in basic conversation and able to follow commands    Patient Positioning: upright in bed    Swallow Mechanism Exam   Facial: symmetrical  Labial: WFL  Lingual: WFL  Velum: symmetrical  Mandible: adequate ROM  Dentition: adequate  Vocal quality:clear/adequate   Volitional Cough: strong/productive   Resp: RA    Consistencies Assessed and Performance   Consistencies Administered: thin liquids, puree, soft solids, hard solids and mixed consistency  Specific materials administered included: mashed potatoes, peaches in juice, hard cookie, thin liquids    Oral Stage: WFL  Mastication was adequate with the materials administered today  Bolus formation and transfer were functional with nosignificant oral residue noted  No overt s/s reduced oral control  Pharyngeal Stage: WFL    Swallow Mechanics:  Swallowing initiation appeared prompt  Laryngeal rise was palpated and judged to be within functional limits    No coughing, throat clearing, change in vocal quality or respiratory status noted today  Esophageal Concerns: none reported    Summary   Pts oropharyngeal swallow function appears generally WFL at this time with the materials administered today  He does not appear to be at risk for aspiration at this time       Recommendations: regular diet and thin liquids     Recommended Form of Meds: as tolerated     Aspiration precautions and compensatory swallowing strategies: upright posture, only feed when fully alert, slow rate of feeding, small bites/sips and alternating bites and sips    Results Reviewed with: patient, RN and MD     Dysphagia Goals: pt will tolerate regular textures with thin liquids without s/s of aspiration x2-3    Plan  Will f/u 1-2x    LUCHO Spears S , [address] [facility]  Speech Language Pathologist   Available via [address] [place] [address] #[id number]  [place] #[id number]

## 2021-05-10 NOTE — TELEMEDICINE
REQUIRED DOCUMENTATION:     1  This service was provided via Telemedicine  2  Provider located at [facility]   3  TeleMed provider: Jenny Boyce MD   4  Identify all parties in room with patient during tele consult:  Nurse   5  After connecting through [employer], patient was identified by name and date of birth and assistant checked wristband  Patient was then informed that this was a Telemedicine visit and that the exam was being conducted confidentially over secure lines  My office door was closed  No one else was in the room  Patient acknowledged consent and understanding of privacy and security of the Telemedicine visit, and gave us permission to have the assistant stay in the room in order to assist with the history and to conduct the exam   I informed the patient that I have reviewed their record in Epic and presented the opportunity for them to ask any questions regarding the visit today  The patient agreed to participate  Consultation - Olympia Kussmaul 79 y o  male MRN: [id number]    Unit/Bed#: ED 08 Encounter: [id number]      Assessment/Plan   67-year-old male with no  Known prior history of diabetes who presented with confusion and was found to have severe hyperglycemia, acute kidney injury and hypertensive crisis   -given that sugars have improved and anion  gap has normalized will switch to basal bolus insulin therapy starting with Lantus 20 units daily in the morning, start Humalog 5 units before meals once he starts oral intake  Monitor blood sugars before meals and bedtime adjust accordingly  His family/caregivers would need insulin teaching and glucometer teaching prior to discharge  Upon Discharge, if Lantus is not the preferred Basal Insulin as per Insurance, use [place], Basaglar, Levemir or Toujeo at same dose    Upon Discharge, if Humalog is not the preferred mealtime Insulin per Insurance, Use Novolog, [place], Admelog or Apidra at the same dose instead  Hypokalemia-being supplemented by primary team-he should have repeat labs this afternoon  Acute kidney injury/chronic kidney disease-being monitored by primary team     He will need outpatient follow-up with Endocrinology 2-3 weeks after discharge      CC: Diabetes Consult    History of Present Illness     HPI:    64-year-old male was brought into the hospital for altered mental status-on admission he was found to have severe hyperglycemia, acute kidney injury and hypertensive crises- he was started on IV insulin infusion with improvement in his blood sugars-endocrine consultation is requested for management of newly diagnosed diabetes  Patient is a poor historian so history is obtained from review of the chart as well as discussion with the nurse by bedside  It appears that he Lives with daughter and has no history of Diabetes and doesn't appear to be on any antidiabetic medications      He is currently NPO awaiting speech and swallow evaluation    Inpatient consult to Endocrinology  Consult performed by: Jennifer Reddy MD  Consult ordered by: Bijal Rojas MD          Review of Systems    Historical Information   Past Medical History:   Diagnosis Date    Anemia     BPH (benign prostatic hyperplasia)     Chronic kidney disease     Dementia ([address] )     Hyperlipidemia     Hypertension     Incontinence     Osteoarthritis      Past Surgical History:   Procedure Laterality Date    COLON SURGERY      COLONOSCOPY      FL RETROGRADE PYELOGRAM  12/13/2019    OR CYSTOURETHRO W/IMPLANT N/A 12/13/2019    Procedure: CYSTOSCOPY WITH INSERTION UROLIFT, BILATERAL RETROGRADE PYELOGRAM;  Surgeon: Keely Huang MD;  Location: [facility];  Service: Urology    TUMOR REMOVAL       Social History   Social History     Substance and Sexual Activity   Alcohol Use Never    Frequency: Never     Social History     Substance and Sexual Activity   Drug Use Never     Social History     Tobacco Use   Smoking Status Passive Smoke Exposure - Never Smoker   Smokeless Tobacco Never Used     Family History:   Family History   Problem Relation Age of Onset    Heart failure Brother     Dementia Maternal Uncle        Meds/Allergies   Current Facility-Administered Medications   Medication Dose Route Frequency Provider Last Rate Last Admin    acetaminophen (TYLENOL) tablet 650 mg  650 mg Oral Q6H PRN Nyla Shelton MD        amLODIPine (NORVASC) tablet 5 mg  5 mg Oral Daily Nancy Pat MD        aspirin (ECOTRIN LOW STRENGTH) EC tablet 81 mg  81 mg Oral Daily Nyla Shelton MD   81 mg at 05/10/21 2918    calcium carbonate (TUMS) chewable tablet 1,000 mg  1,000 mg Oral Daily PRN Nyla Shelton MD        docusate sodium (COLACE) capsule 100 mg  100 mg Oral BID Nyla Shelton MD   100 mg at 05/10/21 5521    finasteride (PROSCAR) tablet 5 mg  5 mg Oral Daily Nyla Shelton MD   5 mg at 05/10/21 0927    heparin (porcine) subcutaneous injection 5,000 Units  5,000 Units Subcutaneous [facility] Nyla Shelton MD   Stopped at 05/10/21 0515    hydrALAZINE (APRESOLINE) injection 5 mg  5 mg Intravenous Q6H PRN Nancy Pta MD        insulin glargine (LANTUS) subcutaneous injection 15 Units 0 15 mL  15 Units Subcutaneous HS Nancy Pat MD        insulin lispro (HumaLOG) 100 units/mL subcutaneous injection 1-5 Units  1-5 Units Subcutaneous TID AC Jacob Vaz PA-C        insulin lispro (HumaLOG) 100 units/mL subcutaneous injection 1-5 Units  1-5 Units Subcutaneous HS Jacob Vaz PA-C        insulin lispro (HumaLOG) 100 units/mL subcutaneous injection 3 Units  3 Units Subcutaneous Daily With Breakfast Jacob Vaz PA-C        lactated ringers infusion  50 mL/hr Intravenous Continuous Yoan Melendez MD        ondansetron [facility]) injection 4 mg  4 mg Intravenous Q6H PRN Nyla Shelton MD        potassium chloride (K-DUR,KLOR-CON) CR tablet 40 mEq  40 mEq Oral Once [employer] Scott Hester MD        pravastatin (PRAVACHOL) tablet 40 mg  40 mg Oral Daily With Roderick Gates MD        [facility]) tablet 8 6 mg  1 tablet Oral Daily Petra Swenson MD   8 6 mg at 05/10/21 1135     Current Outpatient Medications   Medication Sig Dispense Refill    acetaminophen (TYLENOL) 500 mg tablet Take 500 mg by mouth every 6 (six) hours as needed for mild pain      allopurinol (ZYLOPRIM) 300 mg tablet take 1 tablet by mouth once daily 90 tablet 2    amLODIPine (NORVASC) 10 mg tablet take 1 tablet by mouth daily 90 tablet 2    Diclofenac Sodium (VOLTAREN) 1 % Apply 2 g topically 4 (four) times a day 1 Tube 2    finasteride (PROSCAR) 5 mg tablet Take 1 tablet (5 mg total) by mouth daily 90 tablet 3    fluticasone (FLONASE) 50 mcg/act nasal spray 1 spray into each nostril daily 1 Bottle 3    furosemide (LASIX) 40 mg tablet Take 1 tablet (40 mg total) by mouth daily 90 tablet 3    hydrALAZINE (APRESOLINE) 50 mg tablet take 1 tablet by mouth three times a day 90 tablet 2    Incontinence Supply Disposable (Incontinence Brief Large) MISC by Does not apply route 4 (four) times a day PLEASE SUPPLY EXTRA-LARGE  120 each 5    labetalol (NORMODYNE) 200 mg tablet Take 1 tablet (200 mg total) by mouth daily at bedtime 90 tablet 3    Multiple Vitamins-Minerals (MULTIVITAMIN WITH MINERALS) tablet Take 1 tablet by mouth daily      omega-3-acid ethyl esters (LOVAZA) 1 g capsule Take 1 g by mouth daily      simvastatin (ZOCOR) 20 mg tablet take 1 tablet by mouth at bedtime 30 tablet 2     Allergies   Allergen Reactions    Strawberry C [Ascorbate - Food Allergy] Hives       Objective   Vitals: Blood pressure 151/93, pulse 84, temperature 98 1 °F (36 7 °C), resp  rate (!) 25, height 5' 9 5" (1 765 m), weight 98 kg (216 lb 0 8 oz), SpO2 98 %    No intake or output data in the 24 hours ending 05/10/21 1238  Invasive Devices     Peripheral Intravenous Line            Peripheral IV 05/09/21 Left Forearm less than 1 day    Peripheral IV 05/09/21 Left Forearm less than 1 day          Drain            Urethral Catheter Coude 20 Fr  514 days                Physical Exam  Vitals signs reviewed  Constitutional:       Appearance: Normal appearance  He is not ill-appearing or diaphoretic  HENT:      Head: Normocephalic and atraumatic  Pulmonary:      Effort: Pulmonary effort is normal  No respiratory distress  Musculoskeletal:      Comments: Moving bilateral upper extremities   Neurological:      Mental Status: He is alert  Comments: Confused         The history was obtained from the review of the chart, patient and nurse   Lab Results:   Results from last 7 days   Lab Units 05/09/21  1628   HEMOGLOBIN A1C % 12 3*     Lab Results   Component Value Date    WBC 7 26 05/10/2021    HGB 14 0 05/10/2021    HCT 41 6 05/10/2021    MCV 85 05/10/2021     (L) 05/10/2021     Lab Results   Component Value Date/Time    BUN 34 (H) 05/10/2021 04:58 AM    K 3 1 (L) 05/10/2021 04:58 AM     05/10/2021 04:58 AM    CO2 27 05/10/2021 04:58 AM    CREATININE 2 07 (H) 05/10/2021 04:58 AM    AST 17 05/09/2021 04:28 PM    ALT 29 05/09/2021 04:28 PM    ALB 3 6 05/09/2021 04:28 PM     No results for input(s): CHOL, HDL, LDL, TRIG, VLDL in the last 72 hours  No results found for: Herbie Mccrary  POC Glucose (mg/dl)   Date Value   05/10/2021 257 (H)   05/10/2021 162 (H)   05/10/2021 76   05/10/2021 116   05/10/2021 123   05/09/2021 246 (H)   05/09/2021 >500 (HH)   05/09/2021 >500 (HH)   05/09/2021 >500 ([facility])   10/14/2019 97       Imaging Studies: I have personally reviewed pertinent reports  Portions of the record may have been created with voice recognition software

## 2021-05-10 NOTE — ASSESSMENT & PLAN NOTE
Lab Results   Component Value Date    EGFR 34 05/10/2021    EGFR 30 05/09/2021    EGFR 26 05/09/2021    CREATININE 2 07 (H) 05/10/2021    CREATININE 2 30 (H) 05/09/2021    CREATININE 2 63 (H) 05/09/2021   · Acute kidney injury superimposed to chronic kidney disease stage IIIB  · Creatinine baseline between 1 8-2 6  · Serum creatinine on presentation 2 63 improving--> 2 07 (at baseline)  · Likely prerenal in the setting of dehydration and polyuria due to hyperosmolar hyperglycemic state    · Status post IV fluids  · Monitor renal function

## 2021-05-10 NOTE — UTILIZATION REVIEW
Initial Clinical Review    Admission: Date/Time/Statement:   Admission Orders (From admission, onward)     Ordered        05/09/21 1845  Inpatient Admission  Once                   Orders Placed This Encounter   Procedures    Inpatient Admission     Standing Status:   Standing     Number of Occurrences:   1     Order Specific Question:   Level of Care     Answer:   Med Surg [16]     Order Specific Question:   Estimated length of stay     Answer:   More than 2 Midnights     Order Specific Question:   Certification     Answer:   I certify that inpatient services are medically necessary for this patient for a duration of greater than two midnights  See H&P and MD Progress Notes for additional information about the patient's course of treatment  ED Arrival Information     Expected Arrival Acuity Means of Arrival Escorted By Service Admission Type    - 5/9/2021 15:58 Emergent Wheelchair Family Member General Medicine Emergency    Arrival Complaint    Weakness; AMS        Chief Complaint   Patient presents with    Altered Mental Status     Per daughter, pt woke up this morning slightly confused, unsure of who she was and c/o blurry vision  Symptoms have since resolved but still having vision problems        Initial Presentation:   79 yom to ER from home with confusion, disoriented, incontinent, blurred vision  Per family, facial droop & acting unusual  Hx CKD, HTN, HLD  Presents neuro exam intact, 5/5 motor strength, cranial nerves 2-12 intact, does have slight right sided resting facial droop  Admission work-up showing MILE, elevated procalcitonin, hyponatremia  Admitted to inpatient status for hyperosmolar hyperglycemic state  Date: 5/10/21 Day 2:   HHS with uncontrolled blood sugars ranging from  over past 24hrs  Insulin gtt transitioned to scheduled Lantus insulin dosing with accuchecks with coverage scale continued  Responses sluggish, A&O x3   Swallow eval at bedside, started on pureed with thin liquids  Per endocrinology:  -given that sugars have improved and anion  gap has normalized will switch to basal bolus insulin therapy starting with Lantus 20 units daily in the morning, start Humalog 5 units before meals once he starts oral intake  Monitor blood sugars before meals and bedtime adjust accordingly      ED Triage Vitals   Temperature Pulse Respirations Blood Pressure SpO2   05/09/21 1608 05/09/21 1608 05/09/21 1608 05/09/21 1608 05/09/21 1608   98 1 °F (36 7 °C) 87 16 149/75 95 %      Temp src Heart Rate Source Patient Position - Orthostatic VS BP Location FiO2 (%)   -- 05/09/21 1730 05/09/21 1730 05/09/21 1730 --    Monitor Lying Right arm       Pain Score       --                 Wt Readings from Last 1 Encounters:   05/10/21 98 kg (216 lb 0 8 oz)     Additional Vital Signs:   05/10/21 0330  --  81  20  163/95  123  --  --  Lying   05/10/21 0300  --  84  20  146/86  108  93 %  None (Room air)  Lying   05/10/21 0130  --  79  22  144/67  95  96 %  None (Room air)  Lying   05/10/21 0000  --  82  12  151/73  102  98 %  None (Room air)  Lying   05/09/21 2300  --  81  27Abnormal   134/71  96  98 %  None (Room air)  --   05/09/21 2200  --  80  18  161/89  118  --  --  Sitting   05/09/21 2130  --  80  28Abnormal   161/93  113  98 %  None (Room air)  --     Pertinent Labs/Diagnostic Test Results:   Results from last 7 days   Lab Units 05/10/21  0458 05/09/21  1628   WBC Thousand/uL 7 26 6 38   HEMOGLOBIN g/dL 14 0 13 9   HEMATOCRIT % 41 6 42 3   PLATELETS Thousands/uL 145* 142*   NEUTROS ABS Thousands/µL  --  4 74     Results from last 7 days   Lab Units 05/10/21  0458 05/09/21  2352 05/09/21  1628   SODIUM mmol/L 145 142 132*   POTASSIUM mmol/L 3 1* 3 3* 4 8   CHLORIDE mmol/L 107 106 95*   CO2 mmol/L 27 26 23   ANION GAP mmol/L 11 10 14*   BUN mg/dL 34* 37* 42*   CREATININE mg/dL 2 07* 2 30* 2 63*   EGFR ml/min/1 73sq m 34 30 26   CALCIUM mg/dL 9 2 9 0 8 9   MAGNESIUM mg/dL 2 1  --   --      Results from last 7 days   Lab Units 05/09/21  1628   AST U/L 17   ALT U/L 29   ALK PHOS U/L 114   TOTAL PROTEIN g/dL 7 3   ALBUMIN g/dL 3 6   TOTAL BILIRUBIN mg/dL 0 70     Results from last 7 days   Lab Units 05/10/21  0802 05/10/21  0455 05/10/21  0253 05/10/21  0142 05/09/21  2349 05/09/21  2114 05/09/21 2003 05/09/21  1850   POC GLUCOSE mg/dl 162* 76 116 123 246* >500* >500* >500*     Results from last 7 days   Lab Units 05/10/21  0458 05/09/21  2352 05/09/21  1628   GLUCOSE RANDOM mg/dL 81 268* 796*     Results from last 7 days   Lab Units 05/09/21  1628   HEMOGLOBIN A1C % 12 3*   EAG mg/dl 306     BETA-HYDROXYBUTYRATE   Date Value Ref Range Status   05/09/2021 3 7 (H) <0 6 mmol/L Final      Results from last 7 days   Lab Units 05/09/21  1728   PH TELMA  7 342   PCO2 TELMA mm Hg 40 1*   PO2 TELMA mm Hg 57 1*   HCO3 TELMA mmol/L 21 2*   BASE EXC TELMA mmol/L -4 1   O2 CONTENT TELMA ml/dL 17 3   O2 HGB, VENOUS % 87 1*     Results from last 7 days   Lab Units 05/09/21  1628   PROTIME seconds 13 0   INR  1 03   PTT seconds 32     Results from last 7 days   Lab Units 05/09/21  1628   TSH 3RD GENERATON uIU/mL 0 804     Results from last 7 days   Lab Units 05/10/21  0458 05/09/21  1628   PROCALCITONIN ng/ml 0 28* 0 22     Results from last 7 days   Lab Units 05/09/21  1628   LACTIC ACID mmol/L 1 6     Results from last 7 days   Lab Units 05/09/21  1628   ETHANOL LVL mg/dL <3   ACETAMINOPHEN LVL ug/mL <6 4*   SALICYLATE LVL mg/dL <3*     Results from last 7 days   Lab Units 05/09/21  1728 05/09/21  1628   BLOOD CULTURE  Received in Microbiology Lab  Culture in Progress  Received in Microbiology Lab  Culture in Progress  5/9  CT head=  No acute intracranial abnormality  Microangiopathic changes  Cerebral volume loss      ED Treatment:   Medication Administration from 05/09/2021 1558 to 05/10/2021 0937       Date/Time Order Dose Route Action     05/09/2021 1757 sodium chloride 0 9 % bolus 1,000 mL 1,000 mL Intravenous New Bag 05/09/2021 2122 insulin regular (HumuLIN R,NovoLIN R) 1 Units/mL in sodium chloride 0 9 % 100 mL infusion 1 5 Units/hr Intravenous Rate/Dose Change     05/09/2021 1918 insulin regular (HumuLIN R,NovoLIN R) 1 Units/mL in sodium chloride 0 9 % 100 mL infusion 10 Units/hr Intravenous New Bag     05/10/2021 0927 finasteride (PROSCAR) tablet 5 mg 5 mg Oral Given     05/10/2021 7771 aspirin (ECOTRIN LOW STRENGTH) EC tablet 81 mg 81 mg Oral Given     05/09/2021 2123 aspirin (ECOTRIN LOW STRENGTH) EC tablet 81 mg 81 mg Oral Given     05/10/2021 0927 docusate sodium (COLACE) capsule 100 mg 100 mg Oral Given     05/10/2021 0638 senna (SENOKOT) tablet 8 6 mg 8 6 mg Oral Given     05/10/2021 0500 heparin (porcine) subcutaneous injection 5,000 Units 5,000 Units Subcutaneous Given        Past Medical History:   Diagnosis Date    Anemia     BPH (benign prostatic hyperplasia)     Chronic kidney disease     Dementia [address] [address]     Hyperlipidemia     Hypertension     Incontinence     Osteoarthritis      Present on Admission:   BPH (benign prostatic hyperplasia)   Other hyperlipidemia    Admitting Diagnosis: AMS (altered mental status) [R41 82]  Age/Sex: 79 y o  male  Admission Orders:  accuchecks  Consult endocrinology  St eval  & tx  scd    Scheduled Medications:  aspirin, 81 mg, Oral, Daily  docusate sodium, 100 mg, Oral, BID  finasteride, 5 mg, Oral, Daily  heparin (porcine), 5,000 Units, Subcutaneous, Q8H [address]  insulin lispro, 1-5 Units, Subcutaneous, TID AC  insulin lispro, 1-5 Units, Subcutaneous, HS  insulin lispro, 3 Units, Subcutaneous, Daily With Breakfast  pravastatin, 40 mg, Oral, Daily With Dinner  senna, 1 tablet, Oral, Daily    Continuous IV Infusions:  insulin regular (HumuLIN R,NovoLIN R) 1 Units/mL in sodium chloride 0 9 % 100 mL infusion   Rate: 0 3-21 mL/hr Dose: 0 3-21 Units/hr  Freq: Titrated Route: IV  Last Dose: Stopped (05/10/21 0515)  Start: 05/09/21 1830 End: 05/10/21 0520    PRN Meds:  acetaminophen, 650 mg, Oral, Q6H PRN  calcium carbonate, 1,000 mg, Oral, Daily PRN  hydrALAZINE, 5 mg, Intravenous, Q6H PRN  ondansetron, 4 mg, Intravenous, Q6H PRN    Network Utilization Review Department  ATTENTION: Please call with any questions or concerns to [phone] and carefully listen to the prompts so that you are directed to the right person  All voicemails are confidential   Francis Alejandre all requests for admission clinical reviews, approved or denied determinations and any other requests to dedicated fax number below belonging to the campus where the patient is receiving treatment   List of dedicated fax numbers for the Facilities:  [address] DENIALS (Administrative/Medical Necessity) [phone]   [address] (Maternity/NICU/Pediatrics) [phone] [address] [place] [address] [id number] [place] [address] [address] [facility] [phone]

## 2021-05-10 NOTE — PROGRESS NOTES
[facility]  Progress Note - Rosette Villatoro 1940, 79 y o  male MRN: [id number]  Unit/Bed#: ED 08 Encounter: [id number]  Primary Care Provider: Christy Fuller MD   Date and time admitted to hospital: 5/9/2021  4:12 PM    * Hyperosmolar hyperglycemic state (HHS) [facility])  Assessment & Plan  Lab Results   Component Value Date    HGBA1C 12 3 (H) 05/09/2021       Recent Labs     05/10/21  0142 05/10/21  0253 05/10/21  0455 05/10/21  0802   POCGLU 123 116 76 162*       Blood Sugar Average: Last 72 hrs:  Patient presented with some neurological deficits including confusion and facial drooping he was found to have glycemia greater than 700  No prior early diagnosed history of diabetes mellitus  Hemoglobin A1c from December 2019 --->5 0  Hemoglobin A1c on presentation 12 3  Status post IV fluids and insulin administration patient blood sugar is now better controlled; most recent blood glucose this morning at 8 a m  162  On physical exam there was no evidence of neurological focal deficits  Patient was started on Humalog 3 units t i d  A c  And sliding scale insulin coverage  Since patient is NPO pending speech therapy assessment for dysphagia I will hold Humalog insulin with meals  Will start Lantus 15 units q h s  And will continue sliding scale insulin coverage  Gentle IV hydration with lactated Ringer at 50 cc/hour  Admitting physician consulted endocrinologist   Input appreciated  Stroke-like symptoms  Assessment & Plan  · On admission Self reported facial droop, visual changes, and confusion  · As per documentation from admitting physician: Neuro exam intact, 5/5 motor strength, cranial nerves 2-12 intact, does have slight right sided resting facial droop but on smile and frown test droop resolves  · CT head on admission showed no acute intracranial abnormalities  · On neuro exam and by me 05/10/2021 there was no evidence of focal neurological deficits    · Brain MRI, has been ordered  Status post aspirin, continue statin  Patient was allowed for permissive hypertension  Current blood pressure 161/92  · Initial neurological deficits most likely attributed to hyperosmolar hyperglycemic state  Hypertensive crisis  Assessment & Plan  · As per documentation blood pressure on the 046D systolic at home, patient's daughter checks regularly   · Follows with PCP has been adjusting his home meds without much improvement  · Since admission patient was allowed for permissive hypertension, on the morning of 07/37/0016 systolic blood pressure in the 160s  · Home regimen of amlodipine  10 mg oral daily, furosemide 40 mg daily, hydralazine 50 mg p o  T i d , labetalol 200 daily at bedtime  · Currently on hydralazine 5 mg IV q 6 hours for systolic blood pressure greater than 220  · Will resume home regimen of antihypertensive medications in step wise manner  · Resume amlodipine at 5 mg q a m  · Continue p r n  IV hydralazine  Change parameters for systolic blood pressure greater than 170 mmHg  · Will continue holding diuretics in the setting of MILE superimposed to CKD  There is no evidence of volume overload  · Once patient renal function improve patient may benefit from ACEs/ARBs in the setting of diabetes mellitus and CKD  Dementia [facility])  Assessment & Plan  As per documentation patient has history of dementia  Late onset 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   Patient follows with geriatrician, recommendations to pursue conservative management including frequent reorientation and redirection  Being in socially, physically and mentally active  Fall precautions, optimization of chronic conditions  Cognitive challenging exercises  Recommendations to start vitamin E/Namenda on upcoming neurocognitive assessment visit by geriatrician          Hypokalemia  Assessment & Plan  Likely to intracellular shift in the setting of IV insulin for HHS  Potassium chloride 40 mEq p o  X1  IV hydration with lactated Ringer at 50 cc/hour  Dysphagia  Assessment & Plan  · Patient reports dysphagia at home on thin liquids  · Patient is currently NPO  · Consult speech and swallow     Chronic kidney disease  Assessment & Plan  Lab Results   Component Value Date    EGFR 34 05/10/2021    EGFR 30 05/09/2021    EGFR 26 05/09/2021    CREATININE 2 07 (H) 05/10/2021    CREATININE 2 30 (H) 05/09/2021    CREATININE 2 63 (H) 05/09/2021   · Acute kidney injury superimposed to chronic kidney disease stage IIIB  · Creatinine baseline between 1 8-2 6  · Serum creatinine on presentation 2 63 improving--> 2 07 (at baseline)  · Likely prerenal in the setting of dehydration and polyuria due to hyperosmolar hyperglycemic state  · Status post IV fluids  · Monitor renal function    BPH (benign prostatic hyperplasia)  Assessment & Plan  · Continue finasteride 5 mg oral daily  Subjective:   Patient has been seen at bedside, he was alert and cooperative  Although patient was sluggish on his responses he was oriented x3  At the time of the assessment he was able to provide reliable information; patient states he was admitted because he was not feeling well and he was confused  Denies any precordial chest pain or shortness of breath  Denies abdominal pain  Denies changes in bowel or urinary habits  Denies focal neurological deficits including numbness weakness or visual disturbances  Denies headache  Patient has remained afebrile  Hemodynamically stable  Objective:     Vitals: Blood pressure 161/92, pulse 77, temperature 98 1 °F (36 7 °C), resp  rate 20, height 5' 9 5" (1 765 m), weight 98 kg (216 lb 0 8 oz), SpO2 93 %  ,Body mass index is 31 45 kg/m²  No intake or output data in the 24 hours ending 05/10/21 1058      Physical Exam:     Physical Exam  Constitutional:       General: He is not in acute distress  Appearance: Normal appearance  He is not toxic-appearing or diaphoretic     HENT: Head: Normocephalic and atraumatic  Nose: Nose normal       Mouth/Throat:      Mouth: Mucous membranes are moist    Eyes:      General: No visual field deficit  Extraocular Movements: Extraocular movements intact  Conjunctiva/sclera: Conjunctivae normal    Cardiovascular:      Rate and Rhythm: Normal rate and regular rhythm  Pulses: Normal pulses  Heart sounds: No murmur  No friction rub  No gallop  Pulmonary:      Effort: Pulmonary effort is normal  No respiratory distress  Breath sounds: No wheezing, rhonchi or rales  Abdominal:      General: Bowel sounds are normal  There is no distension  Palpations: Abdomen is soft  Tenderness: There is no abdominal tenderness  There is no guarding or rebound  Musculoskeletal: Normal range of motion  General: No swelling, deformity or signs of injury  Skin:     General: Skin is warm and dry  Capillary Refill: Capillary refill takes less than 2 seconds  Coloration: Skin is not jaundiced or pale  Findings: No erythema  Neurological:      General: No focal deficit present  Mental Status: He is alert and oriented to person, place, and time  Cranial Nerves: No cranial nerve deficit, dysarthria or facial asymmetry  Sensory: Sensation is intact  Motor: No weakness, tremor, abnormal muscle tone or seizure activity  Coordination: Coordination is intact   Finger-Nose-Finger Test normal    Psychiatric:         Mood and Affect: Mood normal          Behavior: Behavior normal            Invasive Devices     Peripheral Intravenous Line            Peripheral IV 05/09/21 Left Forearm less than 1 day    Peripheral IV 05/09/21 Left Forearm less than 1 day          Drain            Urethral Catheter Coude 20 Fr  514 days                        CBC:   Lab Results   Component Value Date    WBC 7 26 05/10/2021    HGB 14 0 05/10/2021    HCT 41 6 05/10/2021    MCV 85 05/10/2021     (L) 05/10/2021 MCH 28 6 05/10/2021    MCHC 33 7 05/10/2021    RDW 13 3 05/10/2021    MPV 12 1 05/10/2021    NRBC 0 05/09/2021   ,   CMP:   Lab Results   Component Value Date    K 3 1 (L) 05/10/2021     05/10/2021    CO2 27 05/10/2021    BUN 34 (H) 05/10/2021    CREATININE 2 07 (H) 05/10/2021    CALCIUM 9 2 05/10/2021    AST 17 05/09/2021    ALT 29 05/09/2021    ALKPHOS 114 05/09/2021    EGFR 34 05/10/2021   ,   PT/INR:   Lab Results   Component Value Date    INR 1 03 05/09/2021   ,   Troponin: No results found for: TROPONINI,     Imaging Studies: I have personally reviewed pertinent reports  Counseling / Coordination of Care  Total time spent today  30 minutes  Greater than 50% of total time was spent with the patient and / or family counseling and / or coordination of care  I did talk to patient's daughter Mrs Chelsea Swenson on the morning of 05/10/2021  I updated her with patient's current condition, assessment and plan  All questions/concerns were addressed  Will continue updating family members on regular basis

## 2021-05-10 NOTE — QUICK NOTE
Notified by RN patient's blood glucose level on check is 73 currently  Instructed RN to discontinue insulin drip  Order discontinued  Placed orders for subcutaneous sliding scale insulin and Humalog 3 units t i d  With meals  Blood glucose checks q i d  Starting at 7:00 a m  Becky Bullard     Appreciate Endocrine consult

## 2021-05-10 NOTE — ASSESSMENT & PLAN NOTE
· On admission Self reported facial droop, visual changes, and confusion  · As per documentation from admitting physician: Neuro exam intact, 5/5 motor strength, cranial nerves 2-12 intact, does have slight right sided resting facial droop but on smile and frown test droop resolves  · CT head on admission showed no acute intracranial abnormalities  · On neuro exam and by me 05/10/2021 there was no evidence of focal neurological deficits  · Brain MRI, has been ordered  Status post aspirin, continue statin  Patient was allowed for permissive hypertension  Current blood pressure 161/92  · Initial neurological deficits most likely attributed to hyperosmolar hyperglycemic state

## 2021-05-10 NOTE — QUICK NOTE
On May 10, 2021 at 11:50 a m  bedside swallow eval was performed by me on patient Mr Elsa Sykes at bed 8 ED  Patient was able to drink water without evidence of aspiration, no cough or clearing throat  Patient has been started on dysphagia 1 pureed diet with thin liquids  Carbohydrate consistent diet  Patient is pending formal speech therapy swallow eval   Will follow recommendations

## 2021-05-10 NOTE — ASSESSMENT & PLAN NOTE
Likely to intracellular shift in the setting of IV insulin for HHS  Potassium chloride 40 mEq p o  X1  IV hydration with lactated Ringer at 50 cc/hour

## 2021-05-10 NOTE — ASSESSMENT & PLAN NOTE
· As per documentation blood pressure on the 812H systolic at home, patient's daughter checks regularly   · Follows with PCP has been adjusting his home meds without much improvement  · Since admission patient was allowed for permissive hypertension, on the morning of 07/19/4703 systolic blood pressure in the 160s  · Home regimen of amlodipine  10 mg oral daily, furosemide 40 mg daily, hydralazine 50 mg p o  T i d , labetalol 200 daily at bedtime  · Currently on hydralazine 5 mg IV q 6 hours for systolic blood pressure greater than 220  · Will resume home regimen of antihypertensive medications in step wise manner  · Resume amlodipine at 5 mg q a m  · Continue p r n  IV hydralazine  Change parameters for systolic blood pressure greater than 170 mmHg  · Will continue holding diuretics in the setting of MILE superimposed to CKD  There is no evidence of volume overload  · Once patient renal function improve patient may benefit from ACEs/ARBs in the setting of diabetes mellitus and CKD

## 2021-05-10 NOTE — ASSESSMENT & PLAN NOTE
Lab Results   Component Value Date    HGBA1C 12 3 (H) 05/09/2021       Recent Labs     05/10/21  0142 05/10/21  0253 05/10/21  0455 05/10/21  0802   POCGLU 123 116 76 162*       Blood Sugar Average: Last 72 hrs:  Patient presented with some neurological deficits including confusion and facial drooping he was found to have glycemia greater than 700  No prior early diagnosed history of diabetes mellitus  Hemoglobin A1c from December 2019 --->5 0  Hemoglobin A1c on presentation 12 3  Status post IV fluids and insulin administration patient blood sugar is now better controlled; most recent blood glucose this morning at 8 a m  162  On physical exam there was no evidence of neurological focal deficits  Patient was started on Humalog 3 units t i d  A c  And sliding scale insulin coverage  Since patient is NPO pending speech therapy assessment for dysphagia I will hold Humalog insulin with meals  Will start Lantus 15 units q h s  And will continue sliding scale insulin coverage  Gentle IV hydration with lactated Ringer at 50 cc/hour  Admitting physician consulted endocrinologist   Input appreciated

## 2021-05-10 NOTE — ASSESSMENT & PLAN NOTE
As per documentation patient has history of dementia  Late onset 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   Patient follows with geriatrician, recommendations to pursue conservative management including frequent reorientation and redirection  Being in socially, physically and mentally active  Fall precautions, optimization of chronic conditions  Cognitive challenging exercises  Recommendations to start vitamin E/Namenda on upcoming neurocognitive assessment visit by geriatrician

## 2021-05-10 NOTE — ASSESSMENT & PLAN NOTE
· Patient reports dysphagia at home on thin liquids  · Patient is currently NPO    · Consult speech and swallow

## 2021-05-10 NOTE — ED NOTES
Called MRI advised that patient was ready however unable to go right now as they have a 1630 but, will get him [facility]  05/10/21 9856

## 2021-05-10 NOTE — ED NOTES
Pt did not go to MRI - must have spilled a urinal  Patient was cleaned and new linens provided  External condom catheter applied to patient        Bob Milan RN  05/10/21 5376

## 2021-05-10 NOTE — ED NOTES
Attempted to assist patient with ordering lunch however, patient is set on pancakes and a 1 piece of cook    Advised patient it is lunch time but, he does not seem to understand     Tammy Contreras  05/10/21 9448

## 2021-05-11 PROBLEM — E11.9 NEW ONSET TYPE 2 DIABETES MELLITUS (HCC): Status: ACTIVE | Noted: 2021-05-11

## 2021-05-11 LAB
ANION GAP SERPL CALCULATED.3IONS-SCNC: 13 MMOL/L (ref 4–13)
BUN SERPL-MCNC: 28 MG/DL (ref 5–25)
CALCIUM SERPL-MCNC: 8.5 MG/DL (ref 8.3–10.1)
CHLORIDE SERPL-SCNC: 106 MMOL/L (ref 100–108)
CO2 SERPL-SCNC: 23 MMOL/L (ref 21–32)
CREAT SERPL-MCNC: 1.87 MG/DL (ref 0.6–1.3)
ERYTHROCYTE [DISTWIDTH] IN BLOOD BY AUTOMATED COUNT: 13.4 % (ref 11.6–15.1)
GFR SERPL CREATININE-BSD FRML MDRD: 39 ML/MIN/1.73SQ M
GLUCOSE SERPL-MCNC: 247 MG/DL (ref 65–140)
GLUCOSE SERPL-MCNC: 281 MG/DL (ref 65–140)
GLUCOSE SERPL-MCNC: 410 MG/DL (ref 65–140)
GLUCOSE SERPL-MCNC: 430 MG/DL (ref 65–140)
HCT VFR BLD AUTO: 43.1 % (ref 36.5–49.3)
HGB BLD-MCNC: 14.3 G/DL (ref 12–17)
MCH RBC QN AUTO: 28.5 PG (ref 26.8–34.3)
MCHC RBC AUTO-ENTMCNC: 33.2 G/DL (ref 31.4–37.4)
MCV RBC AUTO: 86 FL (ref 82–98)
PLATELET # BLD AUTO: 153 THOUSANDS/UL (ref 149–390)
PMV BLD AUTO: 12.7 FL (ref 8.9–12.7)
POTASSIUM SERPL-SCNC: 3.8 MMOL/L (ref 3.5–5.3)
PROCALCITONIN SERPL-MCNC: 0.13 NG/ML
RBC # BLD AUTO: 5.02 MILLION/UL (ref 3.88–5.62)
SODIUM SERPL-SCNC: 142 MMOL/L (ref 136–145)
WBC # BLD AUTO: 5.88 THOUSAND/UL (ref 4.31–10.16)

## 2021-05-11 PROCEDURE — 92526 ORAL FUNCTION THERAPY: CPT

## 2021-05-11 PROCEDURE — 99233 SBSQ HOSP IP/OBS HIGH 50: CPT | Performed by: INTERNAL MEDICINE

## 2021-05-11 PROCEDURE — 85027 COMPLETE CBC AUTOMATED: CPT | Performed by: INTERNAL MEDICINE

## 2021-05-11 PROCEDURE — 80048 BASIC METABOLIC PNL TOTAL CA: CPT | Performed by: INTERNAL MEDICINE

## 2021-05-11 PROCEDURE — 84145 PROCALCITONIN (PCT): CPT | Performed by: INTERNAL MEDICINE

## 2021-05-11 PROCEDURE — 82948 REAGENT STRIP/BLOOD GLUCOSE: CPT

## 2021-05-11 RX ADMIN — HEPARIN SODIUM 5000 UNITS: 5000 INJECTION INTRAVENOUS; SUBCUTANEOUS at 21:42

## 2021-05-11 RX ADMIN — INSULIN LISPRO 5 UNITS: 100 INJECTION, SOLUTION INTRAVENOUS; SUBCUTANEOUS at 12:46

## 2021-05-11 RX ADMIN — DOCUSATE SODIUM 100 MG: 100 CAPSULE ORAL at 09:34

## 2021-05-11 RX ADMIN — PRAVASTATIN SODIUM 40 MG: 40 TABLET ORAL at 18:17

## 2021-05-11 RX ADMIN — INSULIN LISPRO 5 UNITS: 100 INJECTION, SOLUTION INTRAVENOUS; SUBCUTANEOUS at 18:15

## 2021-05-11 RX ADMIN — INSULIN LISPRO 5 UNITS: 100 INJECTION, SOLUTION INTRAVENOUS; SUBCUTANEOUS at 09:37

## 2021-05-11 RX ADMIN — INSULIN LISPRO 2 UNITS: 100 INJECTION, SOLUTION INTRAVENOUS; SUBCUTANEOUS at 09:35

## 2021-05-11 RX ADMIN — INSULIN GLARGINE 20 UNITS: 100 INJECTION, SOLUTION SUBCUTANEOUS at 09:32

## 2021-05-11 RX ADMIN — DOCUSATE SODIUM 100 MG: 100 CAPSULE ORAL at 18:17

## 2021-05-11 RX ADMIN — SODIUM CHLORIDE, SODIUM LACTATE, POTASSIUM CHLORIDE, AND CALCIUM CHLORIDE 50 ML/HR: .6; .31; .03; .02 INJECTION, SOLUTION INTRAVENOUS at 21:42

## 2021-05-11 RX ADMIN — HEPARIN SODIUM 5000 UNITS: 5000 INJECTION INTRAVENOUS; SUBCUTANEOUS at 15:23

## 2021-05-11 RX ADMIN — ASPIRIN 81 MG: 81 TABLET, COATED ORAL at 09:34

## 2021-05-11 RX ADMIN — INSULIN LISPRO 4 UNITS: 100 INJECTION, SOLUTION INTRAVENOUS; SUBCUTANEOUS at 21:42

## 2021-05-11 RX ADMIN — INSULIN LISPRO 4 UNITS: 100 INJECTION, SOLUTION INTRAVENOUS; SUBCUTANEOUS at 18:14

## 2021-05-11 RX ADMIN — AMLODIPINE BESYLATE 5 MG: 5 TABLET ORAL at 09:34

## 2021-05-11 RX ADMIN — FINASTERIDE 5 MG: 5 TABLET, FILM COATED ORAL at 09:34

## 2021-05-11 RX ADMIN — HEPARIN SODIUM 5000 UNITS: 5000 INJECTION INTRAVENOUS; SUBCUTANEOUS at 05:27

## 2021-05-11 RX ADMIN — INSULIN LISPRO 2 UNITS: 100 INJECTION, SOLUTION INTRAVENOUS; SUBCUTANEOUS at 12:46

## 2021-05-11 NOTE — ASSESSMENT & PLAN NOTE
Lab Results   Component Value Date    HGBA1C 12 3 (H) 05/09/2021       Recent Labs     05/10/21  1216 05/10/21  2132 05/10/21  2200 05/11/21  0725   POCGLU 257* 362* 343* 281*       Blood Sugar Average: Last 72 hrs:  (P) 533 [id number]     Continue insulin  Monitor sugars

## 2021-05-11 NOTE — PLAN OF CARE
Problem: Potential for Falls  Goal: Patient will remain free of falls  Description: INTERVENTIONS:  - Assess patient frequently for physical needs  -  Identify cognitive and physical deficits and behaviors that affect risk of falls  -  [place] fall precautions as indicated by assessment   - Educate patient/family on patient safety including physical limitations  - Instruct patient to call for assistance with activity based on assessment  - Modify environment to reduce risk of injury  - Consider OT/PT consult to assist with strengthening/mobility  Outcome: Progressing     Problem: Nutrition/Hydration-ADULT  Goal: Nutrient/Hydration intake appropriate for improving, restoring or maintaining nutritional needs  Description: Monitor and assess patient's nutrition/hydration status for malnutrition  Collaborate with interdisciplinary team and initiate plan and interventions as ordered  Monitor patient's weight and dietary intake as ordered or per policy  Utilize nutrition screening tool and intervene as necessary  Determine patient's food preferences and provide high-protein, high-caloric foods as appropriate       INTERVENTIONS:  - Monitor oral intake, urinary output, labs, and treatment plans  - Assess nutrition and hydration status and recommend course of action  - Evaluate amount of meals eaten  - Assist patient with eating if necessary   - Allow adequate time for meals  - Recommend/ encourage appropriate diets, oral nutritional supplements, and vitamin/mineral supplements  - Order, calculate, and assess calorie counts as needed  - Recommend, monitor, and adjust tube feedings and TPN/PPN based on assessed needs  - Assess need for intravenous fluids  - Provide specific nutrition/hydration education as appropriate  - Include patient/family/caregiver in decisions related to nutrition  Outcome: Progressing     Problem: PAIN - ADULT  Goal: Verbalizes/displays adequate comfort level or baseline comfort level  Description: Interventions:  - Encourage patient to monitor pain and request assistance  - Assess pain using appropriate pain scale  - Administer analgesics based on type and severity of pain and evaluate response  - Implement non-pharmacological measures as appropriate and evaluate response  - Consider cultural and social influences on pain and pain management  - Notify physician/advanced practitioner if interventions unsuccessful or patient reports new pain  Outcome: Progressing     Problem: INFECTION - ADULT  Goal: Absence or prevention of progression during hospitalization  Description: INTERVENTIONS:  - Assess and monitor for signs and symptoms of infection  - Monitor lab/diagnostic results  - Monitor all insertion sites, i e  indwelling lines, tubes, and drains  - Monitor endotracheal if appropriate and nasal secretions for changes in amount and color  - [place] appropriate cooling/warming therapies per order  - Administer medications as ordered  - Instruct and encourage patient and family to use good hand hygiene technique  - Identify and instruct in appropriate isolation precautions for identified infection/condition  Outcome: Progressing     Problem: SAFETY ADULT  Goal: Patient will remain free of falls  Description: INTERVENTIONS:  - Assess patient frequently for physical needs  -  Identify cognitive and physical deficits and behaviors that affect risk of falls    -  [place] fall precautions as indicated by assessment   - Educate patient/family on patient safety including physical limitations  - Instruct patient to call for assistance with activity based on assessment  - Modify environment to reduce risk of injury  - Consider OT/PT consult to assist with strengthening/mobility  Outcome: Progressing  Goal: Maintain or return to baseline ADL function  Description: INTERVENTIONS:  -  Assess patient's ability to carry out ADLs; assess patient's baseline for ADL function and identify physical deficits which impact ability to perform ADLs (bathing, care of mouth/teeth, toileting, grooming, dressing, etc )  - Assess/evaluate cause of self-care deficits   - Assess range of motion  - Assess patient's mobility; develop plan if impaired  - Assess patient's need for assistive devices and provide as appropriate  - Encourage maximum independence but intervene and supervise when necessary  - Involve family in performance of ADLs  - Assess for home care needs following discharge   - Consider OT consult to assist with ADL evaluation and planning for discharge  - Provide patient education as appropriate  Outcome: Progressing  Goal: Maintain or return mobility status to optimal level  Description: INTERVENTIONS:  - Assess patient's baseline mobility status (ambulation, transfers, stairs, etc )    - Identify cognitive and physical deficits and behaviors that affect mobility  - Identify mobility aids required to assist with transfers and/or ambulation (gait belt, sit-to-stand, lift, walker, cane, etc )  - [place] fall precautions as indicated by assessment  - Record patient progress and toleration of activity level on Mobility SBAR; progress patient to next Phase/Stage  - Instruct patient to call for assistance with activity based on assessment  - Consider rehabilitation consult to assist with strengthening/weightbearing, etc   Outcome: Progressing     Problem: DISCHARGE PLANNING  Goal: Discharge to home or other facility with appropriate resources  Description: INTERVENTIONS:  - Identify barriers to discharge w/patient and caregiver  - Arrange for needed discharge resources and transportation as appropriate  - Identify discharge learning needs (meds, wound care, etc )  - Arrange for interpretive services to assist at discharge as needed  - Refer to Case Management Department for coordinating discharge planning if the patient needs post-hospital services based on physician/advanced practitioner order or complex needs related to functional status, cognitive ability, or social support system  Outcome: Progressing     Problem: Knowledge Deficit  Goal: Patient/family/caregiver demonstrates understanding of disease process, treatment plan, medications, and discharge instructions  Description: Complete learning assessment and assess knowledge base    Interventions:  - Provide teaching at level of understanding  - Provide teaching via preferred learning methods  Outcome: Progressing     Problem: METABOLIC, FLUID AND ELECTROLYTES - ADULT  Goal: Glucose maintained within target range  Description: INTERVENTIONS:  - Monitor Blood Glucose as ordered  - Assess for signs and symptoms of hyperglycemia and hypoglycemia  - Administer ordered medications to maintain glucose within target range  - Assess nutritional intake and initiate nutrition service referral as needed  Outcome: Progressing

## 2021-05-11 NOTE — ASSESSMENT & PLAN NOTE
· On admission Self reported facial droop, visual changes, and confusion  · As per documentation from admitting physician: Neuro exam intact, 5/5 motor strength, cranial nerves 2-12 intact, does have slight right sided resting facial droop but on smile and frown test droop resolves    Patient's symptoms of confusion blurriness likely secondary to hyper osmolar hyperglycemic state given new onset type 2 diabetes    · CT head on admission showed no acute intracranial abnormalities  · On neuro exam no evidence of focal neurological deficits  · Brain MRI negative for acute abnormality  · Continue aspirin and statin

## 2021-05-11 NOTE — SPEECH THERAPY NOTE
Speech Language/Pathology    Speech/Language Pathology Progress Note    Patient Name: Julia Hernandez  [id number] Date: 5/11/2021    Subjective:  "I'm here because the water stopped in my system and I had to put my hands up  Then it went away" When questioned further he pointed to his lower abdomen to indicate where it "stopped"  He continues to deny any dysphagia  RN reports he is tolerating current diet and meds without difficulty  Patient is [place]- he reports he has some at home  Objective:  Patient seen upright in bed with lunch  He consumed ham sandwich, grapes, pineapple and thin liquids  Mastication was adequate with the materials administered today  Bolus formation and transfer were functional with nosignificant oral residue noted  No overt s/s reduced oral control  Swallowing initiation appeared prompt  Laryngeal rise was palpated and judged to be within functional limits  No coughing, throat clearing, change in vocal quality or respiratory status noted today  Assessment:  Patients oropharyngeal swallow function continues to present as GWFL at this time       Plan/Recommendations:  Continue current diet  meds as tolerated    Aspiration precautions and compensatory swallowing strategies: upright posture, only feed when fully alert, slow rate of feeding, small bites/sips and alternating bites and sips    No further ST indicated at this time    LUCHO Kingston S , [address] [facility]  Speech Language Pathologist   Available via [address] [place] [address] #[id number]  [place] #[id number]

## 2021-05-11 NOTE — ASSESSMENT & PLAN NOTE
Lab Results   Component Value Date    EGFR 39 05/11/2021    EGFR 34 05/10/2021    EGFR 30 05/09/2021    CREATININE 1 87 (H) 05/11/2021    CREATININE 2 07 (H) 05/10/2021    CREATININE 2 30 (H) 05/09/2021   · Acute kidney injury superimposed to chronic kidney disease stage IIIB  · Creatinine baseline between 1 8-2 6  · Serum creatinine on presentation 2 63 improving--> 2 07 (at baseline)  · Likely prerenal in the setting of dehydration and polyuria due to hyperosmolar hyperglycemic state    · Status post IV fluids  · Monitor renal function

## 2021-05-11 NOTE — PROGRESS NOTES
[facility]  Progress Note - Jennifer Winston 1940, 79 y o  male MRN: [id number]  Unit/Bed#: -Shree Encounter: [id number]  Primary Care Provider: Sravani Davila MD   Date and time admitted to hospital: 5/9/2021  4:12 PM    Acute metabolic encephalopathy  Assessment & Plan  · On admission Self reported facial droop, visual changes, and confusion  · As per documentation from admitting physician: Neuro exam intact, 5/5 motor strength, cranial nerves 2-12 intact, does have slight right sided resting facial droop but on smile and frown test droop resolves    Patient's symptoms of confusion blurriness likely secondary to hyper osmolar hyperglycemic state given new onset type 2 diabetes    · CT head on admission showed no acute intracranial abnormalities  · On neuro exam no evidence of focal neurological deficits  · Brain MRI negative for acute abnormality  · Continue aspirin and statin  New onset type 2 diabetes mellitus [facility])  Assessment & Plan  Lab Results   Component Value Date    HGBA1C 12 3 (H) 05/09/2021       Recent Labs     05/10/21  1216 05/10/21  2132 05/10/21  2200 05/11/21  0725   POCGLU 257* 362* 343* 281*       Blood Sugar Average: Last 72 hrs:  (P) 297 [id number]     Continue insulin  Monitor sugars    Dementia (HCC)  Assessment & Plan  As per documentation patient has history of dementia  Late onset 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   Patient follows with geriatrician, recommendations to pursue conservative management including frequent reorientation and redirection  Being in socially, physically and mentally active  Fall precautions, optimization of chronic conditions  Cognitive challenging exercises  Recommendations to start vitamin E/Namenda on upcoming neurocognitive assessment visit by geriatrician          Dysphagia  Assessment & Plan  · Patient reports dysphagia at home on thin liquids  · Speech and swallow recommendations appreciated  · Continue dysphagia diet    Chronic kidney disease  Assessment & Plan  Lab Results   Component Value Date    EGFR 39 05/11/2021    EGFR 34 05/10/2021    EGFR 30 05/09/2021    CREATININE 1 87 (H) 05/11/2021    CREATININE 2 07 (H) 05/10/2021    CREATININE 2 30 (H) 05/09/2021   · Acute kidney injury superimposed to chronic kidney disease stage IIIB  · Creatinine baseline between 1 8-2 6  · Serum creatinine on presentation 2 63 improving--> 2 07 (at baseline)  · Likely prerenal in the setting of dehydration and polyuria due to hyperosmolar hyperglycemic state  · Status post IV fluids  · Monitor renal function    BPH (benign prostatic hyperplasia)  Assessment & Plan  · Continue finasteride 5 mg oral daily  Other hyperlipidemia  Assessment & Plan  · Continue home dose statin     * Hyperosmolar hyperglycemic state ([facility]) [facility])  Assessment & Plan  Lab Results   Component Value Date    HGBA1C 12 3 (H) 05/09/2021       Recent Labs     05/10/21  1216 05/10/21  2132 05/10/21  2200 05/11/21  0725   POCGLU 257* 362* 343* 281*       Blood Sugar Average: Last 72 hrs:  Endocrinology recommendations greatly appreciated  Continue insulin and monitor sugars closely      VTE Pharmacologic Prophylaxis:   Pharmacologic: Heparin  Mechanical VTE Prophylaxis in Place: Yes    Patient Centered Rounds: I have performed bedside rounds with nursing staff today  Discussions with Specialists or Other Care Team Provider:  Reviewed the management plan with case management    Education and Discussions with Family / Patient:  Discussed with patient's daughter and updated the management plan    Time Spent for Care: 45 minutes  More than 50% of total time spent on counseling and coordination of care as described above      Current Length of Stay: 2 day(s)    Current Patient Status: Inpatient   Certification Statement: The patient will continue to require additional inpatient hospital stay due to Above medical problems    Discharge Plan:  Continue hospitalization to monitor sugars    Code Status: Level 1 - Full Code      Subjective:   Patient seen and examined  He is alert, oriented to name and place and time  He has mild blurry vision however appears to be at his baseline  Denies any headache nausea vomiting abdominal pain  No dysuria  No other GI issues  No cough or shortness of breath  No chest pain or palpitations  No weakness or paresthesias  No facial droop  Objective:     Vitals:   Temp (24hrs), Av 6 °F (37 °C), Min:98 4 °F (36 9 °C), Max:98 8 °F (37 1 °C)    Temp:  [98 4 °F (36 9 °C)-98 8 °F (37 1 °C)] 98 4 °F (36 9 °C)  HR:  [78-95] 95  Resp:  [18-24] 18  BP: (135-153)/(84-92) 153/88  SpO2:  [93 %-99 %] 93 %  Body mass index is 31 45 kg/m²  Input and Output Summary (last 24 hours): Intake/Output Summary (Last 24 hours) at 2021 1455  Last data filed at 2021 0529  Gross per 24 hour   Intake 480 ml   Output 550 ml   Net -70 ml       Physical Exam:     Physical Exam  Vitals signs and nursing note reviewed  Constitutional:       Appearance: Normal appearance  HENT:      Head: Normocephalic and atraumatic  Neck:      Musculoskeletal: Normal range of motion  Cardiovascular:      Rate and Rhythm: Normal rate and regular rhythm  Pulses: Normal pulses  Heart sounds: Normal heart sounds  Pulmonary:      Effort: Pulmonary effort is normal  No respiratory distress  Breath sounds: Normal breath sounds  Abdominal:      General: Abdomen is flat  Palpations: Abdomen is soft  Musculoskeletal: Normal range of motion  Skin:     General: Skin is warm  Neurological:      General: No focal deficit present  Mental Status: He is alert and oriented to person, place, and time          Additional Data:     Labs:    Results from last 7 days   Lab Units 21  0516  21  1628   WBC Thousand/uL 5 88   < > 6 38   HEMOGLOBIN g/dL 14 3   < > 13 9   HEMATOCRIT % 43 1   < > 42 3 PLATELETS Thousands/uL 153   < > 142*   NEUTROS PCT %  --   --  73   LYMPHS PCT %  --   --  15   MONOS PCT %  --   --  8   EOS PCT %  --   --  2    < > = values in this interval not displayed  Results from last 7 days   Lab Units 05/11/21  0516  05/09/21  1628   SODIUM mmol/L 142   < > 132*   POTASSIUM mmol/L 3 8   < > 4 8   CHLORIDE mmol/L 106   < > 95*   CO2 mmol/L 23   < > 23   BUN mg/dL 28*   < > 42*   CREATININE mg/dL 1 87*   < > 2 63*   ANION GAP mmol/L 13   < > 14*   CALCIUM mg/dL 8 5   < > 8 9   ALBUMIN g/dL  --   --  3 6   TOTAL BILIRUBIN mg/dL  --   --  0 70   ALK PHOS U/L  --   --  114   ALT U/L  --   --  29   AST U/L  --   --  17   GLUCOSE RANDOM mg/dL 247*   < > 796*    < > = values in this interval not displayed  Results from last 7 days   Lab Units 05/09/21  1628   INR  1 03     Results from last 7 days   Lab Units 05/11/21  0725 05/10/21  2200 05/10/21  2132 05/10/21  1216 05/10/21  0802 05/10/21  0455 05/10/21  0253 05/10/21  0142 05/09/21  2349 05/09/21  2114 05/09/21  2003 05/09/21  1850   POC GLUCOSE mg/dl 281* 343* 362* 257* 162* 76 116 123 246* >500* >500* >500*     Results from last 7 days   Lab Units 05/09/21  1628   HEMOGLOBIN A1C % 12 3*     Results from last 7 days   Lab Units 05/10/21  0458 05/09/21  1628   LACTIC ACID mmol/L  --  1 6   PROCALCITONIN ng/ml 0 28* 0 22           * I Have Reviewed All Lab Data Listed Above  * Additional Pertinent Lab Tests Reviewed: All [place] Admission Reviewed    Imaging:    Imaging Reports Reviewed Today Include:  MRI of brain, CT of the head  Imaging Personally Reviewed by Myself Includes:      Recent Cultures (last 7 days):     Results from last 7 days   Lab Units 05/09/21  1728 05/09/21  1628   BLOOD CULTURE  No Growth at 24 hrs  No Growth at 24 hrs         Last 24 Hours Medication List:   Current Facility-Administered Medications   Medication Dose Route Frequency Provider Last Rate    acetaminophen  650 mg Oral Q6H PRN Everlean Angelucci, MD      amLODIPine  5 mg Oral Daily Yaniv Saenz MD      aspirin  81 mg Oral Daily Everlean Angelucci, MD      calcium carbonate  1,000 mg Oral Daily PRN Everlean Angelucci, MD      docusate sodium  100 mg Oral BID Everlean Angelucci, MD      finasteride  5 mg Oral Daily Everlean Angelucci, MD      heparin (porcine)  5,000 Units Subcutaneous [facility] Everlean Angelucci, MD      hydrALAZINE  5 mg Intravenous Q6H PRN Yaniv Saenz MD      insulin glargine  20 Units Subcutaneous QAM Maria R Busby MD      insulin lispro  1-5 Units Subcutaneous TID AC Lori Aguilar PA-C      insulin lispro  1-5 Units Subcutaneous HS Lori Aguilar PA-C      insulin lispro  5 Units Subcutaneous TID With Meals Maria R Busby MD      lactated ringers  50 mL/hr Intravenous Continuous Yaniv Saenz MD 50 mL/hr (05/10/21 7752)    ondansetron  4 mg Intravenous Q6H PRN Everlean Angelucci, MD      pravastatin  40 mg Oral Daily With Tavo Thibodeaux MD      senna  1 tablet Oral Daily Everlean Angelucci, MD          Today, Patient Was Seen By: Gabriella Gilbert MD    ** Please Note: Dictation voice to text software may have been used in the creation of this document   **

## 2021-05-11 NOTE — PLAN OF CARE
Problem: Potential for Falls  Goal: Patient will remain free of falls  Description: INTERVENTIONS:  - Assess patient frequently for physical needs  -  Identify cognitive and physical deficits and behaviors that affect risk of falls  -  [place] fall precautions as indicated by assessment   - Educate patient/family on patient safety including physical limitations  - Instruct patient to call for assistance with activity based on assessment  - Modify environment to reduce risk of injury  - Consider OT/PT consult to assist with strengthening/mobility  Outcome: Progressing     Problem: Nutrition/Hydration-ADULT  Goal: Nutrient/Hydration intake appropriate for improving, restoring or maintaining nutritional needs  Description: Monitor and assess patient's nutrition/hydration status for malnutrition  Collaborate with interdisciplinary team and initiate plan and interventions as ordered  Monitor patient's weight and dietary intake as ordered or per policy  Utilize nutrition screening tool and intervene as necessary  Determine patient's food preferences and provide high-protein, high-caloric foods as appropriate       INTERVENTIONS:  - Monitor oral intake, urinary output, labs, and treatment plans  - Assess nutrition and hydration status and recommend course of action  - Evaluate amount of meals eaten  - Assist patient with eating if necessary   - Allow adequate time for meals  - Recommend/ encourage appropriate diets, oral nutritional supplements, and vitamin/mineral supplements  - Order, calculate, and assess calorie counts as needed  - Recommend, monitor, and adjust tube feedings and TPN/PPN based on assessed needs  - Assess need for intravenous fluids  - Provide specific nutrition/hydration education as appropriate  - Include patient/family/caregiver in decisions related to nutrition  Outcome: Progressing     Problem: PAIN - ADULT  Goal: Verbalizes/displays adequate comfort level or baseline comfort level  Description: Interventions:  - Encourage patient to monitor pain and request assistance  - Assess pain using appropriate pain scale  - Administer analgesics based on type and severity of pain and evaluate response  - Implement non-pharmacological measures as appropriate and evaluate response  - Consider cultural and social influences on pain and pain management  - Notify physician/advanced practitioner if interventions unsuccessful or patient reports new pain  Outcome: Progressing     Problem: INFECTION - ADULT  Goal: Absence or prevention of progression during hospitalization  Description: INTERVENTIONS:  - Assess and monitor for signs and symptoms of infection  - Monitor lab/diagnostic results  - Monitor all insertion sites, i e  indwelling lines, tubes, and drains  - Monitor endotracheal if appropriate and nasal secretions for changes in amount and color  - [place] appropriate cooling/warming therapies per order  - Administer medications as ordered  - Instruct and encourage patient and family to use good hand hygiene technique  - Identify and instruct in appropriate isolation precautions for identified infection/condition  Outcome: Progressing     Problem: SAFETY ADULT  Goal: Patient will remain free of falls  Description: INTERVENTIONS:  - Assess patient frequently for physical needs  -  Identify cognitive and physical deficits and behaviors that affect risk of falls    -  [place] fall precautions as indicated by assessment   - Educate patient/family on patient safety including physical limitations  - Instruct patient to call for assistance with activity based on assessment  - Modify environment to reduce risk of injury  - Consider OT/PT consult to assist with strengthening/mobility  Outcome: Progressing  Goal: Maintain or return to baseline ADL function  Description: INTERVENTIONS:  -  Assess patient's ability to carry out ADLs; assess patient's baseline for ADL function and identify physical deficits which impact ability to perform ADLs (bathing, care of mouth/teeth, toileting, grooming, dressing, etc )  - Assess/evaluate cause of self-care deficits   - Assess range of motion  - Assess patient's mobility; develop plan if impaired  - Assess patient's need for assistive devices and provide as appropriate  - Encourage maximum independence but intervene and supervise when necessary  - Involve family in performance of ADLs  - Assess for home care needs following discharge   - Consider OT consult to assist with ADL evaluation and planning for discharge  - Provide patient education as appropriate  Outcome: Progressing  Goal: Maintain or return mobility status to optimal level  Description: INTERVENTIONS:  - Assess patient's baseline mobility status (ambulation, transfers, stairs, etc )    - Identify cognitive and physical deficits and behaviors that affect mobility  - Identify mobility aids required to assist with transfers and/or ambulation (gait belt, sit-to-stand, lift, walker, cane, etc )  - [place] fall precautions as indicated by assessment  - Record patient progress and toleration of activity level on Mobility SBAR; progress patient to next Phase/Stage  - Instruct patient to call for assistance with activity based on assessment  - Consider rehabilitation consult to assist with strengthening/weightbearing, etc   Outcome: Progressing     Problem: DISCHARGE PLANNING  Goal: Discharge to home or other facility with appropriate resources  Description: INTERVENTIONS:  - Identify barriers to discharge w/patient and caregiver  - Arrange for needed discharge resources and transportation as appropriate  - Identify discharge learning needs (meds, wound care, etc )  - Arrange for interpretive services to assist at discharge as needed  - Refer to Case Management Department for coordinating discharge planning if the patient needs post-hospital services based on physician/advanced practitioner order or complex needs related to functional status, cognitive ability, or social support system  Outcome: Progressing     Problem: Knowledge Deficit  Goal: Patient/family/caregiver demonstrates understanding of disease process, treatment plan, medications, and discharge instructions  Description: Complete learning assessment and assess knowledge base    Interventions:  - Provide teaching at level of understanding  - Provide teaching via preferred learning methods  Outcome: Progressing     Problem: METABOLIC, FLUID AND ELECTROLYTES - ADULT  Goal: Glucose maintained within target range  Description: INTERVENTIONS:  - Monitor Blood Glucose as ordered  - Assess for signs and symptoms of hyperglycemia and hypoglycemia  - Administer ordered medications to maintain glucose within target range  - Assess nutritional intake and initiate nutrition service referral as needed  Outcome: Progressing

## 2021-05-11 NOTE — ASSESSMENT & PLAN NOTE
· Patient reports dysphagia at home on thin liquids  · Speech and swallow recommendations appreciated  · Continue dysphagia diet

## 2021-05-11 NOTE — ASSESSMENT & PLAN NOTE
Lab Results   Component Value Date    HGBA1C 12 3 (H) 05/09/2021       Recent Labs     05/10/21  1216 05/10/21  2132 05/10/21  2200 05/11/21  0725   POCGLU 257* 362* 343* 281*       Blood Sugar Average: Last 72 hrs:  Endocrinology recommendations greatly appreciated  Continue insulin and monitor sugars closely

## 2021-05-12 ENCOUNTER — APPOINTMENT (INPATIENT)
Dept: RADIOLOGY | Facility: HOSPITAL | Age: 81
DRG: 637 | End: 2021-05-12
Payer: COMMERCIAL

## 2021-05-12 ENCOUNTER — APPOINTMENT (INPATIENT)
Dept: GASTROENTEROLOGY | Facility: HOSPITAL | Age: 81
DRG: 637 | End: 2021-05-12
Payer: COMMERCIAL

## 2021-05-12 PROBLEM — T17.908A ASPIRATION INTO AIRWAY: Status: ACTIVE | Noted: 2021-05-12

## 2021-05-12 PROBLEM — I16.9 HYPERTENSIVE CRISIS: Status: RESOLVED | Noted: 2021-05-09 | Resolved: 2021-05-12

## 2021-05-12 PROBLEM — K92.0 HEMATEMESIS: Status: ACTIVE | Noted: 2021-05-12

## 2021-05-12 PROBLEM — E87.6 HYPOKALEMIA: Status: RESOLVED | Noted: 2021-05-10 | Resolved: 2021-05-12

## 2021-05-12 PROBLEM — R57.9 SHOCK (HCC): Status: ACTIVE | Noted: 2021-05-12

## 2021-05-12 LAB
ABO GROUP BLD: NORMAL
ABO GROUP BLD: NORMAL
ANION GAP SERPL CALCULATED.3IONS-SCNC: 13 MMOL/L (ref 4–13)
APTT PPP: 32 SECONDS (ref 23–37)
BASE EXCESS BLDA CALC-SCNC: -5.2 MMOL/L
BASOPHILS # BLD AUTO: 0.06 THOUSANDS/ΜL (ref 0–0.1)
BASOPHILS NFR BLD AUTO: 1 % (ref 0–1)
BLD GP AB SCN SERPL QL: NEGATIVE
BODY TEMPERATURE: 98.6 DEGREES FEHRENHEIT
BUN SERPL-MCNC: 56 MG/DL (ref 5–25)
CALCIUM SERPL-MCNC: 8.2 MG/DL (ref 8.3–10.1)
CHLORIDE SERPL-SCNC: 102 MMOL/L (ref 100–108)
CO2 SERPL-SCNC: 23 MMOL/L (ref 21–32)
CREAT SERPL-MCNC: 2.26 MG/DL (ref 0.6–1.3)
EOSINOPHIL # BLD AUTO: 0.1 THOUSAND/ΜL (ref 0–0.61)
EOSINOPHIL NFR BLD AUTO: 1 % (ref 0–6)
ERYTHROCYTE [DISTWIDTH] IN BLOOD BY AUTOMATED COUNT: 13.4 % (ref 11.6–15.1)
FIBRINOGEN PPP-MCNC: 343 MG/DL (ref 227–495)
GFR SERPL CREATININE-BSD FRML MDRD: 31 ML/MIN/1.73SQ M
GLUCOSE SERPL-MCNC: 114 MG/DL (ref 65–140)
GLUCOSE SERPL-MCNC: 123 MG/DL (ref 65–140)
GLUCOSE SERPL-MCNC: 125 MG/DL (ref 65–140)
GLUCOSE SERPL-MCNC: 130 MG/DL (ref 65–140)
GLUCOSE SERPL-MCNC: 234 MG/DL (ref 65–140)
GLUCOSE SERPL-MCNC: 404 MG/DL (ref 65–140)
GLUCOSE SERPL-MCNC: 429 MG/DL (ref 65–140)
GLUCOSE SERPL-MCNC: 48 MG/DL (ref 65–140)
GLUCOSE SERPL-MCNC: 514 MG/DL (ref 65–140)
GLUCOSE SERPL-MCNC: 93 MG/DL (ref 65–140)
GLUCOSE SERPL-MCNC: 97 MG/DL (ref 65–140)
GLUCOSE SERPL-MCNC: >500 MG/DL (ref 65–140)
GLUCOSE SERPL-MCNC: >500 MG/DL (ref 65–140)
HCO3 BLDA-SCNC: 19.8 MMOL/L (ref 22–28)
HCT VFR BLD AUTO: 35.3 % (ref 36.5–49.3)
HGB BLD-MCNC: 11 G/DL (ref 12–17)
HGB BLD-MCNC: 11 G/DL (ref 12–17)
HGB BLD-MCNC: 11.5 G/DL (ref 12–17)
HGB BLD-MCNC: 9.8 G/DL (ref 12–17)
HOROWITZ INDEX BLDA+IHG-RTO: 100 MM[HG]
IMM GRANULOCYTES # BLD AUTO: 0.07 THOUSAND/UL (ref 0–0.2)
IMM GRANULOCYTES NFR BLD AUTO: 1 % (ref 0–2)
INR PPP: 1.15 (ref 0.84–1.19)
LACTATE SERPL-SCNC: 3.9 MMOL/L (ref 0.5–2)
LACTATE SERPL-SCNC: 4.2 MMOL/L (ref 0.5–2)
LYMPHOCYTES # BLD AUTO: 1.85 THOUSANDS/ΜL (ref 0.6–4.47)
LYMPHOCYTES NFR BLD AUTO: 20 % (ref 14–44)
MCH RBC QN AUTO: 28.5 PG (ref 26.8–34.3)
MCHC RBC AUTO-ENTMCNC: 32.6 G/DL (ref 31.4–37.4)
MCV RBC AUTO: 87 FL (ref 82–98)
MONOCYTES # BLD AUTO: 0.8 THOUSAND/ΜL (ref 0.17–1.22)
MONOCYTES NFR BLD AUTO: 9 % (ref 4–12)
NEUTROPHILS # BLD AUTO: 6.24 THOUSANDS/ΜL (ref 1.85–7.62)
NEUTS SEG NFR BLD AUTO: 68 % (ref 43–75)
NRBC BLD AUTO-RTO: 0 /100 WBCS
O2 CT BLDA-SCNC: 13.8 ML/DL (ref 16–23)
OXYHGB MFR BLDA: 98.7 % (ref 94–97)
PCO2 BLDA: 36.4 MM HG (ref 36–44)
PEEP RESPIRATORY: 6 CM[H2O]
PH BLDA: 7.35 [PH] (ref 7.35–7.45)
PLATELET # BLD AUTO: 148 THOUSANDS/UL (ref 149–390)
PMV BLD AUTO: 12.1 FL (ref 8.9–12.7)
PO2 BLDA: 341.1 MM HG (ref 75–129)
POTASSIUM SERPL-SCNC: 5 MMOL/L (ref 3.5–5.3)
PROCALCITONIN SERPL-MCNC: 0.32 NG/ML
PROTHROMBIN TIME: 14.2 SECONDS (ref 11.6–14.5)
RBC # BLD AUTO: 4.04 MILLION/UL (ref 3.88–5.62)
RH BLD: POSITIVE
RH BLD: POSITIVE
SODIUM SERPL-SCNC: 138 MMOL/L (ref 136–145)
SPECIMEN EXPIRATION DATE: NORMAL
SPECIMEN SOURCE: ABNORMAL
VENT AC: 18
VENT- AC: AC
VT SETTING VENT: 470 ML
WBC # BLD AUTO: 9.12 THOUSAND/UL (ref 4.31–10.16)

## 2021-05-12 PROCEDURE — 82948 REAGENT STRIP/BLOOD GLUCOSE: CPT

## 2021-05-12 PROCEDURE — 85730 THROMBOPLASTIN TIME PARTIAL: CPT | Performed by: PHYSICIAN ASSISTANT

## 2021-05-12 PROCEDURE — 84145 PROCALCITONIN (PCT): CPT | Performed by: INTERNAL MEDICINE

## 2021-05-12 PROCEDURE — NC001 PR NO CHARGE: Performed by: PHYSICIAN ASSISTANT

## 2021-05-12 PROCEDURE — 86850 RBC ANTIBODY SCREEN: CPT | Performed by: PHYSICIAN ASSISTANT

## 2021-05-12 PROCEDURE — 88305 TISSUE EXAM BY PATHOLOGIST: CPT | Performed by: PATHOLOGY

## 2021-05-12 PROCEDURE — 31500 INSERT EMERGENCY AIRWAY: CPT | Performed by: ANESTHESIOLOGY

## 2021-05-12 PROCEDURE — 5A1945Z RESPIRATORY VENTILATION, 24-96 CONSECUTIVE HOURS: ICD-10-PCS | Performed by: STUDENT IN AN ORGANIZED HEALTH CARE EDUCATION/TRAINING PROGRAM

## 2021-05-12 PROCEDURE — 0DB68ZX EXCISION OF STOMACH, VIA NATURAL OR ARTIFICIAL OPENING ENDOSCOPIC, DIAGNOSTIC: ICD-10-PCS | Performed by: STUDENT IN AN ORGANIZED HEALTH CARE EDUCATION/TRAINING PROGRAM

## 2021-05-12 PROCEDURE — 99223 1ST HOSP IP/OBS HIGH 75: CPT | Performed by: INTERNAL MEDICINE

## 2021-05-12 PROCEDURE — 83605 ASSAY OF LACTIC ACID: CPT | Performed by: PHYSICIAN ASSISTANT

## 2021-05-12 PROCEDURE — 85025 COMPLETE CBC W/AUTO DIFF WBC: CPT | Performed by: PHYSICIAN ASSISTANT

## 2021-05-12 PROCEDURE — 85610 PROTHROMBIN TIME: CPT | Performed by: PHYSICIAN ASSISTANT

## 2021-05-12 PROCEDURE — 43239 EGD BIOPSY SINGLE/MULTIPLE: CPT | Performed by: INTERNAL MEDICINE

## 2021-05-12 PROCEDURE — 94760 N-INVAS EAR/PLS OXIMETRY 1: CPT

## 2021-05-12 PROCEDURE — 82805 BLOOD GASES W/O2 SATURATION: CPT | Performed by: PHYSICIAN ASSISTANT

## 2021-05-12 PROCEDURE — 99291 CRITICAL CARE FIRST HOUR: CPT | Performed by: PHYSICIAN ASSISTANT

## 2021-05-12 PROCEDURE — 80048 BASIC METABOLIC PNL TOTAL CA: CPT | Performed by: PHYSICIAN ASSISTANT

## 2021-05-12 PROCEDURE — 85018 HEMOGLOBIN: CPT | Performed by: NURSE PRACTITIONER

## 2021-05-12 PROCEDURE — 85018 HEMOGLOBIN: CPT | Performed by: PHYSICIAN ASSISTANT

## 2021-05-12 PROCEDURE — 94002 VENT MGMT INPAT INIT DAY: CPT

## 2021-05-12 PROCEDURE — 0BH17EZ INSERTION OF ENDOTRACHEAL AIRWAY INTO TRACHEA, VIA NATURAL OR ARTIFICIAL OPENING: ICD-10-PCS | Performed by: STUDENT IN AN ORGANIZED HEALTH CARE EDUCATION/TRAINING PROGRAM

## 2021-05-12 PROCEDURE — 86920 COMPATIBILITY TEST SPIN: CPT

## 2021-05-12 PROCEDURE — C9113 INJ PANTOPRAZOLE SODIUM, VIA: HCPCS | Performed by: NURSE PRACTITIONER

## 2021-05-12 PROCEDURE — 71045 X-RAY EXAM CHEST 1 VIEW: CPT

## 2021-05-12 PROCEDURE — 2Y41X5Z PACKING OF NASAL REGION USING PACKING MATERIAL: ICD-10-PCS | Performed by: STUDENT IN AN ORGANIZED HEALTH CARE EDUCATION/TRAINING PROGRAM

## 2021-05-12 PROCEDURE — 85384 FIBRINOGEN ACTIVITY: CPT | Performed by: PHYSICIAN ASSISTANT

## 2021-05-12 PROCEDURE — 86900 BLOOD TYPING SEROLOGIC ABO: CPT | Performed by: PHYSICIAN ASSISTANT

## 2021-05-12 PROCEDURE — 0W3P8ZZ CONTROL BLEEDING IN GASTROINTESTINAL TRACT, VIA NATURAL OR ARTIFICIAL OPENING ENDOSCOPIC: ICD-10-PCS | Performed by: STUDENT IN AN ORGANIZED HEALTH CARE EDUCATION/TRAINING PROGRAM

## 2021-05-12 PROCEDURE — 3E033XZ INTRODUCTION OF VASOPRESSOR INTO PERIPHERAL VEIN, PERCUTANEOUS APPROACH: ICD-10-PCS | Performed by: STUDENT IN AN ORGANIZED HEALTH CARE EDUCATION/TRAINING PROGRAM

## 2021-05-12 PROCEDURE — 86901 BLOOD TYPING SEROLOGIC RH(D): CPT | Performed by: PHYSICIAN ASSISTANT

## 2021-05-12 PROCEDURE — P9016 RBC LEUKOCYTES REDUCED: HCPCS

## 2021-05-12 PROCEDURE — 31500 INSERT EMERGENCY AIRWAY: CPT

## 2021-05-12 PROCEDURE — C9113 INJ PANTOPRAZOLE SODIUM, VIA: HCPCS | Performed by: PHYSICIAN ASSISTANT

## 2021-05-12 PROCEDURE — 30233N1 TRANSFUSION OF NONAUTOLOGOUS RED BLOOD CELLS INTO PERIPHERAL VEIN, PERCUTANEOUS APPROACH: ICD-10-PCS | Performed by: STUDENT IN AN ORGANIZED HEALTH CARE EDUCATION/TRAINING PROGRAM

## 2021-05-12 RX ORDER — PROPOFOL 10 MG/ML
5-50 INJECTION, EMULSION INTRAVENOUS
Status: DISCONTINUED | OUTPATIENT
Start: 2021-05-12 | End: 2021-05-12

## 2021-05-12 RX ORDER — ALBUMIN, HUMAN INJ 5% 5 %
12.5 SOLUTION INTRAVENOUS ONCE
Status: COMPLETED | OUTPATIENT
Start: 2021-05-12 | End: 2021-05-12

## 2021-05-12 RX ORDER — PROPOFOL 10 MG/ML
5-50 INJECTION, EMULSION INTRAVENOUS
Status: DISCONTINUED | OUTPATIENT
Start: 2021-05-12 | End: 2021-05-13

## 2021-05-12 RX ORDER — PANTOPRAZOLE SODIUM 40 MG/1
40 INJECTION, POWDER, FOR SOLUTION INTRAVENOUS EVERY 12 HOURS SCHEDULED
Status: DISCONTINUED | OUTPATIENT
Start: 2021-05-12 | End: 2021-05-15

## 2021-05-12 RX ORDER — FENTANYL CITRATE 50 UG/ML
50 INJECTION, SOLUTION INTRAMUSCULAR; INTRAVENOUS ONCE
Status: COMPLETED | OUTPATIENT
Start: 2021-05-12 | End: 2021-05-12

## 2021-05-12 RX ORDER — DEXTROSE MONOHYDRATE 25 G/50ML
25 INJECTION, SOLUTION INTRAVENOUS ONCE
Status: COMPLETED | OUTPATIENT
Start: 2021-05-12 | End: 2021-05-12

## 2021-05-12 RX ORDER — FENTANYL CITRATE-0.9 % NACL/PF 10 MCG/ML
25 PLASTIC BAG, INJECTION (ML) INTRAVENOUS CONTINUOUS
Status: DISCONTINUED | OUTPATIENT
Start: 2021-05-12 | End: 2021-05-13

## 2021-05-12 RX ORDER — FENTANYL CITRATE 50 UG/ML
50 INJECTION, SOLUTION INTRAMUSCULAR; INTRAVENOUS
Status: DISCONTINUED | OUTPATIENT
Start: 2021-05-12 | End: 2021-05-20 | Stop reason: HOSPADM

## 2021-05-12 RX ORDER — CHLORHEXIDINE GLUCONATE 0.12 MG/ML
15 RINSE ORAL EVERY 12 HOURS SCHEDULED
Status: DISCONTINUED | OUTPATIENT
Start: 2021-05-12 | End: 2021-05-13

## 2021-05-12 RX ORDER — EPINEPHRINE 1 MG/ML
INJECTION, SOLUTION, CONCENTRATE INTRAVENOUS
Status: DISCONTINUED
Start: 2021-05-12 | End: 2021-05-12 | Stop reason: WASHOUT

## 2021-05-12 RX ADMIN — INSULIN HUMAN 10 UNITS: 100 INJECTION, SOLUTION PARENTERAL at 09:16

## 2021-05-12 RX ADMIN — STANDARDIZED SENNA CONCENTRATE 8.6 MG: 8.6 TABLET ORAL at 08:26

## 2021-05-12 RX ADMIN — DOCUSATE SODIUM 100 MG: 100 CAPSULE ORAL at 08:26

## 2021-05-12 RX ADMIN — CHLORHEXIDINE GLUCONATE 0.12% ORAL RINSE 15 ML: 1.2 LIQUID ORAL at 08:26

## 2021-05-12 RX ADMIN — ALBUMIN (HUMAN) 12.5 G: 12.5 INJECTION, SOLUTION INTRAVENOUS at 05:11

## 2021-05-12 RX ADMIN — PROPOFOL 25 MCG/KG/MIN: 10 INJECTION, EMULSION INTRAVENOUS at 07:41

## 2021-05-12 RX ADMIN — FINASTERIDE 5 MG: 5 TABLET, FILM COATED ORAL at 08:26

## 2021-05-12 RX ADMIN — NOREPINEPHRINE BITARTRATE 15 MCG/MIN: 1 INJECTION, SOLUTION, CONCENTRATE INTRAVENOUS at 03:54

## 2021-05-12 RX ADMIN — PANTOPRAZOLE SODIUM 40 MG: 40 INJECTION, POWDER, FOR SOLUTION INTRAVENOUS at 20:16

## 2021-05-12 RX ADMIN — PANTOPRAZOLE SODIUM 40 MG: 40 INJECTION, POWDER, FOR SOLUTION INTRAVENOUS at 15:40

## 2021-05-12 RX ADMIN — CHLORHEXIDINE GLUCONATE 0.12% ORAL RINSE 15 ML: 1.2 LIQUID ORAL at 20:16

## 2021-05-12 RX ADMIN — FENTANYL CITRATE 50 MCG: 50 INJECTION, SOLUTION INTRAMUSCULAR; INTRAVENOUS at 03:45

## 2021-05-12 RX ADMIN — SODIUM CHLORIDE 80 MG: 9 INJECTION, SOLUTION INTRAVENOUS at 02:03

## 2021-05-12 RX ADMIN — FENTANYL CITRATE 75 MCG/HR: 50 INJECTION, SOLUTION INTRAMUSCULAR; INTRAVENOUS at 04:02

## 2021-05-12 RX ADMIN — PRAVASTATIN SODIUM 40 MG: 40 TABLET ORAL at 18:00

## 2021-05-12 RX ADMIN — PROPOFOL 5 MCG/KG/MIN: 10 INJECTION, EMULSION INTRAVENOUS at 19:24

## 2021-05-12 RX ADMIN — ACETAMINOPHEN 650 MG: 325 TABLET, FILM COATED ORAL at 11:11

## 2021-05-12 RX ADMIN — DEXTROSE MONOHYDRATE 25 ML: 500 INJECTION PARENTERAL at 14:06

## 2021-05-12 RX ADMIN — ONDANSETRON 4 MG: 2 INJECTION INTRAMUSCULAR; INTRAVENOUS at 02:57

## 2021-05-12 RX ADMIN — SODIUM CHLORIDE 8 MG/HR: 9 INJECTION, SOLUTION INTRAVENOUS at 02:21

## 2021-05-12 RX ADMIN — FENTANYL CITRATE 100 MCG/HR: 50 INJECTION, SOLUTION INTRAMUSCULAR; INTRAVENOUS at 12:32

## 2021-05-12 RX ADMIN — SODIUM CHLORIDE 8 MG/HR: 9 INJECTION, SOLUTION INTRAVENOUS at 14:29

## 2021-05-12 RX ADMIN — PROPOFOL 20 MCG/KG/MIN: 10 INJECTION, EMULSION INTRAVENOUS at 03:45

## 2021-05-12 RX ADMIN — SODIUM CHLORIDE 20 UNITS/HR: 9 INJECTION, SOLUTION INTRAVENOUS at 09:13

## 2021-05-12 RX ADMIN — SODIUM CHLORIDE 12 UNITS/HR: 9 INJECTION, SOLUTION INTRAVENOUS at 02:39

## 2021-05-12 NOTE — CODE DOCUMENTATION
Rapid response team arrived  Vitals BP 99/62 HR 77 RR 24 SPO2 97%  Pt alert but lethargic  Normal saline bolus initiated  2L NC administered  Preparing to intubate

## 2021-05-12 NOTE — RESPIRATORY THERAPY NOTE
05/12/21 0708   Vent Information   Vent ID [id number]   Vent type Olvera C3   Olvera C3/G5 Vent Mode APVcmv   Is the patient reintubated? No   $ Pulse Oximetry Spot Check Charge Completed   SpO2 99 %   APVcmv Settings   Resp Rate (BPM) 18 BPM   VT (mL) 470 mL   Insp Time (sec) 0 91 sec   FIO2 (%) 60 %   PEEP (cmH2O) 6 cmH2O   I:E Ratio 1:2 7   Insp Resistance 9   P-ramp (ms) 100 (ms)   Flow Trigger (LPM) 2 LPM   Humidification Heater   Heater Temp 98 6 °F (37 °C)   APVcmv Actuals   Resp Rate (BPM) 19 BPM   VT (mL) 467 mL   MV (Obs) 9 1   MAP (cmH2O) 9 7 cmH2O   Peak Pressure (cmH2O) 18 cmH2O   I:E Ratio (Obs) 1:2 7   Static Compliance (mL/cmH20) 55 8 mL/cmH2O   Plateau Pressure (cm H2O) 16 cm H2O   Heater Temperature (Obs) 98 1 °F (36 7 °C)   APVcmv ALARMS   High Peak Pressure (cmH2O) 40 cmH2O   Low Peak Pressure (cmH2O) 10 cm H2O   High Resp Rate (BPM) 28 BPM   High MV (L/min) 16 L/min   Low MV (L/min) 4 L/min   High VT (mL) 900 mL   Low VT (mL) 250 mL   Apnea Time (s) 20 S   Maintenance   Alarm (pink) cable attached No   Resuscitation bag with peep valve at bedside Yes   Water bag changed No   Circuit changed No   Daily Screen   Patient safety screen outcome: Failed   Not Ready for Weaning due to: Underline problem not resolved   IHI Ventilator Associated Pneumonia Bundle   Daily Assessment of Readiness to Extubate Yes   Head of Bed Elevated HOB 30   ETT  Oral 8 mm   Placement Date/Time: 05/12/21 0400   Preoxygenated: Yes  Type: Oral  Tube Size: 8 mm  Laryngoscope: [place]  Location: Oral  Insertion: Atraumatic; No change in dentition  Insertion attempts: 1  Placement Verification: Auscultation; Chest x-ray;Fiberopt       Secured at (cm) 24   Measured from Teeth   Secured Location Right   Repositioned Right to [address] [place] [address] by Commercial tube [place]   Site Condition Dry   Cuff Pressure (cm H2O) 27 cm H2O   HI-LO Suction  Continuous low suction   HI-LO Secretions Scant;Clear   HI-LO Intervention Patent

## 2021-05-12 NOTE — ASSESSMENT & PLAN NOTE
· Secondary to hematemesis  · Patient with 2 episodes of hematemesis with subsequent drop in H/H from 14 1 to 9 8  · Transfused 1 U PRBC overnight  · Trend q6  · Patient with bilateral nares packed due to mild epistaxis during RRT, however no further bleeding, packs likely can be removed today

## 2021-05-12 NOTE — QUICK NOTE
28-year-old gentleman with medical history of uncontrolled type 2 diabetes, was transferred to MICU anemic shock, intubated due to aspiration  basal bolus insulin regimen was discontinued  he was started on insulin infusion as per non DKA protocol, managed by critical  Care team    recommendation  - based on patient's current condition, recommend continuing insulin infusion for now    - continue to monitor blood sugar every 2 hours, and adjust insulin drip as per the protocol  - patient is currently NPO   will follow-up    Eric Alexander MD

## 2021-05-12 NOTE — CONSULTS
[address] 1940, 79 y o  male MRN: [id number]  Unit/Bed#:  Encounter: [id number]  Primary Care Provider: Del Bhardwaj MD   Date and time admitted to hospital: 5/9/2021  4:12 PM    Consults    Shock ([address] )  Assessment & Plan  · 2/2 hypovolemia/hemorrhagic  · Given 1 U PRBC; 250 cc albumin; fluids  · Trend H/H  · Continue levophed though improving    Hematemesis  Assessment & Plan  · Episodes x2  · Intubated due to aspiration  · No clear blood in oropharynx  · Clotted blood from endotracheal tube  · Bilateral nares packed  · OG tube with continued clotted output  · Protonix bolus and infusion  · D/C SQH and aspirin  · GI consult  · Likely needs EGD    Acute blood loss anemia (ABLA)  Assessment & Plan  · Secondary to hematemesis  · Patient with 2 episodes of hematemesis with subsequent drop in H/H from 14 1 to 9 8  · Transfused 1 U PRBC overnight  · Trend q6  · Patient with bilateral nares packed due to mild epistaxis during RRT, however no further bleeding, packs likely can be removed today    * Hyperosmolar hyperglycemic state (HHS) [facility])  Assessment & Plan  Lab Results   Component Value Date    HGBA1C 12 3 (H) 05/09/2021       Recent Labs     05/11/21  1541 05/11/21 2032 05/12/21  0358 05/12/21  0612   POCGLU 410* 430* >500* 429*       Blood Sugar Average: Last 72 hrs:  (P) 223 [id number]   · Transitioned to SSI but glucose still out of control  · Place back on insulin infusion    Aspiration into airway  Assessment & Plan  · Patient with hypoxia, gurgling respirations after hematemesis  · Intubated for airway protection  · Would benefit from bronch    New onset type 2 diabetes mellitus [facility])  Assessment & Plan  Lab Results   Component Value Date    HGBA1C 12 3 (H) 05/09/2021       Recent Labs     05/11/21  1541 05/11/21 2032 05/12/21  0358 05/12/21  0612   POCGLU 410* 430* >500* 429*       Blood Sugar Average: Last 72 hrs:  (P) 745 [id number]     · Endocrine following  · Continue insulin infusoin    Dysphagia  Assessment & Plan  · Speech eval once extubated    Acute metabolic encephalopathy  Assessment & Plan  · Likely metabolic from [address]  · Continue fent/prop for sedation  · Avoid deliriogenic medications    Late onset 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 ([facility] )  Assessment & Plan  · At baseline    Chronic kidney disease  Assessment & Plan  Lab Results   Component Value Date    EGFR 31 05/12/2021    EGFR 39 05/11/2021    EGFR 34 05/10/2021    CREATININE 2 26 (H) 05/12/2021    CREATININE 1 87 (H) 05/11/2021    CREATININE 2 07 (H) 05/10/2021   · Presented with MILE, slightly worsened again by shock state  · Monitor closely  · MAP goal > 65    Other hyperlipidemia  Assessment & Plan  · Continue statin    Dementia ([facility] )  Assessment & Plan  · At baseline    BPH (benign prostatic hyperplasia)  Assessment & Plan  · Now with soto cath in place    -------------------------------------------------------------------------------------------------------------  Chief Complaint: "don't worry"    History of Present Illness   HX and PE limited by: Dementia  Sanya Trent is a 79 y o  male who presented to [address] [place] [address] on 5/9/21 with polyuria, uncontrolled hypertension, and right sided facial droop  His PMH is inclusive of chronic anemia, BPH, CKD, Alzheimer dementia, HLD, HTN  He was initally a stroke alert but was found to be HHNK so was started on insulin infusion and admitted to [facility] 5 floor  There he was transitioned to [facility] with lantus but had persistent hyperglycemia  He had an MRI that did not show acute ischemia  This evening around 2 am, patient pressed call bell after he had vomited a moderate amount of clotted blood  Patient mildly tachycardic with normal BP and mentation, therefore hemoglobin was checked  He had a 3 G hemoglobin drop so he was referred for SD admission   He was bolused 80 mg IV protonix, started on infusion, and was type and crossed  While awaiting transfer, patient had more copious vomiting with hypotension, hypoxia, likely aspiration, and decreased mentation so therefore RRT was called  At that time he also had epistaxis  He was emergently intubated  He did not have copious blood in his oropharynx but after intubation was suctioned for moderate, clotted blood  and transfused 1 U of PRBC  I updated patient's guardian, Coni Burton, of events and also updated patients daughter and Mr Tanner Love wife, Don Garcia  She confirmed story above  She does not know of any history of ulcers, GI bleeding, and states he did not have any vomiting/retching at home  Transfusion was discussed and she agreed to emergent transfusion  History obtained from child, chart review and unobtainable from patient due to mental status   -------------------------------------------------------------------------------------------------------------  Dispo: Admit to Critical Care     Code Status: Level 1 - Full Code  --------------------------------------------------------------------------------------------------------------  Review of Systems    Review of systems was unable to be performed secondary to intubation    Physical Exam  Vitals signs and nursing note reviewed  Constitutional:       Appearance: He is well-developed and well-groomed  Interventions: He is sedated, intubated and restrained  HENT:      Head: Normocephalic and atraumatic  Nose:      Comments: Dried blood in B/L nares now with packing     Mouth/Throat:      Mouth: Mucous membranes are dry  Comments: No blood in oropharynx   Eyes:      Conjunctiva/sclera: Conjunctivae normal    Neck:      Musculoskeletal: Neck supple  Cardiovascular:      Rate and Rhythm: Regular rhythm  Tachycardia present  Heart sounds: Normal heart sounds  Pulmonary:      Effort: He is intubated  Breath sounds: Stridor: course, throughout  Rhonchi present  Abdominal:      General: Bowel sounds are normal  There is no distension  Palpations: Abdomen is soft  Genitourinary:     Comments: Deferred  Musculoskeletal:      Right lower leg: No edema  Left lower leg: No edema  Skin:     General: Skin is dry  Capillary Refill: Capillary refill takes 2 to 3 seconds  Neurological:      Mental Status: He is lethargic  Comments: GCS 9T (2, 1T, 6)       --------------------------------------------------------------------------------------------------------------  Vitals:   Vitals:    05/12/21 0445 05/12/21 0500 05/12/21 0600 05/12/21 0708   BP: 147/81 135/70 91/51    Pulse: 89 85 82    Resp: 19 20 18    Temp: 98 2 °F (36 8 °C) 98 2 °F (36 8 °C) 98 4 °F (36 9 °C)    SpO2: 99% 98% 98% 99%   Weight:       Height:         Temp  Min: 98 1 °F (36 7 °C)  Max: 99 2 °F (37 3 °C)  IBW (Ideal Body Weight): 71 85 kg  Height: 5' 9 5" (176 5 cm)  Body mass index is 31 45 kg/m²      Laboratory and Diagnostics:  Results from last 7 days   Lab Units 05/12/21  0349 05/12/21  0211 05/11/21  0516 05/10/21  0458 05/09/21  1628   WBC Thousand/uL  --  9 12 5 88 7 26 6 38   HEMOGLOBIN g/dL 9 8* 11 5* 14 3 14 0 13 9   HEMATOCRIT %  --  35 3* 43 1 41 6 42 3   PLATELETS Thousands/uL  --  148* 153 145* 142*   NEUTROS PCT %  --  68  --   --  73   MONOS PCT %  --  9  --   --  8     Results from last 7 days   Lab Units 05/12/21 0211 05/11/21  0516 05/10/21  0458 05/09/21  2352 05/09/21  1628   SODIUM mmol/L 138 142 145 142 132*   POTASSIUM mmol/L 5 0 3 8 3 1* 3 3* 4 8   CHLORIDE mmol/L 102 106 107 106 95*   CO2 mmol/L 23 23 27 26 23   ANION GAP mmol/L 13 13 11 10 14*   BUN mg/dL 56* 28* 34* 37* 42*   CREATININE mg/dL 2 26* 1 87* 2 07* 2 30* 2 63*   CALCIUM mg/dL 8 2* 8 5 9 2 9 0 8 9   GLUCOSE RANDOM mg/dL 514* 247* 81 268* 796*   ALT U/L  --   --   --   --  29   AST U/L  --   --   --   --  17   ALK PHOS U/L  --   --   --   --  114   ALBUMIN g/dL  --   --   --   --  3 6   TOTAL BILIRUBIN mg/dL  --   --   --   --  0 70     Results from last 7 days   Lab Units 05/10/21  0458   MAGNESIUM mg/dL 2 1      Results from last 7 days   Lab Units 05/12/21  0349 05/09/21  1628   INR  1 15 1 03   PTT seconds 32 32          Results from last 7 days   Lab Units 05/12/21  0616 05/12/21  0349 05/09/21  1628   LACTIC ACID mmol/L 3 9* 4 2* 1 6     ABG:  Results from last 7 days   Lab Units 05/12/21  0418   PH ART  7 353   PCO2 ART mm Hg 36 4   PO2 ART mm Hg 341 1*   HCO3 ART mmol/L 19 8*   BASE EXC ART mmol/L -5 2   ABG SOURCE  Radial, Right     VBG:  Results from last 7 days   Lab Units 05/12/21  0418 05/09/21  1728   PH TELMA   --  7 342   PCO2 TELMA mm Hg  --  40 1*   PO2 TELMA mm Hg  --  57 1*   HCO3 TELMA mmol/L  --  21 2*   BASE EXC TELMA mmol/L  --  -4 1   ABG SOURCE  Radial, Right  --      Results from last 7 days   Lab Units 05/11/21  1522 05/10/21  0458 05/09/21  1628   PROCALCITONIN ng/ml 0 13 0 28* 0 22       Micro:  Results from last 7 days   Lab Units 05/09/21  1728 05/09/21  1628   BLOOD CULTURE  No Growth at 48 hrs  No Growth at 48 hrs         EKG:   Imaging: I have personally reviewed pertinent films in PACS      Historical Information   Past Medical History:   Diagnosis Date    Anemia     BPH (benign prostatic hyperplasia)     Chronic kidney disease     Dementia ([address] )     Hyperlipidemia     Hypertension     Incontinence     Osteoarthritis      Past Surgical History:   Procedure Laterality Date    COLON SURGERY      COLONOSCOPY      FL RETROGRADE PYELOGRAM  12/13/2019    IL CYSTOURETHRO W/IMPLANT N/A 12/13/2019    Procedure: CYSTOSCOPY WITH INSERTION UROLIFT, BILATERAL RETROGRADE PYELOGRAM;  Surgeon: Sandi Alberto MD;  Location: [facility];  Service: Urology    TUMOR REMOVAL       Social History   Social History     Substance and Sexual Activity   Alcohol Use Never    Frequency: Never     Social History     Substance and Sexual Activity   Drug Use Never     Social History Tobacco Use   Smoking Status Passive Smoke Exposure - Never Smoker   Smokeless Tobacco Never Used     Exercise History: Needs help with ADLs  Family History:   Family History   Problem Relation Age of Onset    Heart failure Brother     Dementia Maternal Uncle      I have reviewed this patient's family history and commented on sigificant items within the HPI      Medications:  Current Facility-Administered Medications   Medication Dose Route Frequency    acetaminophen (TYLENOL) tablet 650 mg  650 mg Oral Q6H PRN    calcium carbonate (TUMS) chewable tablet 1,000 mg  1,000 mg Oral Daily PRN    chlorhexidine (PERIDEX) 0 12 % oral rinse 15 mL  15 mL Swish & Spit Q12H [facility]    docusate sodium (COLACE) capsule 100 mg  100 mg Oral BID    fentaNYL 1000 mcg in sodium chloride 0 9% 100mL infusion  100 mcg/hr Intravenous Continuous    fentanyl citrate (PF) 100 MCG/2ML 50 mcg  50 mcg Intravenous Q1H PRN    finasteride (PROSCAR) tablet 5 mg  5 mg Oral Daily    hydrALAZINE (APRESOLINE) injection 5 mg  5 mg Intravenous Q6H PRN    insulin regular (HumuLIN R,NovoLIN R) 1 Units/mL in sodium chloride 0 9 % 100 mL infusion  0 3-21 Units/hr Intravenous Titrated    lactated ringers infusion  100 mL/hr Intravenous Continuous    norepinephrine (LEVOPHED) 4 mg (STANDARD CONCENTRATION) IV in sodium chloride 0 9% 250 mL  1-30 mcg/min Intravenous Titrated    ondansetron (ZOFRAN) injection 4 mg  4 mg Intravenous Q6H PRN    pantoprazole (PROTONIX) 80 mg in sodium chloride 0 9 % 100 mL infusion  8 mg/hr Intravenous Continuous    pravastatin (PRAVACHOL) tablet 40 mg  40 mg Oral Daily With Dinner    propofol (DIPRIVAN) 1000 mg in 100 mL infusion (premix)  5-50 mcg/kg/min Intravenous Titrated    senna (SENOKOT) tablet 8 6 mg  1 tablet Oral Daily     Home medications:  Prior to Admission Medications   Prescriptions Last Dose Informant Patient Reported? Taking?    Diclofenac Sodium (VOLTAREN) 1 %  Child No No   Sig: Apply 2 g topically 4 (four) times a day   Incontinence Supply Disposable (Incontinence Brief Large) MISC  Child No No   Sig: by Does not apply route 4 (four) times a day PLEASE SUPPLY EXTRA-LARGE  Multiple Vitamins-Minerals (MULTIVITAMIN WITH MINERALS) tablet  Child Yes No   Sig: Take 1 tablet by mouth daily   acetaminophen (TYLENOL) 500 mg tablet  Child Yes No   Sig: Take 500 mg by mouth every 6 (six) hours as needed for mild pain   allopurinol (ZYLOPRIM) 300 mg tablet   No No   Sig: take 1 tablet by mouth once daily   amLODIPine (NORVASC) 10 mg tablet   No No   Sig: take 1 tablet by mouth daily   finasteride (PROSCAR) 5 mg tablet  Child No No   Sig: Take 1 tablet (5 mg total) by mouth daily   fluticasone (FLONASE) 50 mcg/act nasal spray   No No   Si spray into each nostril daily   furosemide (LASIX) 40 mg tablet   No No   Sig: Take 1 tablet (40 mg total) by mouth daily   hydrALAZINE (APRESOLINE) 50 mg tablet   No No   Sig: take 1 tablet by mouth three times a day   labetalol (NORMODYNE) 200 mg tablet   No No   Sig: Take 1 tablet (200 mg total) by mouth daily at bedtime   omega-3-acid ethyl esters (LOVAZA) 1 g capsule  Child Yes No   Sig: Take 1 g by mouth daily   simvastatin (ZOCOR) 20 mg tablet   No No   Sig: take 1 tablet by mouth at bedtime      Facility-Administered Medications: None     Allergies:   Allergies   Allergen Reactions    Strawberry C [Ascorbate - Food Allergy] Hives     ------------------------------------------------------------------------------------------------------------  Advance Directive and Living Will:      Power of :    POLST:    ------------------------------------------------------------------------------------------------------------  Care Time Delivered:   Upon my evaluation, this patient had a high probability of imminent or life-threatening deterioration due to Shock, acute blood loss anemia, hematemesis, aspiration, which required my direct attention, intervention, and personal management  I have personally provided 45 minutes (448 to 752) of critical care time, exclusive of procedures, teaching, family meetings, and any prior time recorded by providers other than myself  Ainsley Bhandari PA-C        Portions of the record may have been created with voice recognition software  Occasional wrong word or "sound a like" substitutions may have occurred due to the inherent limitations of voice recognition software    Read the chart carefully and recognize, using context, where substitutions have occurred

## 2021-05-12 NOTE — ASSESSMENT & PLAN NOTE
Lab Results   Component Value Date    EGFR 31 05/12/2021    EGFR 39 05/11/2021    EGFR 34 05/10/2021    CREATININE 2 26 (H) 05/12/2021    CREATININE 1 87 (H) 05/11/2021    CREATININE 2 07 (H) 05/10/2021   · Presented with MILE, slightly worsened again by shock state  · Monitor closely  · MAP goal > 65

## 2021-05-12 NOTE — CODE DOCUMENTATION
Vitals retaken- /65  RR 24 SPO2 95%  ICU PA determined pt was stable enough to transfer and intubate in ICU

## 2021-05-12 NOTE — RESPIRATORY THERAPY NOTE
RT Ventilator Management Note  Jefferson Guzman 79 y o  male MRN: [id number]  Unit/Bed#:  Encounter: [id number]      Daily Screen       5/12/2021  0415 5/12/2021  0708          Patient safety screen outcome[de-identified]  Failed  Failed      Not Ready for Weaning due to[de-identified]  FiO2 >60%; Underline problem not resolved  Underline problem not resolved              Physical Exam:   Assessment Type: (P) Assess only  General Appearance: (P) Sedated  Respiratory Pattern: (P) Assisted  Chest Assessment: (P) Chest expansion symmetrical  Bilateral Breath Sounds: (P) Diminished, Coarse(slightly)  Cough: Productive(assisted)  Suction: (P) ET Tube  O2 Device: (P) vent  Subjective Data: sedated      Resp Comments: (P) pt remains on full vent support  Has been weaned down to 60%  Pt in no distress at this time  Sedated on the vent and comfortable  Will cont  to wean as tolerated

## 2021-05-12 NOTE — ASSESSMENT & PLAN NOTE
· 2/2 hypovolemia/hemorrhagic  · Given 1 U PRBC; 250 cc albumin; fluids  · Trend H/H  · Continue levophed though improving

## 2021-05-12 NOTE — NURSING NOTE
Pt rang call bell asking to be cleaned up  Upon entering the room, pt found covered in dark red blood and what appeared to be clots  Pt had no immediate complaints  Vitals stable- /75  Pt denied nausea, dizziness, pain  On-call Melissa ROJO, messaged along with photos of vomit  Critical Care provider, Carmita Coelho, also made aware, and he came to assess the patient  POC BS done and found to be >500  Labwork (CBC, BMP, and Type % Screen) drawn per ICU provider order  Protonix and Insulin drips initiated as well  Around approx  0300 pt began feeling nauseous  Zofran ready to be administered when pt began vomiting blood and clots once again  Pt's facial expression turned blank and appeared to lose consciousness  Rapid response called immediately

## 2021-05-12 NOTE — ASSESSMENT & PLAN NOTE
· Patient with hypoxia, gurgling respirations after hematemesis  · Intubated for airway protection  · Would benefit from bronch

## 2021-05-12 NOTE — RESPIRATORY THERAPY NOTE
Attempted wean on pt, but pt not awake enough  Tidal volumes were low (124-133cc)  Placed back on original settings  Will attempt again when ready

## 2021-05-12 NOTE — RAPID RESPONSE
Rapid Response Note  Clovis Mcdaniel 79 y o  male MRN: [id number]  Unit/Bed#:  Encounter: [id number]    Rapid Response Notification(s):   Response called date/time:  5/12/2021 3:02 AM  Response team arrival date/time:  5/12/2021 3:03 AM  Response end date/time:  5/12/2021 3:15 AM  Rapid response location:  [facility]  Primary reason for rapid response call:  Acute significant bleed, acute change in O2 sat and acute change in neuro status    Rapid Response Intervention(s):   Airway:  Suction and endotracheal intubation  Breathing:  Oxygen  Circulation:  None  Fluids administered:  Blood and isolyte/plasmalyte  Medications administered:  None       Background/Situation:   Please see subsequent Consult Note    Review of Systems   Unable to perform ROS: Mental status change       Objective:   Vitals:    05/12/21 0435 05/12/21 0440 05/12/21 0445 05/12/21 0500   BP: 121/74 152/80 147/81 135/70   Pulse: 80 85 89 85   Resp: 19 21 19 20   Temp: 98 4 °F (36 9 °C) 98 2 °F (36 8 °C) 98 2 °F (36 8 °C) 98 2 °F (36 8 °C)   SpO2: 99% 99% 99% 98%   Weight:       Height:         Physical Exam  Vitals signs and nursing note reviewed  Constitutional:       Appearance: He is well-developed and well-groomed  HENT:      Head:      Comments: Clotted blood noted in mouth and bedside basin as well as from bilateral nares  Eyes:      Conjunctiva/sclera: Conjunctivae normal       Pupils: Pupils are equal, round, and reactive to light  Neck:      Musculoskeletal: Neck supple  Cardiovascular:      Rate and Rhythm: Tachycardia present  Pulmonary:      Effort: Tachypnea and respiratory distress present  Breath sounds: Rhonchi present  Abdominal:      General: There is no distension  Palpations: Abdomen is soft  Tenderness: There is no abdominal tenderness  Genitourinary:     Comments: Deferred  Musculoskeletal:      Right lower leg: No edema  Left lower leg: No edema     Skin:     General: Skin is warm and dry       Capillary Refill: Capillary refill takes less than 2 seconds  Neurological:      Mental Status: He is lethargic  Comments: Lethargic GCS 12 (2, 4, 6)  Non-focal         Assessment:   · Acute blood loss anemia  · Likely aspiration  · Likely hematemesis r/u nasal source and less likely pulmonary source    Plan:   · Patient transferred to ICU and intubated  · See consult note for more details     Rapid Response Outcome:   Condition: In critical condition  Progression:  Unchanged  Transfer:  Transfer to ICU  Primary service notified of transfer: Yes    Handoff report: by phone    Code Status: Level 1 (Full Code)      Family notified of transfer: yes  Family member contacted: Melita Sandoval, Patient's guardian      Portions of the record may have been created with voice recognition software  Occasional wrong word or "sound a like" substitutions may have occurred due to the inherent limitations of voice recognition software  Read the chart carefully and recognize, using context, where substitutions have occurred      ePr Meneses PA-C

## 2021-05-12 NOTE — PLAN OF CARE
Problem: Potential for Falls  Goal: Patient will remain free of falls  Description: INTERVENTIONS:  - Assess patient frequently for physical needs  -  Identify cognitive and physical deficits and behaviors that affect risk of falls  -  [place] fall precautions as indicated by assessment   - Educate patient/family on patient safety including physical limitations  - Instruct patient to call for assistance with activity based on assessment  - Modify environment to reduce risk of injury  - Consider OT/PT consult to assist with strengthening/mobility  Outcome: Progressing     Problem: Nutrition/Hydration-ADULT  Goal: Nutrient/Hydration intake appropriate for improving, restoring or maintaining nutritional needs  Description: Monitor and assess patient's nutrition/hydration status for malnutrition  Collaborate with interdisciplinary team and initiate plan and interventions as ordered  Monitor patient's weight and dietary intake as ordered or per policy  Utilize nutrition screening tool and intervene as necessary  Determine patient's food preferences and provide high-protein, high-caloric foods as appropriate       INTERVENTIONS:  - Monitor oral intake, urinary output, labs, and treatment plans  - Assess nutrition and hydration status and recommend course of action  - Evaluate amount of meals eaten  - Assist patient with eating if necessary   - Allow adequate time for meals  - Recommend/ encourage appropriate diets, oral nutritional supplements, and vitamin/mineral supplements  - Order, calculate, and assess calorie counts as needed  - Recommend, monitor, and adjust tube feedings and TPN/PPN based on assessed needs  - Assess need for intravenous fluids  - Provide specific nutrition/hydration education as appropriate  - Include patient/family/caregiver in decisions related to nutrition  Outcome: Progressing     Problem: PAIN - ADULT  Goal: Verbalizes/displays adequate comfort level or baseline comfort level  Description: Interventions:  - Encourage patient to monitor pain and request assistance  - Assess pain using appropriate pain scale  - Administer analgesics based on type and severity of pain and evaluate response  - Implement non-pharmacological measures as appropriate and evaluate response  - Consider cultural and social influences on pain and pain management  - Notify physician/advanced practitioner if interventions unsuccessful or patient reports new pain  Outcome: Progressing     Problem: INFECTION - ADULT  Goal: Absence or prevention of progression during hospitalization  Description: INTERVENTIONS:  - Assess and monitor for signs and symptoms of infection  - Monitor lab/diagnostic results  - Monitor all insertion sites, i e  indwelling lines, tubes, and drains  - Monitor endotracheal if appropriate and nasal secretions for changes in amount and color  - [place] appropriate cooling/warming therapies per order  - Administer medications as ordered  - Instruct and encourage patient and family to use good hand hygiene technique  - Identify and instruct in appropriate isolation precautions for identified infection/condition  Outcome: Progressing     Problem: SAFETY ADULT  Goal: Patient will remain free of falls  Description: INTERVENTIONS:  - Assess patient frequently for physical needs  -  Identify cognitive and physical deficits and behaviors that affect risk of falls    -  [place] fall precautions as indicated by assessment   - Educate patient/family on patient safety including physical limitations  - Instruct patient to call for assistance with activity based on assessment  - Modify environment to reduce risk of injury  - Consider OT/PT consult to assist with strengthening/mobility  Outcome: Progressing  Goal: Maintain or return to baseline ADL function  Description: INTERVENTIONS:  -  Assess patient's ability to carry out ADLs; assess patient's baseline for ADL function and identify physical deficits which impact ability to perform ADLs (bathing, care of mouth/teeth, toileting, grooming, dressing, etc )  - Assess/evaluate cause of self-care deficits   - Assess range of motion  - Assess patient's mobility; develop plan if impaired  - Assess patient's need for assistive devices and provide as appropriate  - Encourage maximum independence but intervene and supervise when necessary  - Involve family in performance of ADLs  - Assess for home care needs following discharge   - Consider OT consult to assist with ADL evaluation and planning for discharge  - Provide patient education as appropriate  Outcome: Progressing  Goal: Maintain or return mobility status to optimal level  Description: INTERVENTIONS:  - Assess patient's baseline mobility status (ambulation, transfers, stairs, etc )    - Identify cognitive and physical deficits and behaviors that affect mobility  - Identify mobility aids required to assist with transfers and/or ambulation (gait belt, sit-to-stand, lift, walker, cane, etc )  - [place] fall precautions as indicated by assessment  - Record patient progress and toleration of activity level on Mobility SBAR; progress patient to next Phase/Stage  - Instruct patient to call for assistance with activity based on assessment  - Consider rehabilitation consult to assist with strengthening/weightbearing, etc   Outcome: Progressing     Problem: DISCHARGE PLANNING  Goal: Discharge to home or other facility with appropriate resources  Description: INTERVENTIONS:  - Identify barriers to discharge w/patient and caregiver  - Arrange for needed discharge resources and transportation as appropriate  - Identify discharge learning needs (meds, wound care, etc )  - Arrange for interpretive services to assist at discharge as needed  - Refer to Case Management Department for coordinating discharge planning if the patient needs post-hospital services based on physician/advanced practitioner order or complex needs related to functional status, cognitive ability, or social support system  Outcome: Progressing     Problem: Knowledge Deficit  Goal: Patient/family/caregiver demonstrates understanding of disease process, treatment plan, medications, and discharge instructions  Description: Complete learning assessment and assess knowledge base    Interventions:  - Provide teaching at level of understanding  - Provide teaching via preferred learning methods  Outcome: Progressing     Problem: METABOLIC, FLUID AND ELECTROLYTES - ADULT  Goal: Glucose maintained within target range  Description: INTERVENTIONS:  - Monitor Blood Glucose as ordered  - Assess for signs and symptoms of hyperglycemia and hypoglycemia  - Administer ordered medications to maintain glucose within target range  - Assess nutritional intake and initiate nutrition service referral as needed  Outcome: Progressing

## 2021-05-12 NOTE — QUICK NOTE
Message with photos attached that patient called to be changed  When nursing went to him he was found to have thrown up what appears to be clots  Photos appear to show large amt of dark jelly like material on gown  Vitals stable and per nurse patient without complaints  Patient made NPO, cbc ordered, heparin SC stopped  [facility] DEVI made aware of episode and will see patient when able

## 2021-05-12 NOTE — RESPIRATORY THERAPY NOTE
RT Ventilator Management Note  Euna Nissen 79 y o  male MRN: [id number]  Unit/Bed#:  Encounter: [id number]      Daily Screen       5/12/2021  0760             Patient safety screen outcome[de-identified]  Failed    Not Ready for Weaning due to[de-identified]  FiO2 >60%; Underline problem not resolved            Physical Exam:   Assessment Type: Assess only  General Appearance: Sedated  Respiratory Pattern: Assisted  Chest Assessment: Chest expansion symmetrical  Bilateral Breath Sounds: Diminished, Coarse, Scattered  Cough: Productive(assisted)  Suction: Oral, ET Tube  O2 Device: mechanical ventilation  Subjective Data: sedated      Resp Comments: patient electively intubated for airway protection after large amounts of blood noted from nose and possible aspiration  pattient rapid sequence intubated wihout difficulty by CRCELE [place] pateint tolerated proceidure well et tube in place and secure tie tension aedqauet no evidence of skin breakdown noted at this time b as  eqaul bilateral with good congested cough  pateint suctioned for lareg amounts of dark blood tinged secretions patient being sedated wihtut apparent respiratoruy disytess plan: continue current suport unitl further orders

## 2021-05-12 NOTE — ASSESSMENT & PLAN NOTE
· Episodes x2  · Intubated due to aspiration  · No clear blood in oropharynx  · Clotted blood from endotracheal tube  · Bilateral nares packed  · OG tube with continued clotted output  · Protonix bolus and infusion  · D/C SQH and aspirin  · GI consult  · Likely needs EGD

## 2021-05-12 NOTE — ASSESSMENT & PLAN NOTE
Lab Results   Component Value Date    HGBA1C 12 3 (H) 05/09/2021       Recent Labs     05/11/21  1541 05/11/21 2032 05/12/21  0358 05/12/21  0612   POCGLU 410* 430* >500* 429*       Blood Sugar Average: Last 72 hrs:  (P) 334 [id number]     · Endocrine following  · Continue insulin infusoin

## 2021-05-12 NOTE — CODE DOCUMENTATION
Pt reporting nausea, Zofran ready to be administered when pt began vomiting dark blood with clots  Pt began breathing heavily and got a blank facial expression and appeared to lose consciousness  Rapid response called

## 2021-05-12 NOTE — RESPIRATORY THERAPY NOTE
05/12/21 1454   Vent Information   Vent ID [id number]   Vent type Olvera C3   [place]vera C3/G5 Vent Mode APVcmv   Is the patient reintubated?  No   $ Pulse Oximetry Spot Check Charge Completed   SpO2 100 %   APVcmv Settings   Resp Rate (BPM) 18 BPM   VT (mL) 470 mL   Insp Time (sec) 0 91 sec   FIO2 (%) 40 %   PEEP (cmH2O) 6 cmH2O   I:E Ratio 1:2 4   Insp Resistance 18   P-ramp (ms) 100 (ms)   Flow Trigger (LPM) 2 LPM   Humidification Heater   Heater Temp 98 6 °F (37 °C)   APVcmv Actuals   Resp Rate (BPM) 22 BPM   VT (mL) 340 mL   MV (Obs) 7 3   MAP (cmH2O) 11 cmH2O   Peak Pressure (cmH2O) 22 cmH2O   I:E Ratio (Obs) 1:2 4   Static Compliance (mL/cmH20) 57 9 mL/cmH2O   Plateau Pressure (cm H2O) 17 cm H2O   Heater Temperature (Obs) 98 4 °F (36 9 °C)   APVcmv ALARMS   High Peak Pressure (cmH2O) 40 cmH2O   Low Peak Pressure (cmH2O) 10 cm H2O   High Resp Rate (BPM) 58 BPM   High MV (L/min) 16 L/min   Low MV (L/min) 4 L/min   High VT (mL) 900 mL   Low VT (mL) 250 mL   Apnea Time (s) 20 S   Maintenance   Alarm (pink) cable attached No   Resuscitation bag with peep valve at bedside Yes   Water bag changed No   Circuit changed No

## 2021-05-12 NOTE — ASSESSMENT & PLAN NOTE
· Likely metabolic from Parviz[facility] Trish [facility]  · Continue fent/prop for sedation  · Avoid deliriogenic medications

## 2021-05-12 NOTE — ASSESSMENT & PLAN NOTE
Lab Results   Component Value Date    HGBA1C 12 3 (H) 05/09/2021       Recent Labs     05/11/21  1541 05/11/21 2032 05/12/21  0358 05/12/21  0612   POCGLU 410* 430* >500* 429*       Blood Sugar Average: Last 72 hrs:  (P) 221 [id number]   · Transitioned to SSI but glucose still out of control  · Place back on insulin infusion

## 2021-05-12 NOTE — PLAN OF CARE
Problem: Potential for Falls  Goal: Patient will remain free of falls  Description: INTERVENTIONS:  - Assess patient frequently for physical needs  -  Identify cognitive and physical deficits and behaviors that affect risk of falls  -  [place] fall precautions as indicated by assessment   - Educate patient/family on patient safety including physical limitations  - Instruct patient to call for assistance with activity based on assessment  - Modify environment to reduce risk of injury  - Consider OT/PT consult to assist with strengthening/mobility  Outcome: Progressing     Problem: Nutrition/Hydration-ADULT  Goal: Nutrient/Hydration intake appropriate for improving, restoring or maintaining nutritional needs  Description: Monitor and assess patient's nutrition/hydration status for malnutrition  Collaborate with interdisciplinary team and initiate plan and interventions as ordered  Monitor patient's weight and dietary intake as ordered or per policy  Utilize nutrition screening tool and intervene as necessary  Determine patient's food preferences and provide high-protein, high-caloric foods as appropriate       INTERVENTIONS:  - Monitor oral intake, urinary output, labs, and treatment plans  - Assess nutrition and hydration status and recommend course of action  - Evaluate amount of meals eaten  - Assist patient with eating if necessary   - Allow adequate time for meals  - Recommend/ encourage appropriate diets, oral nutritional supplements, and vitamin/mineral supplements  - Order, calculate, and assess calorie counts as needed  - Recommend, monitor, and adjust tube feedings and TPN/PPN based on assessed needs  - Assess need for intravenous fluids  - Provide specific nutrition/hydration education as appropriate  - Include patient/family/caregiver in decisions related to nutrition  Outcome: Progressing     Problem: PAIN - ADULT  Goal: Verbalizes/displays adequate comfort level or baseline comfort level  Description: Interventions:  - Encourage patient to monitor pain and request assistance  - Assess pain using appropriate pain scale  - Administer analgesics based on type and severity of pain and evaluate response  - Implement non-pharmacological measures as appropriate and evaluate response  - Consider cultural and social influences on pain and pain management  - Notify physician/advanced practitioner if interventions unsuccessful or patient reports new pain  Outcome: Progressing     Problem: INFECTION - ADULT  Goal: Absence or prevention of progression during hospitalization  Description: INTERVENTIONS:  - Assess and monitor for signs and symptoms of infection  - Monitor lab/diagnostic results  - Monitor all insertion sites, i e  indwelling lines, tubes, and drains  - Monitor endotracheal if appropriate and nasal secretions for changes in amount and color  - [place] appropriate cooling/warming therapies per order  - Administer medications as ordered  - Instruct and encourage patient and family to use good hand hygiene technique  - Identify and instruct in appropriate isolation precautions for identified infection/condition  Outcome: Progressing     Problem: SAFETY ADULT  Goal: Patient will remain free of falls  Description: INTERVENTIONS:  - Assess patient frequently for physical needs  -  Identify cognitive and physical deficits and behaviors that affect risk of falls    -  [place] fall precautions as indicated by assessment   - Educate patient/family on patient safety including physical limitations  - Instruct patient to call for assistance with activity based on assessment  - Modify environment to reduce risk of injury  - Consider OT/PT consult to assist with strengthening/mobility  Outcome: Progressing  Goal: Maintain or return to baseline ADL function  Description: INTERVENTIONS:  -  Assess patient's ability to carry out ADLs; assess patient's baseline for ADL function and identify physical deficits which impact ability to perform ADLs (bathing, care of mouth/teeth, toileting, grooming, dressing, etc )  - Assess/evaluate cause of self-care deficits   - Assess range of motion  - Assess patient's mobility; develop plan if impaired  - Assess patient's need for assistive devices and provide as appropriate  - Encourage maximum independence but intervene and supervise when necessary  - Involve family in performance of ADLs  - Assess for home care needs following discharge   - Consider OT consult to assist with ADL evaluation and planning for discharge  - Provide patient education as appropriate  Outcome: Progressing  Goal: Maintain or return mobility status to optimal level  Description: INTERVENTIONS:  - Assess patient's baseline mobility status (ambulation, transfers, stairs, etc )    - Identify cognitive and physical deficits and behaviors that affect mobility  - Identify mobility aids required to assist with transfers and/or ambulation (gait belt, sit-to-stand, lift, walker, cane, etc )  - [place] fall precautions as indicated by assessment  - Record patient progress and toleration of activity level on Mobility SBAR; progress patient to next Phase/Stage  - Instruct patient to call for assistance with activity based on assessment  - Consider rehabilitation consult to assist with strengthening/weightbearing, etc   Outcome: Progressing     Problem: DISCHARGE PLANNING  Goal: Discharge to home or other facility with appropriate resources  Description: INTERVENTIONS:  - Identify barriers to discharge w/patient and caregiver  - Arrange for needed discharge resources and transportation as appropriate  - Identify discharge learning needs (meds, wound care, etc )  - Arrange for interpretive services to assist at discharge as needed  - Refer to Case Management Department for coordinating discharge planning if the patient needs post-hospital services based on physician/advanced practitioner order or complex needs related to functional status, cognitive ability, or social support system  Outcome: Progressing     Problem: Knowledge Deficit  Goal: Patient/family/caregiver demonstrates understanding of disease process, treatment plan, medications, and discharge instructions  Description: Complete learning assessment and assess knowledge base    Interventions:  - Provide teaching at level of understanding  - Provide teaching via preferred learning methods  Outcome: Progressing     Problem: METABOLIC, FLUID AND ELECTROLYTES - ADULT  Goal: Glucose maintained within target range  Description: INTERVENTIONS:  - Monitor Blood Glucose as ordered  - Assess for signs and symptoms of hyperglycemia and hypoglycemia  - Administer ordered medications to maintain glucose within target range  - Assess nutritional intake and initiate nutrition service referral as needed  Outcome: Progressing

## 2021-05-12 NOTE — CONSULTS
Consultation - [facility]  Lay El 79 y o  male MRN: [id number]  Unit/Bed#:  Encounter: [id number]        Consults    Reason for Consult / Principal Problem: Hematemesis    HPI: Saundra Phillips is a 78 yo M with a PMH of Alzheimer's dementia, HTN, HLD, BPH, who initially presented on 5/9 with polyuria, hypertensive urgency, and a R sided facial droops  He was found to be significantly hyperglycemic which was thought to be the cause of his neuro symptoms as his neurologic exam was unremarkable  Last night he began to have nausea and then had several episodes of hematemesis with clots  He became lethargic and was intubated for airway protection  He received 1 U PRBC overnight  Spoke with his daughter Ambrocio Maradiaga who reports she is a POA for Saundra Phillips   She reports no history of upper GI bleeding in the past     REVIEW OF SYSTEMS: Negative except for as stated above    Historical Information   Past Medical History:   Diagnosis Date    Anemia     BPH (benign prostatic hyperplasia)     Chronic kidney disease     Dementia ([facility] [address] )     Hyperlipidemia     Hypertension     Incontinence     Osteoarthritis      Past Surgical History:   Procedure Laterality Date    COLON SURGERY      COLONOSCOPY      FL RETROGRADE PYELOGRAM  12/13/2019    ND CYSTOURETHRO W/IMPLANT N/A 12/13/2019    Procedure: CYSTOSCOPY WITH INSERTION UROLIFT, BILATERAL RETROGRADE PYELOGRAM;  Surgeon: Leif Griffin MD;  Location: [facility]  MAIN [facility];  Service: Urology    TUMOR REMOVAL       Social History   Social History     Substance and Sexual Activity   Alcohol Use Never    Frequency: Never     Social History     Substance and Sexual Activity   Drug Use Never     Social History     Tobacco Use   Smoking Status Passive Smoke Exposure - Never Smoker   Smokeless Tobacco Never Used     Family History   Problem Relation Age of Onset    Heart failure Brother     Dementia Maternal Uncle        Meds/Allergies     Medications Prior to Admission   Medication    acetaminophen (TYLENOL) 500 mg tablet    allopurinol (ZYLOPRIM) 300 mg tablet    amLODIPine (NORVASC) 10 mg tablet    Diclofenac Sodium (VOLTAREN) 1 %    finasteride (PROSCAR) 5 mg tablet    fluticasone (FLONASE) 50 mcg/act nasal spray    furosemide (LASIX) 40 mg tablet    hydrALAZINE (APRESOLINE) 50 mg tablet    Incontinence Supply Disposable (Incontinence Brief Large) MISC    labetalol (NORMODYNE) 200 mg tablet    Multiple Vitamins-Minerals (MULTIVITAMIN WITH MINERALS) tablet    omega-3-acid ethyl esters (LOVAZA) 1 g capsule    simvastatin (ZOCOR) 20 mg tablet     Current Facility-Administered Medications   Medication Dose Route Frequency    acetaminophen (TYLENOL) tablet 650 mg  650 mg Oral Q6H PRN    calcium carbonate (TUMS) chewable tablet 1,000 mg  1,000 mg Oral Daily PRN    chlorhexidine (PERIDEX) 0 12 % oral rinse 15 mL  15 mL Swish & Spit Q12H [facility]    docusate sodium (COLACE) capsule 100 mg  100 mg Oral BID    fentaNYL 1000 mcg in sodium chloride 0 9% 100mL infusion  100 mcg/hr Intravenous Continuous    fentanyl citrate (PF) 100 MCG/2ML 50 mcg  50 mcg Intravenous Q1H PRN    finasteride (PROSCAR) tablet 5 mg  5 mg Oral Daily    hydrALAZINE (APRESOLINE) injection 5 mg  5 mg Intravenous Q6H PRN    insulin regular (HumuLIN R,NovoLIN R) 1 Units/mL in sodium chloride 0 9 % 100 mL infusion  0 3-21 Units/hr Intravenous Titrated    lactated ringers infusion  100 mL/hr Intravenous Continuous    norepinephrine (LEVOPHED) 4 mg (STANDARD CONCENTRATION) IV in sodium chloride 0 9% 250 mL  1-30 mcg/min Intravenous Titrated    ondansetron (ZOFRAN) injection 4 mg  4 mg Intravenous Q6H PRN    pantoprazole (PROTONIX) 80 mg in sodium chloride 0 9 % 100 mL infusion  8 mg/hr Intravenous Continuous    pravastatin (PRAVACHOL) tablet 40 mg  40 mg Oral Daily With Dinner    propofol (DIPRIVAN) 1000 mg in 100 mL infusion (premix)  5-50 mcg/kg/min Intravenous Titrated    senna (SENOKOT) tablet 8 6 mg  1 tablet Oral Daily       Allergies   Allergen Reactions    Strawberry C [Ascorbate - Food Allergy] Hives           Objective     Blood pressure 119/70, pulse 91, temperature 99 7 °F (37 6 °C), resp  rate 20, height 5' 9 5" (1 765 m), weight 98 kg (216 lb 0 8 oz), SpO2 100 %  Intake/Output Summary (Last 24 hours) at 5/12/2021 0962  Last data filed at 5/12/2021 0749  Gross per 24 hour   Intake 2841 48 ml   Output 1250 ml   Net 1591 48 ml         PHYSICAL EXAM:      General Appearance:   Sedated, intubated   HEENT:   Normocephalic, atraumatic, anicteric      Neck:  Supple, symmetrical, trachea midline   Lungs:   Clear to auscultation bilaterally; no rales, rhonchi or wheezing; respirations unlabored    Heart[de-identified]   RRR, no murmur   Abdomen:   Non-distended, BS active, soft   Rectal:   Deferred    Extremities:  No cyanosis, clubbing or edema    Pulses:  2+ and symmetric all extremities    Skin:  No jaundice, no rashes or lesions      Lab Results:   Results from last 7 days   Lab Units 05/12/21  0816  05/12/21  0211   WBC Thousand/uL  --   --  9 12   HEMOGLOBIN g/dL 11 0*   < > 11 5*   HEMATOCRIT %  --   --  35 3*   PLATELETS Thousands/uL  --   --  148*   NEUTROS PCT %  --   --  68   LYMPHS PCT %  --   --  20   MONOS PCT %  --   --  9   EOS PCT %  --   --  1    < > = values in this interval not displayed  Results from last 7 days   Lab Units 05/12/21  0211  05/09/21  1628   POTASSIUM mmol/L 5 0   < > 4 8   CHLORIDE mmol/L 102   < > 95*   CO2 mmol/L 23   < > 23   BUN mg/dL 56*   < > 42*   CREATININE mg/dL 2 26*   < > 2 63*   CALCIUM mg/dL 8 2*   < > 8 9   ALK PHOS U/L  --   --  114   ALT U/L  --   --  29   AST U/L  --   --  17    < > = values in this interval not displayed  Results from last 7 days   Lab Units 05/12/21  0349   INR  1 15           Imaging Studies: I have personally reviewed pertinent imaging studies        Ct Head Without Contrast  Result Date: 5/9/2021  Impression: No acute intracranial abnormality  Microangiopathic changes  Cerebral volume loss  Workstation performed: [id number]     Mri Brain Wo Contrast  Result Date: 5/11/2021  Impression: No MR evidence of acute intracranial ischemia  Moderate white matter changes and punctate foci of hemosiderin deposition probably related to chronic microvascular disease, unchanged when compared to prior examination  Workstation performed: [id number]     Xr Chest Portable Icu  Result Date: 5/12/2021  Impression: 1  Endotracheal tube tip position appears satisfactory on the 2nd film as above  Nasogastric tube in satisfactory position  2   No acute cardiopulmonary disease  The study was marked in [facility] for immediate notification  Workstation performed: [id number]       ASSESSMENT and PLAN:      Hematemesis  Hemorrhagic Shock  - He developed acute nausea with hematemesis overnight requiring intubation for airway protection, pressor support, and 1 U PRBC  - Hb 11 0 this morning  - Protonix drip  - Plan for EGD today at bedside for further evaluation  - Attempted to call Jesse Alonzo who did not answer   - Thai Cabrera, patient's daughter, who reports she is POA and answered all questions as well as plan for EGD today which she is agreeable to  - Reglan 10 mg x 1 prior to EGD to promote stomach emptying  - Further recommendations pending EGD findings  - Continue to monitor H/H      The patient will be seen and examined by Dr Valeria Okeefe

## 2021-05-12 NOTE — CASE MANAGEMENT
LOS: 3  PT IS NOT A BUNDLE OR 30 DAY RA  PT'S UNPLANNED RA SCORE IS  18-GREEN  CM s/w pt's daughterTrino to complete open given hx of dementia  [place] in [place] with daughterTrino  Pt lives in 2 story but resides on 1st floor and has 1 jia without railings  Pt uses cane prn and completes adl's independently  Pt has no hx of rehab, mh, or sa  Pt uses Adrianna Mathews pcp and [facility] rx  Pt's poa is daughterTrino and he has ad  CM requested copies  Pt is retired and family transports  CM reviewed discharge planning process including the following: identifying help at home, patient preference for discharge planning needs, pharmacy preference, and availability of treatment team to discuss questions or concerns patient and/or family may have regarding understanding medications and recognizing signs and symptoms once discharged  CM also encouraged patient to follow up with all recommended appointments after discharge  Patient advised of importance for patient and family to participate in managing patients medical well being  CM discussed dcp and pt's daughter plans for him to return home  Pt's daughter will transport  Needs tbd  CM Dept to follow pt through dc

## 2021-05-13 PROBLEM — R57.9 SHOCK (HCC): Status: RESOLVED | Noted: 2021-05-12 | Resolved: 2021-05-13

## 2021-05-13 LAB
ANION GAP SERPL CALCULATED.3IONS-SCNC: 10 MMOL/L (ref 4–13)
BASOPHILS # BLD AUTO: 0.06 THOUSANDS/ΜL (ref 0–0.1)
BASOPHILS NFR BLD AUTO: 0 % (ref 0–1)
BUN SERPL-MCNC: 58 MG/DL (ref 5–25)
CA-I BLD-SCNC: 1.02 MMOL/L (ref 1.12–1.32)
CALCIUM SERPL-MCNC: 7.8 MG/DL (ref 8.3–10.1)
CHLORIDE SERPL-SCNC: 111 MMOL/L (ref 100–108)
CO2 SERPL-SCNC: 24 MMOL/L (ref 21–32)
CREAT SERPL-MCNC: 2.59 MG/DL (ref 0.6–1.3)
EOSINOPHIL # BLD AUTO: 0.09 THOUSAND/ΜL (ref 0–0.61)
EOSINOPHIL NFR BLD AUTO: 1 % (ref 0–6)
ERYTHROCYTE [DISTWIDTH] IN BLOOD BY AUTOMATED COUNT: 14.7 % (ref 11.6–15.1)
FERRITIN SERPL-MCNC: 272 NG/ML (ref 8–388)
GFR SERPL CREATININE-BSD FRML MDRD: 26 ML/MIN/1.73SQ M
GLUCOSE SERPL-MCNC: 129 MG/DL (ref 65–140)
GLUCOSE SERPL-MCNC: 145 MG/DL (ref 65–140)
GLUCOSE SERPL-MCNC: 149 MG/DL (ref 65–140)
GLUCOSE SERPL-MCNC: 151 MG/DL (ref 65–140)
GLUCOSE SERPL-MCNC: 154 MG/DL (ref 65–140)
GLUCOSE SERPL-MCNC: 169 MG/DL (ref 65–140)
GLUCOSE SERPL-MCNC: 207 MG/DL (ref 65–140)
GLUCOSE SERPL-MCNC: 209 MG/DL (ref 65–140)
GLUCOSE SERPL-MCNC: 218 MG/DL (ref 65–140)
GLUCOSE SERPL-MCNC: 218 MG/DL (ref 65–140)
GLUCOSE SERPL-MCNC: 249 MG/DL (ref 65–140)
GLUCOSE SERPL-MCNC: 279 MG/DL (ref 65–140)
GLUCOSE SERPL-MCNC: 281 MG/DL (ref 65–140)
GLUCOSE SERPL-MCNC: 378 MG/DL (ref 65–140)
GLUCOSE SERPL-MCNC: 73 MG/DL (ref 65–140)
HCT VFR BLD AUTO: 26.4 % (ref 36.5–49.3)
HCT VFR BLD AUTO: 28.9 % (ref 36.5–49.3)
HGB BLD-MCNC: 8.6 G/DL (ref 12–17)
HGB BLD-MCNC: 9.1 G/DL (ref 12–17)
HGB BLD-MCNC: 9.6 G/DL (ref 12–17)
HGB BLD-MCNC: 9.7 G/DL (ref 12–17)
IMM GRANULOCYTES # BLD AUTO: 0.1 THOUSAND/UL (ref 0–0.2)
IMM GRANULOCYTES NFR BLD AUTO: 1 % (ref 0–2)
IRON SATN MFR SERPL: 12 %
IRON SERPL-MCNC: 27 UG/DL (ref 65–175)
LYMPHOCYTES # BLD AUTO: 1.69 THOUSANDS/ΜL (ref 0.6–4.47)
LYMPHOCYTES NFR BLD AUTO: 12 % (ref 14–44)
MAGNESIUM SERPL-MCNC: 1.7 MG/DL (ref 1.6–2.6)
MCH RBC QN AUTO: 30 PG (ref 26.8–34.3)
MCHC RBC AUTO-ENTMCNC: 33.6 G/DL (ref 31.4–37.4)
MCV RBC AUTO: 90 FL (ref 82–98)
MONOCYTES # BLD AUTO: 1 THOUSAND/ΜL (ref 0.17–1.22)
MONOCYTES NFR BLD AUTO: 7 % (ref 4–12)
NEUTROPHILS # BLD AUTO: 10.82 THOUSANDS/ΜL (ref 1.85–7.62)
NEUTS SEG NFR BLD AUTO: 79 % (ref 43–75)
NRBC BLD AUTO-RTO: 0 /100 WBCS
PHOSPHATE SERPL-MCNC: 2.2 MG/DL (ref 2.3–4.1)
PLATELET # BLD AUTO: 103 THOUSANDS/UL (ref 149–390)
PMV BLD AUTO: 12.3 FL (ref 8.9–12.7)
POTASSIUM SERPL-SCNC: 3.8 MMOL/L (ref 3.5–5.3)
RBC # BLD AUTO: 3.23 MILLION/UL (ref 3.88–5.62)
SODIUM SERPL-SCNC: 145 MMOL/L (ref 136–145)
TIBC SERPL-MCNC: 217 UG/DL (ref 250–450)
WBC # BLD AUTO: 13.76 THOUSAND/UL (ref 4.31–10.16)

## 2021-05-13 PROCEDURE — 83540 ASSAY OF IRON: CPT | Performed by: ANESTHESIOLOGY

## 2021-05-13 PROCEDURE — 82948 REAGENT STRIP/BLOOD GLUCOSE: CPT

## 2021-05-13 PROCEDURE — 84100 ASSAY OF PHOSPHORUS: CPT | Performed by: PHYSICIAN ASSISTANT

## 2021-05-13 PROCEDURE — 85025 COMPLETE CBC W/AUTO DIFF WBC: CPT | Performed by: PHYSICIAN ASSISTANT

## 2021-05-13 PROCEDURE — 94003 VENT MGMT INPAT SUBQ DAY: CPT

## 2021-05-13 PROCEDURE — 83550 IRON BINDING TEST: CPT | Performed by: ANESTHESIOLOGY

## 2021-05-13 PROCEDURE — C9113 INJ PANTOPRAZOLE SODIUM, VIA: HCPCS | Performed by: NURSE PRACTITIONER

## 2021-05-13 PROCEDURE — 82728 ASSAY OF FERRITIN: CPT | Performed by: ANESTHESIOLOGY

## 2021-05-13 PROCEDURE — 83735 ASSAY OF MAGNESIUM: CPT | Performed by: PHYSICIAN ASSISTANT

## 2021-05-13 PROCEDURE — 85018 HEMOGLOBIN: CPT | Performed by: PHYSICIAN ASSISTANT

## 2021-05-13 PROCEDURE — 80048 BASIC METABOLIC PNL TOTAL CA: CPT | Performed by: PHYSICIAN ASSISTANT

## 2021-05-13 PROCEDURE — 99291 CRITICAL CARE FIRST HOUR: CPT | Performed by: ANESTHESIOLOGY

## 2021-05-13 PROCEDURE — 85018 HEMOGLOBIN: CPT | Performed by: NURSE PRACTITIONER

## 2021-05-13 PROCEDURE — 94760 N-INVAS EAR/PLS OXIMETRY 1: CPT

## 2021-05-13 PROCEDURE — 82330 ASSAY OF CALCIUM: CPT | Performed by: PHYSICIAN ASSISTANT

## 2021-05-13 PROCEDURE — 99232 SBSQ HOSP IP/OBS MODERATE 35: CPT | Performed by: INTERNAL MEDICINE

## 2021-05-13 PROCEDURE — 85014 HEMATOCRIT: CPT | Performed by: PHYSICIAN ASSISTANT

## 2021-05-13 RX ADMIN — SODIUM CHLORIDE, SODIUM LACTATE, POTASSIUM CHLORIDE, AND CALCIUM CHLORIDE 100 ML/HR: .6; .31; .03; .02 INJECTION, SOLUTION INTRAVENOUS at 12:54

## 2021-05-13 RX ADMIN — PROPOFOL 15 MCG/KG/MIN: 10 INJECTION, EMULSION INTRAVENOUS at 06:25

## 2021-05-13 RX ADMIN — FENTANYL CITRATE 50 MCG/HR: 50 INJECTION, SOLUTION INTRAMUSCULAR; INTRAVENOUS at 05:57

## 2021-05-13 RX ADMIN — SODIUM CHLORIDE, SODIUM LACTATE, POTASSIUM CHLORIDE, AND CALCIUM CHLORIDE 100 ML/HR: .6; .31; .03; .02 INJECTION, SOLUTION INTRAVENOUS at 01:11

## 2021-05-13 RX ADMIN — PANTOPRAZOLE SODIUM 40 MG: 40 INJECTION, POWDER, FOR SOLUTION INTRAVENOUS at 08:28

## 2021-05-13 RX ADMIN — FINASTERIDE 5 MG: 5 TABLET, FILM COATED ORAL at 08:28

## 2021-05-13 RX ADMIN — CHLORHEXIDINE GLUCONATE 0.12% ORAL RINSE 15 ML: 1.2 LIQUID ORAL at 08:28

## 2021-05-13 RX ADMIN — SODIUM CHLORIDE 3 UNITS/HR: 9 INJECTION, SOLUTION INTRAVENOUS at 07:27

## 2021-05-13 RX ADMIN — SODIUM CHLORIDE 8 UNITS/HR: 9 INJECTION, SOLUTION INTRAVENOUS at 20:27

## 2021-05-13 RX ADMIN — ACETAMINOPHEN 650 MG: 325 TABLET, FILM COATED ORAL at 04:31

## 2021-05-13 RX ADMIN — ACETAMINOPHEN 650 MG: 325 TABLET, FILM COATED ORAL at 08:28

## 2021-05-13 RX ADMIN — PANTOPRAZOLE SODIUM 40 MG: 40 INJECTION, POWDER, FOR SOLUTION INTRAVENOUS at 20:18

## 2021-05-13 RX ADMIN — PRAVASTATIN SODIUM 40 MG: 40 TABLET ORAL at 16:43

## 2021-05-13 NOTE — QUICK NOTE
Spoke with the patient's daughter, Porter Schmidt  Updated her on the patient's current condition and plan of care  All her questions were answered  She was appreciative of the update

## 2021-05-13 NOTE — ASSESSMENT & PLAN NOTE
· Patient with hypoxia, gurgling respirations after hematemesis  · Intubated for airway protection  · Trial extubation today

## 2021-05-13 NOTE — ASSESSMENT & PLAN NOTE
Lab Results   Component Value Date    HGBA1C 12 3 (H) 05/09/2021       Recent Labs     05/12/21 2015 05/12/21  2201 05/13/21  0013 05/13/21  0150   POCGLU 97 123 154* 149*       Blood Sugar Average: Last 72 hrs:  (P) 211 [id number]     · Endocrine following (reach back out today)  · Continue insulin infusion

## 2021-05-13 NOTE — ASSESSMENT & PLAN NOTE
· Secondary to hematemesis  · Patient with 2 episodes of hematemesis 5/11 with subsequent drop in H/H from 14 1 to 9 8 transfused 1 U PRBC  · Trended Q6 with slight downtrend overnight  · EGD with small ulcers in fundus and small angioectasia in body of stomach  · Bilateral packing in nose, likely can discontinue

## 2021-05-13 NOTE — ASSESSMENT & PLAN NOTE
· Episodes x2 5/11-5/12  · Intubated due to aspiration No clear blood in oropharynx  · Clotted blood from endotracheal tube  · Bilateral nares packed, likely can D/C  · EGD with small gastric fundus ulcer and angioectasia s/p APC  · Protonix infusion now BID IV  · Consider restarting SQH

## 2021-05-13 NOTE — ASSESSMENT & PLAN NOTE
Lab Results   Component Value Date    HGBA1C 12 3 (H) 05/09/2021       Recent Labs     05/12/21 2015 05/12/21  2201 05/13/21  0013 05/13/21  0150   POCGLU 97 123 154* 149*       Blood Sugar Average: Last 72 hrs:  (P) 211 [id number]   · Continue insulin infusion  · Follow up with endocrine today

## 2021-05-13 NOTE — TELEPHONE ENCOUNTER
Patient's daughter, Sandrita Lawrence ([phone]), contacted office to relay patient is still in hospital      Caller relayed patient was taken off ventilator this morning; reports patient is doing ok off of the vent  Reports patient had an episode of vomiting (AM of 5/13) which had blood and blood clots in it; endoscopy performed; Vomiting is under control  Reports patient was disoriented on 5/9/21 and taken to the hospital; reports blood glucose at that time was 796  Patient is being given insulin through IV to control/bring down  Daughter is concerned why patient suddenly has developed diabetes and how it will be managed after he is discharged from hospital  Relayed the patient has gone through a lot since 5/9/21  Also expressed that patient has not been the same since his [address]; has been lethargic and very thirsty  This office relayed to daughter that message will be routed to provider

## 2021-05-13 NOTE — PROGRESS NOTES
GI Progress Note - Lina Boykin 79 y o  male MRN: [id number]    Unit/Bed#:  Encounter: [id number]    Subjective: S/P extubation this morning  We went over the events of the hospitalization including EGD findings  He denies abdominal pain or nausea  No melena or hematochezia today per nursing staff  Objective:     Vitals: Blood pressure 119/59, pulse 94, temperature 99 7 °F (37 6 °C), temperature source Bladder, resp  rate 22, height 5' 9 5" (1 765 m), weight 98 kg (216 lb 0 8 oz), SpO2 100 %  ,Body mass index is 31 45 kg/m²        Intake/Output Summary (Last 24 hours) at 5/13/2021 1130  Last data filed at 5/13/2021 0414  Gross per 24 hour   Intake 2104 74 ml   Output 890 ml   Net 1214 74 ml       Physical Exam:     General Appearance: Alert, oriented to self, confused at times, no acute distress  Lungs: Clear to auscultation bilaterally, no rales or rhonchi, no labored breathing/accessory muscle use  Heart: Irregular, no murmur  Abdomen: Non-distended, soft, BS active, NTTP  Extremities: No cyanosis, edema    Invasive Devices     Peripheral Intravenous Line            Peripheral IV 05/12/21 Proximal;Right;Ventral (anterior) Forearm 1 day    Peripheral IV 05/12/21 Left;Proximal;Ventral (anterior) Forearm less than 1 day    Peripheral IV 05/13/21 Distal;Right;Upper;Ventral (anterior) Arm less than 1 day          Drain            NG/OG/Enteral Tube Orogastric Center mouth 1 day    Urethral Catheter Temperature probe 1 day                Lab Results:  Results from last 7 days   Lab Units 05/13/21 0513   WBC Thousand/uL 13 76*   HEMOGLOBIN g/dL 9 7*   HEMATOCRIT % 28 9*   PLATELETS Thousands/uL 103*   NEUTROS PCT % 79*   LYMPHS PCT % 12*   MONOS PCT % 7   EOS PCT % 1     Results from last 7 days   Lab Units 05/13/21  0513  05/09/21  1628   POTASSIUM mmol/L 3 8   < > 4 8   CHLORIDE mmol/L 111*   < > 95*   CO2 mmol/L 24   < > 23   BUN mg/dL 58*   < > 42*   CREATININE mg/dL 2 59*   < > 2 63*   CALCIUM mg/dL 7 8* < > 8 9   ALK PHOS U/L  --   --  114   ALT U/L  --   --  29   AST U/L  --   --  17    < > = values in this interval not displayed  Results from last 7 days   Lab Units 05/12/21  0349   INR  1 15           Imaging Studies: I have personally reviewed pertinent imaging studies  Ct Head Without Contrast  Result Date: 5/9/2021  Impression: No acute intracranial abnormality  Microangiopathic changes  Cerebral volume loss  Workstation performed: [id number]     Mri Brain Wo Contrast  Result Date: 5/11/2021  Impression: No MR evidence of acute intracranial ischemia  Moderate white matter changes and punctate foci of hemosiderin deposition probably related to chronic microvascular disease, unchanged when compared to prior examination  Workstation performed: [id number]     Xr Chest Portable Icu  Result Date: 5/12/2021  Impression: 1  Endotracheal tube tip position appears satisfactory on the 2nd film as above  Nasogastric tube in satisfactory position  2   No acute cardiopulmonary disease  The study was marked in [facility] for immediate notification  Workstation performed: [id number]       Assessment and Plan:     Hematemesis  Hemorrhagic Shock  - He developed acute nausea with hematemesis overnight 5/12 requiring intubation for airway protection, pressor support, and 1 U PRBC  - EGD on 5/13 showed a small ulcer in the fundus without stigmata, small angioectasia in the body of the stomach s/p APC, 2 polyps in the distal esophagus not biopsied  - Protonix 40 mg IV BID  - Hb 9 7 today, no overt bleeding noted since EGD  - Okay for full liquid diet today then advance as tolerated  - Continue to monitor H/H  - He will need a repeat EGD in 2-3 months to document ulcer healing and biopsy the polyps in the distal esophagus  - Check H   Pylori stool antigen      The patient will be seen by Dr Javad Matos

## 2021-05-13 NOTE — PROGRESS NOTES
[address] [facility]  Progress Note - Jazzy Pham 1940, 79 y o  male MRN: [id number]  Unit/Bed#:  Encounter: [id number]  Primary Care Provider: Peyton Jennings MD   Date and time admitted to hospital: 5/9/2021  4:12 PM    Hematemesis  Assessment & Plan  · Episodes x2 5/11-5/12  · Intubated due to aspiration No clear blood in oropharynx  · Clotted blood from endotracheal tube  · Bilateral nares packed, likely can D/C  · EGD with small gastric fundus ulcer and angioectasia s/p APC  · Protonix infusion now BID IV  · Consider restarting SQH    Acute blood loss anemia (ABLA)  Assessment & Plan  · Secondary to hematemesis  · Patient with 2 episodes of hematemesis 5/11 with subsequent drop in H/H from 14 1 to 9 8 transfused 1 U PRBC  · Trended Q6 with slight downtrend overnight  · EGD with small ulcers in fundus and small angioectasia in body of stomach  · Bilateral packing in nose, likely can discontinue    * Hyperosmolar hyperglycemic state (HHS) [facility])  Assessment & Plan  Lab Results   Component Value Date    HGBA1C 12 3 (H) 05/09/2021       Recent Labs     05/12/21 2015 05/12/21 2201 05/13/21  0013 05/13/21  0150   POCGLU 97 123 154* 149*       Blood Sugar Average: Last 72 hrs:  (P) 211 [id number]   · Continue insulin infusion  · Follow up with endocrine today    Shock (HCC)-resolved as of 5/13/2021  Assessment & Plan  · 2/2 hypovolemia/hemorrhagic  · Given 1 U PRBC; 250 cc albumin; fluids  · Trend H/H  · Continue levophed though improving    Aspiration into airway  Assessment & Plan  · Patient with hypoxia, gurgling respirations after hematemesis  · Intubated for airway protection  · Trial extubation today    New onset type 2 diabetes mellitus [facility])  Assessment & Plan  Lab Results   Component Value Date    HGBA1C 12 3 (H) 05/09/2021       Recent Labs     05/12/21 2015 05/12/21 2201 05/13/21  0013 05/13/21  0150   POCGLU 97 123 154* 149*       Blood Sugar Average: Last 72 hrs:  (P) 083 [id number]     · Endocrine following (reach back out today)  · Continue insulin infusion    Dysphagia  Assessment & Plan  · Speech eval once extubated    Acute metabolic encephalopathy  Assessment & Plan  · Likely metabolic from [address]  · Continue fent/prop for sedation  · Avoid deliriogenic medications    Late onset 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 ([facility] [address] )  Assessment & Plan  · At baseline    Chronic kidney disease  Assessment & Plan  Lab Results   Component Value Date    EGFR 31 05/12/2021    EGFR 39 05/11/2021    EGFR 34 05/10/2021    CREATININE 2 26 (H) 05/12/2021    CREATININE 1 87 (H) 05/11/2021    CREATININE 2 07 (H) 05/10/2021   · Presented with MILE, slightly worsened again by shock state  · Monitor closely  · MAP goal > 65    Other hyperlipidemia  Assessment & Plan  · Continue statin    Dementia (HCC)  Assessment & Plan  · At baseline    BPH (benign prostatic hyperplasia)  Assessment & Plan  · Now with soto cath in place      ----------------------------------------------------------------------------------------  HPI/24hr events:   · Patient underwent EGD; Ulcer in gastric fundus without stigmata  Small angioectasia s/p APC  · 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  · Hemoglobin with slight downtrend, 11 0 to 9 6    Disposition: Continue Critical Care   Code Status: Level 1 - Full Code  ---------------------------------------------------------------------------------------  SUBJECTIVE  None given   Intubated/sedated    Review of Systems  Review of systems was unable to be performed secondary to intubation  ---------------------------------------------------------------------------------------  OBJECTIVE    Vitals   Vitals:    05/13/21 0100 05/13/21 0200 05/13/21 0300 05/13/21 0400   BP: 129/67 137/84 115/83 141/71   BP Location:    Left arm   Pulse: 94 93 93 90   Resp: 20 19 19 18   Temp: (!) 100 6 °F (38 1 °C) (!) 100 6 °F (38 1 °C) (!) 100 8 °F (38 2 °C) (!) 100 8 °F (38 2 °C) TempSrc:    Bladder   SpO2: 99% 99% 99% 100%   Weight:       Height:         Temp (24hrs), Av 4 °F (38 °C), Min:98 2 °F (36 8 °C), Max:101 8 °F (38 8 °C)  Current: Temperature: (!) 100 8 °F (38 2 °C)          Respiratory:  SpO2: SpO2: 99 %, SpO2 Activity: SpO2 Activity: At Rest, SpO2 Device: O2 Device: Ventilator       Invasive/non-invasive ventilation settings   Respiratory    Lab Data (Last 4 hours)    None         O2/Vent Data (Last 4 hours)    None                Physical Exam  Vitals signs and nursing note reviewed  Constitutional:       General: He is awake  He is not in acute distress  Interventions: He is sedated, intubated and restrained  HENT:      Head: Normocephalic and atraumatic  Nose:      Comments: Nasal packing bilaterally, no epistaxis or dried blood     Mouth/Throat:      Mouth: Mucous membranes are dry  Eyes:      Conjunctiva/sclera: Conjunctivae normal    Neck:      Musculoskeletal: Neck supple  Cardiovascular:      Rate and Rhythm: Regular rhythm  Tachycardia present  Pulses: Normal pulses  Pulmonary:      Effort: He is intubated  Breath sounds: No wheezing, rhonchi or rales  Abdominal:      General: Bowel sounds are normal       Tenderness: There is no abdominal tenderness  Genitourinary:     Comments: Deferred  Musculoskeletal: Normal range of motion  Skin:     General: Skin is warm and dry  Capillary Refill: Capillary refill takes less than 2 seconds  Neurological:      Mental Status: He is alert        Comments: GCS 11T (4, 1T, 6)         Laboratory and Diagnostics:  Results from last 7 days   Lab Units 21  0038 21  1837 21  0816 21  0349 21  0211 21  0516 05/10/21  0458 21  1628   WBC Thousand/uL  --   --   --   --  9 12 5 88 7 26 6 38   HEMOGLOBIN g/dL 9 6* 11 0* 11 0* 9 8* 11 5* 14 3 14 0 13 9   HEMATOCRIT %  --   --   --   --  35 3* 43 1 41 6 42 3   PLATELETS Thousands/uL  --   --   --   --  148* 153 145* 142*   NEUTROS PCT %  --   --   --   --  68  --   --  73   MONOS PCT %  --   --   --   --  9  --   --  8     Results from last 7 days   Lab Units 05/12/21  0211 05/11/21  0516 05/10/21  0458 05/09/21  2352 05/09/21  1628   SODIUM mmol/L 138 142 145 142 132*   POTASSIUM mmol/L 5 0 3 8 3 1* 3 3* 4 8   CHLORIDE mmol/L 102 106 107 106 95*   CO2 mmol/L 23 23 27 26 23   ANION GAP mmol/L 13 13 11 10 14*   BUN mg/dL 56* 28* 34* 37* 42*   CREATININE mg/dL 2 26* 1 87* 2 07* 2 30* 2 63*   CALCIUM mg/dL 8 2* 8 5 9 2 9 0 8 9   GLUCOSE RANDOM mg/dL 514* 247* 81 268* 796*   ALT U/L  --   --   --   --  29   AST U/L  --   --   --   --  17   ALK PHOS U/L  --   --   --   --  114   ALBUMIN g/dL  --   --   --   --  3 6   TOTAL BILIRUBIN mg/dL  --   --   --   --  0 70     Results from last 7 days   Lab Units 05/10/21  0458   MAGNESIUM mg/dL 2 1      Results from last 7 days   Lab Units 05/12/21  0349 05/09/21  1628   INR  1 15 1 03   PTT seconds 32 32          Results from last 7 days   Lab Units 05/12/21  0616 05/12/21  0349 05/09/21  1628   LACTIC ACID mmol/L 3 9* 4 2* 1 6     ABG:  Results from last 7 days   Lab Units 05/12/21  0418   PH ART  7 353   PCO2 ART mm Hg 36 4   PO2 ART mm Hg 341 1*   HCO3 ART mmol/L 19 8*   BASE EXC ART mmol/L -5 2   ABG SOURCE  Radial, Right     VBG:  Results from last 7 days   Lab Units 05/12/21  0418 05/09/21  1728   PH TELMA   --  7 342   PCO2 TELMA mm Hg  --  40 1*   PO2 TELMA mm Hg  --  57 1*   HCO3 TELMA mmol/L  --  21 2*   BASE EXC TELMA mmol/L  --  -4 1   ABG SOURCE  Radial, Right  --      Results from last 7 days   Lab Units 05/12/21  0354 05/11/21  1522 05/10/21  0458 05/09/21  1628   PROCALCITONIN ng/ml 0 32* 0 13 0 28* 0 22       Micro  Results from last 7 days   Lab Units 05/09/21  1728 05/09/21  1628   BLOOD CULTURE  No Growth at 72 hrs  No Growth at 72 hrs         EKG:   Imaging: I have personally reviewed pertinent films in PACS    Intake and Output  I/O       05/11 0701 - 05/12 0700 05/12 0701 - 05/13 0700    P  O  360     I V  (mL/kg) 1781 5 (18 2) 2810 8 (28 7)    Blood 350     IV Piggyback 350     Total Intake(mL/kg) 2841 5 (29) 2810 8 (28 7)    Urine (mL/kg/hr) 625 (0 3) 1715 (0 7)    Total Output 625 1715    Net +2216 5 +1095 8                Height and Weights   Height: 5' 9 5" (176 5 cm)  IBW (Ideal Body Weight): 71 85 kg  Body mass index is 31 45 kg/m²  Weight (last 2 days)     None            Nutrition       Diet Orders   (From admission, onward)             Start     Ordered    05/12/21 0148  Diet NPO  Diet effective now     Question Answer Comment   Diet Type NPO    RD to adjust diet per protocol?  Yes        05/12/21 0148                  Active Medications  Scheduled Meds:  Current Facility-Administered Medications   Medication Dose Route Frequency Provider Last Rate    acetaminophen  650 mg Oral Q6H PRN Flip Oats, PA-C      calcium carbonate  1,000 mg Oral Daily PRN Flip Oats, PA-C      chlorhexidine  15 mL Swish & Spit Q12H [facility] [place], [place]      docusate sodium  100 mg Oral BID Flip Oats, PA-C      fentaNYL  50 mcg/hr Intravenous Continuous TAMRA Nguyen 50 mcg/hr (05/12/21 1719)    fentanyl citrate (PF)  50 mcg Intravenous Q1H PRN Flip Oats, PA-C      finasteride  5 mg Oral Daily [place], [place]      hydrALAZINE  5 mg Intravenous Q6H PRN Flip Oats, PA-C      insulin regular (HumuLIN R,NovoLIN R) infusion  0 3-21 Units/hr Intravenous Titrated Flip Oats, PA-C 1 5 Units/hr (05/13/21 0414)    lactated ringers  100 mL/hr Intravenous Continuous Flip Oats, PA-C 100 mL/hr (05/13/21 0111)    norepinephrine  1-30 mcg/min Intravenous Titrated Flip Oats, PA-C Stopped (05/12/21 1726)    ondansetron  4 mg Intravenous Q6H PRN Flip Oats, PA-C      pantoprazole  40 mg Intravenous Q12H [facility] Susy Bullock, CRNP      pravastatin  40 mg Oral Daily With Jaylin Esquivel PA-C      propofol  5-50 mcg/kg/min Intravenous Titrated Flip Solorzano PA-C 10 mcg/kg/min (05/12/21 2358)    senna  1 tablet Oral Daily Melissa Paul PA-C       Continuous Infusions:  fentaNYL, 50 mcg/hr, Last Rate: 50 mcg/hr (05/12/21 1719)  insulin regular (HumuLIN R,NovoLIN R) infusion, 0 3-21 Units/hr, Last Rate: 1 5 Units/hr (05/13/21 0414)  lactated ringers, 100 mL/hr, Last Rate: 100 mL/hr (05/13/21 0111)  norepinephrine, 1-30 mcg/min, Last Rate: Stopped (05/12/21 1726)  propofol, 5-50 mcg/kg/min, Last Rate: 10 mcg/kg/min (05/12/21 2358)      PRN Meds:   acetaminophen, 650 mg, Q6H PRN  calcium carbonate, 1,000 mg, Daily PRN  fentanyl citrate (PF), 50 mcg, Q1H PRN  hydrALAZINE, 5 mg, Q6H PRN  ondansetron, 4 mg, Q6H PRN        Invasive Devices Review  Invasive Devices     Peripheral Intravenous Line            Peripheral IV 05/12/21 Proximal;Right;Ventral (anterior) Forearm 1 day    Peripheral IV 05/12/21 Right;Ventral (anterior) Forearm 1 day    Peripheral IV 05/12/21 Left;Proximal;Ventral (anterior) Forearm less than 1 day          Drain            NG/OG/Enteral Tube Orogastric Center mouth 1 day    Urethral Catheter Temperature probe 1 day          Airway            ETT  Oral 8 mm 1 day                Rationale for remaining devices: Cruz while intubated  ---------------------------------------------------------------------------------------  Advance Directive and Living Will:      Power of :    POLST:    ---------------------------------------------------------------------------------------  Care Time Delivered:   No Critical Care time spent       Melissa Paul PA-C      Portions of the record may have been created with voice recognition software  Occasional wrong word or "sound a like" substitutions may have occurred due to the inherent limitations of voice recognition software    Read the chart carefully and recognize, using context, where substitutions have occurred

## 2021-05-14 PROBLEM — K25.9 GASTRIC ULCER: Status: ACTIVE | Noted: 2021-05-14

## 2021-05-14 LAB
ANION GAP SERPL CALCULATED.3IONS-SCNC: 9 MMOL/L (ref 4–13)
BASOPHILS # BLD AUTO: 0.04 THOUSANDS/ΜL (ref 0–0.1)
BASOPHILS NFR BLD AUTO: 0 % (ref 0–1)
BUN SERPL-MCNC: 42 MG/DL (ref 5–25)
CALCIUM SERPL-MCNC: 8.2 MG/DL (ref 8.3–10.1)
CHLORIDE SERPL-SCNC: 113 MMOL/L (ref 100–108)
CO2 SERPL-SCNC: 22 MMOL/L (ref 21–32)
CREAT SERPL-MCNC: 2.19 MG/DL (ref 0.6–1.3)
EOSINOPHIL # BLD AUTO: 0.12 THOUSAND/ΜL (ref 0–0.61)
EOSINOPHIL NFR BLD AUTO: 1 % (ref 0–6)
ERYTHROCYTE [DISTWIDTH] IN BLOOD BY AUTOMATED COUNT: 14.8 % (ref 11.6–15.1)
GFR SERPL CREATININE-BSD FRML MDRD: 32 ML/MIN/1.73SQ M
GLUCOSE SERPL-MCNC: 121 MG/DL (ref 65–140)
GLUCOSE SERPL-MCNC: 140 MG/DL (ref 65–140)
GLUCOSE SERPL-MCNC: 195 MG/DL (ref 65–140)
GLUCOSE SERPL-MCNC: 209 MG/DL (ref 65–140)
GLUCOSE SERPL-MCNC: 239 MG/DL (ref 65–140)
GLUCOSE SERPL-MCNC: 244 MG/DL (ref 65–140)
GLUCOSE SERPL-MCNC: 74 MG/DL (ref 65–140)
GLUCOSE SERPL-MCNC: 88 MG/DL (ref 65–140)
GLUCOSE SERPL-MCNC: 93 MG/DL (ref 65–140)
HCT VFR BLD AUTO: 23.2 % (ref 36.5–49.3)
HCT VFR BLD AUTO: 26.9 % (ref 36.5–49.3)
HGB BLD-MCNC: 7.6 G/DL (ref 12–17)
HGB BLD-MCNC: 8.7 G/DL (ref 12–17)
IMM GRANULOCYTES # BLD AUTO: 0.13 THOUSAND/UL (ref 0–0.2)
IMM GRANULOCYTES NFR BLD AUTO: 1 % (ref 0–2)
LACTATE SERPL-SCNC: 1.8 MMOL/L (ref 0.5–2)
LYMPHOCYTES # BLD AUTO: 1.34 THOUSANDS/ΜL (ref 0.6–4.47)
LYMPHOCYTES NFR BLD AUTO: 12 % (ref 14–44)
MCH RBC QN AUTO: 30.4 PG (ref 26.8–34.3)
MCHC RBC AUTO-ENTMCNC: 32.8 G/DL (ref 31.4–37.4)
MCV RBC AUTO: 93 FL (ref 82–98)
MONOCYTES # BLD AUTO: 0.97 THOUSAND/ΜL (ref 0.17–1.22)
MONOCYTES NFR BLD AUTO: 9 % (ref 4–12)
NEUTROPHILS # BLD AUTO: 8.68 THOUSANDS/ΜL (ref 1.85–7.62)
NEUTS SEG NFR BLD AUTO: 77 % (ref 43–75)
NRBC BLD AUTO-RTO: 0 /100 WBCS
PLATELET # BLD AUTO: 103 THOUSANDS/UL (ref 149–390)
PMV BLD AUTO: 12.5 FL (ref 8.9–12.7)
POTASSIUM SERPL-SCNC: 3.9 MMOL/L (ref 3.5–5.3)
RBC # BLD AUTO: 2.5 MILLION/UL (ref 3.88–5.62)
SODIUM SERPL-SCNC: 144 MMOL/L (ref 136–145)
WBC # BLD AUTO: 11.28 THOUSAND/UL (ref 4.31–10.16)

## 2021-05-14 PROCEDURE — 80048 BASIC METABOLIC PNL TOTAL CA: CPT | Performed by: PHYSICIAN ASSISTANT

## 2021-05-14 PROCEDURE — 85018 HEMOGLOBIN: CPT | Performed by: PHYSICIAN ASSISTANT

## 2021-05-14 PROCEDURE — 97167 OT EVAL HIGH COMPLEX 60 MIN: CPT

## 2021-05-14 PROCEDURE — C9113 INJ PANTOPRAZOLE SODIUM, VIA: HCPCS | Performed by: NURSE PRACTITIONER

## 2021-05-14 PROCEDURE — 83605 ASSAY OF LACTIC ACID: CPT | Performed by: PHYSICIAN ASSISTANT

## 2021-05-14 PROCEDURE — 85025 COMPLETE CBC W/AUTO DIFF WBC: CPT | Performed by: PHYSICIAN ASSISTANT

## 2021-05-14 PROCEDURE — 82948 REAGENT STRIP/BLOOD GLUCOSE: CPT

## 2021-05-14 PROCEDURE — 85014 HEMATOCRIT: CPT | Performed by: PHYSICIAN ASSISTANT

## 2021-05-14 PROCEDURE — 97163 PT EVAL HIGH COMPLEX 45 MIN: CPT

## 2021-05-14 PROCEDURE — C9113 INJ PANTOPRAZOLE SODIUM, VIA: HCPCS | Performed by: PHYSICIAN ASSISTANT

## 2021-05-14 PROCEDURE — 99233 SBSQ HOSP IP/OBS HIGH 50: CPT | Performed by: ANESTHESIOLOGY

## 2021-05-14 RX ORDER — INSULIN GLARGINE 100 [IU]/ML
20 INJECTION, SOLUTION SUBCUTANEOUS EVERY MORNING
Status: DISCONTINUED | OUTPATIENT
Start: 2021-05-14 | End: 2021-05-14

## 2021-05-14 RX ORDER — LABETALOL 100 MG/1
200 TABLET, FILM COATED ORAL
Status: DISCONTINUED | OUTPATIENT
Start: 2021-05-14 | End: 2021-05-20 | Stop reason: HOSPADM

## 2021-05-14 RX ORDER — CALCIUM GLUCONATE 20 MG/ML
1 INJECTION, SOLUTION INTRAVENOUS ONCE
Status: COMPLETED | OUTPATIENT
Start: 2021-05-14 | End: 2021-05-14

## 2021-05-14 RX ORDER — FUROSEMIDE 40 MG/1
40 TABLET ORAL DAILY
Status: DISCONTINUED | OUTPATIENT
Start: 2021-05-14 | End: 2021-05-20 | Stop reason: HOSPADM

## 2021-05-14 RX ORDER — INSULIN GLARGINE 100 [IU]/ML
30 INJECTION, SOLUTION SUBCUTANEOUS EVERY MORNING
Status: DISCONTINUED | OUTPATIENT
Start: 2021-05-15 | End: 2021-05-20 | Stop reason: HOSPADM

## 2021-05-14 RX ADMIN — INSULIN GLARGINE 20 UNITS: 100 INJECTION, SOLUTION SUBCUTANEOUS at 08:54

## 2021-05-14 RX ADMIN — STANDARDIZED SENNA CONCENTRATE 8.6 MG: 8.6 TABLET ORAL at 08:53

## 2021-05-14 RX ADMIN — IRON SUCROSE 300 MG: 20 INJECTION, SOLUTION INTRAVENOUS at 10:02

## 2021-05-14 RX ADMIN — PRAVASTATIN SODIUM 40 MG: 40 TABLET ORAL at 15:56

## 2021-05-14 RX ADMIN — FUROSEMIDE 40 MG: 40 TABLET ORAL at 08:53

## 2021-05-14 RX ADMIN — INSULIN LISPRO 10 UNITS: 100 INJECTION, SOLUTION INTRAVENOUS; SUBCUTANEOUS at 15:57

## 2021-05-14 RX ADMIN — PANTOPRAZOLE SODIUM 40 MG: 40 INJECTION, POWDER, FOR SOLUTION INTRAVENOUS at 22:23

## 2021-05-14 RX ADMIN — INSULIN LISPRO 8 UNITS: 100 INJECTION, SOLUTION INTRAVENOUS; SUBCUTANEOUS at 11:54

## 2021-05-14 RX ADMIN — PANTOPRAZOLE SODIUM 40 MG: 40 INJECTION, POWDER, FOR SOLUTION INTRAVENOUS at 08:55

## 2021-05-14 RX ADMIN — DOCUSATE SODIUM 100 MG: 100 CAPSULE ORAL at 08:53

## 2021-05-14 RX ADMIN — INSULIN LISPRO 4 UNITS: 100 INJECTION, SOLUTION INTRAVENOUS; SUBCUTANEOUS at 22:23

## 2021-05-14 RX ADMIN — DOCUSATE SODIUM 100 MG: 100 CAPSULE ORAL at 17:30

## 2021-05-14 RX ADMIN — FINASTERIDE 5 MG: 5 TABLET, FILM COATED ORAL at 08:53

## 2021-05-14 RX ADMIN — INSULIN LISPRO 4 UNITS: 100 INJECTION, SOLUTION INTRAVENOUS; SUBCUTANEOUS at 15:57

## 2021-05-14 RX ADMIN — INSULIN LISPRO 10 UNITS: 100 INJECTION, SOLUTION INTRAVENOUS; SUBCUTANEOUS at 11:55

## 2021-05-14 RX ADMIN — LABETALOL HYDROCHLORIDE 200 MG: 100 TABLET, FILM COATED ORAL at 22:25

## 2021-05-14 RX ADMIN — CALCIUM GLUCONATE 1 G: 20 INJECTION, SOLUTION INTRAVENOUS at 06:35

## 2021-05-14 NOTE — ASSESSMENT & PLAN NOTE
Lab Results   Component Value Date    EGFR 26 05/13/2021    EGFR 31 05/12/2021    EGFR 39 05/11/2021    CREATININE 2 59 (H) 05/13/2021    CREATININE 2 26 (H) 05/12/2021    CREATININE 1 87 (H) 05/11/2021   ·   · Presented with MILE, slightly worsened again by shock state  · Monitor closely, trend BUN/Cr  · MAP goal > 65

## 2021-05-14 NOTE — QUICK NOTE
Called and spoke with patient's daughter Tsering Issa via phone to provide daily clinical update  All questions and concerns were answered to her satisfaction      Jeaneth Tadeo PA-C

## 2021-05-14 NOTE — PHYSICAL THERAPY NOTE
Physical Therapy Evaluation     Patient's Name: Naomie Cobian    Admitting Diagnosis  Altered mental status [R41 82]  Hyperglycemia [R73 9]  AMS (altered mental status) [R41 82]    Problem List  Patient Active Problem List   Diagnosis    Dyspnea on exertion    Essential hypertension    Pyelonephritis    Other hyperlipidemia    MILE (acute kidney injury) ([address] )    Hematuria    Metabolic acidosis    Acute blood loss anemia (ABLA)    Azotemia    Ambulatory dysfunction    BPH (benign prostatic hyperplasia)    Abdominal distention    Chronic pain of both knees    Stool incontinence    Chronic kidney disease    Urinary retention    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)    Snoring    Allergic rhinitis    Mixed stress and urge urinary incontinence    Bilateral leg edema    Ingrown toenail    Need for influenza vaccination    Osteoarthritis of knee    Vision changes    Acute metabolic encephalopathy    Hyperosmolar hyperglycemic state (HHS) ([facility] [address]    Dysphagia    Dementia ([facility] [address] )    New onset type 2 diabetes mellitus ([facility] [address] )    Hematemesis    Aspiration into airway    Gastric ulcer       Past Medical History  Past Medical History:   Diagnosis Date    Anemia     BPH (benign prostatic hyperplasia)     Chronic kidney disease     Dementia ([facility] [address] )     Hyperlipidemia     Hypertension     Incontinence     Osteoarthritis        Past Surgical History  Past Surgical History:   Procedure Laterality Date    COLON SURGERY      COLONOSCOPY      FL RETROGRADE PYELOGRAM  12/13/2019    OH CYSTOURETHRO W/IMPLANT N/A 12/13/2019    Procedure: CYSTOSCOPY WITH INSERTION UROLIFT, BILATERAL RETROGRADE PYELOGRAM;  Surgeon: Audie Salinas MD;  Location: [facility];  Service: Urology    TUMOR REMOVAL          05/14/21 1150   PT Last Visit   PT Visit Date 05/14/21   Note Type   Note type Evaluation   Pain Assessment   Pain Assessment Tool Pain Assessment not indicated - pt denies pain   Pain Score No Pain   Home Living   Type of Home House   Home Layout Two level;Performs ADLs on one level; Able to live on main level with bedroom/bathroom;Stairs to enter without rails  (0 KEIRY; CM note states 1 KEIRY per dtr)   Bathroom Shower/Tub Tub/shower unit   Bathroom Toilet Standard   Bathroom Equipment   (none per pt)   [address]  (none per pt; CM note states pt uses cane at baseline per dtr)   Prior Function   Level of [place] Independent with ADLs and functional mobility   Lives With Daughter   Receives Help From Family   ADL Assistance Independent   IADLs Needs assistance   Falls in the last 6 months 0  (per patient)   Vocational Retired   Comments information listed above obtained from pt at time of PT evaluation- pt questionable historian, CM to follow up   Restrictions/Precautions   Weight Bearing Precautions Per Order No   Braces or Orthoses   (none reported)   Other Precautions Chair Alarm; Bed Alarm; Fall Risk;Pain;Hard of hearing;Multiple lines;Telemetry;O2   General   Family/Caregiver Present No   Cognition   Overall Cognitive Status Impaired   Arousal/Participation Alert   Attention Attends with cues to redirect   Orientation Level Oriented to person;Oriented to place;Oriented to time;Disoriented to situation   Memory Decreased recall of recent events;Decreased short term memory   Following Commands Follows one step commands with increased time or repetition   RUE Assessment   RUE Assessment X  (defer to OT eval for comments)   LUE Assessment   LUE Assessment X  (defer to OT eval for comments)   RLE Assessment   RLE Assessment X  (assessed c functional mobility: 3/5)   LLE Assessment   LLE Assessment X  (assessed c functional mobility: 3+/5)   Coordination   Movements are Fluid and Coordinated 1   Sensation WFL   Bed Mobility   Additional Comments pt received OOB to recliner upon arrival   Transfers   Sit to Stand 4  Minimal assistance Additional items Assist x 2;Armrests; Increased time required;Verbal cues   Stand to Sit 4  Minimal assistance   Additional items Assist x 2;Armrests; Increased time required;Verbal cues   Ambulation/Elevation   Gait pattern Decreased foot clearance; Short stride; Improper Weight shift; Step to;Excessively slow   Gait Assistance 4  Minimal assist   Additional items Assist x 2;Verbal cues   Assistive Device Rolling walker   Distance 3' x2 (to/from commode)   Balance   Static Sitting Fair   Dynamic Sitting Fair -   Static Standing Fair -   Dynamic Standing Poor +   Ambulatory Poor   Endurance Deficit   Endurance Deficit Yes   Activity Tolerance   Activity Tolerance Patient limited by fatigue   Medical Staff Made Aware OT Nikolas Hirsch and student Gus   Nurse Made Aware RN Amira Maciel confirmed patient appropriate for therapy and made aware of session outcomes  Assessment   Prognosis Fair   Problem List Decreased strength;Decreased endurance; Impaired balance;Decreased mobility; Decreased cognition   Assessment Pt is 79 y o  male seen for PT evaluation on 5/14/2021 s/p admit to [place] on 5/9/2021 w/ Hyperosmolar hyperglycemic state (HHS) ([facility] [address] )  PT consulted to assess pt's functional mobility and d/c needs  Order placed for PT eval and tx  Performed at least 2 patient identifiers during session: Name and wristband  Comorbidities affecting pt's physical performance at time of assessment include:  has a past medical history of Anemia, BPH (benign prostatic hyperplasia), Chronic kidney disease, Dementia ([facility] [address] ), Hyperlipidemia, Hypertension, Incontinence, and Osteoarthritis  information listed above obtained from pt at time of PT evaluation- pt questionable historian, CM to follow up  Per pt- PTA, pt was independent w/ all functional mobility w/ no AD/DME, ambulates household distances, has 0 KEIRY and lives w/ daughter in 2 level home   Personal factors affecting pt at time of IE include: inaccessible home environment, ambulating w/ assistive device, inability to ambulate household distances, inability to navigate level surfaces w/o external assistance, decreased cognition and decreased initiation and engagement  Please find objective findings from PT assessment regarding body systems outlined above with impairments and limitations including weakness, impaired balance, decreased endurance, gait deviations, decreased activity tolerance, fall risk and decreased cognition, as well as mobility assessment (need for cueing for mobility technique)  The following objective measures performed on IE also reveal limitations: Barthel Index: 30/100 and Modified [place]: 4 (moderate/severe disability)  Pt's clinical presentation is currently unstable/unpredictable seen in pt's presentation of abnormal lab value(s), need for input for task focus and mobility technique and ongoing medical assessment  Pt to benefit from continued PT tx to address deficits as defined above and maximize level of functional independent mobility and consistency  From PT/mobility standpoint, recommendation at time of d/c would be post acute rehabilitation services pending progress in order to facilitate return to PLOF     Barriers to Discharge Inaccessible home environment;Decreased caregiver support   Goals   Patient Goals to return home   STG Expiration Date 05/24/21   Short Term Goal #1 In 7-10 days: Increase bilateral LE strength 1/2 grade to facilitate independent mobility, Perform all bed mobility tasks modified independent to decrease caregiver burden, Perform all transfers modified independent to improve independence, Ambulate > 50 ft  with least restrictive assistive device with close S w/o LOB and w/ normalized gait pattern 100% of the time, Increase all balance 1/2 grade to decrease risk for falls, Tolerate 4 hr OOB to faciliate upright tolerance, Improve Barthel Index score to 45 or greater to facilitate independence, PT provider will perform functional balance assessment to determine fall risk and PT to see and establish goals for elevations/ stairs when appropriate   PT Treatment Day 0   Plan   Treatment/Interventions Functional transfer training;LE strengthening/ROM; Therapeutic exercise; Endurance training;Equipment eval/education;Patient/family training;Bed mobility;Spoke to nursing;Spoke to case management   PT Frequency   (3-5x/wk)   Recommendation   PT Discharge Recommendation Post acute rehabilitation services   Equipment Recommended Walker  (RW)   Walker Package Recommended Wheeled walker   Change/add to [email or web address]?  No   PT - OK to Discharge Yes  (when medically cleared if to STR)   AM-PAC Basic Mobility Inpatient   Turning in Bed Without Bedrails 3   Lying on Back to Sitting on Edge of Flat Bed 2   Moving Bed to Chair 2   Standing Up From Chair 2   Walk in Room 2   Climb 3-5 Stairs 1   Basic Mobility Inpatient Raw Score 12   Basic Mobility Standardized Score 32 23   Modified [place] Scale   Modified [place] Scale 4   Barthel Index   Feeding 5   Bathing 0   Grooming Score 0   Dressing Score 5   Bladder Score 0   Bowels Score 5   Toilet Use Score 5   Transfers (Bed/Chair) Score 10   Mobility (Level Surface) Score 0   Stairs Score 0   Barthel Index Score 30           Gely Oneill, PT, DPT

## 2021-05-14 NOTE — ASSESSMENT & PLAN NOTE
· Episodes x2 5/11-5/12  · Intubated due to aspiration No clear blood in oropharynx  · Clotted blood from endotracheal tube  · EGD with small gastric fundus ulcer and angioectasia s/p APC  · Protonix  BID IV  · If hgb remains stable, consider re-initiation of DVT ppx

## 2021-05-14 NOTE — TELEPHONE ENCOUNTER
Thank you for the update    I did call patient's daughter and had a detailed conversation with her about patient's current hospitalization

## 2021-05-14 NOTE — ASSESSMENT & PLAN NOTE
· Patient with hypoxia, gurgling respirations after hematemesis  · Intubated for airway protection  · Successfully extubated on 5/13

## 2021-05-14 NOTE — PROGRESS NOTES
[facility]  Progress Note - Dory Fischerhire 1940, 79 y o  male MRN: [id number]  Unit/Bed#:  Encounter: [id number]  Primary Care Provider: Victoria Clark MD   Date and time admitted to hospital: 5/9/2021  4:12 PM    Hematemesis  Assessment & Plan  · Episodes x2 5/11-5/12  · Intubated due to aspiration No clear blood in oropharynx  · Clotted blood from endotracheal tube  · EGD with small gastric fundus ulcer and angioectasia s/p APC  · Protonix  BID IV  · If hgb remains stable, consider re-initiation of DVT ppx    Acute blood loss anemia (ABLA)  Assessment & Plan  · Secondary to hematemesis  · Patient with 2 episodes of hematemesis 5/11 with subsequent drop in H/H from 14 1 to 9 8 transfused 1 U PRBC  · Slight hemoglobin downtrend over past 24hrs  · If Hgb stable this AM, switch to q12 H/H  · EGD with small ulcers in fundus and small angioectasia in body of stomach s/p APC    * Hyperosmolar hyperglycemic state (HHS) [facility])  Assessment & Plan  Lab Results   Component Value Date    HGBA1C 12 3 (H) 05/09/2021       Recent Labs     05/13/21  1554 05/13/21  1827 05/13/21 2000 05/13/21 2202   POCGLU 281* 279* 145* 73       Blood Sugar Average: Last 72 hrs:  (P) 180 [id number]   · Continue insulin infusion  · Plan to advance to full carb controlled diet today and then transition to SSI/mealtime coverage    Aspiration into airway  Assessment & Plan  · Patient with hypoxia, gurgling respirations after hematemesis  · Intubated for airway protection  · Successfully extubated on 5/13    New onset type 2 diabetes mellitus [facility])  Assessment & Plan  Lab Results   Component Value Date    HGBA1C 12 3 (H) 05/09/2021       Recent Labs     05/13/21  1827 05/13/21 2000 05/13/21 2202 05/13/21  2358   POCGLU 279* 145* 73 74       Blood Sugar Average: Last 72 hrs:  (P) 022 [id number]     · Continue insulin infusion  · After advancing to full carb controlled diet today, calculate 24hr insulin requirements and attempt transition to subQ insulin    Dysphagia  Assessment & Plan  · Speech eval once extubated    Acute metabolic encephalopathy  Assessment & Plan  · Suspect metabolic from [address]  · Resolved  · Avoid deliriogenic medications  · CAM-ICU per protocol    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)  Assessment & Plan  · At baseline    Chronic kidney disease  Assessment & Plan  Lab Results   Component Value Date    EGFR 26 05/13/2021    EGFR 31 05/12/2021    EGFR 39 05/11/2021    CREATININE 2 59 (H) 05/13/2021    CREATININE 2 26 (H) 05/12/2021    CREATININE 1 87 (H) 05/11/2021   ·   · Presented with MILE, slightly worsened again by shock state  · Monitor closely, trend BUN/Cr  · MAP goal > 65    Other hyperlipidemia  Assessment & Plan  · Continue statin    Dementia (HCC)  Assessment & Plan  · At baseline    BPH (benign prostatic hyperplasia)  Assessment & Plan  · Now with soto cath in place  · Continue proscar    Gastric ulcer  Assessment & Plan  · Gastric fundus ulcer with angioectasia on EGD s/p APC  · Biopsy sent to r/o H  Pylori  · Repeat EGD in 3 months  · Transition protonix to 40mg PO QD  · GI signed off    ----------------------------------------------------------------------------------------  HPI/24hr events: Slight downtrend of hgb overnight  AM hgb pending  No episodes of melena/hematochezia/hematemesis  Disposition: Transfer to Med-Surg   Code Status: Level 1 - Full Code  ---------------------------------------------------------------------------------------  SUBJECTIVE  Denies any significant complaints this AM  Denies abdominal pain, SOB      Review of Systems  Review of systems was reviewed and negative unless stated above in HPI/24-hour events   ---------------------------------------------------------------------------------------  OBJECTIVE    Vitals   Vitals:    05/13/21 2000 05/13/21 2100 05/13/21 2300 05/14/21 0000   BP: 134/59 121/71 138/63 130/67   BP Location:    Left arm   Pulse: 97 87 93 92   Resp: 14 12 (!) 26 22   Temp: 100 4 °F (38 °C) 100 2 °F (37 9 °C) 100 2 °F (37 9 °C) 99 9 °F (37 7 °C)   TempSrc: Bladder   Bladder   SpO2: 98% 97% 97% 97%   Weight:       Height:         Temp (24hrs), Av 8 °F (37 7 °C), Min:98 8 °F (37 1 °C), Max:100 4 °F (38 °C)  Current: Temperature: 99 9 °F (37 7 °C)          Respiratory:  SpO2: SpO2: 97 %, SpO2 Activity: SpO2 Activity: At Rest, SpO2 Device: O2 Device: Nasal cannula       Invasive/non-invasive ventilation settings   Respiratory    Lab Data (Last 4 hours)    None         O2/Vent Data (Last 4 hours)    None                Physical Exam  HENT:      Head: Normocephalic  Mouth/Throat:      Mouth: Mucous membranes are moist    Eyes:      Extraocular Movements: Extraocular movements intact  Conjunctiva/sclera: Conjunctivae normal    Neck:      Musculoskeletal: Neck supple  Cardiovascular:      Rate and Rhythm: Regular rhythm  Tachycardia present  Heart sounds: No murmur  No friction rub  Pulmonary:      Effort: Pulmonary effort is normal       Breath sounds: No wheezing, rhonchi or rales  Abdominal:      General: Abdomen is flat  There is no distension  Palpations: Abdomen is soft  Tenderness: There is no abdominal tenderness  There is no guarding  Musculoskeletal:      Right lower leg: No edema  Left lower leg: No edema  Skin:     General: Skin is warm and dry  Neurological:      Mental Status: He is alert and oriented to person, place, and time  Mental status is at baseline           Laboratory and Diagnostics:  Results from last 7 days   Lab Units 21  2014 21  1238 21  0513 21  0038 21  1837 21  5663 21  0349 21  0211 21  0516 05/10/21  0458 21  1628   WBC Thousand/uL  --   --  13 76*  --   --   --   --  9 12 5 88 7 26 6 38   HEMOGLOBIN g/dL 8 6* 9 1* 9 7* 9 6* 11 0* 11 0* 9 8* 11 5* 14 3 14 0 13 9 HEMATOCRIT % 26 4*  --  28 9*  --   --   --   --  35 3* 43 1 41 6 42 3   PLATELETS Thousands/uL  --   --  103*  --   --   --   --  148* 153 145* 142*   NEUTROS PCT %  --   --  79*  --   --   --   --  68  --   --  73   MONOS PCT %  --   --  7  --   --   --   --  9  --   --  8     Results from last 7 days   Lab Units 05/13/21 0513 05/12/21  0211 05/11/21  0516 05/10/21  0458 05/09/21  2352 05/09/21  1628   SODIUM mmol/L 145 138 142 145 142 132*   POTASSIUM mmol/L 3 8 5 0 3 8 3 1* 3 3* 4 8   CHLORIDE mmol/L 111* 102 106 107 106 95*   CO2 mmol/L 24 23 23 27 26 23   ANION GAP mmol/L 10 13 13 11 10 14*   BUN mg/dL 58* 56* 28* 34* 37* 42*   CREATININE mg/dL 2 59* 2 26* 1 87* 2 07* 2 30* 2 63*   CALCIUM mg/dL 7 8* 8 2* 8 5 9 2 9 0 8 9   GLUCOSE RANDOM mg/dL 151* 514* 247* 81 268* 796*   ALT U/L  --   --   --   --   --  29   AST U/L  --   --   --   --   --  17   ALK PHOS U/L  --   --   --   --   --  114   ALBUMIN g/dL  --   --   --   --   --  3 6   TOTAL BILIRUBIN mg/dL  --   --   --   --   --  0 70     Results from last 7 days   Lab Units 05/13/21  0513 05/10/21  0458   MAGNESIUM mg/dL 1 7 2 1   PHOSPHORUS mg/dL 2 2*  --       Results from last 7 days   Lab Units 05/12/21 0349 05/09/21  1628   INR  1 15 1 03   PTT seconds 32 32          Results from last 7 days   Lab Units 05/12/21  0616 05/12/21 0349 05/09/21  1628   LACTIC ACID mmol/L 3 9* 4 2* 1 6     ABG:  Results from last 7 days   Lab Units 05/12/21  0418   PH ART  7 353   PCO2 ART mm Hg 36 4   PO2 ART mm Hg 341 1*   HCO3 ART mmol/L 19 8*   BASE EXC ART mmol/L -5 2   ABG SOURCE  Radial, Right     VBG:  Results from last 7 days   Lab Units 05/12/21  0418 05/09/21  1728   PH TELMA   --  7 342   PCO2 TELMA mm Hg  --  40 1*   PO2 TELMA mm Hg  --  57 1*   HCO3 TELMA mmol/L  --  21 2*   BASE EXC TELMA mmol/L  --  -4 1   ABG SOURCE  Radial, Right  --      Results from last 7 days   Lab Units 05/12/21  0354 05/11/21  1522 05/10/21  0458 05/09/21  1628   PROCALCITONIN ng/ml 0 32* 0  13 0 28* 0 22       Micro  Results from last 7 days   Lab Units 05/09/21  1728 05/09/21  1628   BLOOD CULTURE  No Growth After 4 Days  No Growth After 4 Days  Imaging: I have personally reviewed pertinent reports  and I have personally reviewed pertinent films in PACS    Intake and Output  I/O       05/12 0701 - 05/13 0700 05/13 0701 - 05/14 0700    I V  (mL/kg) 2810 8 (28 7) 499 1 (5 1)    Total Intake(mL/kg) 2810 8 (28 7) 499 1 (5 1)    Urine (mL/kg/hr) 1715 (0 7) 1325 (0 6)    Total Output 1715 1325    Net +1095 8 -825 9                Height and Weights   Height: 5' 9 5" (176 5 cm)  IBW (Ideal Body Weight): 71 85 kg  Body mass index is 31 45 kg/m²  Weight (last 2 days)     None            Nutrition       Diet Orders   (From admission, onward)             Start     Ordered    05/13/21 1107  Diet [place]/CHO Controlled; Diabetic [place] Liquids  Diet effective now     Question Answer Comment   Diet Type Blaine/CHO Controlled    Blaine/CHO Controlled Diabetic [place] Liquids    RD to adjust diet per protocol?  Yes        05/13/21 1107                  Active Medications  Scheduled Meds:  Current Facility-Administered Medications   Medication Dose Route Frequency Provider Last Rate    acetaminophen  650 mg Oral Q6H PRN Apple Portillo, PA-ALEIDA      calcium carbonate  1,000 mg Oral Daily PRN Tildon Eawayne, PA-ALEIDA      docusate sodium  100 mg Oral BID Apple Portillo, PA-ALEIDA      fentanyl citrate (PF)  50 mcg Intravenous Q1H PRN Gaeldon Bandar, PA-ALEIDA      finasteride  5 mg Oral Daily Odilia Dior      hydrALAZINE  5 mg Intravenous Q6H PRN Tildon Eawayne, PA-C      insulin regular (HumuLIN R,NovoLIN R) infusion  0 3-21 Units/hr Intravenous Titrated Gaeldon GHANSHYAM Portillo-C 1 5 Units/hr (05/14/21 0453)    ondansetron  4 mg Intravenous Q6H PRN Tildon Eawayne, PA-C      pantoprazole  40 mg Intravenous Q12H [address] TAMRA Nguyen      pravastatin  40 mg Oral Daily With DEVI Lambert      senna  1 tablet Oral Daily Gaeldon Odilia Portillo Continuous Infusions:  insulin regular (HumuLIN R,NovoLIN R) infusion, 0 3-21 Units/hr, Last Rate: 1 5 Units/hr (05/14/21 4028)      PRN Meds:   acetaminophen, 650 mg, Q6H PRN  calcium carbonate, 1,000 mg, Daily PRN  fentanyl citrate (PF), 50 mcg, Q1H PRN  hydrALAZINE, 5 mg, Q6H PRN  ondansetron, 4 mg, Q6H PRN        Invasive Devices Review  Invasive Devices     Peripheral Intravenous Line            Peripheral IV 05/12/21 Proximal;Right;Ventral (anterior) Forearm 2 days    Peripheral IV 05/12/21 Left;Proximal;Ventral (anterior) Forearm 1 day    Peripheral IV 05/13/21 Distal;Right;Upper;Ventral (anterior) Arm less than 1 day          Drain            Urethral Catheter Temperature probe 2 days                Rationale for remaining devices: Cruz 2/2 urinary retention with BPH  ---------------------------------------------------------------------------------------  Advance Directive and Living Will:      Power of :    POLST:    ---------------------------------------------------------------------------------------  Care Time Delivered:   No Critical Care time spent       Leanord Councilman, PA-C      Portions of the record may have been created with voice recognition software  Occasional wrong word or "sound a like" substitutions may have occurred due to the inherent limitations of voice recognition software    Read the chart carefully and recognize, using context, where substitutions have occurred

## 2021-05-14 NOTE — PLAN OF CARE
Problem: Potential for Falls  Goal: Patient will remain free of falls  Description: INTERVENTIONS:  - Assess patient frequently for physical needs  -  Identify cognitive and physical deficits and behaviors that affect risk of falls  -  [place] fall precautions as indicated by assessment   - Educate patient/family on patient safety including physical limitations  - Instruct patient to call for assistance with activity based on assessment  - Modify environment to reduce risk of injury  - Consider OT/PT consult to assist with strengthening/mobility  Outcome: Progressing     Problem: Nutrition/Hydration-ADULT  Goal: Nutrient/Hydration intake appropriate for improving, restoring or maintaining nutritional needs  Description: Monitor and assess patient's nutrition/hydration status for malnutrition  Collaborate with interdisciplinary team and initiate plan and interventions as ordered  Monitor patient's weight and dietary intake as ordered or per policy  Utilize nutrition screening tool and intervene as necessary  Determine patient's food preferences and provide high-protein, high-caloric foods as appropriate       INTERVENTIONS:  - Monitor oral intake, urinary output, labs, and treatment plans  - Assess nutrition and hydration status and recommend course of action  - Evaluate amount of meals eaten  - Assist patient with eating if necessary   - Allow adequate time for meals  - Recommend/ encourage appropriate diets, oral nutritional supplements, and vitamin/mineral supplements  - Order, calculate, and assess calorie counts as needed  - Recommend, monitor, and adjust tube feedings and TPN/PPN based on assessed needs  - Assess need for intravenous fluids  - Provide nutrition/hydration education as appropriate  - Include patient/family/caregiver in decisions related to nutrition  Outcome: Progressing     Problem: PAIN - ADULT  Goal: Verbalizes/displays adequate comfort level or baseline comfort level  Description: Interventions:  - Encourage patient to monitor pain and request assistance  - Assess pain using appropriate pain scale  - Administer analgesics based on type and severity of pain and evaluate response  - Implement non-pharmacological measures as appropriate and evaluate response  - Consider cultural and social influences on pain and pain management  - Notify physician/advanced practitioner if interventions unsuccessful or patient reports new pain  Outcome: Progressing     Problem: INFECTION - ADULT  Goal: Absence or prevention of progression during hospitalization  Description: INTERVENTIONS:  - Assess and monitor for signs and symptoms of infection  - Monitor lab/diagnostic results  - Monitor all insertion sites, i e  indwelling lines, tubes, and drains  - Monitor endotracheal if appropriate and nasal secretions for changes in amount and color  - [place] appropriate cooling/warming therapies per order  - Administer medications as ordered  - Instruct and encourage patient and family to use good hand hygiene technique  - Identify and instruct in appropriate isolation precautions for identified infection/condition  Outcome: Progressing     Problem: SAFETY ADULT  Goal: Patient will remain free of falls  Description: INTERVENTIONS:  - Assess patient frequently for physical needs  -  Identify cognitive and physical deficits and behaviors that affect risk of falls    -  [place] fall precautions as indicated by assessment   - Educate patient/family on patient safety including physical limitations  - Instruct patient to call for assistance with activity based on assessment  - Modify environment to reduce risk of injury  - Consider OT/PT consult to assist with strengthening/mobility  Outcome: Progressing  Goal: Maintain or return to baseline ADL function  Description: INTERVENTIONS:  -  Assess patient's ability to carry out ADLs; assess patient's baseline for ADL function and identify physical deficits which impact ability to perform ADLs (bathing, care of mouth/teeth, toileting, grooming, dressing, etc )  - Assess/evaluate cause of self-care deficits   - Assess range of motion  - Assess patient's mobility; develop plan if impaired  - Assess patient's need for assistive devices and provide as appropriate  - Encourage maximum independence but intervene and supervise when necessary  - Involve family in performance of ADLs  - Assess for home care needs following discharge   - Consider OT consult to assist with ADL evaluation and planning for discharge  - Provide patient education as appropriate  Outcome: Progressing  Goal: Maintain or return mobility status to optimal level  Description: INTERVENTIONS:  - Assess patient's baseline mobility status (ambulation, transfers, stairs, etc )    - Identify cognitive and physical deficits and behaviors that affect mobility  - Identify mobility aids required to assist with transfers and/or ambulation (gait belt, sit-to-stand, lift, walker, cane, etc )  - [place] fall precautions as indicated by assessment  - Record patient progress and toleration of activity level on Mobility SBAR; progress patient to next Phase/Stage  - Instruct patient to call for assistance with activity based on assessment  - Consider rehabilitation consult to assist with strengthening/weightbearing, etc   Outcome: Progressing     Problem: DISCHARGE PLANNING  Goal: Discharge to home or other facility with appropriate resources  Description: INTERVENTIONS:  - Identify barriers to discharge w/patient and caregiver  - Arrange for needed discharge resources and transportation as appropriate  - Identify discharge learning needs (meds, wound care, etc )  - Arrange for interpretive services to assist at discharge as needed  - Refer to Case Management Department for coordinating discharge planning if the patient needs post-hospital services based on physician/advanced practitioner order or complex needs related to functional status, cognitive ability, or social support system  Outcome: Progressing     Problem: Knowledge Deficit  Goal: Patient/family/caregiver demonstrates understanding of disease process, treatment plan, medications, and discharge instructions  Description: Complete learning assessment and assess knowledge base    Interventions:  - Provide teaching at level of understanding  - Provide teaching via preferred learning methods  Outcome: Progressing     Problem: METABOLIC, FLUID AND ELECTROLYTES - ADULT  Goal: Glucose maintained within target range  Description: INTERVENTIONS:  - Monitor Blood Glucose as ordered  - Assess for signs and symptoms of hyperglycemia and hypoglycemia  - Administer ordered medications to maintain glucose within target range  - Assess nutritional intake and initiate nutrition service referral as needed  Outcome: Progressing

## 2021-05-14 NOTE — PLAN OF CARE
Problem: PHYSICAL THERAPY ADULT  Goal: Performs mobility at highest level of function for planned discharge setting  See evaluation for individualized goals  Description: Treatment/Interventions: Functional transfer training, LE strengthening/ROM, Therapeutic exercise, Endurance training, Equipment eval/education, Patient/family training, Bed mobility, Spoke to nursing, Spoke to case management  Equipment Recommended: Walker(RW)       See flowsheet documentation for full assessment, interventions and recommendations  Note: Prognosis: Fair  Problem List: Decreased strength, Decreased endurance, Impaired balance, Decreased mobility, Decreased cognition  Assessment: Pt is 79 y o  male seen for PT evaluation on 5/14/2021 s/p admit to [facility] on 5/9/2021 w/ Hyperosmolar hyperglycemic state (HHS) ([facility] )  PT consulted to assess pt's functional mobility and d/c needs  Order placed for PT eval and tx  Performed at least 2 patient identifiers during session: Name and wristband  Comorbidities affecting pt's physical performance at time of assessment include:  has a past medical history of Anemia, BPH (benign prostatic hyperplasia), Chronic kidney disease, Dementia ([facility] ), Hyperlipidemia, Hypertension, Incontinence, and Osteoarthritis  information listed above obtained from pt at time of PT evaluation- pt questionable historian, CM to follow up  Per pt- PTA, pt was independent w/ all functional mobility w/ no AD/DME, ambulates household distances, has 0 KEIRY and lives w/ daughter in 2 level home  Personal factors affecting pt at time of IE include: inaccessible home environment, ambulating w/ assistive device, inability to ambulate household distances, inability to navigate level surfaces w/o external assistance, decreased cognition and decreased initiation and engagement   Please find objective findings from PT assessment regarding body systems outlined above with impairments and limitations including weakness, impaired balance, decreased endurance, gait deviations, decreased activity tolerance, fall risk and decreased cognition, as well as mobility assessment (need for cueing for mobility technique)  The following objective measures performed on IE also reveal limitations: Barthel Index: 30/100 and Modified [place]: 4 (moderate/severe disability)  Pt's clinical presentation is currently unstable/unpredictable seen in pt's presentation of abnormal lab value(s), need for input for task focus and mobility technique and ongoing medical assessment  Pt to benefit from continued PT tx to address deficits as defined above and maximize level of functional independent mobility and consistency  From PT/mobility standpoint, recommendation at time of d/c would be post acute rehabilitation services pending progress in order to facilitate return to PLOF  Barriers to Discharge: Inaccessible home environment, Decreased caregiver support        PT Discharge Recommendation: Post acute rehabilitation services     PT - OK to Discharge: Yes(when medically cleared if to STR)    See flowsheet documentation for full assessment

## 2021-05-14 NOTE — UTILIZATION REVIEW
Continued Stay Review    Date: 5/14                        Current Patient Class: IP  Current Level of Care: ICU    HPI:80 y o  male initially admitted on 5/9 for confusion , blurred vision   Found to have elevated BS placed on insulin gtt   5/12 pt started to vomit dark bld clots , + LOC and pt was intubated and transferred to ICU     Assessment/Plan: pt had slight down trend iin hgb overnight   No episodes of melena/hematochezia/hematemesis  Appears stable , change to q12hr H&H   Cont Iv protonix   2 episodes of hematemesis on 5/11 transfused 1u   Pt extubated on 5/13  Plan to transfer to [facility]  [address]   Vital Signs:   05/14/21 1000  99 7 °F (37 6 °C)  101  27Abnormal   168/87  121  96 %  --  --  --  --  --  --   05/14/21 0900  99 9 °F (37 7 °C)  98  22  143/72  98  96 %  --  --  --  --  --  Sitting   05/14/21 0800  100 °F (37 8 °C)  97  20  137/74  99  96 %  --  28  --  2 L/min  Nasal cannula  Lying   05/14/21 0700  98 7 °F (37 1 °C)  96  25Abnormal   150/72  104  99 %  --  --  --  --  Nasal cannula  Lying   05/14/21 0400  100 4 °F (38 °C)  92  26Abnormal   141/66  93  98 %  --  --  2 L/min  --  Nasal cannula  Lying   05/14/21 0000  99 9 °F (37 7 °C)  92  22  130/67  92  97 %                     Pertinent Labs/Diagnostic Results:     Results from last 7 days   Lab Units 05/14/21  0523 05/13/21 2014 05/13/21  1238 05/13/21  0513 05/13/21  0038  05/12/21  0211 05/11/21  0516   WBC Thousand/uL 11 28*  --   --  13 76*  --   --  9 12 5 88   HEMOGLOBIN g/dL 7 6* 8 6* 9 1* 9 7* 9 6*   < > 11 5* 14 3   HEMATOCRIT % 23 2* 26 4*  --  28 9*  --   --  35 3* 43 1   PLATELETS Thousands/uL 103*  --   --  103*  --   --  148* 153   NEUTROS ABS Thousands/µL 8 68*  --   --  10 82*  --   --  6 24  --     < > = values in this interval not displayed       Results from last 7 days   Lab Units 05/14/21  0523 05/13/21  0513 05/12/21 0211 05/11/21  0516 05/10/21  0458   SODIUM mmol/L 144 145 138 142 145   POTASSIUM mmol/L 3 9 3 8 5 0 3 8 3 1*   CHLORIDE mmol/L 113* 111* 102 106 107   CO2 mmol/L 22 24 23 23 27   ANION GAP mmol/L 9 10 13 13 11   BUN mg/dL 42* 58* 56* 28* 34*   CREATININE mg/dL 2 19* 2 59* 2 26* 1 87* 2 07*   EGFR ml/min/1 73sq m 32 26 31 39 34   CALCIUM mg/dL 8 2* 7 8* 8 2* 8 5 9 2   CALCIUM, IONIZED mmol/L  --  1 02*  --   --   --    MAGNESIUM mg/dL  --  1 7  --   --  2 1   PHOSPHORUS mg/dL  --  2 2*  --   --   --      Results from last 7 days   Lab Units 05/09/21  1628   AST U/L 17   ALT U/L 29   ALK PHOS U/L 114   TOTAL PROTEIN g/dL 7 3   ALBUMIN g/dL 3 6   TOTAL BILIRUBIN mg/dL 0 70     Results from last 7 days   Lab Units 05/14/21  1010 05/14/21  0803 05/14/21  0713 05/14/21  8348 05/14/21  0452 05/14/21  0207 05/13/21  2358 05/13/21  2202 05/13/21  2000 05/13/21  1827 05/13/21  1554 05/13/21  1402   POC GLUCOSE mg/dl 244* 93 121 140 121 88 74 73 145* 279* 281* 249*     Results from last 7 days   Lab Units 05/14/21  0523 05/13/21  0513 05/12/21  0211 05/11/21  0516 05/10/21  0458 05/09/21  2352 05/09/21  1628   GLUCOSE RANDOM mg/dL 121 151* 514* 247* 81 268* 796*     Results from last 7 days   Lab Units 05/09/21  1628   HEMOGLOBIN A1C % 12 3*   EAG mg/dl 306     BETA-HYDROXYBUTYRATE   Date Value Ref Range Status   05/09/2021 3 7 (H) <0 6 mmol/L Final      Results from last 7 days   Lab Units 05/12/21  0418   PH ART  7 353   PCO2 ART mm Hg 36 4   PO2 ART mm Hg 341 1*   HCO3 ART mmol/L 19 8*   BASE EXC ART mmol/L -5 2   O2 CONTENT ART mL/dL 13 8*   O2 HGB, ARTERIAL % 98 7*   ABG SOURCE  Radial, Right     Results from last 7 days   Lab Units 05/09/21  1728   PH TELMA  7 342   PCO2 TELMA mm Hg 40 1*   PO2 TELMA mm Hg 57 1*   HCO3 TELMA mmol/L 21 2*   BASE EXC TELMA mmol/L -4 1   O2 CONTENT TELMA ml/dL 17 3   O2 HGB, VENOUS % 87 1*       Results from last 7 days   Lab Units 05/12/21  0349 05/09/21  1628   PROTIME seconds 14 2 13 0   INR  1 15 1 03   PTT seconds 32 32     Results from last 7 days   Lab Units 05/09/21  1628   TSH 3RD GENERATON uIU/mL 0 804     Results from last 7 days   Lab Units 05/12/21  0354 05/11/21  1522 05/10/21  0458 05/09/21  1628   PROCALCITONIN ng/ml 0 32* 0 13 0 28* 0 22     Results from last 7 days   Lab Units 05/12/21  0616 05/12/21  0349 05/09/21  1628   LACTIC ACID mmol/L 3 9* 4 2* 1 6     Results from last 7 days   Lab Units 05/13/21  0513   FERRITIN ng/mL 272     Results from last 7 days   Lab Units 05/10/21  1250   CLARITY UA  Clear   COLOR UA  Light Yellow   SPEC GRAV UA  1 010   PH UA  6 0   GLUCOSE UA mg/dl >=1000 (1%)*   KETONES UA mg/dl 40 (2+)*   BLOOD UA  Small*   PROTEIN UA mg/dl Negative   NITRITE UA  Negative   BILIRUBIN UA  Negative   UROBILINOGEN UA E U /dl 0 2   LEUKOCYTES UA  Trace*   WBC UA /hpf 2-4   RBC UA /hpf 1-2   BACTERIA UA /hpf Moderate*   EPITHELIAL CELLS WET PREP /hpf Occasional     Results from last 7 days   Lab Units 05/09/21  1628   ETHANOL LVL mg/dL <3   ACETAMINOPHEN LVL ug/mL <6 0*   SALICYLATE LVL mg/dL <3*     Results from last 7 days   Lab Units 05/09/21  1728 05/09/21  1628   BLOOD CULTURE  No Growth After 4 Days  No Growth After 4 Days         Medications:   Scheduled Medications:  docusate sodium, 100 mg, Oral, BID  finasteride, 5 mg, Oral, Daily  furosemide, 40 mg, Oral, Daily  [START ON 5/15/2021] insulin glargine, 30 Units, Subcutaneous, QAM  insulin lispro, 10 Units, Subcutaneous, TID With Meals  insulin lispro, 4-20 Units, Subcutaneous, TID With Meals  insulin lispro, 4-20 Units, Subcutaneous, HS  iron sucrose, 300 mg, Intravenous, Once  labetalol, 200 mg, Oral, HS  pantoprazole, 40 mg, Intravenous, Q12H [place]  pravastatin, 40 mg, Oral, Daily With Dinner  senna, 1 tablet, Oral, Daily      Continuous IV Infusions:  insulin regular (HumuLIN R,NovoLIN R) infusion, 0 3-21 Units/hr, Intravenous, Titrated      PRN Meds:  acetaminophen, 650 mg, Oral, Q6H PRN  calcium carbonate, 1,000 mg, Oral, Daily PRN  fentanyl citrate (PF), 50 mcg, Intravenous, Q1H PRN  hydrALAZINE, 5 mg, Intravenous, Q6H PRN  ondansetron, 4 mg, Intravenous, Q6H PRN        Discharge Plan: TBD     Network Utilization Review Department  ATTENTION: Please call with any questions or concerns to [phone] and carefully listen to the prompts so that you are directed to the right person  All voicemails are confidential   Corrine Sandra all requests for admission clinical reviews, approved or denied determinations and any other requests to dedicated fax number below belonging to the campus where the patient is receiving treatment   List of dedicated fax numbers for the Facilities:  [address] DENIALS (Administrative/Medical Necessity) [phone]   [address] (Maternity/NICU/Pediatrics) [phone]   [address] [place] [address] [id number] [place] [address] [address] [facility] [place] [address] [phone]

## 2021-05-14 NOTE — ASSESSMENT & PLAN NOTE
Lab Results   Component Value Date    HGBA1C 12 3 (H) 05/09/2021       Recent Labs     05/13/21  1554 05/13/21  1827 05/13/21 2000 05/13/21 2202   POCGLU 281* 279* 145* 73       Blood Sugar Average: Last 72 hrs:  (P) 931 [id number]   · Continue insulin infusion  · Plan to advance to full carb controlled diet today and then transition to SSI/mealtime coverage

## 2021-05-14 NOTE — ASSESSMENT & PLAN NOTE
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

## 2021-05-14 NOTE — PLAN OF CARE
Problem: OCCUPATIONAL THERAPY ADULT  Goal: Performs self-care activities at highest level of function for planned discharge setting  See evaluation for individualized goals  Description: Treatment Interventions: ADL retraining, Functional transfer training, UE strengthening/ROM, Endurance training, Cognitive reorientation, Patient/family training, Equipment evaluation/education, Compensatory technique education, Continued evaluation, Energy conservation, Activityengagement          See flowsheet documentation for full assessment, interventions and recommendations  Note: Limitation: Decreased ADL status, Decreased UE ROM, Decreased UE strength, Decreased Safe judgement during ADL, Decreased endurance, Visual deficit, Decreased high-level ADLs, Decreased self-care trans, Decreased cognition  Prognosis: Good  Assessment: Patient is a 78 y/o male who was admitted to [facility] on 5/12/2021 with complaints of polyuria, weakness, and facial droop and received a diagnosis of hyperosmolar hyperglycemic state  Co-morbidities affecting patient performance include stroke-like symptoms, dysphagia, hypertensive crisis, chronic kidney disease, benign prostatic hyperplasia, and other hyperlipidemia  Two patient identifiers were noted to confirm patient ID  Patient lives with his daughter in a two story home with 1 KEIRY and resides on the first floor  Prior to admission, pt reports independence in ADLs and his daughter provides assistance for IADLs  Pt ambulates using a cane at baseline and does not drive  Currently, patient requires Mod A for UB ADLs and Max A for LB ADLs  Pt took 4-5 steps using RW with Min A x2  Patient is alert and oriented x3, disoriented to situation  Personal factors impacting performance include decreased insight, decreased environmental support, and decreased independence in ADLs and functional mobility   Patient limitations include decreased dynamic sitting balance, decreased static/dynamic standing balance, decreased BUE ROM and strength, decreased safety awareness, decreased standing tolerance, and decreased cognition, specifically memory and attention  Patient will benefit from continued skilled OT services 3-5 x/week while in the hospital to address the limitations mentioned and increase functional performance in ADLs and functional mobility  Occupational performance areas to address include dressing, bathing, toileting, grooming, functional transfers/mobility, and leisure activities  From an OT perspective, recommendation at time of d/c would be post acute rehabilitation services       OT Discharge Recommendation: Post acute rehabilitation services  OT - OK to Discharge: Yes(pending medical clearance)

## 2021-05-14 NOTE — OCCUPATIONAL THERAPY NOTE
Occupational Therapy Evaluation      Patient Name: Ronak Hsieh  [id number]'[id number] Date: 5/14/2021 05/14/21 1119   OT Last Visit   OT Visit Date 05/14/21   Note Type   Note type Evaluation   Restrictions/Precautions   Weight Bearing Precautions Per Order No   Other Precautions Chair Alarm; Bed Alarm; Fall Risk;Pain;Hard of hearing;Multiple lines;Telemetry;O2;Cognitive   Pain Assessment   Pain Assessment Tool Pain Assessment not indicated - pt denies pain   Pain Score No Pain   Home Living   Type of Home House   Home Layout Two level;Performs ADLs on one level; Able to live on main level with bedroom/bathroom;Stairs to enter without rails  (0 KIERY; CM note states 1 KEIRY per dtr)   Bathroom Shower/Tub Tub/shower unit   Bathroom Toilet Standard   Bathroom Equipment Other (Comment)  (none per patient)   [address] Other (Comment); Cane  (none per pt; CM note states pt uses cane at baseline per dtr)   Additional Comments Pt ambulates with no AD at baseline per pt   Prior Function   Level of [place] Independent with ADLs and functional mobility   Lives With Daughter   Receives Help From Family   ADL Assistance Independent   IADLs Needs assistance   Falls in the last 6 months 0  (per patient)   Vocational Retired   Comments Per CM note- family transports  Patient is a questionable historian at time of evaluation, above information listed obtained via pt report  Lifestyle   Autonomy Patient lives with his daughter in a two story home with 1 KEIRY and resides on the first floor  Prior to admission, pt reports independence in ADLs and his daughter provides assistance for IADLs  Pt ambulates using a cane at baseline and does not drive     Reciprocal Relationships family   Service to Others retired   Psychosocial   Psychosocial ([address]) [address] [place]  5  230 [address] 3  Moderate 45 Cindi Bloom Assistance 2  Maximal Assistance   UB Dressing Assistance 3  Moderate Assistance   LB Dressing Assistance 2  Maximal Assistance   Toileting Assistance  2  Maximal Assistance   Functional Assistance 2  Maximal Assistance   Additional Comments ADL assist levels based on patient functional performance during OT evaluation  Bed Mobility   Additional Comments Patient received sitting OOB in recliner chair upon OT arrival  Post session: patient returned to sitting OOB in recliner chair with call bell and all needs within reach and chair alarm activated  Transfers   Sit to Stand 4  Minimal assistance   Additional items Assist x 2; Increased time required;Verbal cues;Armrests   Stand to Sit 4  Minimal assistance   Additional items Assist x 2; Increased time required;Verbal cues;Armrests   Toilet transfer 4  Minimal assistance   Additional items Assist x 2;Armrests; Increased time required;Verbal cues; Commode   Additional Comments Patient performed functional transfers using RW  Functional Mobility   Functional Mobility 4  Minimal assistance   Additional Comments x2; Patient took 4-5 steps to commode with no overt LOB or SOB  Additional items Rolling walker   Balance   Static Sitting Fair   Dynamic Sitting Fair -   Static Standing Fair -   Dynamic Standing Poor +   Ambulatory Poor   Activity Tolerance   Activity Tolerance Patient limited by fatigue   Medical Staff Made Aware PT Stephanie Willis   Nurse Made Aware PATY Linton confirmed patient appropriate for therapy     RUE Assessment   RUE Assessment X   RUE Overall AROM   R Shoulder Flexion ~80 degrees   R Shoulder ABduction ~90 degrees   RUE Strength   RUE Overall Strength Deficits  (3+/5 grossly t/o)   LUE Assessment   LUE Assessment X   LUE Overall AROM   L Shoulder Flexion ~80 degrees   L Shoulder ABduction ~90 degrees   LUE Strength   LUE Overall Strength Deficits  (3+/5 grossly t/o)   Hand Function   Gross Motor Coordination Functional   Fine Motor Coordination Functional   Sensation   Light Touch No apparent deficits  (BUEs)   Vision-Basic Assessment   Current Vision Wears glasses only for reading  ("need new ones")   Patient Visual Report Other (Comment)  (pt reports worsening vision, unable to describe)   Cognition   Overall Cognitive Status Impaired   Arousal/Participation Alert; Responsive; Cooperative   Attention Attends with cues to redirect   Orientation Level Oriented to person;Oriented to place;Oriented to time;Disoriented to situation   Memory Decreased recall of recent events;Decreased short term memory   Following Commands Follows one step commands with increased time or repetition   Comments Pt agreeable to OT evaluation  Assessment   Limitation Decreased ADL status; Decreased UE ROM; Decreased UE strength;Decreased Safe judgement during ADL;Decreased endurance;Visual deficit; Decreased high-level ADLs; Decreased self-care trans;Decreased cognition   Prognosis Good   Assessment Patient is a 78 y/o male who was admitted to [facility] on 5/12/2021 with complaints of polyuria, weakness, and facial droop and received a diagnosis of hyperosmolar hyperglycemic state  Co-morbidities affecting patient performance include stroke-like symptoms, dysphagia, hypertensive crisis, chronic kidney disease, benign prostatic hyperplasia, and other hyperlipidemia  Two patient identifiers were noted to confirm patient ID  Patient lives with his daughter in a two story home with 1 KEIRY and resides on the first floor  Prior to admission, pt reports independence in ADLs and his daughter provides assistance for IADLs  Pt ambulates using a cane at baseline and does not drive  Currently, patient requires Mod A for UB ADLs and Max A for LB ADLs  Pt took 4-5 steps using RW with Min A x2  Patient is alert and oriented x3, disoriented to situation   Personal factors impacting performance include decreased insight, decreased environmental support, and decreased independence in ADLs and functional mobility  Patient limitations include decreased dynamic sitting balance, decreased static/dynamic standing balance, decreased BUE ROM and strength, decreased safety awareness, decreased standing tolerance, and decreased cognition, specifically memory and attention  Patient will benefit from continued skilled OT services 3-5 x/week while in the hospital to address the limitations mentioned and increase functional performance in ADLs and functional mobility  Occupational performance areas to address include dressing, bathing, toileting, grooming, functional transfers/mobility, and leisure activities  From an OT perspective, recommendation at time of d/c would be post acute rehabilitation services  Goals   Patient Goals to return home   Plan   Treatment Interventions ADL retraining;Functional transfer training;UE strengthening/ROM; Endurance training;Cognitive reorientation;Patient/family training;Equipment evaluation/education; Compensatory technique education;Continued evaluation; Energy conservation; Activityengagement   Goal Expiration Date 05/28/21   OT Frequency 3-5x/wk   Recommendation   OT Discharge Recommendation Post acute rehabilitation services   OT - OK to Discharge Yes  (pending medical clearance)   AM-PAC Daily Activity Inpatient   Lower Body Dressing 2   Bathing 2   Toileting 2   Upper Body Dressing 2   Grooming 3   Eating 3   Daily Activity Raw Score 14   Daily Activity Standardized Score (Calc for Raw Score >=11) 33 39   AM-PAC Applied Cognition Inpatient   Following a Speech/Presentation 2   Understanding Ordinary Conversation 3   Taking Medications 1   Remembering Where Things Are Placed or Put Away 2   Remembering List of 4-5 Errands 2   Taking Care of Complicated Tasks 1   Applied Cognition Raw Score 11   Applied Cognition Standardized Score 27 03   Barthel Index   Feeding 5   Bathing 0   Grooming Score 0   Dressing Score 5   Bladder Score 0   Bowels Score 5   Toilet Use Score 5   Transfers (Bed/Chair) Score 10   Mobility (Level Surface) Score 0   Stairs Score 0   Barthel Index Score 30   Modified [place] Scale   Modified [place] Scale 4     Occupational Therapy Goals to be addressed within 10-14 days:    Pt will perform transfers to all surfaces with supervision level of assistance  Pt will identify s/s of exertion during ADLs and functional mobility activities with minimal verbal cues  Pt will verbalize and demonstrate compensatory strategies to recover from exertion with minimal verbal cues  Pt will verbalize and demonstrate energy conservation techniques/ work simplification skills during functional activities with minimal verbal cues  Pt will increase standing tolerance to 3-4 minutes to increase independence in sink level ADLs  Pt/ caregiver will demonstrate competency in UE HEP  Pt will participate in further visual assessment to determine extent of deficits 100% of the time  Pt will increase dynamic sitting balance to Fair+ to increase independence in ADLs  Pt will increase static/dynamic standing balance to Fair to increase independence in functional mobility and ADLs  Pt will verbalize three safety awareness/  principles to prevent falls in the home with minimal verbal cues  Pt will increase independence in UB ADL to Min A  Pt will increase independence in LB ADL to Mod A with use of compensatory as indicated  Pt will increase toileting to Mod A

## 2021-05-14 NOTE — PLAN OF CARE
Problem: Potential for Falls  Goal: Patient will remain free of falls  Description: INTERVENTIONS:  - Assess patient frequently for physical needs  -  Identify cognitive and physical deficits and behaviors that affect risk of falls  -  [place] fall precautions as indicated by assessment   - Educate patient/family on patient safety including physical limitations  - Instruct patient to call for assistance with activity based on assessment  - Modify environment to reduce risk of injury  - Consider OT/PT consult to assist with strengthening/mobility  Outcome: Progressing     Problem: Nutrition/Hydration-ADULT  Goal: Nutrient/Hydration intake appropriate for improving, restoring or maintaining nutritional needs  Description: Monitor and assess patient's nutrition/hydration status for malnutrition  Collaborate with interdisciplinary team and initiate plan and interventions as ordered  Monitor patient's weight and dietary intake as ordered or per policy  Utilize nutrition screening tool and intervene as necessary  Determine patient's food preferences and provide high-protein, high-caloric foods as appropriate       INTERVENTIONS:  - Monitor oral intake, urinary output, labs, and treatment plans  - Assess nutrition and hydration status and recommend course of action  - Evaluate amount of meals eaten  - Assist patient with eating if necessary   - Allow adequate time for meals  - Recommend/ encourage appropriate diets, oral nutritional supplements, and vitamin/mineral supplements  - Order, calculate, and assess calorie counts as needed  - Recommend, monitor, and adjust tube feedings and TPN/PPN based on assessed needs  - Assess need for intravenous fluids  - Provide nutrition/hydration education as appropriate  - Include patient/family/caregiver in decisions related to nutrition  Outcome: Progressing     Problem: PAIN - ADULT  Goal: Verbalizes/displays adequate comfort level or baseline comfort level  Description: Interventions:  - Encourage patient to monitor pain and request assistance  - Assess pain using appropriate pain scale  - Administer analgesics based on type and severity of pain and evaluate response  - Implement non-pharmacological measures as appropriate and evaluate response  - Consider cultural and social influences on pain and pain management  - Notify physician/advanced practitioner if interventions unsuccessful or patient reports new pain  Outcome: Progressing     Problem: INFECTION - ADULT  Goal: Absence or prevention of progression during hospitalization  Description: INTERVENTIONS:  - Assess and monitor for signs and symptoms of infection  - Monitor lab/diagnostic results  - Monitor all insertion sites, i e  indwelling lines, tubes, and drains  - Monitor endotracheal if appropriate and nasal secretions for changes in amount and color  - [place] appropriate cooling/warming therapies per order  - Administer medications as ordered  - Instruct and encourage patient and family to use good hand hygiene technique  - Identify and instruct in appropriate isolation precautions for identified infection/condition  Outcome: Progressing     Problem: SAFETY ADULT  Goal: Patient will remain free of falls  Description: INTERVENTIONS:  - Assess patient frequently for physical needs  -  Identify cognitive and physical deficits and behaviors that affect risk of falls    -  [place] fall precautions as indicated by assessment   - Educate patient/family on patient safety including physical limitations  - Instruct patient to call for assistance with activity based on assessment  - Modify environment to reduce risk of injury  - Consider OT/PT consult to assist with strengthening/mobility  Outcome: Progressing  Goal: Maintain or return to baseline ADL function  Description: INTERVENTIONS:  -  Assess patient's ability to carry out ADLs; assess patient's baseline for ADL function and identify physical deficits which impact ability to perform ADLs (bathing, care of mouth/teeth, toileting, grooming, dressing, etc )  - Assess/evaluate cause of self-care deficits   - Assess range of motion  - Assess patient's mobility; develop plan if impaired  - Assess patient's need for assistive devices and provide as appropriate  - Encourage maximum independence but intervene and supervise when necessary  - Involve family in performance of ADLs  - Assess for home care needs following discharge   - Consider OT consult to assist with ADL evaluation and planning for discharge  - Provide patient education as appropriate  Outcome: Progressing  Goal: Maintain or return mobility status to optimal level  Description: INTERVENTIONS:  - Assess patient's baseline mobility status (ambulation, transfers, stairs, etc )    - Identify cognitive and physical deficits and behaviors that affect mobility  - Identify mobility aids required to assist with transfers and/or ambulation (gait belt, sit-to-stand, lift, walker, cane, etc )  - [place] fall precautions as indicated by assessment  - Record patient progress and toleration of activity level on Mobility SBAR; progress patient to next Phase/Stage  - Instruct patient to call for assistance with activity based on assessment  - Consider rehabilitation consult to assist with strengthening/weightbearing, etc   Outcome: Progressing     Problem: DISCHARGE PLANNING  Goal: Discharge to home or other facility with appropriate resources  Description: INTERVENTIONS:  - Identify barriers to discharge w/patient and caregiver  - Arrange for needed discharge resources and transportation as appropriate  - Identify discharge learning needs (meds, wound care, etc )  - Arrange for interpretive services to assist at discharge as needed  - Refer to Case Management Department for coordinating discharge planning if the patient needs post-hospital services based on physician/advanced practitioner order or complex needs related to functional status, cognitive ability, or social support system  Outcome: Progressing     Problem: Knowledge Deficit  Goal: Patient/family/caregiver demonstrates understanding of disease process, treatment plan, medications, and discharge instructions  Description: Complete learning assessment and assess knowledge base    Interventions:  - Provide teaching at level of understanding  - Provide teaching via preferred learning methods  Outcome: Progressing     Problem: METABOLIC, FLUID AND ELECTROLYTES - ADULT  Goal: Glucose maintained within target range  Description: INTERVENTIONS:  - Monitor Blood Glucose as ordered  - Assess for signs and symptoms of hyperglycemia and hypoglycemia  - Administer ordered medications to maintain glucose within target range  - Assess nutritional intake and initiate nutrition service referral as needed  Outcome: Progressing

## 2021-05-14 NOTE — ASSESSMENT & PLAN NOTE
· Suspect metabolic from Mj Griffin  · Resolved  · Avoid deliriogenic medications  · CAM-ICU per protocol

## 2021-05-14 NOTE — ASSESSMENT & PLAN NOTE
Lab Results   Component Value Date    HGBA1C 12 3 (H) 05/09/2021       Recent Labs     05/13/21  1827 05/13/21 2000 05/13/21 2202 05/13/21  2358   POCGLU 279* 145* 73 74       Blood Sugar Average: Last 72 hrs:  (P) 474 [id number]     · Continue insulin infusion  · After advancing to full carb controlled diet today, calculate 24hr insulin requirements and attempt transition to subQ insulin

## 2021-05-14 NOTE — CASE MANAGEMENT
Gisella Rojas from ICU asking for an appointment with [facility] Endocrinology and also an appointment for a diabetic nurse educator  An appointment was set up with Dr Beatrice Stout for Monday, 5/24/21-10:40    An appointment with the diabetic educator-Erasto Vaughan was set up for Friday, 6/4/21-09:30

## 2021-05-14 NOTE — CASE MANAGEMENT
Cm spoke to daughter Kia Arguelles via phone to discuss PT recommendations of post acute rehab services  Daughter was hoping that pt could return home (he lives with her) on discharge  She will discuss with family and let CM know their decision  She does give permission to place referrals to [facility] services for PT/Nursing/Aide  Understands that it is her preference  KELLIE will continue to follow

## 2021-05-14 NOTE — ASSESSMENT & PLAN NOTE
· Secondary to hematemesis  · Patient with 2 episodes of hematemesis 5/11 with subsequent drop in H/H from 14 1 to 9 8 transfused 1 U PRBC  · Slight hemoglobin downtrend over past 24hrs  · If Hgb stable this AM, switch to q12 H/H  · EGD with small ulcers in fundus and small angioectasia in body of stomach s/p APC

## 2021-05-15 ENCOUNTER — APPOINTMENT (INPATIENT)
Dept: RADIOLOGY | Facility: HOSPITAL | Age: 81
DRG: 637 | End: 2021-05-15
Payer: COMMERCIAL

## 2021-05-15 LAB
ANION GAP SERPL CALCULATED.3IONS-SCNC: 11 MMOL/L (ref 4–13)
BACTERIA BLD CULT: NORMAL
BACTERIA BLD CULT: NORMAL
BASOPHILS # BLD AUTO: 0.05 THOUSANDS/ΜL (ref 0–0.1)
BASOPHILS NFR BLD AUTO: 0 % (ref 0–1)
BUN SERPL-MCNC: 32 MG/DL (ref 5–25)
CALCIUM SERPL-MCNC: 8 MG/DL (ref 8.3–10.1)
CHLORIDE SERPL-SCNC: 107 MMOL/L (ref 100–108)
CO2 SERPL-SCNC: 23 MMOL/L (ref 21–32)
CREAT SERPL-MCNC: 2.29 MG/DL (ref 0.6–1.3)
EOSINOPHIL # BLD AUTO: 0.17 THOUSAND/ΜL (ref 0–0.61)
EOSINOPHIL NFR BLD AUTO: 1 % (ref 0–6)
ERYTHROCYTE [DISTWIDTH] IN BLOOD BY AUTOMATED COUNT: 15.1 % (ref 11.6–15.1)
GFR SERPL CREATININE-BSD FRML MDRD: 30 ML/MIN/1.73SQ M
GLUCOSE SERPL-MCNC: 192 MG/DL (ref 65–140)
GLUCOSE SERPL-MCNC: 206 MG/DL (ref 65–140)
GLUCOSE SERPL-MCNC: 223 MG/DL (ref 65–140)
GLUCOSE SERPL-MCNC: 223 MG/DL (ref 65–140)
HCT VFR BLD AUTO: 25.3 % (ref 36.5–49.3)
HGB BLD-MCNC: 7.3 G/DL (ref 12–17)
IMM GRANULOCYTES # BLD AUTO: 0.13 THOUSAND/UL (ref 0–0.2)
IMM GRANULOCYTES NFR BLD AUTO: 1 % (ref 0–2)
LYMPHOCYTES # BLD AUTO: 1.26 THOUSANDS/ΜL (ref 0.6–4.47)
LYMPHOCYTES NFR BLD AUTO: 11 % (ref 14–44)
MCH RBC QN AUTO: 29.6 PG (ref 26.8–34.3)
MCHC RBC AUTO-ENTMCNC: 28.9 G/DL (ref 31.4–37.4)
MCV RBC AUTO: 102 FL (ref 82–98)
MONOCYTES # BLD AUTO: 0.97 THOUSAND/ΜL (ref 0.17–1.22)
MONOCYTES NFR BLD AUTO: 8 % (ref 4–12)
NEUTROPHILS # BLD AUTO: 9.34 THOUSANDS/ΜL (ref 1.85–7.62)
NEUTS SEG NFR BLD AUTO: 79 % (ref 43–75)
NRBC BLD AUTO-RTO: 0 /100 WBCS
PLATELET # BLD AUTO: 152 THOUSANDS/UL (ref 149–390)
PMV BLD AUTO: 12.3 FL (ref 8.9–12.7)
POTASSIUM SERPL-SCNC: 3.8 MMOL/L (ref 3.5–5.3)
RBC # BLD AUTO: 2.47 MILLION/UL (ref 3.88–5.62)
SODIUM SERPL-SCNC: 141 MMOL/L (ref 136–145)
WBC # BLD AUTO: 11.92 THOUSAND/UL (ref 4.31–10.16)

## 2021-05-15 PROCEDURE — 82948 REAGENT STRIP/BLOOD GLUCOSE: CPT

## 2021-05-15 PROCEDURE — C9113 INJ PANTOPRAZOLE SODIUM, VIA: HCPCS | Performed by: PHYSICIAN ASSISTANT

## 2021-05-15 PROCEDURE — 74018 RADEX ABDOMEN 1 VIEW: CPT

## 2021-05-15 PROCEDURE — 99233 SBSQ HOSP IP/OBS HIGH 50: CPT | Performed by: INTERNAL MEDICINE

## 2021-05-15 PROCEDURE — 80048 BASIC METABOLIC PNL TOTAL CA: CPT | Performed by: INTERNAL MEDICINE

## 2021-05-15 PROCEDURE — 85025 COMPLETE CBC W/AUTO DIFF WBC: CPT | Performed by: INTERNAL MEDICINE

## 2021-05-15 RX ORDER — POLYETHYLENE GLYCOL 3350 17 G/17G
17 POWDER, FOR SOLUTION ORAL DAILY PRN
Status: DISCONTINUED | OUTPATIENT
Start: 2021-05-15 | End: 2021-05-20 | Stop reason: HOSPADM

## 2021-05-15 RX ORDER — PANTOPRAZOLE SODIUM 40 MG/1
40 TABLET, DELAYED RELEASE ORAL
Status: DISCONTINUED | OUTPATIENT
Start: 2021-05-15 | End: 2021-05-20 | Stop reason: HOSPADM

## 2021-05-15 RX ADMIN — INSULIN LISPRO 4 UNITS: 100 INJECTION, SOLUTION INTRAVENOUS; SUBCUTANEOUS at 20:02

## 2021-05-15 RX ADMIN — INSULIN GLARGINE 30 UNITS: 100 INJECTION, SOLUTION SUBCUTANEOUS at 08:47

## 2021-05-15 RX ADMIN — PRAVASTATIN SODIUM 40 MG: 40 TABLET ORAL at 20:02

## 2021-05-15 RX ADMIN — FUROSEMIDE 40 MG: 40 TABLET ORAL at 08:47

## 2021-05-15 RX ADMIN — DOCUSATE SODIUM 100 MG: 100 CAPSULE ORAL at 20:01

## 2021-05-15 RX ADMIN — LABETALOL HYDROCHLORIDE 200 MG: 100 TABLET, FILM COATED ORAL at 22:09

## 2021-05-15 RX ADMIN — INSULIN LISPRO 10 UNITS: 100 INJECTION, SOLUTION INTRAVENOUS; SUBCUTANEOUS at 08:48

## 2021-05-15 RX ADMIN — INSULIN LISPRO 10 UNITS: 100 INJECTION, SOLUTION INTRAVENOUS; SUBCUTANEOUS at 20:02

## 2021-05-15 RX ADMIN — PANTOPRAZOLE SODIUM 40 MG: 40 TABLET, DELAYED RELEASE ORAL at 20:02

## 2021-05-15 RX ADMIN — INSULIN LISPRO 8 UNITS: 100 INJECTION, SOLUTION INTRAVENOUS; SUBCUTANEOUS at 08:48

## 2021-05-15 RX ADMIN — INSULIN LISPRO 10 UNITS: 100 INJECTION, SOLUTION INTRAVENOUS; SUBCUTANEOUS at 12:29

## 2021-05-15 RX ADMIN — FINASTERIDE 5 MG: 5 TABLET, FILM COATED ORAL at 08:47

## 2021-05-15 RX ADMIN — INSULIN LISPRO 4 UNITS: 100 INJECTION, SOLUTION INTRAVENOUS; SUBCUTANEOUS at 12:29

## 2021-05-15 RX ADMIN — PANTOPRAZOLE SODIUM 40 MG: 40 INJECTION, POWDER, FOR SOLUTION INTRAVENOUS at 08:47

## 2021-05-15 NOTE — ASSESSMENT & PLAN NOTE
· Episodes x2 5/11-5/12  · Intubated due to aspiration No clear blood in oropharynx  · Clotted blood from endotracheal tube  · EGD with small gastric fundus ulcer and angioectasia s/p APC  · Protonix  changed to oral  · Monitor hemoglobin

## 2021-05-15 NOTE — PROGRESS NOTES
[facility]  Progress Note - Euna Nissen 1940, 79 y o  male MRN: [id number]  Unit/Bed#: MS Amanda-Shree Encounter: [id number]  Primary Care Provider: Real Joshi MD   Date and time admitted to hospital: 5/9/2021  4:12 PM    Gastric ulcer  Assessment & Plan  · Gastric fundus ulcer with angioectasia on EGD s/p APC  · Biopsy sent to r/o H  Pylori  · Repeat EGD in 3 months  · Transition protonix to 40mg PO   · GI signed off    Aspiration into airway  Assessment & Plan  · Patient with hypoxia, gurgling respirations after hematemesis  · Intubated for airway protection  · Successfully extubated on 5/13    Hematemesis  Assessment & Plan  · Episodes x2 5/11-5/12  · Intubated due to aspiration No clear blood in oropharynx  · Clotted blood from endotracheal tube  · EGD with small gastric fundus ulcer and angioectasia s/p APC  · Protonix  changed to oral  · Monitor hemoglobin    New onset type 2 diabetes mellitus [facility])  Assessment & Plan  Lab Results   Component Value Date    HGBA1C 12 3 (H) 05/09/2021       Recent Labs     05/14/21  1555 05/14/21  2033 05/15/21  0741 05/15/21  1131   POCGLU 195* 209* 223* 206*       Blood Sugar Average: Last 72 hrs:  (P) 178     · Was in ICU and insulin infusion  · Now started on Lantus and lispro  · Monitor closely    Endocrinology follow-up as outpatient    Dementia [facility])  Assessment & Plan  · At baseline    Dysphagia  Assessment & Plan  · Tolerating diet    Acute metabolic encephalopathy  Assessment & Plan  · Suspect metabolic from [facility] [place]  · Resolved  · Avoid deliriogenic medications      Late onset 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 ([facility] [address] )  Assessment & Plan  · At baseline    Chronic kidney disease  Assessment & Plan  Lab Results   Component Value Date    EGFR 30 05/15/2021    EGFR 32 05/14/2021    EGFR 26 05/13/2021    CREATININE 2 29 (H) 05/15/2021    CREATININE 2 19 (H) 05/14/2021    CREATININE 2 59 (H) 05/13/2021     · Presented with MILE, slightly worsened again by shock state  · Monitor closely, trend BUN/Cr    BPH (benign prostatic hyperplasia)  Assessment & Plan  · Now with soto cath in place  · Continue proscar    Acute blood loss anemia (ABLA)  Assessment & Plan  · Secondary to hematemesis  · Patient with 2 episodes of hematemesis  with subsequent drop in H/H from 14 1 to 9 8 transfused 1 U PRBC  · Stable now  Monitor hemoglobin closely  Other hyperlipidemia  Assessment & Plan  · Continue statin    * Hyperosmolar hyperglycemic state (HHS) [facility])  Assessment & Plan  Lab Results   Component Value Date    HGBA1C 12 3 (H) 2021       Recent Labs     21  1555 21  2033 05/15/21  0741 05/15/21  1131   POCGLU 195* 209* 223* 206*       Blood Sugar Average: Last 72 hrs:  (P) 178   · Improved and transferred out of medical ICU  · Started on Lantus 30 units daily and lispro 10 units before each meal   · Continue sliding scale  VTE Pharmacologic Prophylaxis:   Pharmacologic: Pharmacologic VTE Prophylaxis contraindicated due to Hematemesis  Mechanical VTE Prophylaxis in Place: Yes    Patient Centered Rounds: I have performed bedside rounds with nursing staff today  Discussions with Specialists or Other Care Team Provider:     Education and Discussions with Family / Patient:  Patient, daughter    Time Spent for Care: More than 50% of total time spent on counseling and coordination of care as described above  Current Length of Stay: 6 day(s)    Current Patient Status: Inpatient   Certification Statement: The patient will continue to require additional inpatient hospital stay due to See above    Discharge Plan:  24-48 hours    Code Status: Level 1 - Full Code      Subjective:   I have seen and examined the patient bedside this morning  Patient denies any new complaints  No active bleeding  No chest pain, palpitation or shortness of breath      Objective:     Vitals:   Temp (24hrs), Av 7 °F (37 6 °C), Min:98 7 °F (37 1 °C), Max:100 4 °F (38 °C)    Temp:  [98 7 °F (37 1 °C)-100 4 °F (38 °C)] 98 7 °F (37 1 °C)  HR:  [] 100  Resp:  [18-20] 18  BP: (112-139)/(61-78) 112/61  SpO2:  [93 %-100 %] 93 %  Body mass index is 31 45 kg/m²  Input and Output Summary (last 24 hours): Intake/Output Summary (Last 24 hours) at 5/15/2021 1533  Last data filed at 5/15/2021 0300  Gross per 24 hour   Intake --   Output 432 ml   Net -432 ml       Physical Exam:     Physical Exam  General- Awake, alert, looks comfortable  HEENT- Normocephalic, atraumatic  CVS- Normal S1/ S2, Regular rate and rhythm  Respiratory system- B/L clear breath sounds  Abdomen- Soft, Non distended, no tenderness, Bowel sound- present  CNS- No acute focal neurologic deficit noted    Additional Data:     Labs:    Results from last 7 days   Lab Units 05/15/21  1034   WBC Thousand/uL 11 92*   HEMOGLOBIN g/dL 7 3*   HEMATOCRIT % 25 3*   PLATELETS Thousands/uL 152   NEUTROS PCT % 79*   LYMPHS PCT % 11*   MONOS PCT % 8   EOS PCT % 1     Results from last 7 days   Lab Units 05/15/21  1034  05/09/21  1628   SODIUM mmol/L 141   < > 132*   POTASSIUM mmol/L 3 8   < > 4 8   CHLORIDE mmol/L 107   < > 95*   CO2 mmol/L 23   < > 23   BUN mg/dL 32*   < > 42*   CREATININE mg/dL 2 29*   < > 2 63*   ANION GAP mmol/L 11   < > 14*   CALCIUM mg/dL 8 0*   < > 8 9   ALBUMIN g/dL  --   --  3 6   TOTAL BILIRUBIN mg/dL  --   --  0 70   ALK PHOS U/L  --   --  114   ALT U/L  --   --  29   AST U/L  --   --  17   GLUCOSE RANDOM mg/dL 223*   < > 796*    < > = values in this interval not displayed       Results from last 7 days   Lab Units 05/12/21  0349   INR  1 15     Results from last 7 days   Lab Units 05/15/21  1131 05/15/21  0741 05/14/21  2033 05/14/21  1555 05/14/21  1052 05/14/21  1010 05/14/21  0803 05/14/21  0713 05/14/21  8927 05/14/21  0452 05/14/21  0207 05/13/21  2358   POC GLUCOSE mg/dl 206* 223* 209* 195* 239* 244* 93 121 140 121 88 74     Results from last 7 days   Lab Units 05/09/21  1628   HEMOGLOBIN A1C % 12 3*     Results from last 7 days   Lab Units 05/14/21  1436 05/12/21  0616 05/12/21  0354 05/12/21  0349 05/11/21  1522 05/10/21  0458 05/09/21  1628   LACTIC ACID mmol/L 1 8 3 9*  --  4 2*  --   --  1 6   PROCALCITONIN ng/ml  --   --  0 32*  --  0 13 0 28* 0 22           * I Have Reviewed All Lab Data Listed Above  * Additional Pertinent Lab Tests Reviewed: All Labs Within Last 24 Hours Reviewed    Imaging:    Imaging Reports Reviewed Today Include:   Imaging Personally Reviewed by Myself Includes:      Recent Cultures (last 7 days):     Results from last 7 days   Lab Units 05/09/21  1728 05/09/21  1628   BLOOD CULTURE  No Growth After 5 Days  No Growth After 5 Days         Last 24 Hours Medication List:   Current Facility-Administered Medications   Medication Dose Route Frequency Provider Last Rate    acetaminophen  650 mg Oral Q6H PRN Sania Green PA-C      calcium carbonate  1,000 mg Oral Daily PRN Sania Green PA-C      docusate sodium  100 mg Oral BID Sania Green PA-C      fentanyl citrate (PF)  50 mcg Intravenous Q1H PRN Sania Green PA-C      finasteride  5 mg Oral Daily Sania Green PA-C      furosemide  40 mg Oral Daily Odilia Medina      hydrALAZINE  5 mg Intravenous Q6H PRN Sania Green PA-C      insulin glargine  30 Units Subcutaneous QAM Sania Green PA-C      insulin lispro  10 Units Subcutaneous TID With Meals Sania Green PA-C      insulin lispro  4-20 Units Subcutaneous TID With Meals Sania Green PA-C      insulin lispro  4-20 Units Subcutaneous HS Sania Green PA-C      labetalol  200 mg Oral HS Sania Green PA-C      ondansetron  4 mg Intravenous Q6H PRN Sania Green PA-C      pantoprazole  40 mg Intravenous Q12H [address] Sania Green PA-C      pravastatin  40 mg Oral Daily With Dinner Sania Green PA-C      senna  1 tablet Oral Daily Sania Green PA-C          Today, Patient Was Seen By: Emeterio Bentley MD    ** Please Note: Dictation voice to text software may have been used in the creation of this document   **

## 2021-05-15 NOTE — ASSESSMENT & PLAN NOTE
Lab Results   Component Value Date    HGBA1C 12 3 (H) 05/09/2021       Recent Labs     05/14/21  1555 05/14/21  2033 05/15/21  0741 05/15/21  1131   POCGLU 195* 209* 223* 206*       Blood Sugar Average: Last 72 hrs:  (P) 178   · Improved and transferred out of medical ICU  · Started on Lantus 30 units daily and lispro 10 units before each meal   · Continue sliding scale

## 2021-05-15 NOTE — ASSESSMENT & PLAN NOTE
· Secondary to hematemesis  · Patient with 2 episodes of hematemesis 5/11 with subsequent drop in H/H from 14 1 to 9 8 transfused 1 U PRBC  · Stable now  Monitor hemoglobin closely

## 2021-05-15 NOTE — ASSESSMENT & PLAN NOTE
· Gastric fundus ulcer with angioectasia on EGD s/p APC  · Biopsy sent to r/o H   Pylori  · Repeat EGD in 3 months  · Transition protonix to 40mg PO   · GI signed off

## 2021-05-15 NOTE — ASSESSMENT & PLAN NOTE
Lab Results   Component Value Date    EGFR 30 05/15/2021    EGFR 32 05/14/2021    EGFR 26 05/13/2021    CREATININE 2 29 (H) 05/15/2021    CREATININE 2 19 (H) 05/14/2021    CREATININE 2 59 (H) 05/13/2021     · Presented with MILE, slightly worsened again by shock state  · Monitor closely, trend BUN/Cr

## 2021-05-15 NOTE — ASSESSMENT & PLAN NOTE
Lab Results   Component Value Date    HGBA1C 12 3 (H) 05/09/2021       Recent Labs     05/14/21  1555 05/14/21  2033 05/15/21  0741 05/15/21  1131   POCGLU 195* 209* 223* 206*       Blood Sugar Average: Last 72 hrs:  (P) 178     · Was in ICU and insulin infusion  · Now started on Lantus and lispro  · Monitor closely    Endocrinology follow-up as outpatient

## 2021-05-16 LAB
ABO GROUP BLD BPU: NORMAL
ABO GROUP BLD BPU: NORMAL
ABO GROUP BLD: NORMAL
ANION GAP SERPL CALCULATED.3IONS-SCNC: 8 MMOL/L (ref 4–13)
BASOPHILS # BLD AUTO: 0.06 THOUSANDS/ΜL (ref 0–0.1)
BASOPHILS NFR BLD AUTO: 1 % (ref 0–1)
BLD GP AB SCN SERPL QL: NEGATIVE
BPU ID: NORMAL
BPU ID: NORMAL
BUN SERPL-MCNC: 28 MG/DL (ref 5–25)
CALCIUM SERPL-MCNC: 8 MG/DL (ref 8.3–10.1)
CHLORIDE SERPL-SCNC: 106 MMOL/L (ref 100–108)
CO2 SERPL-SCNC: 28 MMOL/L (ref 21–32)
CREAT SERPL-MCNC: 2.36 MG/DL (ref 0.6–1.3)
CROSSMATCH: NORMAL
CROSSMATCH: NORMAL
EOSINOPHIL # BLD AUTO: 0.2 THOUSAND/ΜL (ref 0–0.61)
EOSINOPHIL NFR BLD AUTO: 2 % (ref 0–6)
ERYTHROCYTE [DISTWIDTH] IN BLOOD BY AUTOMATED COUNT: 15.1 % (ref 11.6–15.1)
GFR SERPL CREATININE-BSD FRML MDRD: 29 ML/MIN/1.73SQ M
GLUCOSE SERPL-MCNC: 140 MG/DL (ref 65–140)
GLUCOSE SERPL-MCNC: 177 MG/DL (ref 65–140)
GLUCOSE SERPL-MCNC: 179 MG/DL (ref 65–140)
GLUCOSE SERPL-MCNC: 185 MG/DL (ref 65–140)
GLUCOSE SERPL-MCNC: 186 MG/DL (ref 65–140)
HCT VFR BLD AUTO: 21 % (ref 36.5–49.3)
HCT VFR BLD AUTO: 22.2 % (ref 36.5–49.3)
HCT VFR BLD AUTO: 27.2 % (ref 36.5–49.3)
HGB BLD-MCNC: 6.6 G/DL (ref 12–17)
HGB BLD-MCNC: 7 G/DL (ref 12–17)
HGB BLD-MCNC: 8.8 G/DL (ref 12–17)
IMM GRANULOCYTES # BLD AUTO: 0.3 THOUSAND/UL (ref 0–0.2)
IMM GRANULOCYTES NFR BLD AUTO: 2 % (ref 0–2)
LYMPHOCYTES # BLD AUTO: 1.85 THOUSANDS/ΜL (ref 0.6–4.47)
LYMPHOCYTES NFR BLD AUTO: 15 % (ref 14–44)
MCH RBC QN AUTO: 30.1 PG (ref 26.8–34.3)
MCHC RBC AUTO-ENTMCNC: 31.1 G/DL (ref 31.4–37.4)
MCV RBC AUTO: 97 FL (ref 82–98)
MONOCYTES # BLD AUTO: 1.26 THOUSAND/ΜL (ref 0.17–1.22)
MONOCYTES NFR BLD AUTO: 10 % (ref 4–12)
NEUTROPHILS # BLD AUTO: 9 THOUSANDS/ΜL (ref 1.85–7.62)
NEUTS SEG NFR BLD AUTO: 70 % (ref 43–75)
NRBC BLD AUTO-RTO: 1 /100 WBCS
PLATELET # BLD AUTO: 206 THOUSANDS/UL (ref 149–390)
PMV BLD AUTO: 11.9 FL (ref 8.9–12.7)
POTASSIUM SERPL-SCNC: 3.5 MMOL/L (ref 3.5–5.3)
RBC # BLD AUTO: 2.29 MILLION/UL (ref 3.88–5.62)
RH BLD: POSITIVE
SODIUM SERPL-SCNC: 142 MMOL/L (ref 136–145)
SPECIMEN EXPIRATION DATE: NORMAL
UNIT DISPENSE STATUS: NORMAL
UNIT DISPENSE STATUS: NORMAL
UNIT PRODUCT CODE: NORMAL
UNIT PRODUCT CODE: NORMAL
UNIT RH: NORMAL
UNIT RH: NORMAL
WBC # BLD AUTO: 12.67 THOUSAND/UL (ref 4.31–10.16)

## 2021-05-16 PROCEDURE — 86901 BLOOD TYPING SEROLOGIC RH(D): CPT | Performed by: INTERNAL MEDICINE

## 2021-05-16 PROCEDURE — P9016 RBC LEUKOCYTES REDUCED: HCPCS

## 2021-05-16 PROCEDURE — 86900 BLOOD TYPING SEROLOGIC ABO: CPT | Performed by: INTERNAL MEDICINE

## 2021-05-16 PROCEDURE — 99233 SBSQ HOSP IP/OBS HIGH 50: CPT | Performed by: INTERNAL MEDICINE

## 2021-05-16 PROCEDURE — 85018 HEMOGLOBIN: CPT | Performed by: INTERNAL MEDICINE

## 2021-05-16 PROCEDURE — 86850 RBC ANTIBODY SCREEN: CPT | Performed by: INTERNAL MEDICINE

## 2021-05-16 PROCEDURE — 80048 BASIC METABOLIC PNL TOTAL CA: CPT | Performed by: INTERNAL MEDICINE

## 2021-05-16 PROCEDURE — 85025 COMPLETE CBC W/AUTO DIFF WBC: CPT | Performed by: INTERNAL MEDICINE

## 2021-05-16 PROCEDURE — 85014 HEMATOCRIT: CPT | Performed by: INTERNAL MEDICINE

## 2021-05-16 PROCEDURE — 86920 COMPATIBILITY TEST SPIN: CPT

## 2021-05-16 PROCEDURE — 82948 REAGENT STRIP/BLOOD GLUCOSE: CPT

## 2021-05-16 RX ADMIN — PANTOPRAZOLE SODIUM 40 MG: 40 TABLET, DELAYED RELEASE ORAL at 09:00

## 2021-05-16 RX ADMIN — DOCUSATE SODIUM 100 MG: 100 CAPSULE ORAL at 09:00

## 2021-05-16 RX ADMIN — INSULIN LISPRO 10 UNITS: 100 INJECTION, SOLUTION INTRAVENOUS; SUBCUTANEOUS at 09:00

## 2021-05-16 RX ADMIN — FUROSEMIDE 40 MG: 40 TABLET ORAL at 09:00

## 2021-05-16 RX ADMIN — INSULIN GLARGINE 30 UNITS: 100 INJECTION, SOLUTION SUBCUTANEOUS at 09:00

## 2021-05-16 RX ADMIN — INSULIN LISPRO 4 UNITS: 100 INJECTION, SOLUTION INTRAVENOUS; SUBCUTANEOUS at 16:54

## 2021-05-16 RX ADMIN — INSULIN LISPRO 4 UNITS: 100 INJECTION, SOLUTION INTRAVENOUS; SUBCUTANEOUS at 22:13

## 2021-05-16 RX ADMIN — STANDARDIZED SENNA CONCENTRATE 8.6 MG: 8.6 TABLET ORAL at 09:00

## 2021-05-16 RX ADMIN — IRON SUCROSE 300 MG: 20 INJECTION, SOLUTION INTRAVENOUS at 10:24

## 2021-05-16 RX ADMIN — INSULIN LISPRO 10 UNITS: 100 INJECTION, SOLUTION INTRAVENOUS; SUBCUTANEOUS at 16:54

## 2021-05-16 RX ADMIN — PRAVASTATIN SODIUM 40 MG: 40 TABLET ORAL at 16:54

## 2021-05-16 RX ADMIN — INSULIN LISPRO 4 UNITS: 100 INJECTION, SOLUTION INTRAVENOUS; SUBCUTANEOUS at 09:00

## 2021-05-16 RX ADMIN — LABETALOL HYDROCHLORIDE 200 MG: 100 TABLET, FILM COATED ORAL at 22:11

## 2021-05-16 RX ADMIN — PANTOPRAZOLE SODIUM 40 MG: 40 TABLET, DELAYED RELEASE ORAL at 16:54

## 2021-05-16 RX ADMIN — DOCUSATE SODIUM 100 MG: 100 CAPSULE ORAL at 16:55

## 2021-05-16 RX ADMIN — INSULIN LISPRO 10 UNITS: 100 INJECTION, SOLUTION INTRAVENOUS; SUBCUTANEOUS at 12:55

## 2021-05-16 RX ADMIN — INSULIN LISPRO 4 UNITS: 100 INJECTION, SOLUTION INTRAVENOUS; SUBCUTANEOUS at 12:55

## 2021-05-16 RX ADMIN — FINASTERIDE 5 MG: 5 TABLET, FILM COATED ORAL at 09:00

## 2021-05-16 NOTE — ASSESSMENT & PLAN NOTE
· Secondary to hematemesis  · Patient with 2 episodes of hematemesis 5/11 with subsequent drop in H/H from 14 1 to 9 8 transfused 1 U PRBC  · Transferred out of medical ICU now  · EGD done on May 12th, small ulcers and small angio ectasia  · Hemoglobin still dropping  6 6 today  Iron was low  Will give 1 unit PRBC transfusion and Venofer    · GI evaluation again for further endoscopic evaluation

## 2021-05-16 NOTE — ASSESSMENT & PLAN NOTE
Lab Results   Component Value Date    HGBA1C 12 3 (H) 05/09/2021       Recent Labs     05/15/21  1131 05/15/21  1549 05/16/21  0723 05/16/21  1137   POCGLU 206* 192* 179* 186*       Blood Sugar Average: Last 72 hrs:  (P) 178 [id number]   · Improved and transferred out of medical ICU  · Started on Lantus 30 units daily and lispro 10 units before each meal   · Continue sliding scale    Stable

## 2021-05-16 NOTE — ASSESSMENT & PLAN NOTE
Lab Results   Component Value Date    HGBA1C 12 3 (H) 05/09/2021       Recent Labs     05/15/21  1131 05/15/21  1549 05/16/21  0723 05/16/21  1137   POCGLU 206* 192* 179* 186*       Blood Sugar Average: Last 72 hrs:  (P) 178 [id number]     · Was in ICU and insulin infusion  · Now started on Lantus and lispro  · Monitor closely    Endocrinology follow-up as outpatient Private car

## 2021-05-16 NOTE — ASSESSMENT & PLAN NOTE
· Episodes x2 5/11-5/12  · Intubated due to aspiration No clear blood in oropharynx  · Clotted blood from endotracheal tube  · EGD with small gastric fundus ulcer and angioectasia s/p APC  · Protonix  changed to oral  · Hemoglobin dropping again, blood transfusion  Monitor hemoglobin    GI for re evaluation

## 2021-05-16 NOTE — PROGRESS NOTES
[facility]  Progress Note - Ronak Hsieh 1940, 79 y o  male MRN: [id number]  Unit/Bed#: MS Beata Encounter: [id number]  Primary Care Provider: Clemencia Lombardi MD   Date and time admitted to hospital: 5/9/2021  4:12 PM    Gastric ulcer  Assessment & Plan  · Gastric fundus ulcer with angioectasia on EGD s/p APC  · Biopsy sent to r/o H  Pylori  · Repeat EGD in 3 months  · Transition protonix to 40mg PO   · Will consult GI again for dropping hemoglobin    Aspiration into airway  Assessment & Plan  · Patient with hypoxia, gurgling respirations after hematemesis  · Intubated for airway protection  · Successfully extubated on 5/13    Hematemesis  Assessment & Plan  · Episodes x2 5/11-5/12  · Intubated due to aspiration No clear blood in oropharynx  · Clotted blood from endotracheal tube  · EGD with small gastric fundus ulcer and angioectasia s/p APC  · Protonix  changed to oral  · Hemoglobin dropping again, blood transfusion  Monitor hemoglobin  GI for re evaluation    New onset type 2 diabetes mellitus [facility])  Assessment & Plan  Lab Results   Component Value Date    HGBA1C 12 3 (H) 05/09/2021       Recent Labs     05/15/21  1131 05/15/21  1549 05/16/21  0723 05/16/21  1137   POCGLU 206* 192* 179* 186*       Blood Sugar Average: Last 72 hrs:  (P) 178 [id number]     · Was in ICU and insulin infusion  · Now started on Lantus and lispro  · Monitor closely    Endocrinology follow-up as outpatient    Dementia [facility])  Assessment & Plan  · At baseline    Dysphagia  Assessment & Plan  · Tolerating diet    Acute metabolic encephalopathy  Assessment & Plan  · Suspect metabolic from Mj Griffin  · Resolved  · Avoid deliriogenic medications      Late onset 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 ([facility] [address] )  Assessment & Plan  · At baseline    Chronic kidney disease  Assessment & Plan  Lab Results   Component Value Date    EGFR 29 05/16/2021    EGFR 30 05/15/2021    EGFR 32 05/14/2021 CREATININE 2 36 (H) 05/16/2021    CREATININE 2 29 (H) 05/15/2021    CREATININE 2 19 (H) 05/14/2021     · Presented with MILE, slightly worsened again by shock state  · Monitor closely, trend BUN/Cr    BPH (benign prostatic hyperplasia)  Assessment & Plan  Continue proscar    Acute blood loss anemia (ABLA)  Assessment & Plan  · Secondary to hematemesis  · Patient with 2 episodes of hematemesis 5/11 with subsequent drop in H/H from 14 1 to 9 8 transfused 1 U PRBC  · Transferred out of medical ICU now  · EGD done on May 12th, small ulcers and small angio ectasia  · Hemoglobin still dropping  6 6 today  Iron was low  Will give 1 unit PRBC transfusion and Venofer  · GI evaluation again for further endoscopic evaluation    Other hyperlipidemia  Assessment & Plan  · Continue statin    * Hyperosmolar hyperglycemic state (HHS) [facility])  Assessment & Plan  Lab Results   Component Value Date    HGBA1C 12 3 (H) 05/09/2021       Recent Labs     05/15/21  1131 05/15/21  1549 05/16/21  0723 05/16/21  1137   POCGLU 206* 192* 179* 186*       Blood Sugar Average: Last 72 hrs:  (P) 178 [id number]   · Improved and transferred out of medical ICU  · Started on Lantus 30 units daily and lispro 10 units before each meal   · Continue sliding scale  Stable        VTE Pharmacologic Prophylaxis: VTE Score: 4 Contraindication because of anemia and bleeding    Patient Centered Rounds: I performed bedside rounds with nursing staff today  Discussions with Specialists or Other Care Team Provider:  GI    Education and Discussions with Family / Patient: Updated  (daughter) at bedside  Time Spent for Care: More than 50% of total time spent on counseling and coordination of care as described above      Current Length of Stay: 7 day(s)  Current Patient Status: Inpatient   Certification Statement: The patient will continue to require additional inpatient hospital stay due to See above  Discharge Plan: Anticipate discharge in 50 hrs to home with home services  Code Status: Level 1 - Full Code    Subjective:   I have seen and examined the patient bedside this morning  Patient denies any new complaints  Yesterday he complained about abdominal pain, abdominal x-ray did not show any acute findings  No abdominal pain now  However his hemoglobin 7 this morning, 6 6 later on  No active bleeding noted  Objective:     Vitals:   Temp (24hrs), Av 1 °F (37 3 °C), Min:98 7 °F (37 1 °C), Max:99 5 °F (37 5 °C)    Temp:  [98 7 °F (37 1 °C)-99 5 °F (37 5 °C)] 99 5 °F (37 5 °C)  HR:  [] 84  Resp:  [18] 18  BP: (112-141)/(61-80) 135/66  SpO2:  [91 %-93 %] 91 %  Body mass index is 31 45 kg/m²  Input and Output Summary (last 24 hours):      Intake/Output Summary (Last 24 hours) at 2021 1249  Last data filed at 2021 0000  Gross per 24 hour   Intake --   Output 1080 ml   Net -1080 ml       Physical Exam:   Physical Exam General- Awake, alert, looks comfortable  HEENT- Normocephalic, atraumatic  CVS- Normal S1/ S2, Regular rate and rhythm  Respiratory system- B/L clear breath sounds  Abdomen- Soft, Non distended, no tenderness, Bowel sound- present  CNS- No acute focal neurologic deficit noted    Additional Data:     Labs:  Results from last 7 days   Lab Units 21  1202 21  0527   WBC Thousand/uL  --  12 67*   HEMOGLOBIN g/dL 6 6* 7 0*   HEMATOCRIT % 21 0* 22 2*   PLATELETS Thousands/uL  --  206   NEUTROS PCT %  --  70   LYMPHS PCT %  --  15   MONOS PCT %  --  10   EOS PCT %  --  2     Results from last 7 days   Lab Units 21  0527  21  1628   SODIUM mmol/L 142   < > 132*   POTASSIUM mmol/L 3 5   < > 4 8   CHLORIDE mmol/L 106   < > 95*   CO2 mmol/L 28   < > 23   BUN mg/dL 28*   < > 42*   CREATININE mg/dL 2 36*   < > 2 63*   ANION GAP mmol/L 8   < > 14*   CALCIUM mg/dL 8 0*   < > 8 9   ALBUMIN g/dL  --   --  3 6   TOTAL BILIRUBIN mg/dL  --   --  0 70   ALK PHOS U/L  --   --  114   ALT U/L  --   --  29   AST U/L  --   --  17   GLUCOSE RANDOM mg/dL 140   < > 796*    < > = values in this interval not displayed  Results from last 7 days   Lab Units 05/12/21  0349   INR  1 15     Results from last 7 days   Lab Units 05/16/21  1137 05/16/21  0723 05/15/21  1549 05/15/21  1131 05/15/21  0741 05/14/21  2033 05/14/21  1555 05/14/21  1052 05/14/21  1010 05/14/21  0803 05/14/21  0713 05/14/21  0638   POC GLUCOSE mg/dl 186* 179* 192* 206* 223* 209* 195* 239* 244* 93 121 140     Results from last 7 days   Lab Units 05/09/21  1628   HEMOGLOBIN A1C % 12 3*     Results from last 7 days   Lab Units 05/14/21  1436 05/12/21  0616 05/12/21  0354 05/12/21  0349 05/11/21  1522 05/10/21  0458 05/09/21  1628   LACTIC ACID mmol/L 1 8 3 9*  --  4 2*  --   --  1 6   PROCALCITONIN ng/ml  --   --  0 32*  --  0 13 0 28* 0 22       Lines/Drains:  Invasive Devices     Peripheral Intravenous Line            Peripheral IV 05/12/21 Proximal;Right;Ventral (anterior) Forearm 4 days    Peripheral IV 05/13/21 Distal;Right;Upper;Ventral (anterior) Arm 3 days          Drain            External Urinary Catheter Medium less than 1 day                      Imaging: Personally reviewed the following imaging: Abdomen x-ray    Recent Cultures (last 7 days):   Results from last 7 days   Lab Units 05/09/21  1728 05/09/21  1628   BLOOD CULTURE  No Growth After 5 Days  No Growth After 5 Days         Last 24 Hours Medication List:   Current Facility-Administered Medications   Medication Dose Route Frequency Provider Last Rate    acetaminophen  650 mg Oral Q6H PRN GHANSHYAM Romano-ALEIDA      calcium carbonate  1,000 mg Oral Daily PRN Sandra Li PA-C      docusate sodium  100 mg Oral BID Sandra Li PA-C      fentanyl citrate (PF)  50 mcg Intravenous Q1H PRN Sandra Li PA-C      finasteride  5 mg Oral Daily Sandra Li PA-C      furosemide  40 mg Oral Daily Odilia Romano      hydrALAZINE  5 mg Intravenous Q6H PRN Sandra Li PA-C      insulin glargine  30 Units Subcutaneous QAM Shivam Méndez PA-C      insulin lispro  10 Units Subcutaneous TID With Meals Shivam Méndez PA-C      insulin lispro  4-20 Units Subcutaneous TID With Meals Shivam Méndez PA-C      insulin lispro  4-20 Units Subcutaneous HS Shivam Méndez PA-C      labetalol  200 mg Oral HS Shivam Méndez PA-C      ondansetron  4 mg Intravenous Q6H PRN Shivam Méndez PA-C      pantoprazole  40 mg Oral BID AC Neal Nesbitt MD      polyethylene glycol  17 g Oral Daily PRN Neal Nesbitt MD      pravastatin  40 mg Oral Daily With Dinner Shivam Méndez PA-C      senna  1 tablet Oral Daily Shivam Méndez PA-C          Today, Patient Was Seen By: Carlos Calderon MD    **Please Note: This note may have been constructed using a voice recognition system  **

## 2021-05-16 NOTE — ASSESSMENT & PLAN NOTE
Lab Results   Component Value Date    EGFR 29 05/16/2021    EGFR 30 05/15/2021    EGFR 32 05/14/2021    CREATININE 2 36 (H) 05/16/2021    CREATININE 2 29 (H) 05/15/2021    CREATININE 2 19 (H) 05/14/2021     · Presented with MILE, slightly worsened again by shock state  · Monitor closely, trend BUN/Cr

## 2021-05-16 NOTE — PROGRESS NOTES
Hemoglobin this morning 7 0 g/dL  On call SLIM aware  No new orders at this time       Presley Macdonald RN  05/16/21  6:36 AM

## 2021-05-16 NOTE — ASSESSMENT & PLAN NOTE
· Gastric fundus ulcer with angioectasia on EGD s/p APC  · Biopsy sent to r/o H   Pylori  · Repeat EGD in 3 months  · Transition protonix to 40mg PO   · Will consult GI again for dropping hemoglobin

## 2021-05-17 ENCOUNTER — ANESTHESIA EVENT (INPATIENT)
Dept: GASTROENTEROLOGY | Facility: HOSPITAL | Age: 81
DRG: 637 | End: 2021-05-17
Payer: COMMERCIAL

## 2021-05-17 ENCOUNTER — TELEPHONE (OUTPATIENT)
Dept: GERIATRICS | Age: 81
End: 2021-05-17

## 2021-05-17 ENCOUNTER — TELEPHONE (OUTPATIENT)
Dept: GASTROENTEROLOGY | Facility: CLINIC | Age: 81
End: 2021-05-17

## 2021-05-17 ENCOUNTER — ANESTHESIA (INPATIENT)
Dept: GASTROENTEROLOGY | Facility: HOSPITAL | Age: 81
DRG: 637 | End: 2021-05-17
Payer: COMMERCIAL

## 2021-05-17 ENCOUNTER — APPOINTMENT (INPATIENT)
Dept: GASTROENTEROLOGY | Facility: HOSPITAL | Age: 81
DRG: 637 | End: 2021-05-17
Payer: COMMERCIAL

## 2021-05-17 LAB
ABO GROUP BLD BPU: NORMAL
ANION GAP SERPL CALCULATED.3IONS-SCNC: 8 MMOL/L (ref 4–13)
BASOPHILS # BLD MANUAL: 0.12 THOUSAND/UL (ref 0–0.1)
BASOPHILS NFR MAR MANUAL: 1 % (ref 0–1)
BPU ID: NORMAL
BUN SERPL-MCNC: 21 MG/DL (ref 5–25)
CALCIUM SERPL-MCNC: 7.9 MG/DL (ref 8.3–10.1)
CHLORIDE SERPL-SCNC: 106 MMOL/L (ref 100–108)
CO2 SERPL-SCNC: 27 MMOL/L (ref 21–32)
CREAT SERPL-MCNC: 2.08 MG/DL (ref 0.6–1.3)
CROSSMATCH: NORMAL
EOSINOPHIL # BLD MANUAL: 0.24 THOUSAND/UL (ref 0–0.4)
EOSINOPHIL NFR BLD MANUAL: 2 % (ref 0–6)
ERYTHROCYTE [DISTWIDTH] IN BLOOD BY AUTOMATED COUNT: 15.3 % (ref 11.6–15.1)
GFR SERPL CREATININE-BSD FRML MDRD: 34 ML/MIN/1.73SQ M
GLUCOSE SERPL-MCNC: 123 MG/DL (ref 65–140)
GLUCOSE SERPL-MCNC: 133 MG/DL (ref 65–140)
GLUCOSE SERPL-MCNC: 145 MG/DL (ref 65–140)
GLUCOSE SERPL-MCNC: 174 MG/DL (ref 65–140)
GLUCOSE SERPL-MCNC: 194 MG/DL (ref 65–140)
HCT VFR BLD AUTO: 23.5 % (ref 36.5–49.3)
HCT VFR BLD AUTO: 23.8 % (ref 36.5–49.3)
HGB BLD-MCNC: 7.7 G/DL (ref 12–17)
HGB BLD-MCNC: 7.8 G/DL (ref 12–17)
LYMPHOCYTES # BLD AUTO: 1.19 THOUSAND/UL (ref 0.6–4.47)
LYMPHOCYTES # BLD AUTO: 10 % (ref 14–44)
MCH RBC QN AUTO: 31.6 PG (ref 26.8–34.3)
MCHC RBC AUTO-ENTMCNC: 33.2 G/DL (ref 31.4–37.4)
MCV RBC AUTO: 95 FL (ref 82–98)
MONOCYTES # BLD AUTO: 1.19 THOUSAND/UL (ref 0–1.22)
MONOCYTES NFR BLD: 10 % (ref 4–12)
MYELOCYTES NFR BLD MANUAL: 1 % (ref 0–1)
NEUTROPHILS # BLD MANUAL: 9.03 THOUSAND/UL (ref 1.85–7.62)
NEUTS BAND NFR BLD MANUAL: 3 % (ref 0–8)
NEUTS SEG NFR BLD AUTO: 73 % (ref 43–75)
NRBC BLD AUTO-RTO: 1 /100 WBC (ref 0–2)
NRBC BLD AUTO-RTO: 2 /100 WBCS
PLATELET # BLD AUTO: 205 THOUSANDS/UL (ref 149–390)
PLATELET BLD QL SMEAR: ADEQUATE
PMV BLD AUTO: 10.5 FL (ref 8.9–12.7)
POTASSIUM SERPL-SCNC: 3.5 MMOL/L (ref 3.5–5.3)
RBC # BLD AUTO: 2.47 MILLION/UL (ref 3.88–5.62)
SODIUM SERPL-SCNC: 141 MMOL/L (ref 136–145)
TOTAL CELLS COUNTED SPEC: 100
UNIT DISPENSE STATUS: NORMAL
UNIT PRODUCT CODE: NORMAL
UNIT RH: NORMAL
WBC # BLD AUTO: 11.88 THOUSAND/UL (ref 4.31–10.16)

## 2021-05-17 PROCEDURE — 85014 HEMATOCRIT: CPT | Performed by: INTERNAL MEDICINE

## 2021-05-17 PROCEDURE — 88342 IMHCHEM/IMCYTCHM 1ST ANTB: CPT | Performed by: PATHOLOGY

## 2021-05-17 PROCEDURE — 43239 EGD BIOPSY SINGLE/MULTIPLE: CPT | Performed by: INTERNAL MEDICINE

## 2021-05-17 PROCEDURE — 80048 BASIC METABOLIC PNL TOTAL CA: CPT | Performed by: INTERNAL MEDICINE

## 2021-05-17 PROCEDURE — 99232 SBSQ HOSP IP/OBS MODERATE 35: CPT | Performed by: INTERNAL MEDICINE

## 2021-05-17 PROCEDURE — 0DB68ZX EXCISION OF STOMACH, VIA NATURAL OR ARTIFICIAL OPENING ENDOSCOPIC, DIAGNOSTIC: ICD-10-PCS | Performed by: STUDENT IN AN ORGANIZED HEALTH CARE EDUCATION/TRAINING PROGRAM

## 2021-05-17 PROCEDURE — 88305 TISSUE EXAM BY PATHOLOGIST: CPT | Performed by: PATHOLOGY

## 2021-05-17 PROCEDURE — 88313 SPECIAL STAINS GROUP 2: CPT | Performed by: PATHOLOGY

## 2021-05-17 PROCEDURE — 82948 REAGENT STRIP/BLOOD GLUCOSE: CPT

## 2021-05-17 PROCEDURE — 85027 COMPLETE CBC AUTOMATED: CPT | Performed by: INTERNAL MEDICINE

## 2021-05-17 PROCEDURE — 0DJ08ZZ INSPECTION OF UPPER INTESTINAL TRACT, VIA NATURAL OR ARTIFICIAL OPENING ENDOSCOPIC: ICD-10-PCS | Performed by: STUDENT IN AN ORGANIZED HEALTH CARE EDUCATION/TRAINING PROGRAM

## 2021-05-17 PROCEDURE — 85007 BL SMEAR W/DIFF WBC COUNT: CPT | Performed by: INTERNAL MEDICINE

## 2021-05-17 PROCEDURE — 85018 HEMOGLOBIN: CPT | Performed by: INTERNAL MEDICINE

## 2021-05-17 RX ORDER — PROPOFOL 10 MG/ML
INJECTION, EMULSION INTRAVENOUS AS NEEDED
Status: DISCONTINUED | OUTPATIENT
Start: 2021-05-17 | End: 2021-05-17

## 2021-05-17 RX ORDER — GLYCOPYRROLATE 0.2 MG/ML
INJECTION INTRAMUSCULAR; INTRAVENOUS AS NEEDED
Status: DISCONTINUED | OUTPATIENT
Start: 2021-05-17 | End: 2021-05-17

## 2021-05-17 RX ORDER — SODIUM CHLORIDE, SODIUM LACTATE, POTASSIUM CHLORIDE, CALCIUM CHLORIDE 600; 310; 30; 20 MG/100ML; MG/100ML; MG/100ML; MG/100ML
INJECTION, SOLUTION INTRAVENOUS CONTINUOUS PRN
Status: DISCONTINUED | OUTPATIENT
Start: 2021-05-17 | End: 2021-05-17

## 2021-05-17 RX ORDER — LIDOCAINE HYDROCHLORIDE 10 MG/ML
INJECTION, SOLUTION EPIDURAL; INFILTRATION; INTRACAUDAL; PERINEURAL AS NEEDED
Status: DISCONTINUED | OUTPATIENT
Start: 2021-05-17 | End: 2021-05-17

## 2021-05-17 RX ADMIN — PROPOFOL 50 MG: 10 INJECTION, EMULSION INTRAVENOUS at 13:46

## 2021-05-17 RX ADMIN — INSULIN LISPRO 4 UNITS: 100 INJECTION, SOLUTION INTRAVENOUS; SUBCUTANEOUS at 22:16

## 2021-05-17 RX ADMIN — PROPOFOL 50 MG: 10 INJECTION, EMULSION INTRAVENOUS at 13:49

## 2021-05-17 RX ADMIN — FUROSEMIDE 40 MG: 40 TABLET ORAL at 09:29

## 2021-05-17 RX ADMIN — PROPOFOL 100 MG: 10 INJECTION, EMULSION INTRAVENOUS at 13:43

## 2021-05-17 RX ADMIN — PANTOPRAZOLE SODIUM 40 MG: 40 TABLET, DELAYED RELEASE ORAL at 06:21

## 2021-05-17 RX ADMIN — INSULIN LISPRO 4 UNITS: 100 INJECTION, SOLUTION INTRAVENOUS; SUBCUTANEOUS at 17:52

## 2021-05-17 RX ADMIN — LIDOCAINE HYDROCHLORIDE 100 MG: 10 INJECTION, SOLUTION EPIDURAL; INFILTRATION; INTRACAUDAL; PERINEURAL at 13:43

## 2021-05-17 RX ADMIN — STANDARDIZED SENNA CONCENTRATE 8.6 MG: 8.6 TABLET ORAL at 09:28

## 2021-05-17 RX ADMIN — SODIUM CHLORIDE, SODIUM LACTATE, POTASSIUM CHLORIDE, AND CALCIUM CHLORIDE: .6; .31; .03; .02 INJECTION, SOLUTION INTRAVENOUS at 13:40

## 2021-05-17 RX ADMIN — GLYCOPYRROLATE 0.2 MG: 0.2 INJECTION, SOLUTION INTRAMUSCULAR; INTRAVENOUS at 13:43

## 2021-05-17 RX ADMIN — DOCUSATE SODIUM 100 MG: 100 CAPSULE ORAL at 09:28

## 2021-05-17 RX ADMIN — INSULIN GLARGINE 30 UNITS: 100 INJECTION, SOLUTION SUBCUTANEOUS at 09:29

## 2021-05-17 RX ADMIN — FINASTERIDE 5 MG: 5 TABLET, FILM COATED ORAL at 09:29

## 2021-05-17 RX ADMIN — PRAVASTATIN SODIUM 40 MG: 40 TABLET ORAL at 17:52

## 2021-05-17 RX ADMIN — DOCUSATE SODIUM 100 MG: 100 CAPSULE ORAL at 17:52

## 2021-05-17 RX ADMIN — PROPOFOL 50 MG: 10 INJECTION, EMULSION INTRAVENOUS at 13:45

## 2021-05-17 RX ADMIN — PANTOPRAZOLE SODIUM 40 MG: 40 TABLET, DELAYED RELEASE ORAL at 17:52

## 2021-05-17 RX ADMIN — LABETALOL HYDROCHLORIDE 200 MG: 100 TABLET, FILM COATED ORAL at 22:05

## 2021-05-17 RX ADMIN — INSULIN LISPRO 10 UNITS: 100 INJECTION, SOLUTION INTRAVENOUS; SUBCUTANEOUS at 17:52

## 2021-05-17 NOTE — ASSESSMENT & PLAN NOTE
Lab Results   Component Value Date    HGBA1C 12 3 (H) 05/09/2021       Recent Labs     05/16/21  1137 05/16/21  1611 05/16/21 2029 05/17/21  0747   POCGLU 186* 185* 177* 133       Blood Sugar Average: Last 72 hrs:  (P) 172 [id number]     · Was in ICU and insulin infusion  · Now started on Lantus and lispro  · Monitor closely    Endocrinology follow-up as outpatient  · Will need diabetic supplies at discharge

## 2021-05-17 NOTE — TELEPHONE ENCOUNTER
----- Message from Lenard Mckenzie DO sent at 5/13/2021  3:37 PM EDT -----  Please call the patient with the biopsy results  Biopsies the stomach were benign and negative for Helicobacter pylori or cancer

## 2021-05-17 NOTE — PROGRESS NOTES
[address] [facility]  Progress Note - Ronak Hsieh 1940, 79 y o  male MRN: [id number]  Unit/Bed#: MS Amanda-Shree Encounter: [id number]  Primary Care Provider: Clemencia Lombardi MD   Date and time admitted to hospital: 5/9/2021  4:12 PM    Gastric ulcer  Assessment & Plan  · Gastric fundus ulcer with angioectasia on EGD s/p APC  · Biopsy sent to r/o H  Pylori  · Repeat EGD in 3 months  · Transition protonix to 40mg PO   · Repeat EGD again for dropping hemoglobin    Aspiration into airway  Assessment & Plan  · Patient with hypoxia, gurgling respirations after hematemesis  · Intubated for airway protection  · Successfully extubated on 5/13  · Stable now    Hematemesis  Assessment & Plan  · Episodes x2 5/11-5/12  · Intubated due to aspiration No clear blood in oropharynx  · Clotted blood from endotracheal tube  · EGD with small gastric fundus ulcer and angioectasia s/p APC  · Protonix  changed to oral  · Hemoglobin dropping again, blood transfusion  Monitor hemoglobin  GI for re evaluation    New onset type 2 diabetes mellitus [facility])  Assessment & Plan  Lab Results   Component Value Date    HGBA1C 12 3 (H) 05/09/2021       Recent Labs     05/16/21  1137 05/16/21  1611 05/16/21 2029 05/17/21  0747   POCGLU 186* 185* 177* 133       Blood Sugar Average: Last 72 hrs:  (P) 172 [id number]     · Was in ICU and insulin infusion  · Now started on Lantus and lispro  · Monitor closely    Endocrinology follow-up as outpatient  · Will need diabetic supplies at discharge    Dementia [facility])  Assessment & Plan  · At baseline    Dysphagia  Assessment & Plan  · Tolerating diet    Acute metabolic encephalopathy  Assessment & Plan  · Suspect metabolic from Mj Griffin  · Resolved  · Avoid deliriogenic medications      Late onset 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 ([facility] [address] )  Assessment & Plan  · At baseline    Chronic kidney disease  Assessment & Plan  Lab Results   Component Value Date    EGFR 34 05/17/2021 EGFR 29 05/16/2021    EGFR 30 05/15/2021    CREATININE 2 08 (H) 05/17/2021    CREATININE 2 36 (H) 05/16/2021    CREATININE 2 29 (H) 05/15/2021     · Presented with MILE, slightly worsened again by shock state  · Monitor closely, trend BUN/Cr    BPH (benign prostatic hyperplasia)  Assessment & Plan  Continue proscar    Acute blood loss anemia (ABLA)  Assessment & Plan  · Secondary to hematemesis  · Patient with 2 episodes of hematemesis 5/11 with subsequent drop in H/H from 14 1 to 9 8 transfused 1 U PRBC  · Transferred out of medical ICU now  · EGD done on May 12th, small ulcers and small angio ectasia  · Patient having black stool now  Hemoglobin dropped to 6 6 yesterday  Given 1 unit PRBC transfusion  7 8 now  Discussed with GI  Plan for repeat EGD today    Other hyperlipidemia  Assessment & Plan  · Continue statin    * Hyperosmolar hyperglycemic state (HHS) [facility])  Assessment & Plan  Lab Results   Component Value Date    HGBA1C 12 3 (H) 05/09/2021       Recent Labs     05/16/21  1137 05/16/21  1611 05/16/21 2029 05/17/21  0747   POCGLU 186* 185* 177* 133       Blood Sugar Average: Last 72 hrs:  (P) 172 [id number]   · Improved and transferred out of medical ICU  · Started on Lantus 30 units daily and lispro 10 units before each meal   · Continue sliding scale  Stable      VTE Pharmacologic Prophylaxis:   Pharmacologic: Heparin  Mechanical VTE Prophylaxis in Place: Yes    Patient Centered Rounds: I have performed bedside rounds with nursing staff today  Discussions with Specialists or Other Care Team Provider:  GI    Education and Discussions with Family / Patient:  Patient and daughter    Time Spent for Care: More than 50% of total time spent on counseling and coordination of care as described above      Current Length of Stay: 8 day(s)    Current Patient Status: Inpatient   Certification Statement: The patient will continue to require additional inpatient hospital stay due to See above    Discharge Plan:  24-48 hours    Code Status: Level 1 - Full Code      Subjective:   I have seen and examined the patient bedside this morning  Patient denies any complaints  He received 1 unit PRBC transfusion yesterday  Hemoglobin 7 8 this morning  Also has black stool  Plan for repeat EGD today    Objective:     Vitals:   Temp (24hrs), Av 3 °F (37 4 °C), Min:98 7 °F (37 1 °C), Max:99 8 °F (37 7 °C)    Temp:  [98 7 °F (37 1 °C)-99 8 °F (37 7 °C)] 99 4 °F (37 4 °C)  HR:  [] 100  Resp:  [16-19] 19  BP: (116-141)/(53-75) 120/68  SpO2:  [90 %-96 %] 93 %  Body mass index is 31 45 kg/m²  Input and Output Summary (last 24 hours): Intake/Output Summary (Last 24 hours) at 2021 1121  Last data filed at 2021 0618  Gross per 24 hour   Intake 960 ml   Output 200 ml   Net 760 ml       Physical Exam:     Physical Exam  General- Awake, alert, looks comfortable  HEENT- Normocephalic, atraumatic  CVS- Normal S1/ S2, Regular rate and rhythm  Respiratory system- B/L clear breath sounds  Abdomen- Soft, Non distended, no tenderness, Bowel sound- present  CNS- No acute focal neurologic deficit noted    Additional Data:     Labs:    Results from last 7 days   Lab Units 21  1104 21  0619  21  0527   WBC Thousand/uL  --  11 88*  --  12 67*   HEMOGLOBIN g/dL 7 7* 7 8*   < > 7 0*   HEMATOCRIT % 23 8* 23 5*   < > 22 2*   PLATELETS Thousands/uL  --  205  --  206   BANDS PCT %  --  3  --   --    NEUTROS PCT %  --   --   --  70   LYMPHS PCT %  --   --   --  15   LYMPHO PCT %  --  10*  --   --    MONOS PCT %  --   --   --  10   MONO PCT %  --  10  --   --    EOS PCT %  --  2  --  2    < > = values in this interval not displayed       Results from last 7 days   Lab Units 21  0619   SODIUM mmol/L 141   POTASSIUM mmol/L 3 5   CHLORIDE mmol/L 106   CO2 mmol/L 27   BUN mg/dL 21   CREATININE mg/dL 2 08*   ANION GAP mmol/L 8   CALCIUM mg/dL 7 9*   GLUCOSE RANDOM mg/dL 123     Results from last 7 days   Lab Units 05/12/21  0349   INR  1 15     Results from last 7 days   Lab Units 05/17/21  0747 05/16/21  2029 05/16/21  1611 05/16/21  1137 05/16/21  0723 05/15/21  1549 05/15/21  1131 05/15/21  0741 05/14/21  2033 05/14/21  1555 05/14/21  1052 05/14/21  1010   POC GLUCOSE mg/dl 133 177* 185* 186* 179* 192* 206* 223* 209* 195* 239* 244*         Results from last 7 days   Lab Units 05/14/21  1436 05/12/21  0616 05/12/21  0354 05/12/21  0349 05/11/21  1522   LACTIC ACID mmol/L 1 8 3 9*  --  4 2*  --    PROCALCITONIN ng/ml  --   --  0 32*  --  0 13           * I Have Reviewed All Lab Data Listed Above  * Additional Pertinent Lab Tests Reviewed: All Labs Within Last 24 Hours Reviewed    Imaging:    Imaging Reports Reviewed Today Include:   Abdomen x-ray  IMPRESSION:     No definite radiopaque renal calculi identified        Imaging Personally Reviewed by Myself Includes:      Recent Cultures (last 7 days):           Last 24 Hours Medication List:   Current Facility-Administered Medications   Medication Dose Route Frequency Provider Last Rate    acetaminophen  650 mg Oral Q6H PRN Marlene Buck PA-C      calcium carbonate  1,000 mg Oral Daily PRN Marlene Buck PA-C      docusate sodium  100 mg Oral BID Marlene Buck PA-C      fentanyl citrate (PF)  50 mcg Intravenous Q1H PRN Marlene Buck PA-C      finasteride  5 mg Oral Daily Marlene Buck PA-C      furosemide  40 mg Oral Daily Odilia Senior      hydrALAZINE  5 mg Intravenous Q6H PRN Marlene Buck PA-C      insulin glargine  30 Units Subcutaneous QAM Marlene Buck PA-C      insulin lispro  10 Units Subcutaneous TID With Meals Marlene Buck PA-C      insulin lispro  4-20 Units Subcutaneous TID With Meals Marlene Buck PA-C      insulin lispro  4-20 Units Subcutaneous HS Marlene Buck PA-C      labetalol  200 mg Oral HS Marlene Buck PA-C      ondansetron  4 mg Intravenous Q6H PRN Marlene uBck PA-C      pantoprazole  40 mg Oral BID AC Mark Daugherty MD      polyethylene glycol  17 g Oral Daily PRN Mark Daugherty MD      pravastatin  40 mg Oral Daily With Dinner Francoise Mejia PA-C      senna  1 tablet Oral Daily Francoise Mejia PA-C          Today, Patient Was Seen By: Robert Grimes MD    ** Please Note: Dictation voice to text software may have been used in the creation of this document   **

## 2021-05-17 NOTE — ASSESSMENT & PLAN NOTE
· Gastric fundus ulcer with angioectasia on EGD s/p APC  · Biopsy sent to r/o H   Pylori  · Repeat EGD in 3 months  · Transition protonix to 40mg PO   · Repeat EGD again for dropping hemoglobin

## 2021-05-17 NOTE — ASSESSMENT & PLAN NOTE
Lab Results   Component Value Date    HGBA1C 12 3 (H) 05/09/2021       Recent Labs     05/16/21  1137 05/16/21  1611 05/16/21 2029 05/17/21  0747   POCGLU 186* 185* 177* 133       Blood Sugar Average: Last 72 hrs:  (P) 172 [id number]   · Improved and transferred out of medical ICU  · Started on Lantus 30 units daily and lispro 10 units before each meal   · Continue sliding scale    Stable

## 2021-05-17 NOTE — PHYSICAL THERAPY NOTE
Physical Therapy Cancellation Note    Chart review performed  Attempted to see patient for physical therapy treatment session  Pt currently off the unit  PT to continue to follow and treat as appropriate       05/17/21 1313   PT Last Visit   PT Visit Date 05/17/21   Note Type   Note Type Treatment   Cancel Reasons Patient off floor/test       Nataly Womack, PT, DPT

## 2021-05-17 NOTE — ASSESSMENT & PLAN NOTE
· Secondary to hematemesis  · Patient with 2 episodes of hematemesis 5/11 with subsequent drop in H/H from 14 1 to 9 8 transfused 1 U PRBC  · Transferred out of medical ICU now  · EGD done on May 12th, small ulcers and small angio ectasia  · Patient having black stool now  Hemoglobin dropped to 6 6 yesterday  Given 1 unit PRBC transfusion  7 8 now  Discussed with GI    Plan for repeat EGD today

## 2021-05-17 NOTE — ASSESSMENT & PLAN NOTE
· Patient with hypoxia, gurgling respirations after hematemesis  · Intubated for airway protection  · Successfully extubated on 5/13  · Stable now

## 2021-05-17 NOTE — ASSESSMENT & PLAN NOTE
Lab Results   Component Value Date    EGFR 34 05/17/2021    EGFR 29 05/16/2021    EGFR 30 05/15/2021    CREATININE 2 08 (H) 05/17/2021    CREATININE 2 36 (H) 05/16/2021    CREATININE 2 29 (H) 05/15/2021     · Presented with MILE, slightly worsened again by shock state  · Monitor closely, trend BUN/Cr

## 2021-05-17 NOTE — ANESTHESIA PREPROCEDURE EVALUATION
Procedure:  EGD WITH PUSH ENTEROSCOPY    Relevant Problems   CARDIO   (+) Dyspnea on exertion   (+) Essential hypertension   (+) Other hyperlipidemia      ENDO   (+) New onset type 2 diabetes mellitus (HCC)      GI/HEPATIC   (+) Dysphagia   (+) Gastric ulcer   (+) Hematemesis      /RENAL   (+) MILE (acute kidney injury) (HCC)   (+) BPH (benign prostatic hyperplasia)   (+) Chronic kidney disease      HEMATOLOGY   (+) Acute blood loss anemia (ABLA)      MUSCULOSKELETAL   (+) Osteoarthritis of knee      NEURO/PSYCH   (+) Dementia (HCC)   (+) 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)      PULMONARY   (+) Dyspnea on exertion      Glu>700 on admit, now on insulin  · Gastric fundus ulcer with angioectasia on EGD s/p APC  Aspiration:  · Patient with hypoxia, gurgling respirations after hematemesis  · Intubated for airway protection  · Successfully extubated on 5/13    Hgb 7 8 this morning  Has been transfused on this admission  Unlabored respirations, on room air, no difficulty with speech or secretions  Denies n/v for a few days  Physical Exam    Airway    Mallampati score: III  TM Distance: >3 FB  Neck ROM: full     Dental   Comment: Denies loose teeth,     Cardiovascular  Cardiovascular exam normal    Pulmonary  Pulmonary exam normal     Other Findings  Portions of exam deferred due to low yield and/or unknown COVID status      Anesthesia Plan  ASA Score- 3     Anesthesia Type- general with ASA Monitors  Additional Monitors:   Airway Plan: ETT  Plan Factors-Exercise tolerance (METS): <4 METS  Chart reviewed  Existing labs reviewed  Patient summary reviewed  Patient is not a current smoker  Induction- intravenous  Postoperative Plan-     Informed Consent- Anesthetic plan and risks discussed with daughter  I personally reviewed this patient with the CRNA  Discussed and agreed on the Anesthesia Plan with the CRNA  Rina Burch Plan for sedation   Consent from daughter by phone due to baseline dementia  She understands and agrees to intubation if necessary

## 2021-05-17 NOTE — TELEPHONE ENCOUNTER
Ambrocio Maradiaga called into the office again asking to speak with Dr Ramos Orozco  She would like advice  Saundra Phillips is being prepped for discharge and she is concerned that he still has pain  She is concerned he will be discharges with pain  Please advise

## 2021-05-17 NOTE — PLAN OF CARE
Problem: Potential for Falls  Goal: Patient will remain free of falls  Description: INTERVENTIONS:  - Assess patient frequently for physical needs  -  Identify cognitive and physical deficits and behaviors that affect risk of falls  -  [place] fall precautions as indicated by assessment   - Educate patient/family on patient safety including physical limitations  - Instruct patient to call for assistance with activity based on assessment  - Modify environment to reduce risk of injury  - Consider OT/PT consult to assist with strengthening/mobility  Outcome: Progressing     Problem: Nutrition/Hydration-ADULT  Goal: Nutrient/Hydration intake appropriate for improving, restoring or maintaining nutritional needs  Description: Monitor and assess patient's nutrition/hydration status for malnutrition  Collaborate with interdisciplinary team and initiate plan and interventions as ordered  Monitor patient's weight and dietary intake as ordered or per policy  Utilize nutrition screening tool and intervene as necessary  Determine patient's food preferences and provide high-protein, high-caloric foods as appropriate       INTERVENTIONS:  - Monitor oral intake, urinary output, labs, and treatment plans  - Assess nutrition and hydration status and recommend course of action  - Evaluate amount of meals eaten  - Assist patient with eating if necessary   - Allow adequate time for meals  - Recommend/ encourage appropriate diets, oral nutritional supplements, and vitamin/mineral supplements  - Order, calculate, and assess calorie counts as needed  - Recommend, monitor, and adjust tube feedings and TPN/PPN based on assessed needs  - Assess need for intravenous fluids  - Provide nutrition/hydration education as appropriate  - Include patient/family/caregiver in decisions related to nutrition  Outcome: Progressing     Problem: PAIN - ADULT  Goal: Verbalizes/displays adequate comfort level or baseline comfort level  Description: Interventions:  - Encourage patient to monitor pain and request assistance  - Assess pain using appropriate pain scale  - Administer analgesics based on type and severity of pain and evaluate response  - Implement non-pharmacological measures as appropriate and evaluate response  - Consider cultural and social influences on pain and pain management  - Notify physician/advanced practitioner if interventions unsuccessful or patient reports new pain  Outcome: Progressing     Problem: INFECTION - ADULT  Goal: Absence or prevention of progression during hospitalization  Description: INTERVENTIONS:  - Assess and monitor for signs and symptoms of infection  - Monitor lab/diagnostic results  - Monitor all insertion sites, i e  indwelling lines, tubes, and drains  - Monitor endotracheal if appropriate and nasal secretions for changes in amount and color  - [place] appropriate cooling/warming therapies per order  - Administer medications as ordered  - Instruct and encourage patient and family to use good hand hygiene technique  - Identify and instruct in appropriate isolation precautions for identified infection/condition  Outcome: Progressing     Problem: SAFETY ADULT  Goal: Patient will remain free of falls  Description: INTERVENTIONS:  - Assess patient frequently for physical needs  -  Identify cognitive and physical deficits and behaviors that affect risk of falls    -  [place] fall precautions as indicated by assessment   - Educate patient/family on patient safety including physical limitations  - Instruct patient to call for assistance with activity based on assessment  - Modify environment to reduce risk of injury  - Consider OT/PT consult to assist with strengthening/mobility  Outcome: Progressing  Goal: Maintain or return to baseline ADL function  Description: INTERVENTIONS:  -  Assess patient's ability to carry out ADLs; assess patient's baseline for ADL function and identify physical deficits which impact ability to perform ADLs (bathing, care of mouth/teeth, toileting, grooming, dressing, etc )  - Assess/evaluate cause of self-care deficits   - Assess range of motion  - Assess patient's mobility; develop plan if impaired  - Assess patient's need for assistive devices and provide as appropriate  - Encourage maximum independence but intervene and supervise when necessary  - Involve family in performance of ADLs  - Assess for home care needs following discharge   - Consider OT consult to assist with ADL evaluation and planning for discharge  - Provide patient education as appropriate  Outcome: Progressing  Goal: Maintain or return mobility status to optimal level  Description: INTERVENTIONS:  - Assess patient's baseline mobility status (ambulation, transfers, stairs, etc )    - Identify cognitive and physical deficits and behaviors that affect mobility  - Identify mobility aids required to assist with transfers and/or ambulation (gait belt, sit-to-stand, lift, walker, cane, etc )  - [place] fall precautions as indicated by assessment  - Record patient progress and toleration of activity level on Mobility SBAR; progress patient to next Phase/Stage  - Instruct patient to call for assistance with activity based on assessment  - Consider rehabilitation consult to assist with strengthening/weightbearing, etc   Outcome: Progressing     Problem: DISCHARGE PLANNING  Goal: Discharge to home or other facility with appropriate resources  Description: INTERVENTIONS:  - Identify barriers to discharge w/patient and caregiver  - Arrange for needed discharge resources and transportation as appropriate  - Identify discharge learning needs (meds, wound care, etc )  - Arrange for interpretive services to assist at discharge as needed  - Refer to Case Management Department for coordinating discharge planning if the patient needs post-hospital services based on physician/advanced practitioner order or complex needs related to functional status, cognitive ability, or social support system  Outcome: Progressing     Problem: Knowledge Deficit  Goal: Patient/family/caregiver demonstrates understanding of disease process, treatment plan, medications, and discharge instructions  Description: Complete learning assessment and assess knowledge base    Interventions:  - Provide teaching at level of understanding  - Provide teaching via preferred learning methods  Outcome: Progressing     Problem: METABOLIC, FLUID AND ELECTROLYTES - ADULT  Goal: Glucose maintained within target range  Description: INTERVENTIONS:  - Monitor Blood Glucose as ordered  - Assess for signs and symptoms of hyperglycemia and hypoglycemia  - Administer ordered medications to maintain glucose within target range  - Assess nutritional intake and initiate nutrition service referral as needed  Outcome: Progressing     Problem: Prexisting or High Potential for Compromised Skin Integrity  Goal: Skin integrity is maintained or improved  Description: INTERVENTIONS:  - Identify patients at risk for skin breakdown  - Assess and monitor skin integrity  - Assess and monitor nutrition and hydration status  - Monitor labs   - Assess for incontinence   - Turn and reposition patient  - Assist with mobility/ambulation  - Relieve pressure over bony prominences  - Avoid friction and shearing  - Provide appropriate hygiene as needed including keeping skin clean and dry  - Evaluate need for skin moisturizer/barrier cream  - Collaborate with interdisciplinary team   - Patient/family teaching  - Consider wound care consult   Outcome: Progressing

## 2021-05-17 NOTE — ANESTHESIA POSTPROCEDURE EVALUATION
Post-Op Assessment Note    CV Status:  Stable  Pain Score: 0    Pain management: adequate     Mental Status:  Arousable and sleepy   Hydration Status:  Euvolemic and stable   PONV Controlled:  Controlled   Airway Patency:  Patent      Post Op Vitals Reviewed: Yes      Staff: CRNA, Anesthesiologist         No complications documented

## 2021-05-17 NOTE — PROGRESS NOTES
Progress Note  Valerie Thrasher 79 y o  male MRN: [id number]  Unit/Bed#: -01 Encounter: [id number]    Subjective:  GI re-consulted for drop in hemoglobin  Patient dropped his hgb yesterday am was 7 0  Repeat at noon was 6 6  He was given two units of PRBC's  Repeat Hgb yesterday at 10pm was 8 8  Hgb this am was 7 8  He had several episodes of melena overnight  EGD from 5/12/21    · Two sessile polyps measuring 5-9 mm  The polyps were not biopsied today  · Minimal amount of hematin in the cardia, fundus of the stomach, body of the stomach, greater curve of the stomach, lesser curve of the stomach, incisura, antrum, prepyloric region and pylorus  Blood coated the entire surface of the stomach  Several areas were washed showing no active bleeding  · Single small, cratered, round, benign-appearing ulcer in the fundus of the stomach; performed 4 cold forceps biopsies  A film of blood was seen to coat the surface of the ulcer  There was no stigmata  The duodenal bulb and 2nd part of the duodenum appeared normal     Objective:     Vitals:   Vitals:    05/17/21 0900   BP:    Pulse:    Resp:    Temp:    SpO2: 93%   ,Body mass index is 31 45 kg/m²        Intake/Output Summary (Last 24 hours) at 5/17/2021 1055  Last data filed at 5/17/2021 3501  Gross per 24 hour   Intake 960 ml   Output 200 ml   Net 760 ml       Physical Exam:     General Appearance: Alert, appears stated age and cooperative  Lungs: Clear to auscultation bilaterally, no rales or rhonchi, no labored breathing/accessory muscle use  Heart: Regular rate and rhythm, S1, S2 normal, no murmur, click, rub or gallop  Abdomen: Soft, non-tender, non-distended; bowel sounds normal; no masses or no organomegaly  Extremities: No cyanosis, edema    Invasive Devices     Peripheral Intravenous Line            Peripheral IV 05/12/21 Proximal;Right;Ventral (anterior) Forearm 5 days    Peripheral IV 05/13/21 Distal;Right;Upper;Ventral (anterior) Arm 4 days Drain            External Urinary Catheter Medium 1 day                Lab Results:  No results displayed because visit has over 200 results  Imaging Studies:   I have personally reviewed pertinent imaging studies  Xr Abdomen 1 View Kub    Result Date: 5/16/2021  Narrative: ABDOMEN INDICATION:   Pain, rule out kidney stone  COMPARISON:  10/13/2019 VIEWS:  AP supine FINDINGS: Nonspecific bowel gas pattern  No discernible free air on this supine study  Upright or left lateral decubitus imaging is more sensitive to detect subtle free air in the appropriate setting  No definite radiopaque calculi identified  Postsurgical material noted in the left hemiabdomen as well as projecting over the prostate gland  Visualized lung bases are clear  Visualized osseous structures are unremarkable for the patient's age  Impression: No definite radiopaque renal calculi identified  Workstation performed: [id number]     Ct Head Without Contrast    Result Date: 5/9/2021  Narrative: CT BRAIN - WITHOUT CONTRAST INDICATION:   ams  COMPARISON:  MRI 1/26/2020 TECHNIQUE:  CT examination of the brain was performed  In addition to axial images, sagittal and coronal 2D reformatted images were created and submitted for interpretation  Radiation dose length product (DLP) for this visit:  805 mGy-cm   This examination, like all CT scans performed in the [facility], was performed utilizing techniques to minimize radiation dose exposure, including the use of iterative reconstruction and automated exposure control  IMAGE QUALITY:  Diagnostic  FINDINGS: PARENCHYMA: Decreased attenuation is noted in periventricular and subcortical white matter demonstrating an appearance that is statistically most likely to represent moderate microangiopathic change  No CT signs of acute infarction  No intracranial mass, mass effect or midline shift  No acute parenchymal hemorrhage   VENTRICLES AND EXTRA-AXIAL SPACES:  Cerebral volume loss  No hydrocephalus  VISUALIZED ORBITS AND PARANASAL SINUSES:  No acute abnormality  CALVARIUM AND EXTRACRANIAL SOFT TISSUES:  Normal      Impression: No acute intracranial abnormality  Microangiopathic changes  Cerebral volume loss  Workstation performed: [id number]     Mri Brain Wo Contrast    Result Date: 5/11/2021  Narrative: MRI BRAIN WITHOUT CONTRAST INDICATION: stroke rule out  Change in mental status  COMPARISON:   January 26, 2020 TECHNIQUE:  Sagittal T1, axial T2, axial FLAIR, axial T1, axial [place] and axial diffusion imaging  IMAGE QUALITY:  Diagnostic  FINDINGS: BRAIN PARENCHYMA:  There is no discrete mass, mass effect or midline shift  There is no intracranial hemorrhage  There is no evidence of acute infarction and diffusion imaging is unremarkable  Small scattered hyperintensities on T2/FLAIR imaging are noted in the periventricular and subcortical white matter demonstrating an appearance that is statistically most likely to represent moderate microangiopathic change  There are numerous punctate foci of hemosiderin deposition in basal ganglia bilaterally, right temporal lobe, left paramedian superior vermis, right inferior cerebellar hemisphere, similar from previous examination  VENTRICLES:  Volume loss, similar from prior examination  No hydrocephalus  SELLA AND PITUITARY GLAND:  Normal  ORBITS:  Normal  PARANASAL SINUSES:  Normal  VASCULATURE:  Evaluation of the major intracranial vasculature demonstrates appropriate flow voids  CALVARIUM AND SKULL BASE:  Normal  EXTRACRANIAL SOFT TISSUES:  Normal      Impression: No MR evidence of acute intracranial ischemia  Moderate white matter changes and punctate foci of hemosiderin deposition probably related to chronic microvascular disease, unchanged when compared to prior examination  Workstation performed: [id number]     Xr Chest Portable Icu    Result Date: 5/12/2021  Narrative: CHEST INDICATION:   tube placement   COMPARISON: 9/4/2020 EXAM PERFORMED/VIEWS:  XR CHEST PORTABLE ICU FINDINGS:  Endotracheal tube tip appears to project over the right mainstem bronchus on initial radiographs but appears slightly retracted on 2nd radiograph overlying the distal trachea  Cardiomediastinal silhouette appears unremarkable  The lungs are clear  No pneumothorax or pleural effusion  Osseous structures appear within normal limits for patient age  Impression: 1  Endotracheal tube tip position appears satisfactory on the 2nd film as above  Nasogastric tube in satisfactory position  2   No acute cardiopulmonary disease  The study was marked in [facility] for immediate notification  Workstation performed: [id number]         Assessment & Plan  Acute blood loss anemia  Melena  PUD  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

## 2021-05-18 DIAGNOSIS — I10 ESSENTIAL HYPERTENSION: ICD-10-CM

## 2021-05-18 LAB
ANION GAP SERPL CALCULATED.3IONS-SCNC: 8 MMOL/L (ref 4–13)
BUN SERPL-MCNC: 22 MG/DL (ref 5–25)
CALCIUM SERPL-MCNC: 8 MG/DL (ref 8.3–10.1)
CHLORIDE SERPL-SCNC: 102 MMOL/L (ref 100–108)
CO2 SERPL-SCNC: 28 MMOL/L (ref 21–32)
CREAT SERPL-MCNC: 2.32 MG/DL (ref 0.6–1.3)
ERYTHROCYTE [DISTWIDTH] IN BLOOD BY AUTOMATED COUNT: 15.7 % (ref 11.6–15.1)
GFR SERPL CREATININE-BSD FRML MDRD: 30 ML/MIN/1.73SQ M
GLUCOSE SERPL-MCNC: 112 MG/DL (ref 65–140)
GLUCOSE SERPL-MCNC: 169 MG/DL (ref 65–140)
GLUCOSE SERPL-MCNC: 185 MG/DL (ref 65–140)
GLUCOSE SERPL-MCNC: 225 MG/DL (ref 65–140)
GLUCOSE SERPL-MCNC: 91 MG/DL (ref 65–140)
HCT VFR BLD AUTO: 24.7 % (ref 36.5–49.3)
HGB BLD-MCNC: 7.8 G/DL (ref 12–17)
MCH RBC QN AUTO: 30.8 PG (ref 26.8–34.3)
MCHC RBC AUTO-ENTMCNC: 31.6 G/DL (ref 31.4–37.4)
MCV RBC AUTO: 98 FL (ref 82–98)
NRBC BLD AUTO-RTO: 1 /100 WBCS
PLATELET # BLD AUTO: 238 THOUSANDS/UL (ref 149–390)
PMV BLD AUTO: 10.6 FL (ref 8.9–12.7)
POTASSIUM SERPL-SCNC: 3.4 MMOL/L (ref 3.5–5.3)
RBC # BLD AUTO: 2.53 MILLION/UL (ref 3.88–5.62)
SODIUM SERPL-SCNC: 138 MMOL/L (ref 136–145)
WBC # BLD AUTO: 12.8 THOUSAND/UL (ref 4.31–10.16)

## 2021-05-18 PROCEDURE — 99232 SBSQ HOSP IP/OBS MODERATE 35: CPT | Performed by: STUDENT IN AN ORGANIZED HEALTH CARE EDUCATION/TRAINING PROGRAM

## 2021-05-18 PROCEDURE — 97530 THERAPEUTIC ACTIVITIES: CPT

## 2021-05-18 PROCEDURE — 82948 REAGENT STRIP/BLOOD GLUCOSE: CPT

## 2021-05-18 PROCEDURE — NC001 PR NO CHARGE: Performed by: INTERNAL MEDICINE

## 2021-05-18 PROCEDURE — 85027 COMPLETE CBC AUTOMATED: CPT | Performed by: INTERNAL MEDICINE

## 2021-05-18 PROCEDURE — 97535 SELF CARE MNGMENT TRAINING: CPT

## 2021-05-18 PROCEDURE — 80048 BASIC METABOLIC PNL TOTAL CA: CPT | Performed by: INTERNAL MEDICINE

## 2021-05-18 RX ORDER — SIMVASTATIN 20 MG
TABLET ORAL
Qty: 30 TABLET | Refills: 2 | OUTPATIENT
Start: 2021-05-18

## 2021-05-18 RX ORDER — POTASSIUM CHLORIDE 20 MEQ/1
20 TABLET, EXTENDED RELEASE ORAL ONCE
Status: COMPLETED | OUTPATIENT
Start: 2021-05-18 | End: 2021-05-18

## 2021-05-18 RX ADMIN — DOCUSATE SODIUM 100 MG: 100 CAPSULE ORAL at 18:08

## 2021-05-18 RX ADMIN — DOCUSATE SODIUM 100 MG: 100 CAPSULE ORAL at 09:34

## 2021-05-18 RX ADMIN — INSULIN LISPRO 10 UNITS: 100 INJECTION, SOLUTION INTRAVENOUS; SUBCUTANEOUS at 18:08

## 2021-05-18 RX ADMIN — FUROSEMIDE 40 MG: 40 TABLET ORAL at 09:34

## 2021-05-18 RX ADMIN — INSULIN LISPRO 10 UNITS: 100 INJECTION, SOLUTION INTRAVENOUS; SUBCUTANEOUS at 09:33

## 2021-05-18 RX ADMIN — INSULIN LISPRO 10 UNITS: 100 INJECTION, SOLUTION INTRAVENOUS; SUBCUTANEOUS at 12:29

## 2021-05-18 RX ADMIN — INSULIN LISPRO 8 UNITS: 100 INJECTION, SOLUTION INTRAVENOUS; SUBCUTANEOUS at 12:29

## 2021-05-18 RX ADMIN — PANTOPRAZOLE SODIUM 40 MG: 40 TABLET, DELAYED RELEASE ORAL at 18:08

## 2021-05-18 RX ADMIN — LABETALOL HYDROCHLORIDE 200 MG: 100 TABLET, FILM COATED ORAL at 21:15

## 2021-05-18 RX ADMIN — INSULIN LISPRO 4 UNITS: 100 INJECTION, SOLUTION INTRAVENOUS; SUBCUTANEOUS at 09:33

## 2021-05-18 RX ADMIN — PRAVASTATIN SODIUM 40 MG: 40 TABLET ORAL at 18:15

## 2021-05-18 RX ADMIN — STANDARDIZED SENNA CONCENTRATE 8.6 MG: 8.6 TABLET ORAL at 09:34

## 2021-05-18 RX ADMIN — PANTOPRAZOLE SODIUM 40 MG: 40 TABLET, DELAYED RELEASE ORAL at 06:01

## 2021-05-18 RX ADMIN — FINASTERIDE 5 MG: 5 TABLET, FILM COATED ORAL at 09:34

## 2021-05-18 RX ADMIN — POTASSIUM CHLORIDE 20 MEQ: 1500 TABLET, EXTENDED RELEASE ORAL at 18:15

## 2021-05-18 RX ADMIN — INSULIN GLARGINE 30 UNITS: 100 INJECTION, SOLUTION SUBCUTANEOUS at 09:34

## 2021-05-18 NOTE — ASSESSMENT & PLAN NOTE
Lab Results   Component Value Date    EGFR 30 05/18/2021    EGFR 34 05/17/2021    EGFR 29 05/16/2021    CREATININE 2 32 (H) 05/18/2021    CREATININE 2 08 (H) 05/17/2021    CREATININE 2 36 (H) 05/16/2021   · Acute kidney injury superimposed to chronic kidney disease stage IIIB  · Creatinine baseline between 1 8-2 6  · Currently stable at baseline

## 2021-05-18 NOTE — PLAN OF CARE
Problem: Potential for Falls  Goal: Patient will remain free of falls  Description: INTERVENTIONS:  - Assess patient frequently for physical needs  -  Identify cognitive and physical deficits and behaviors that affect risk of falls  -  [place] fall precautions as indicated by assessment   - Educate patient/family on patient safety including physical limitations  - Instruct patient to call for assistance with activity based on assessment  - Modify environment to reduce risk of injury  - Consider OT/PT consult to assist with strengthening/mobility  Outcome: Progressing     Problem: Nutrition/Hydration-ADULT  Goal: Nutrient/Hydration intake appropriate for improving, restoring or maintaining nutritional needs  Description: Monitor and assess patient's nutrition/hydration status for malnutrition  Collaborate with interdisciplinary team and initiate plan and interventions as ordered  Monitor patient's weight and dietary intake as ordered or per policy  Utilize nutrition screening tool and intervene as necessary  Determine patient's food preferences and provide high-protein, high-caloric foods as appropriate       INTERVENTIONS:  - Monitor oral intake, urinary output, labs, and treatment plans  - Assess nutrition and hydration status and recommend course of action  - Evaluate amount of meals eaten  - Assist patient with eating if necessary   - Allow adequate time for meals  - Recommend/ encourage appropriate diets, oral nutritional supplements, and vitamin/mineral supplements  - Order, calculate, and assess calorie counts as needed  - Recommend, monitor, and adjust tube feedings and TPN/PPN based on assessed needs  - Assess need for intravenous fluids  - Provide nutrition/hydration education as appropriate  - Include patient/family/caregiver in decisions related to nutrition  Outcome: Progressing     Problem: PAIN - ADULT  Goal: Verbalizes/displays adequate comfort level or baseline comfort level  Description: Interventions:  - Encourage patient to monitor pain and request assistance  - Assess pain using appropriate pain scale  - Administer analgesics based on type and severity of pain and evaluate response  - Implement non-pharmacological measures as appropriate and evaluate response  - Consider cultural and social influences on pain and pain management  - Notify physician/advanced practitioner if interventions unsuccessful or patient reports new pain  Outcome: Progressing     Problem: INFECTION - ADULT  Goal: Absence or prevention of progression during hospitalization  Description: INTERVENTIONS:  - Assess and monitor for signs and symptoms of infection  - Monitor lab/diagnostic results  - Monitor all insertion sites, i e  indwelling lines, tubes, and drains  - Monitor endotracheal if appropriate and nasal secretions for changes in amount and color  - [place] appropriate cooling/warming therapies per order  - Administer medications as ordered  - Instruct and encourage patient and family to use good hand hygiene technique  - Identify and instruct in appropriate isolation precautions for identified infection/condition  Outcome: Progressing     Problem: SAFETY ADULT  Goal: Patient will remain free of falls  Description: INTERVENTIONS:  - Assess patient frequently for physical needs  -  Identify cognitive and physical deficits and behaviors that affect risk of falls    -  [place] fall precautions as indicated by assessment   - Educate patient/family on patient safety including physical limitations  - Instruct patient to call for assistance with activity based on assessment  - Modify environment to reduce risk of injury  - Consider OT/PT consult to assist with strengthening/mobility  Outcome: Progressing  Goal: Maintain or return to baseline ADL function  Description: INTERVENTIONS:  -  Assess patient's ability to carry out ADLs; assess patient's baseline for ADL function and identify physical deficits which impact ability to perform ADLs (bathing, care of mouth/teeth, toileting, grooming, dressing, etc )  - Assess/evaluate cause of self-care deficits   - Assess range of motion  - Assess patient's mobility; develop plan if impaired  - Assess patient's need for assistive devices and provide as appropriate  - Encourage maximum independence but intervene and supervise when necessary  - Involve family in performance of ADLs  - Assess for home care needs following discharge   - Consider OT consult to assist with ADL evaluation and planning for discharge  - Provide patient education as appropriate  Outcome: Progressing  Goal: Maintain or return mobility status to optimal level  Description: INTERVENTIONS:  - Assess patient's baseline mobility status (ambulation, transfers, stairs, etc )    - Identify cognitive and physical deficits and behaviors that affect mobility  - Identify mobility aids required to assist with transfers and/or ambulation (gait belt, sit-to-stand, lift, walker, cane, etc )  - [place] fall precautions as indicated by assessment  - Record patient progress and toleration of activity level on Mobility SBAR; progress patient to next Phase/Stage  - Instruct patient to call for assistance with activity based on assessment  - Consider rehabilitation consult to assist with strengthening/weightbearing, etc   Outcome: Progressing     Problem: DISCHARGE PLANNING  Goal: Discharge to home or other facility with appropriate resources  Description: INTERVENTIONS:  - Identify barriers to discharge w/patient and caregiver  - Arrange for needed discharge resources and transportation as appropriate  - Identify discharge learning needs (meds, wound care, etc )  - Arrange for interpretive services to assist at discharge as needed  - Refer to Case Management Department for coordinating discharge planning if the patient needs post-hospital services based on physician/advanced practitioner order or complex needs related to functional status, cognitive ability, or social support system  Outcome: Progressing     Problem: Knowledge Deficit  Goal: Patient/family/caregiver demonstrates understanding of disease process, treatment plan, medications, and discharge instructions  Description: Complete learning assessment and assess knowledge base    Interventions:  - Provide teaching at level of understanding  - Provide teaching via preferred learning methods  Outcome: Progressing     Problem: METABOLIC, FLUID AND ELECTROLYTES - ADULT  Goal: Glucose maintained within target range  Description: INTERVENTIONS:  - Monitor Blood Glucose as ordered  - Assess for signs and symptoms of hyperglycemia and hypoglycemia  - Administer ordered medications to maintain glucose within target range  - Assess nutritional intake and initiate nutrition service referral as needed  Outcome: Progressing     Problem: Prexisting or High Potential for Compromised Skin Integrity  Goal: Skin integrity is maintained or improved  Description: INTERVENTIONS:  - Identify patients at risk for skin breakdown  - Assess and monitor skin integrity  - Assess and monitor nutrition and hydration status  - Monitor labs   - Assess for incontinence   - Turn and reposition patient  - Assist with mobility/ambulation  - Relieve pressure over bony prominences  - Avoid friction and shearing  - Provide appropriate hygiene as needed including keeping skin clean and dry  - Evaluate need for skin moisturizer/barrier cream  - Collaborate with interdisciplinary team   - Patient/family teaching  - Consider wound care consult   Outcome: Progressing

## 2021-05-18 NOTE — PLAN OF CARE
Problem: Potential for Falls  Goal: Patient will remain free of falls  Description: INTERVENTIONS:  - Assess patient frequently for physical needs  -  Identify cognitive and physical deficits and behaviors that affect risk of falls  -  [place] fall precautions as indicated by assessment   - Educate patient/family on patient safety including physical limitations  - Instruct patient to call for assistance with activity based on assessment  - Modify environment to reduce risk of injury  - Consider OT/PT consult to assist with strengthening/mobility  Outcome: Progressing     Problem: Nutrition/Hydration-ADULT  Goal: Nutrient/Hydration intake appropriate for improving, restoring or maintaining nutritional needs  Description: Monitor and assess patient's nutrition/hydration status for malnutrition  Collaborate with interdisciplinary team and initiate plan and interventions as ordered  Monitor patient's weight and dietary intake as ordered or per policy  Utilize nutrition screening tool and intervene as necessary  Determine patient's food preferences and provide high-protein, high-caloric foods as appropriate       INTERVENTIONS:  - Monitor oral intake, urinary output, labs, and treatment plans  - Assess nutrition and hydration status and recommend course of action  - Evaluate amount of meals eaten  - Assist patient with eating if necessary   - Allow adequate time for meals  - Recommend/ encourage appropriate diets, oral nutritional supplements, and vitamin/mineral supplements  - Order, calculate, and assess calorie counts as needed  - Recommend, monitor, and adjust tube feedings and TPN/PPN based on assessed needs  - Assess need for intravenous fluids  - Provide nutrition/hydration education as appropriate  - Include patient/family/caregiver in decisions related to nutrition  Outcome: Progressing     Problem: PAIN - ADULT  Goal: Verbalizes/displays adequate comfort level or baseline comfort level  Description: Interventions:  - Encourage patient to monitor pain and request assistance  - Assess pain using appropriate pain scale  - Administer analgesics based on type and severity of pain and evaluate response  - Implement non-pharmacological measures as appropriate and evaluate response  - Consider cultural and social influences on pain and pain management  - Notify physician/advanced practitioner if interventions unsuccessful or patient reports new pain  Outcome: Progressing     Problem: INFECTION - ADULT  Goal: Absence or prevention of progression during hospitalization  Description: INTERVENTIONS:  - Assess and monitor for signs and symptoms of infection  - Monitor lab/diagnostic results  - Monitor all insertion sites, i e  indwelling lines, tubes, and drains  - Monitor endotracheal if appropriate and nasal secretions for changes in amount and color  - [place] appropriate cooling/warming therapies per order  - Administer medications as ordered  - Instruct and encourage patient and family to use good hand hygiene technique  - Identify and instruct in appropriate isolation precautions for identified infection/condition  Outcome: Progressing     Problem: SAFETY ADULT  Goal: Patient will remain free of falls  Description: INTERVENTIONS:  - Assess patient frequently for physical needs  -  Identify cognitive and physical deficits and behaviors that affect risk of falls    -  [place] fall precautions as indicated by assessment   - Educate patient/family on patient safety including physical limitations  - Instruct patient to call for assistance with activity based on assessment  - Modify environment to reduce risk of injury  - Consider OT/PT consult to assist with strengthening/mobility  Outcome: Progressing  Goal: Maintain or return to baseline ADL function  Description: INTERVENTIONS:  -  Assess patient's ability to carry out ADLs; assess patient's baseline for ADL function and identify physical deficits which impact ability to perform ADLs (bathing, care of mouth/teeth, toileting, grooming, dressing, etc )  - Assess/evaluate cause of self-care deficits   - Assess range of motion  - Assess patient's mobility; develop plan if impaired  - Assess patient's need for assistive devices and provide as appropriate  - Encourage maximum independence but intervene and supervise when necessary  - Involve family in performance of ADLs  - Assess for home care needs following discharge   - Consider OT consult to assist with ADL evaluation and planning for discharge  - Provide patient education as appropriate  Outcome: Progressing  Goal: Maintain or return mobility status to optimal level  Description: INTERVENTIONS:  - Assess patient's baseline mobility status (ambulation, transfers, stairs, etc )    - Identify cognitive and physical deficits and behaviors that affect mobility  - Identify mobility aids required to assist with transfers and/or ambulation (gait belt, sit-to-stand, lift, walker, cane, etc )  - [place] fall precautions as indicated by assessment  - Record patient progress and toleration of activity level on Mobility SBAR; progress patient to next Phase/Stage  - Instruct patient to call for assistance with activity based on assessment  - Consider rehabilitation consult to assist with strengthening/weightbearing, etc   Outcome: Progressing     Problem: DISCHARGE PLANNING  Goal: Discharge to home or other facility with appropriate resources  Description: INTERVENTIONS:  - Identify barriers to discharge w/patient and caregiver  - Arrange for needed discharge resources and transportation as appropriate  - Identify discharge learning needs (meds, wound care, etc )  - Arrange for interpretive services to assist at discharge as needed  - Refer to Case Management Department for coordinating discharge planning if the patient needs post-hospital services based on physician/advanced practitioner order or complex needs related to functional status, cognitive ability, or social support system  Outcome: Progressing     Problem: Knowledge Deficit  Goal: Patient/family/caregiver demonstrates understanding of disease process, treatment plan, medications, and discharge instructions  Description: Complete learning assessment and assess knowledge base    Interventions:  - Provide teaching at level of understanding  - Provide teaching via preferred learning methods  Outcome: Progressing     Problem: METABOLIC, FLUID AND ELECTROLYTES - ADULT  Goal: Glucose maintained within target range  Description: INTERVENTIONS:  - Monitor Blood Glucose as ordered  - Assess for signs and symptoms of hyperglycemia and hypoglycemia  - Administer ordered medications to maintain glucose within target range  - Assess nutritional intake and initiate nutrition service referral as needed  Outcome: Progressing     Problem: Prexisting or High Potential for Compromised Skin Integrity  Goal: Skin integrity is maintained or improved  Description: INTERVENTIONS:  - Identify patients at risk for skin breakdown  - Assess and monitor skin integrity  - Assess and monitor nutrition and hydration status  - Monitor labs   - Assess for incontinence   - Turn and reposition patient  - Assist with mobility/ambulation  - Relieve pressure over bony prominences  - Avoid friction and shearing  - Provide appropriate hygiene as needed including keeping skin clean and dry  - Evaluate need for skin moisturizer/barrier cream  - Collaborate with interdisciplinary team   - Patient/family teaching  - Consider wound care consult   Outcome: Progressing

## 2021-05-18 NOTE — ASSESSMENT & PLAN NOTE
EGD with push enteroscopy on 05/17/2021 reflux esophagitis, small hiatal hernia, small clean based gastric ulcer, multiple antral and body erosions without active bleeding  No signs of overt active bleeding noted  Hemoglobin stable around 7 8  Monitor CBC and transfuse below 7  Continue Protonix 40 mg b i d   Patient will need colonoscopy if hemoglobin drops again per GI

## 2021-05-18 NOTE — ASSESSMENT & PLAN NOTE
Lab Results   Component Value Date    HGBA1C 12 3 (H) 05/09/2021       Recent Labs     05/17/21  2102 05/18/21  0727 05/18/21  1145 05/18/21  1632   POCGLU 174* 185* 225* 112       Blood Sugar Average: Last 72 hrs:  (P) 179 [id number]     Continue insulin  Monitor sugars

## 2021-05-18 NOTE — ASSESSMENT & PLAN NOTE
Brain MRI report noted negative for acute finding  Now resume currently patient is awake, alert, oriented at his baseline

## 2021-05-18 NOTE — PLAN OF CARE
Problem: OCCUPATIONAL THERAPY ADULT  Goal: Performs self-care activities at highest level of function for planned discharge setting  See evaluation for individualized goals  Outcome: Progressing  Note: Limitation: Decreased ADL status, Decreased UE ROM, Decreased UE strength, Decreased Safe judgement during ADL, Decreased endurance, Visual deficit, Decreased high-level ADLs, Decreased self-care trans, Decreased cognition  Prognosis: Good  Assessment: Patient participated in Skilled OT session this date with interventions consisting of functional transfer training,  ADL re training with the use of correct body mechnaics, Energy Conservation techniques and increase dynamic sit/ stand balance during functional activity    Patient agreeable to OT treatment session, upon arrival patient was found seated at edge of bed  Patient observed eating breakfast and no noted difficulties noted with eating  Patient then set up for ADL seated at EOB and sat unsupported x 40 minutes (good sitting balance noted to complete ADL as detailed in flow sheet  Patient transfers sit to stand and completes functional mobility with CGA x 1 x 25 feet x 2 from bed > toilet and toilet > chair  Stands with unilateral support of walker x 3 mins for pericare with no LOB noted  Session ended with patient seated OOB in recliner and left with all needs met  Patient requiring frequent re direction, verbal cues for safety, verbal cues for correct technique and verbal cues for pacing thru activity steps  Patient performance  demonstrated varied carryover of learned techniques and strategies to facilitate safety during functional tasks due to cognition  Patient continues to be functioning below baseline level, occupational performance remains limited secondary to factors listed above and increased risk for falls and injury  From OT standpoint, recommendation at time of d/c would be Short Term Rehab    Patient to benefit from continued Occupational Therapy treatment while in the hospital to address deficits as defined above and maximize level of functional independence with ADLs and functional mobility in order to return to PLOF        OT Discharge Recommendation: Post acute rehabilitation services  OT - OK to Discharge: Yes(when medically cleared)

## 2021-05-18 NOTE — PROGRESS NOTES
[facility]  Progress Note - Tessa Riojas 1940, 79 y o  male MRN: [id number]  Unit/Bed#: MS Amanda-01 Encounter: [id number]  Primary Care Provider: Brittany Jade MD   Date and time admitted to hospital: 5/9/2021  4:12 PM    * Hyperosmolar hyperglycemic state (HHS) [facility])  Assessment & Plan  Lab Results   Component Value Date    HGBA1C 12 3 (H) 05/09/2021       Recent Labs     05/17/21  2102 05/18/21  0727 05/18/21  1145 05/18/21  1632   POCGLU 174* 185* 225* 112       Blood Sugar Average: Last 72 hrs:    Currently significantly better  Continue current dose of Lantus 30 units q h s ,  Lispro dependent 10 units t i d  With meals  Diabetic diet, sliding scale coverage  Daughter at bedside counseled on insulin management as well as hypoglycemic management  Nursing staff to initiate insulin  And diabetic teaching  Acute blood loss anemia (ABLA)  Assessment & Plan  EGD with push enteroscopy on 05/17/2021 reflux esophagitis, small hiatal hernia, small clean based gastric ulcer, multiple antral and body erosions without active bleeding  No signs of overt active bleeding noted  Hemoglobin stable around 7 8  Monitor CBC and transfuse below 7  Continue Protonix 40 mg b i d   Patient will need colonoscopy if hemoglobin drops again per GI  Gastric ulcer  Assessment & Plan  Continue Protonix 40 mg b i d   Outpatient follow-up with Gastroenterology  Hematemesis  Assessment & Plan  Now resolved  Hemoglobin stable around 7 8  Continue Protonix 40 mg b i d   Monitor CBC and transfuse below 7  Patient will need colonoscopy if noted with worsening hemoglobin  GI signed off      New onset type 2 diabetes mellitus [facility])  Assessment & Plan  Lab Results   Component Value Date    HGBA1C 12 3 (H) 05/09/2021       Recent Labs     05/17/21  2102 05/18/21  0727 05/18/21  1145 05/18/21  1632   POCGLU 174* 185* 225* 112       Blood Sugar Average: Last 72 hrs:  (P) 137 [id number] Continue insulin  Monitor sugars    Dementia ([facility] [address] )  Assessment & Plan  As per documentation patient has history of dementia  Late onset 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   Patient follows with geriatrician, recommendations to pursue conservative management including frequent reorientation and redirection  Being in socially, physically and mentally active  Fall precautions, optimization of chronic conditions  Cognitive challenging exercises  Recommendations to start vitamin E/Namenda on upcoming neurocognitive assessment visit by geriatrician  Dysphagia  Assessment & Plan  Resolved  Currently tolerating regular diet well  Acute metabolic encephalopathy  Assessment & Plan  Brain MRI report noted negative for acute finding  Now resume currently patient is awake, alert, oriented at his baseline  Late onset 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 ([facility] [address] )  Assessment & Plan  Currently stable at his baseline  Resume home medication  Chronic kidney disease  Assessment & Plan  Lab Results   Component Value Date    EGFR 30 05/18/2021    EGFR 34 05/17/2021    EGFR 29 05/16/2021    CREATININE 2 32 (H) 05/18/2021    CREATININE 2 08 (H) 05/17/2021    CREATININE 2 36 (H) 05/16/2021   · Acute kidney injury superimposed to chronic kidney disease stage IIIB  · Creatinine baseline between 1 8-2 6  · Currently stable at baseline  BPH (benign prostatic hyperplasia)  Assessment & Plan  · Continue finasteride 5 mg oral daily  Other hyperlipidemia  Assessment & Plan  · Continue home dose statin           VTE Pharmacologic Prophylaxis: VTE Score: 4 Moderate Risk (Score 3-4) - Pharmacological DVT Prophylaxis Contraindicated  Sequential Compression Devices Ordered  Patient Centered Rounds: I performed bedside rounds with nursing staff today  Education and Discussions with Family / Patient: Updated  (daughter) at bedside  Time Spent for Care: 30 minutes   More than 50% of total time spent on counseling and coordination of care as described above  Current Length of Stay: 9 day(s)  Current Patient Status: Inpatient   Certification Statement: The patient will continue to require additional inpatient hospital stay due to Above  Discharge Plan: Anticipate discharge tomorrow to home with home services  Code Status: Level 1 - Full Code    Subjective:   Patient is awake, alert, oriented at baseline  No further episode of hematemesis noted  Accu-Cheks much better  Patient is overall poor historian with limited insight  Daughter at bedside during encounter  No other events reported  Daughter prefers patient to come home rather then going to rehab  Objective:     Vitals:   Temp (24hrs), Av 6 °F (37 6 °C), Min:98 9 °F (37 2 °C), Max:100 1 °F (37 8 °C)    Temp:  [98 9 °F (37 2 °C)-100 1 °F (37 8 °C)] 98 9 °F (37 2 °C)  HR:  [80-94] 80  Resp:  [16] 16  BP: (126-128)/(67-71) 127/71  SpO2:  [91 %-96 %] 96 %  Body mass index is 31 45 kg/m²  Input and Output Summary (last 24 hours): Intake/Output Summary (Last 24 hours) at 2021 1738  Last data filed at 2021 0300  Gross per 24 hour   Intake --   Output 1026 ml   Net -1026 ml       Physical Exam:   Physical Exam  Constitutional:       General: He is not in acute distress  Appearance: Normal appearance  Comments: Elderly male patient, acutely nontoxic appearing  HENT:      Head: Normocephalic and atraumatic  Eyes:      Extraocular Movements: Extraocular movements intact  Pupils: Pupils are equal, round, and reactive to light  Neck:      Musculoskeletal: Normal range of motion and neck supple  Cardiovascular:      Rate and Rhythm: Normal rate and regular rhythm  Pulses: Normal pulses  Heart sounds: Normal heart sounds  Pulmonary:      Effort: Pulmonary effort is normal  No respiratory distress  Breath sounds: Normal breath sounds  No stridor     Abdominal:      General: Bowel sounds are normal  There is no distension  Palpations: Abdomen is soft  Tenderness: There is no abdominal tenderness  Neurological:      General: No focal deficit present  Mental Status: He is alert  Mental status is at baseline  Psychiatric:         Behavior: Behavior normal          Additional Data:     Labs:  Results from last 7 days   Lab Units 05/18/21  0447  05/17/21  0619  05/16/21  0527   WBC Thousand/uL 12 80*  --  11 88*  --  12 67*   HEMOGLOBIN g/dL 7 8*   < > 7 8*   < > 7 0*   HEMATOCRIT % 24 7*   < > 23 5*   < > 22 2*   PLATELETS Thousands/uL 238  --  205  --  206   BANDS PCT %  --   --  3  --   --    NEUTROS PCT %  --   --   --   --  70   LYMPHS PCT %  --   --   --   --  15   LYMPHO PCT %  --   --  10*  --   --    MONOS PCT %  --   --   --   --  10   MONO PCT %  --   --  10  --   --    EOS PCT %  --   --  2  --  2    < > = values in this interval not displayed       Results from last 7 days   Lab Units 05/18/21  0447   SODIUM mmol/L 138   POTASSIUM mmol/L 3 4*   CHLORIDE mmol/L 102   CO2 mmol/L 28   BUN mg/dL 22   CREATININE mg/dL 2 32*   ANION GAP mmol/L 8   CALCIUM mg/dL 8 0*   GLUCOSE RANDOM mg/dL 169*     Results from last 7 days   Lab Units 05/12/21  0349   INR  1 15     Results from last 7 days   Lab Units 05/18/21  1632 05/18/21  1145 05/18/21  0727 05/17/21  2102 05/17/21  1550 05/17/21  1103 05/17/21  0747 05/16/21  2029 05/16/21  1611 05/16/21  1137 05/16/21  0723 05/15/21  1549   POC GLUCOSE mg/dl 112 225* 185* 174* 194* 145* 133 177* 185* 186* 179* 192*         Results from last 7 days   Lab Units 05/14/21  1436 05/12/21  0616 05/12/21  0354 05/12/21  0349   LACTIC ACID mmol/L 1 8 3 9*  --  4 2*   PROCALCITONIN ng/ml  --   --  0 32*  --        Lines/Drains:  Invasive Devices     Peripheral Intravenous Line            Peripheral IV 05/17/21 Left;Upper;Ventral (anterior) Arm less than 1 day                  Recent Cultures (last 7 days):         Last 24 Hours Medication List:   Current Facility-Administered Medications   Medication Dose Route Frequency Provider Last Rate    acetaminophen  650 mg Oral Q6H PRN Erin Davis MD      calcium carbonate  1,000 mg Oral Daily PRN Erin Davis MD      docusate sodium  100 mg Oral BID Erin Davis MD      fentanyl citrate (PF)  50 mcg Intravenous Q1H PRN Erin Davis MD      finasteride  5 mg Oral Daily Erin Davis MD      furosemide  40 mg Oral Daily Erin Davis MD      hydrALAZINE  5 mg Intravenous Q6H PRN Erin Davis MD      insulin glargine  30 Units Subcutaneous QAM Erin Davis MD      insulin lispro  10 Units Subcutaneous TID With Meals Sally Ip III, MD      insulin lispro  4-20 Units Subcutaneous TID With Meals Sally Ip III, MD      insulin lispro  4-20 Units Subcutaneous HS Erin Davis MD      labetalol  200 mg Oral HS Erin Davis MD      ondansetron  4 mg Intravenous Q6H PRN Erin Davis MD      pantoprazole  40 mg Oral BID AC Erin Davis MD      polyethylene glycol  17 g Oral Daily PRN Erin Davis MD      pravastatin  40 mg Oral Daily With [address] III, MD      senna  1 tablet Oral Daily Erin Davis MD          Today, Patient Was Seen By: Linnette Carl MD    **Please Note: This note may have been constructed using a voice recognition system  **

## 2021-05-18 NOTE — ASSESSMENT & PLAN NOTE
Now resolved  Hemoglobin stable around 7 8  Continue Protonix 40 mg b i d   Monitor CBC and transfuse below 7  Patient will need colonoscopy if noted with worsening hemoglobin  GI signed off

## 2021-05-18 NOTE — OCCUPATIONAL THERAPY NOTE
05/18/21 0815   OT Last Visit   OT Visit Date 05/18/21   Note Type   Note Type Treatment   Restrictions/Precautions   Weight Bearing Precautions Per Order No   Other Precautions Chair Alarm; Bed Alarm; Fall Risk;Pain;Hard of hearing;Multiple lines;Telemetry;O2   Pain Assessment   Pain Assessment Tool 0-10   Pain Score No Pain   ADL   Where Assessed Edge of bed   Eating Assistance 5  Supervision/Setup   Eating Deficit Setup   Eating Comments No difficulty noted with eating   Grooming Assistance 5  Supervision/Setup   Grooming Deficit Setup;Verbal cueing;Supervision/safety; Increased time to complete;Wash/dry hands; Wash/dry face; Teeth care   UB Bathing Assistance 5  Supervision/Setup   UB Bathing Deficit Setup;Verbal cueing;Supervision/safety; Increased time to complete   LB Bathing Assistance 5  Supervision/Setup   LB Bathing Deficit Setup;Verbal cueing;Supervision/safety; Increased time to complete   UB Dressing Assistance 5  Supervision/Setup  (SBA)   UB Dressing Deficit Setup;Verbal cueing;Supervision/safety; Increased time to complete; Thread RUE; Thread LUE; Fasteners   LB Dressing Assistance 5  Supervision/Setup   LB Dressing Deficit Setup;Verbal cueing;Supervision/safety; Increased time to complete   Toileting Assistance  3  Moderate Assistance   Toileting Deficit Setup;Verbal cueing;Supervison/safety; Increased time to complete;Perineal hygiene   Toileting Comments Mod A with bowel hygiene to ensure cleanliness  Functional Standing Tolerance   Time 3 mins   Activity ADL/pericare   Comments No LOB noted   Transfers   Sit to Stand   (CGA)   Additional items Assist x 1; Increased time required;Verbal cues; Bedrails   Stand to Sit   (CGA)   Additional items Assist x 1; Armrests; Increased time required;Verbal cues   Stand pivot 4  Minimal assistance   Additional items Assist x 1; Increased time required;Verbal cues   Functional Mobility   Functional Mobility   (CGA)   Additional Comments Functional mobility x 25 feet x 2 with no LOB  Additional items Rolling walker   Toilet Transfers   Toilet Transfer From [employer] Transfer Type To   Toilet Transfer to Raised toilet seat with rails   Toilet Transfer Technique Ambulating   Toilet Transfers Contact guard   Toilet Transfers Comments VCs for safety/hand placement   Cognition   Overall Cognitive Status Impaired   Arousal/Participation Alert; Cooperative   Attention Attends with cues to redirect   Orientation Level Oriented to person;Oriented to place;Oriented to time;Disoriented to situation   Memory Decreased recall of recent events;Decreased short term memory   Following Commands Follows one step commands with increased time or repetition   Comments Pt agreeable to OT treatment  Activity Tolerance   Activity Tolerance Patient tolerated treatment well   Assessment   Assessment Patient participated in Skilled OT session this date with interventions consisting of functional transfer training,  ADL re training with the use of correct body mechnaics, Energy Conservation techniques and increase dynamic sit/ stand balance during functional activity    Patient agreeable to OT treatment session, upon arrival patient was found seated at edge of bed  Patient observed eating breakfast and no noted difficulties noted with eating  Patient then set up for ADL seated at EOB and sat unsupported x 40 minutes (good sitting balance noted to complete ADL as detailed in flow sheet  Patient transfers sit to stand and completes functional mobility with CGA x 1 x 25 feet x 2 from bed > toilet and toilet > chair  Stands with unilateral support of walker x 3 mins for pericare with no LOB noted  Session ended with patient seated OOB in recliner and left with all needs met  Patient requiring frequent re direction, verbal cues for safety, verbal cues for correct technique and verbal cues for pacing thru activity steps   Patient performance  demonstrated varied carryover of learned techniques and strategies to facilitate safety during functional tasks due to cognition  Patient continues to be functioning below baseline level, occupational performance remains limited secondary to factors listed above and increased risk for falls and injury  From OT standpoint, recommendation at time of d/c would be Short Term Rehab  Patient to benefit from continued Occupational Therapy treatment while in the hospital to address deficits as defined above and maximize level of functional independence with ADLs and functional mobility in order to return to OF  Plan   Treatment Interventions ADL retraining;Functional transfer training;Energy conservation;Patient/family training; Endurance training   Goal Expiration Date 05/28/21   OT Frequency 3-5x/wk   Recommendation   OT Discharge Recommendation Post acute rehabilitation services   OT - OK to Discharge Yes  (when medically cleared)   AM-PAC Daily Activity Inpatient   Lower Body Dressing 3   Bathing 3   Toileting 3   Upper Body Dressing 3   Grooming 3   Eating 4   Daily Activity Raw Score 19   Daily Activity Standardized Score (Calc for Raw Score >=11) 40 22   NATHANIEL Kaufman

## 2021-05-18 NOTE — ASSESSMENT & PLAN NOTE
Lab Results   Component Value Date    HGBA1C 12 3 (H) 05/09/2021       Recent Labs     05/17/21  2102 05/18/21  0727 05/18/21  1145 05/18/21  1632   POCGLU 174* 185* 225* 112       Blood Sugar Average: Last 72 hrs:    Currently significantly better  Continue current dose of Lantus 30 units q h s ,  Lispro dependent 10 units t i d  With meals  Diabetic diet, sliding scale coverage  Daughter at bedside counseled on insulin management as well as hypoglycemic management  Nursing staff to initiate insulin  And diabetic teaching

## 2021-05-19 LAB
ANION GAP SERPL CALCULATED.3IONS-SCNC: 8 MMOL/L (ref 4–13)
BUN SERPL-MCNC: 19 MG/DL (ref 5–25)
CALCIUM SERPL-MCNC: 7.5 MG/DL (ref 8.3–10.1)
CHLORIDE SERPL-SCNC: 101 MMOL/L (ref 100–108)
CO2 SERPL-SCNC: 28 MMOL/L (ref 21–32)
CREAT SERPL-MCNC: 2.26 MG/DL (ref 0.6–1.3)
ERYTHROCYTE [DISTWIDTH] IN BLOOD BY AUTOMATED COUNT: 15.7 % (ref 11.6–15.1)
GFR SERPL CREATININE-BSD FRML MDRD: 31 ML/MIN/1.73SQ M
GLUCOSE SERPL-MCNC: 109 MG/DL (ref 65–140)
GLUCOSE SERPL-MCNC: 116 MG/DL (ref 65–140)
GLUCOSE SERPL-MCNC: 121 MG/DL (ref 65–140)
GLUCOSE SERPL-MCNC: 131 MG/DL (ref 65–140)
GLUCOSE SERPL-MCNC: 144 MG/DL (ref 65–140)
GLUCOSE SERPL-MCNC: 175 MG/DL (ref 65–140)
HCT VFR BLD AUTO: 24.4 % (ref 36.5–49.3)
HGB BLD-MCNC: 7.7 G/DL (ref 12–17)
MCH RBC QN AUTO: 31 PG (ref 26.8–34.3)
MCHC RBC AUTO-ENTMCNC: 31.6 G/DL (ref 31.4–37.4)
MCV RBC AUTO: 98 FL (ref 82–98)
PLATELET # BLD AUTO: 260 THOUSANDS/UL (ref 149–390)
PMV BLD AUTO: 9.9 FL (ref 8.9–12.7)
POTASSIUM SERPL-SCNC: 3.6 MMOL/L (ref 3.5–5.3)
RBC # BLD AUTO: 2.48 MILLION/UL (ref 3.88–5.62)
SODIUM SERPL-SCNC: 137 MMOL/L (ref 136–145)
WBC # BLD AUTO: 9.03 THOUSAND/UL (ref 4.31–10.16)

## 2021-05-19 PROCEDURE — 97110 THERAPEUTIC EXERCISES: CPT

## 2021-05-19 PROCEDURE — 99232 SBSQ HOSP IP/OBS MODERATE 35: CPT | Performed by: STUDENT IN AN ORGANIZED HEALTH CARE EDUCATION/TRAINING PROGRAM

## 2021-05-19 PROCEDURE — 97530 THERAPEUTIC ACTIVITIES: CPT

## 2021-05-19 PROCEDURE — 82948 REAGENT STRIP/BLOOD GLUCOSE: CPT

## 2021-05-19 PROCEDURE — 85027 COMPLETE CBC AUTOMATED: CPT | Performed by: STUDENT IN AN ORGANIZED HEALTH CARE EDUCATION/TRAINING PROGRAM

## 2021-05-19 PROCEDURE — 80048 BASIC METABOLIC PNL TOTAL CA: CPT | Performed by: STUDENT IN AN ORGANIZED HEALTH CARE EDUCATION/TRAINING PROGRAM

## 2021-05-19 PROCEDURE — 97116 GAIT TRAINING THERAPY: CPT

## 2021-05-19 RX ORDER — INSULIN GLARGINE 100 [IU]/ML
30 INJECTION, SOLUTION SUBCUTANEOUS EVERY MORNING
Qty: 10 ML | Refills: 0 | Status: SHIPPED | OUTPATIENT
Start: 2021-05-20 | End: 2021-05-24 | Stop reason: SDUPTHER

## 2021-05-19 RX ADMIN — FUROSEMIDE 40 MG: 40 TABLET ORAL at 08:46

## 2021-05-19 RX ADMIN — INSULIN LISPRO 10 UNITS: 100 INJECTION, SOLUTION INTRAVENOUS; SUBCUTANEOUS at 18:19

## 2021-05-19 RX ADMIN — PANTOPRAZOLE SODIUM 40 MG: 40 TABLET, DELAYED RELEASE ORAL at 08:46

## 2021-05-19 RX ADMIN — PRAVASTATIN SODIUM 40 MG: 40 TABLET ORAL at 18:18

## 2021-05-19 RX ADMIN — INSULIN LISPRO 10 UNITS: 100 INJECTION, SOLUTION INTRAVENOUS; SUBCUTANEOUS at 12:38

## 2021-05-19 RX ADMIN — INSULIN GLARGINE 30 UNITS: 100 INJECTION, SOLUTION SUBCUTANEOUS at 08:46

## 2021-05-19 RX ADMIN — FINASTERIDE 5 MG: 5 TABLET, FILM COATED ORAL at 08:46

## 2021-05-19 RX ADMIN — LABETALOL HYDROCHLORIDE 200 MG: 100 TABLET, FILM COATED ORAL at 22:34

## 2021-05-19 RX ADMIN — INSULIN LISPRO 10 UNITS: 100 INJECTION, SOLUTION INTRAVENOUS; SUBCUTANEOUS at 08:47

## 2021-05-19 RX ADMIN — PANTOPRAZOLE SODIUM 40 MG: 40 TABLET, DELAYED RELEASE ORAL at 18:18

## 2021-05-19 RX ADMIN — INSULIN LISPRO 4 UNITS: 100 INJECTION, SOLUTION INTRAVENOUS; SUBCUTANEOUS at 22:30

## 2021-05-19 NOTE — ASSESSMENT & PLAN NOTE
Lab Results   Component Value Date    EGFR 31 05/19/2021    EGFR 30 05/18/2021    EGFR 34 05/17/2021    CREATININE 2 26 (H) 05/19/2021    CREATININE 2 32 (H) 05/18/2021    CREATININE 2 08 (H) 05/17/2021   · Acute kidney injury superimposed to chronic kidney disease stage IIIB  · Creatinine baseline between 1 8-2 6  · Currently stable at baseline

## 2021-05-19 NOTE — PLAN OF CARE
Problem: Potential for Falls  Goal: Patient will remain free of falls  Description: INTERVENTIONS:  - Assess patient frequently for physical needs  -  Identify cognitive and physical deficits and behaviors that affect risk of falls  -  [place] fall precautions as indicated by assessment   - Educate patient/family on patient safety including physical limitations  - Instruct patient to call for assistance with activity based on assessment  - Modify environment to reduce risk of injury  - Consider OT/PT consult to assist with strengthening/mobility  Outcome: Progressing     Problem: Nutrition/Hydration-ADULT  Goal: Nutrient/Hydration intake appropriate for improving, restoring or maintaining nutritional needs  Description: Monitor and assess patient's nutrition/hydration status for malnutrition  Collaborate with interdisciplinary team and initiate plan and interventions as ordered  Monitor patient's weight and dietary intake as ordered or per policy  Utilize nutrition screening tool and intervene as necessary  Determine patient's food preferences and provide high-protein, high-caloric foods as appropriate       INTERVENTIONS:  - Monitor oral intake, urinary output, labs, and treatment plans  - Assess nutrition and hydration status and recommend course of action  - Evaluate amount of meals eaten  - Assist patient with eating if necessary   - Allow adequate time for meals  - Recommend/ encourage appropriate diets, oral nutritional supplements, and vitamin/mineral supplements  - Order, calculate, and assess calorie counts as needed  - Recommend, monitor, and adjust tube feedings and TPN/PPN based on assessed needs  - Assess need for intravenous fluids  - Provide nutrition/hydration education as appropriate  - Include patient/family/caregiver in decisions related to nutrition  Outcome: Progressing     Problem: PAIN - ADULT  Goal: Verbalizes/displays adequate comfort level or baseline comfort level  Description: Interventions:  - Encourage patient to monitor pain and request assistance  - Assess pain using appropriate pain scale  - Administer analgesics based on type and severity of pain and evaluate response  - Implement non-pharmacological measures as appropriate and evaluate response  - Consider cultural and social influences on pain and pain management  - Notify physician/advanced practitioner if interventions unsuccessful or patient reports new pain  Outcome: Progressing     Problem: INFECTION - ADULT  Goal: Absence or prevention of progression during hospitalization  Description: INTERVENTIONS:  - Assess and monitor for signs and symptoms of infection  - Monitor lab/diagnostic results  - Monitor all insertion sites, i e  indwelling lines, tubes, and drains  - Monitor endotracheal if appropriate and nasal secretions for changes in amount and color  - [place] appropriate cooling/warming therapies per order  - Administer medications as ordered  - Instruct and encourage patient and family to use good hand hygiene technique  - Identify and instruct in appropriate isolation precautions for identified infection/condition  Outcome: Progressing     Problem: SAFETY ADULT  Goal: Patient will remain free of falls  Description: INTERVENTIONS:  - Assess patient frequently for physical needs  -  Identify cognitive and physical deficits and behaviors that affect risk of falls    -  [place] fall precautions as indicated by assessment   - Educate patient/family on patient safety including physical limitations  - Instruct patient to call for assistance with activity based on assessment  - Modify environment to reduce risk of injury  - Consider OT/PT consult to assist with strengthening/mobility  Outcome: Progressing  Goal: Maintain or return to baseline ADL function  Description: INTERVENTIONS:  -  Assess patient's ability to carry out ADLs; assess patient's baseline for ADL function and identify physical deficits which impact ability to perform ADLs (bathing, care of mouth/teeth, toileting, grooming, dressing, etc )  - Assess/evaluate cause of self-care deficits   - Assess range of motion  - Assess patient's mobility; develop plan if impaired  - Assess patient's need for assistive devices and provide as appropriate  - Encourage maximum independence but intervene and supervise when necessary  - Involve family in performance of ADLs  - Assess for home care needs following discharge   - Consider OT consult to assist with ADL evaluation and planning for discharge  - Provide patient education as appropriate  Outcome: Progressing  Goal: Maintain or return mobility status to optimal level  Description: INTERVENTIONS:  - Assess patient's baseline mobility status (ambulation, transfers, stairs, etc )    - Identify cognitive and physical deficits and behaviors that affect mobility  - Identify mobility aids required to assist with transfers and/or ambulation (gait belt, sit-to-stand, lift, walker, cane, etc )  - [place] fall precautions as indicated by assessment  - Record patient progress and toleration of activity level on Mobility SBAR; progress patient to next Phase/Stage  - Instruct patient to call for assistance with activity based on assessment  - Consider rehabilitation consult to assist with strengthening/weightbearing, etc   Outcome: Progressing     Problem: DISCHARGE PLANNING  Goal: Discharge to home or other facility with appropriate resources  Description: INTERVENTIONS:  - Identify barriers to discharge w/patient and caregiver  - Arrange for needed discharge resources and transportation as appropriate  - Identify discharge learning needs (meds, wound care, etc )  - Arrange for interpretive services to assist at discharge as needed  - Refer to Case Management Department for coordinating discharge planning if the patient needs post-hospital services based on physician/advanced practitioner order or complex needs related to functional status, cognitive ability, or social support system  Outcome: Progressing     Problem: Knowledge Deficit  Goal: Patient/family/caregiver demonstrates understanding of disease process, treatment plan, medications, and discharge instructions  Description: Complete learning assessment and assess knowledge base    Interventions:  - Provide teaching at level of understanding  - Provide teaching via preferred learning methods  Outcome: Progressing     Problem: METABOLIC, FLUID AND ELECTROLYTES - ADULT  Goal: Glucose maintained within target range  Description: INTERVENTIONS:  - Monitor Blood Glucose as ordered  - Assess for signs and symptoms of hyperglycemia and hypoglycemia  - Administer ordered medications to maintain glucose within target range  - Assess nutritional intake and initiate nutrition service referral as needed  Outcome: Progressing     Problem: Prexisting or High Potential for Compromised Skin Integrity  Goal: Skin integrity is maintained or improved  Description: INTERVENTIONS:  - Identify patients at risk for skin breakdown  - Assess and monitor skin integrity  - Assess and monitor nutrition and hydration status  - Monitor labs   - Assess for incontinence   - Turn and reposition patient  - Assist with mobility/ambulation  - Relieve pressure over bony prominences  - Avoid friction and shearing  - Provide appropriate hygiene as needed including keeping skin clean and dry  - Evaluate need for skin moisturizer/barrier cream  - Collaborate with interdisciplinary team   - Patient/family teaching  - Consider wound care consult   Outcome: Progressing

## 2021-05-19 NOTE — PROGRESS NOTES
[address]  Progress Note - Corinn Block 1940, 79 y o  male MRN: [id number]  Unit/Bed#: MS Patel-01 Encounter: [id number]  Primary Care Provider: Dylon Guzman MD   Date and time admitted to hospital: 5/9/2021  4:12 PM    * Hyperosmolar hyperglycemic state (HHS) [facility])  Assessment & Plan  Lab Results   Component Value Date    HGBA1C 12 3 (H) 05/09/2021       Recent Labs     05/19/21  0120 05/19/21  0719 05/19/21  1104 05/19/21  1547   POCGLU 131 109 144* 121       Blood Sugar Average: Last 72 hrs:    Currently significantly better  Continue current dose of Lantus 30 units q h s ,  Lispro dependent 10 units t i d  With meals  Diabetic diet, sliding scale coverage  Daughter at bedside counseled on insulin management as well as hypoglycemic management  Nursing staff to initiate insulin  And diabetic teaching  Nutritionist recommendation appreciated  CM to follow up on coverage for insulin  Acute blood loss anemia (ABLA)  Assessment & Plan  EGD with push enteroscopy on 05/17/2021 reflux esophagitis, small hiatal hernia, small clean based gastric ulcer, multiple antral and body erosions without active bleeding  No signs of overt active bleeding noted  Hemoglobin stable around 7 8  Monitor CBC and transfuse below 7  Continue Protonix 40 mg b i d   Patient will need colonoscopy if hemoglobin drops again per GI  Gastric ulcer  Assessment & Plan  Continue Protonix 40 mg b i d   Outpatient follow-up with Gastroenterology  Hematemesis  Assessment & Plan  Now resolved  Hemoglobin stable around 7 8  Continue Protonix 40 mg b i d   Monitor CBC and transfuse below 7  Patient will need colonoscopy if noted with worsening hemoglobin  GI signed off      New onset type 2 diabetes mellitus 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   Patient follows with geriatrician, recommendations to pursue conservative management including frequent reorientation and redirection  Being in socially, physically and mentally active  Fall precautions, optimization of chronic conditions  Cognitive challenging exercises  Recommendations to start vitamin E/Namenda on upcoming neurocognitive assessment visit by geriatrician  Dysphagia  Assessment & Plan  Resolved  Currently tolerating regular diet well  Acute metabolic encephalopathy  Assessment & Plan  Brain MRI report noted negative for acute finding  Now resume currently patient is awake, alert, oriented at his baseline  Late onset 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 ([facility] [address] )  Assessment & Plan  Currently stable at his baseline  Resume home medication  Chronic kidney disease  Assessment & Plan  Lab Results   Component Value Date    EGFR 31 05/19/2021    EGFR 30 05/18/2021    EGFR 34 05/17/2021    CREATININE 2 26 (H) 05/19/2021    CREATININE 2 32 (H) 05/18/2021    CREATININE 2 08 (H) 05/17/2021   · Acute kidney injury superimposed to chronic kidney disease stage IIIB  · Creatinine baseline between 1 8-2 6  · Currently stable at baseline  BPH (benign prostatic hyperplasia)  Assessment & Plan  · Continue finasteride 5 mg oral daily  Other hyperlipidemia  Assessment & Plan  · Continue home dose statin         VTE Pharmacologic Prophylaxis: VTE Score: 4 Moderate Risk (Score 3-4) - Pharmacological DVT Prophylaxis Contraindicated  Sequential Compression Devices Ordered  Patient Centered Rounds: I performed bedside rounds with nursing staff today      Education and Discussions with Family / Patient: Updated  (daughter) via phone  Time Spent for Care: 30 minutes  More than 50% of total time spent on counseling and coordination of care as described above  Current Length of Stay: 10 day(s)  Current Patient Status: Inpatient   Certification Statement: The patient will continue to require additional inpatient hospital stay due to Safe discharge planning in progress along with  to make sure insuline is available for dicharge  Discharge Plan: Anticipate discharge tomorrow to home with home services  Code Status: Level 1 - Full Code    Subjective:   Afebrile, hemodynamically stable  Seen sitting in a chair during eating breakfast   Patient is good spirit  Denies chest pain, dyspnea, fever, chills, nausea vomiting, any new complaints  No other events reported  Objective:     Vitals:   Temp (24hrs), Av 8 °F (37 1 °C), Min:98 2 °F (36 8 °C), Max:99 3 °F (37 4 °C)    Temp:  [98 2 °F (36 8 °C)-99 3 °F (37 4 °C)] 99 3 °F (37 4 °C)  HR:  [57-88] 84  Resp:  [16-18] 18  BP: (112-134)/(69-75) 134/75  SpO2:  [92 %-96 %] 95 %  Body mass index is 31 45 kg/m²  Input and Output Summary (last 24 hours): Intake/Output Summary (Last 24 hours) at 2021 1633  Last data filed at 2021 1801  Gross per 24 hour   Intake 480 ml   Output 350 ml   Net 130 ml       Physical Exam:   Physical Exam  Constitutional:       General: He is not in acute distress  Appearance: Normal appearance  Comments: Elderly male patient, acutely nontoxic appearing  HENT:      Head: Normocephalic and atraumatic  Eyes:      Extraocular Movements: Extraocular movements intact  Pupils: Pupils are equal, round, and reactive to light  Neck:      Musculoskeletal: Normal range of motion and neck supple  Cardiovascular:      Rate and Rhythm: Normal rate and regular rhythm  Pulses: Normal pulses  Heart sounds: Normal heart sounds     Pulmonary:      Effort: Pulmonary effort is normal  No respiratory distress  Breath sounds: Normal breath sounds  No stridor  Abdominal:      General: Bowel sounds are normal  There is no distension  Palpations: Abdomen is soft  Tenderness: There is no abdominal tenderness  Neurological:      General: No focal deficit present  Mental Status: He is alert  Mental status is at baseline  Psychiatric:         Behavior: Behavior normal           Additional Data:     Labs:  Results from last 7 days   Lab Units 05/19/21  0614  05/17/21  0619  05/16/21  0527   WBC Thousand/uL 9 03   < > 11 88*  --  12 67*   HEMOGLOBIN g/dL 7 7*   < > 7 8*   < > 7 0*   HEMATOCRIT % 24 4*   < > 23 5*   < > 22 2*   PLATELETS Thousands/uL 260   < > 205  --  206   BANDS PCT %  --   --  3  --   --    NEUTROS PCT %  --   --   --   --  70   LYMPHS PCT %  --   --   --   --  15   LYMPHO PCT %  --   --  10*  --   --    MONOS PCT %  --   --   --   --  10   MONO PCT %  --   --  10  --   --    EOS PCT %  --   --  2  --  2    < > = values in this interval not displayed       Results from last 7 days   Lab Units 05/19/21  0614   SODIUM mmol/L 137   POTASSIUM mmol/L 3 6   CHLORIDE mmol/L 101   CO2 mmol/L 28   BUN mg/dL 19   CREATININE mg/dL 2 26*   ANION GAP mmol/L 8   CALCIUM mg/dL 7 5*   GLUCOSE RANDOM mg/dL 116         Results from last 7 days   Lab Units 05/19/21  1547 05/19/21  1104 05/19/21  0719 05/19/21  0120 05/18/21  2059 05/18/21  1632 05/18/21  1145 05/18/21  0727 05/17/21  2102 05/17/21  1550 05/17/21  1103 05/17/21  0747   POC GLUCOSE mg/dl 121 144* 109 131 91 112 225* 185* 174* 194* 145* 133         Results from last 7 days   Lab Units 05/14/21  1436   LACTIC ACID mmol/L 1 8       Lines/Drains:  Invasive Devices     Peripheral Intravenous Line            Peripheral IV 05/17/21 Left;Upper;Ventral (anterior) Arm 1 day                  Recent Cultures (last 7 days):         Last 24 Hours Medication List:   Current Facility-Administered Medications   Medication Dose Route Frequency Provider Last Rate    acetaminophen  650 mg Oral Q6H PRN Felicita Lyn MD      calcium carbonate  1,000 mg Oral Daily PRN Felicita Lyn MD      docusate sodium  100 mg Oral BID Felicita Lyn MD      fentanyl citrate (PF)  50 mcg Intravenous Q1H PRN Felicita Lyn MD      finasteride  5 mg Oral Daily Felicita Lyn MD      furosemide  40 mg Oral Daily Felicita Lyn MD      hydrALAZINE  5 mg Intravenous Q6H PRN Felicita Lyn MD      insulin glargine  30 Units Subcutaneous QAM Felicita Lyn MD      insulin lispro  10 Units Subcutaneous TID With Meals Kyrie Paredes III, MD      insulin lispro  4-20 Units Subcutaneous TID With Meals Kyrie Paredes III, MD      insulin lispro  4-20 Units Subcutaneous HS Felicita Lyn MD      labetalol  200 mg Oral HS Felicita Lyn MD      ondansetron  4 mg Intravenous Q6H PRN Felicita Lyn MD      pantoprazole  40 mg Oral BID AC Felicita Lyn MD      polyethylene glycol  17 g Oral Daily PRN Felicita Lyn MD      pravastatin  40 mg Oral Daily With 4502 y 951 III, MD      senna  1 tablet Oral Daily Felicita Lyn MD          Today, Patient Was Seen By: Phuong Mejia MD    **Please Note: This note may have been constructed using a voice recognition system  **

## 2021-05-19 NOTE — CASE MANAGEMENT
KELLIE phoned and spoke to [facility] [phone] from [facility] who states patient is current with their agency and contracts with [facility] for nursing  CM sent referral to [facility] home care  KELLIE phoned and spoke to Francine  IMM reviewed with francine dennis agree with discharge determination  KELLIE phoned and spoke to Tamia at [facility] in [place]  Who states they had to order patient's lantus and it will not be available until 4pm tomorrow  Patient does not have copay for lantus and humalog  Treatment team informed

## 2021-05-19 NOTE — ASSESSMENT & PLAN NOTE
Lab Results   Component Value Date    HGBA1C 12 3 (H) 05/09/2021       Recent Labs     05/19/21  0120 05/19/21  0719 05/19/21  1104 05/19/21  1547   POCGLU 131 109 144* 121       Blood Sugar Average: Last 72 hrs:    Currently significantly better  Continue current dose of Lantus 30 units q h s ,  Lispro dependent 10 units t i d  With meals  Diabetic diet, sliding scale coverage  Daughter at bedside counseled on insulin management as well as hypoglycemic management  Nursing staff to initiate insulin  And diabetic teaching  Nutritionist recommendation appreciated  CM to follow up on coverage for insulin

## 2021-05-19 NOTE — PHYSICAL THERAPY NOTE
05/19/21 1338   PT Last Visit   PT Visit Date 05/19/21   Note Type   Note Type Treatment   Pain Assessment   Pain Assessment Tool 0-10   Pain Score No Pain   Restrictions/Precautions   Weight Bearing Precautions Per Order No   Other Precautions Chair Alarm; Bed Alarm;Cognitive;Multiple lines;Telemetry; Fall Risk   General   Chart Reviewed Yes   Response to Previous Treatment Patient unable to report, no changes reported from family or staff   Family/Caregiver Present No   Cognition   Overall Cognitive Status Impaired   Arousal/Participation Alert; Responsive; Cooperative   Attention Attends with cues to redirect   Orientation Level Oriented to person;Oriented to place;Oriented to time;Disoriented to situation   Memory Decreased recall of recent events;Decreased recall of precautions;Decreased short term memory   Following Commands Follows one step commands with increased time or repetition   Comments pt agreeable to PT session   Bed Mobility   Additional Comments pt OOB in recliner to begin and end session   Transfers   Sit to Stand 5  Supervision   Additional items Assist x 1; Armrests; Increased time required;Verbal cues   Stand to Sit 5  Supervision   Additional items Assist x 1; Armrests; Increased time required;Verbal cues   Stand pivot 5  Supervision   Additional items Assist x 1; Increased time required;Verbal cues   Toilet transfer   (SBA, with assist for posterior pericare)   Additional items Assist x 1; Increased time required;Standard toilet;Verbal cues   Ambulation/Elevation   Gait pattern Improper Weight shift;Decreased foot clearance; Short stride; Step to   Gait Assistance   (SBA)   Additional items Assist x 1;Verbal cues   Assistive Device Rolling walker   Distance 20 ftx2   Stair Management Assistance Not tested   Balance   Static Sitting Good   Dynamic Sitting Fair +   Static Standing Fair   Dynamic Standing Fair -   Ambulatory Fair -   Endurance Deficit   Endurance Deficit Yes   Activity Tolerance Activity Tolerance Patient limited by fatigue   Nurse Made Aware RN made aware of outcomes   Exercises   Quad Sets Sitting;10 reps;AROM; Bilateral   Heelslides Sitting;10 reps;AROM; Bilateral   Hip Abduction Sitting;20 reps;AROM; Bilateral   Hip Adduction Sitting;20 reps;AROM; Bilateral   Knee AROM Long Arc Quad Sitting;10 reps;AROM; Bilateral   Ankle Pumps Sitting;10 reps;AROM; Bilateral   Marching Sitting;10 reps;AROM; Bilateral   Assessment   Prognosis Fair   Problem List Decreased strength;Decreased endurance; Impaired balance;Decreased mobility; Decreased cognition; Impaired judgement;Decreased safety awareness   Assessment Pt seen for PT treatment session this date with interventions consisting of gait training w/ emphasis on improving pt's ability to ambulate level surfaces x 20 ftx2 with SBA provided by therapist with RW, Therapeutic exercise consisting of: AROM 10 reps B LE in sitting position and therapeutic activity consisting of training: sit<>stand transfers and toilet transfers  Pt agreeable to PT treatment session upon arrival, pt found seated OOB in recliner, in no apparent distress and responsive  In comparison to previous session, pt with improvements in distance ambulated   Post session: pt returned back to recliner, chair alarm engaged, all needs in reach and RN notified of session findings/recommendations  Continue to recommend post acute rehabilitation services at time of d/c in order to maximize pt's functional independence and safety w/ mobility  Pt continues to be functioning below baseline level, and remains limited 2* factors listed above and including decreased cognition, decreased functional mobility  PT will continue to see pt during current hospitalization in order to address the deficits listed above and provide interventions consistent w/ POC in effort to achieve STGs     Barriers to Discharge Inaccessible home environment;Decreased caregiver support   Goals   PT Treatment Day 1   Plan Treatment/Interventions Functional transfer training;LE strengthening/ROM; Therapeutic exercise; Endurance training;Bed mobility;Gait training   Progress Progressing toward goals   PT Frequency   (3-5x/wk)   Recommendation   PT Discharge Recommendation Post acute rehabilitation services   PT - OK to Discharge Yes  (when medically stable if to STR)   Additional Comments upon conclusion, pt OOB in chair with all needs in reach   Additional Comments 2 Pt's raw score on the [facility] Basic Mobility inpatient short form is 16, standardized score is 38 32  Patients at this level are likely to benefit from DC to [facility], however, please refer to therapist recommendation for safe DC planning     AM-[facility] Basic Mobility Inpatient   Turning in Bed Without Bedrails 3   Lying on Back to Sitting on Edge of Flat Bed 2   Moving Bed to Chair 3   Standing Up From Chair 3   Walk in Room 3   Climb 3-5 Stairs 2   Basic Mobility Inpatient Raw Score 16   Basic Mobility Standardized Score 38 32

## 2021-05-19 NOTE — PLAN OF CARE
Problem: Potential for Falls  Goal: Patient will remain free of falls  Description: INTERVENTIONS:  - Assess patient frequently for physical needs  -  Identify cognitive and physical deficits and behaviors that affect risk of falls  -  [place] fall precautions as indicated by assessment   - Educate patient/family on patient safety including physical limitations  - Instruct patient to call for assistance with activity based on assessment  - Modify environment to reduce risk of injury  - Consider OT/PT consult to assist with strengthening/mobility  Outcome: Progressing     Problem: Nutrition/Hydration-ADULT  Goal: Nutrient/Hydration intake appropriate for improving, restoring or maintaining nutritional needs  Description: Monitor and assess patient's nutrition/hydration status for malnutrition  Collaborate with interdisciplinary team and initiate plan and interventions as ordered  Monitor patient's weight and dietary intake as ordered or per policy  Utilize nutrition screening tool and intervene as necessary  Determine patient's food preferences and provide high-protein, high-caloric foods as appropriate       INTERVENTIONS:  - Monitor oral intake, urinary output, labs, and treatment plans  - Assess nutrition and hydration status and recommend course of action  - Evaluate amount of meals eaten  - Assist patient with eating if necessary   - Allow adequate time for meals  - Recommend/ encourage appropriate diets, oral nutritional supplements, and vitamin/mineral supplements  - Order, calculate, and assess calorie counts as needed  - Recommend, monitor, and adjust tube feedings and TPN/PPN based on assessed needs  - Assess need for intravenous fluids  - Provide nutrition/hydration education as appropriate  - Include patient/family/caregiver in decisions related to nutrition  Outcome: Progressing     Problem: PAIN - ADULT  Goal: Verbalizes/displays adequate comfort level or baseline comfort level  Description: Interventions:  - Encourage patient to monitor pain and request assistance  - Assess pain using appropriate pain scale  - Administer analgesics based on type and severity of pain and evaluate response  - Implement non-pharmacological measures as appropriate and evaluate response  - Consider cultural and social influences on pain and pain management  - Notify physician/advanced practitioner if interventions unsuccessful or patient reports new pain  Outcome: Progressing     Problem: INFECTION - ADULT  Goal: Absence or prevention of progression during hospitalization  Description: INTERVENTIONS:  - Assess and monitor for signs and symptoms of infection  - Monitor lab/diagnostic results  - Monitor all insertion sites, i e  indwelling lines, tubes, and drains  - Monitor endotracheal if appropriate and nasal secretions for changes in amount and color  - [place] appropriate cooling/warming therapies per order  - Administer medications as ordered  - Instruct and encourage patient and family to use good hand hygiene technique  - Identify and instruct in appropriate isolation precautions for identified infection/condition  Outcome: Progressing     Problem: SAFETY ADULT  Goal: Patient will remain free of falls  Description: INTERVENTIONS:  - Assess patient frequently for physical needs  -  Identify cognitive and physical deficits and behaviors that affect risk of falls    -  [place] fall precautions as indicated by assessment   - Educate patient/family on patient safety including physical limitations  - Instruct patient to call for assistance with activity based on assessment  - Modify environment to reduce risk of injury  - Consider OT/PT consult to assist with strengthening/mobility  Outcome: Progressing  Goal: Maintain or return to baseline ADL function  Description: INTERVENTIONS:  -  Assess patient's ability to carry out ADLs; assess patient's baseline for ADL function and identify physical deficits which impact ability to perform ADLs (bathing, care of mouth/teeth, toileting, grooming, dressing, etc )  - Assess/evaluate cause of self-care deficits   - Assess range of motion  - Assess patient's mobility; develop plan if impaired  - Assess patient's need for assistive devices and provide as appropriate  - Encourage maximum independence but intervene and supervise when necessary  - Involve family in performance of ADLs  - Assess for home care needs following discharge   - Consider OT consult to assist with ADL evaluation and planning for discharge  - Provide patient education as appropriate  Outcome: Progressing  Goal: Maintain or return mobility status to optimal level  Description: INTERVENTIONS:  - Assess patient's baseline mobility status (ambulation, transfers, stairs, etc )    - Identify cognitive and physical deficits and behaviors that affect mobility  - Identify mobility aids required to assist with transfers and/or ambulation (gait belt, sit-to-stand, lift, walker, cane, etc )  - [place] fall precautions as indicated by assessment  - Record patient progress and toleration of activity level on Mobility SBAR; progress patient to next Phase/Stage  - Instruct patient to call for assistance with activity based on assessment  - Consider rehabilitation consult to assist with strengthening/weightbearing, etc   Outcome: Progressing     Problem: DISCHARGE PLANNING  Goal: Discharge to home or other facility with appropriate resources  Description: INTERVENTIONS:  - Identify barriers to discharge w/patient and caregiver  - Arrange for needed discharge resources and transportation as appropriate  - Identify discharge learning needs (meds, wound care, etc )  - Arrange for interpretive services to assist at discharge as needed  - Refer to Case Management Department for coordinating discharge planning if the patient needs post-hospital services based on physician/advanced practitioner order or complex needs related to functional status, cognitive ability, or social support system  Outcome: Progressing     Problem: Knowledge Deficit  Goal: Patient/family/caregiver demonstrates understanding of disease process, treatment plan, medications, and discharge instructions  Description: Complete learning assessment and assess knowledge base    Interventions:  - Provide teaching at level of understanding  - Provide teaching via preferred learning methods  Outcome: Progressing     Problem: METABOLIC, FLUID AND ELECTROLYTES - ADULT  Goal: Glucose maintained within target range  Description: INTERVENTIONS:  - Monitor Blood Glucose as ordered  - Assess for signs and symptoms of hyperglycemia and hypoglycemia  - Administer ordered medications to maintain glucose within target range  - Assess nutritional intake and initiate nutrition service referral as needed  Outcome: Progressing     Problem: Prexisting or High Potential for Compromised Skin Integrity  Goal: Skin integrity is maintained or improved  Description: INTERVENTIONS:  - Identify patients at risk for skin breakdown  - Assess and monitor skin integrity  - Assess and monitor nutrition and hydration status  - Monitor labs   - Assess for incontinence   - Turn and reposition patient  - Assist with mobility/ambulation  - Relieve pressure over bony prominences  - Avoid friction and shearing  - Provide appropriate hygiene as needed including keeping skin clean and dry  - Evaluate need for skin moisturizer/barrier cream  - Collaborate with interdisciplinary team   - Patient/family teaching  - Consider wound care consult   Outcome: Progressing

## 2021-05-19 NOTE — ASSESSMENT & PLAN NOTE
Lab Results   Component Value Date    HGBA1C 12 3 (H) 05/09/2021       Recent Labs     05/19/21  0120 05/19/21  0719 05/19/21  1104 05/19/21  1547   POCGLU 131 109 144* 121       Blood Sugar Average: Last 72 hrs:  (P) 155 2560     Continue insulin  Monitor sugars

## 2021-05-19 NOTE — PLAN OF CARE
Problem: PHYSICAL THERAPY ADULT  Goal: Performs mobility at highest level of function for planned discharge setting  See evaluation for individualized goals  Description: Treatment/Interventions: Functional transfer training, LE strengthening/ROM, Therapeutic exercise, Endurance training, Equipment eval/education, Patient/family training, Bed mobility, Spoke to nursing, Spoke to case management  Equipment Recommended: Walker(RW)       See flowsheet documentation for full assessment, interventions and recommendations  Outcome: Progressing  Note: Prognosis: Fair  Problem List: Decreased strength, Decreased endurance, Impaired balance, Decreased mobility, Decreased cognition, Impaired judgement, Decreased safety awareness  Assessment: Pt seen for PT treatment session this date with interventions consisting of gait training w/ emphasis on improving pt's ability to ambulate level surfaces x 20 ftx2 with SBA provided by therapist with RW, Therapeutic exercise consisting of: AROM 10 reps B LE in sitting position and therapeutic activity consisting of training: sit<>stand transfers and toilet transfers  Pt agreeable to PT treatment session upon arrival, pt found seated OOB in recliner, in no apparent distress and responsive  In comparison to previous session, pt with improvements in distance ambulated   Post session: pt returned back to recliner, chair alarm engaged, all needs in reach and RN notified of session findings/recommendations  Continue to recommend post acute rehabilitation services at time of d/c in order to maximize pt's functional independence and safety w/ mobility  Pt continues to be functioning below baseline level, and remains limited 2* factors listed above and including decreased cognition, decreased functional mobility   PT will continue to see pt during current hospitalization in order to address the deficits listed above and provide interventions consistent w/ POC in effort to achieve STGs   Barriers to Discharge: Inaccessible home environment, Decreased caregiver support        PT Discharge Recommendation: Post acute rehabilitation services     PT - OK to Discharge: Yes(when medically stable if to STR)    See flowsheet documentation for full assessment

## 2021-05-19 NOTE — UTILIZATION REVIEW
Continued Stay Review    Date: 5/19                         Current Patient Class: Inpatient   Current Level of Care: MEd/surg    HPI:80 y o  male initially admitted on 5/9/21     Assessment/Plan:   Insulin teaching initialed  COntinue with CBC monitoring  HGB stable  EGD shows reflux esophagitis, hiatal hernia, small clean based gastric ulcer, multiple antral and body erosions without active bleeding  Continue with Protonix  Overall poor historian with lmited insight  Hematemesis resolved  Vital Signs:  Date/Time  Temp Pulse  Resp  BP  MAP (mmHg)  SpO2  FiO2 (%)  O2 Flow Rate (L/min)  O2 Device  Patient Position - Orthostatic VS   05/19/21 07:21:45  98 8 °F (37 1 °C) 76  --  126/71  89  95 %  --  --  --  --   05/19/21 05:31:07  98 7 °F (37 1 °C) 88  18  127/71  90  92 %  --  --  --  --   05/19/21 01:28:06  98 8 °F (37 1 °C)  73  18  112/69  83  96 %  --  --  --  Lying   05/18/21 2110  -- 71  --  --  --  --  --  --  --  --   05/18/21 21:03:33  98 2 °F (36 8 °C) 57  16  131/74  93  96 %  --  --  --  --   05/18/21 14:54:08  98 9 °F (37 2 °C) 80  16  127/71  90  96 %               Pertinent Labs/Diagnostic Results:   5/16 KUB Xray: No definite radiopaque renal calculi identified     5/17 EGD with push enteroscopy:   5/17/21  ANESTHESIA INFORMATION:  ASA: III  Anesthesia Type: General     FINDINGS:  · The upper third of the esophagus, middle third of the esophagus and lower third of the esophagus appeared normal   · Grade B esophagitis with mucosal breaks measuring 5 mm or more not continuous between folds, covering less than 75% of the circumference  · 1 cm sliding hiatal hernia (type I hiatal hernia) without [place] lesions present  · Single small, irregular, benign-appearing ulcer in the body of the stomach with clean base (Foreign III)  · Moderate, patchy erythematous and friable mucosa with erosion in the body of the stomach and antrum; performed cold forceps biopsy  · The duodenal bulb, 1st part of the duodenum, 2nd part of the duodenum and proximal jejunum appeared normal          Results from last 7 days   Lab Units 05/19/21  0614 05/18/21  0447 05/17/21  1104 05/17/21  0619 05/16/21  2211  05/16/21  0527 05/15/21  1034  05/14/21  0523   WBC Thousand/uL 9 03 12 80*  --  11 88*  --   --  12 67* 11 92*  --  11 28*   HEMOGLOBIN g/dL 7 7* 7 8* 7 7* 7 8* 8 8*   < > 7 0* 7 3*   < > 7 6*   HEMATOCRIT % 24 4* 24 7* 23 8* 23 5* 27 2*   < > 22 2* 25 3*   < > 23 2*   PLATELETS Thousands/uL 260 238  --  205  --   --  206 152  --  103*   NEUTROS ABS Thousands/µL  --   --   --   --   --   --  9 00* 9 34*  --  8 68*   BANDS PCT %  --   --   --  3  --   --   --   --   --   --     < > = values in this interval not displayed  Results from last 7 days   Lab Units 05/19/21  0614 05/18/21 0447 05/17/21  0619 05/16/21  0527 05/15/21  1034  05/13/21  0513   SODIUM mmol/L 137 138 141 142 141   < > 145   POTASSIUM mmol/L 3 6 3 4* 3 5 3 5 3 8   < > 3 8   CHLORIDE mmol/L 101 102 106 106 107   < > 111*   CO2 mmol/L 28 28 27 28 23   < > 24   ANION GAP mmol/L 8 8 8 8 11   < > 10   BUN mg/dL 19 22 21 28* 32*   < > 58*   CREATININE mg/dL 2 26* 2 32* 2 08* 2 36* 2 29*   < > 2 59*   EGFR ml/min/1 73sq m 31 30 34 29 30   < > 26   CALCIUM mg/dL 7 5* 8 0* 7 9* 8 0* 8 0*   < > 7 8*   CALCIUM, IONIZED mmol/L  --   --   --   --   --   --  1 02*   MAGNESIUM mg/dL  --   --   --   --   --   --  1 7   PHOSPHORUS mg/dL  --   --   --   --   --   --  2 2*    < > = values in this interval not displayed           Results from last 7 days   Lab Units 05/19/21  1104 05/19/21  0719 05/19/21  0120 05/18/21  2059 05/18/21  1632 05/18/21  1145 05/18/21  0727 05/17/21  2102 05/17/21  1550 05/17/21  1103 05/17/21  0747 05/16/21 2029   POC GLUCOSE mg/dl 144* 109 131 91 112 225* 185* 174* 194* 145* 133 177*     Results from last 7 days   Lab Units 05/19/21  0614 05/18/21  0447 05/17/21  9346 05/16/21  0527 05/15/21  1034 05/14/21  0523 05/13/21  0513   GLUCOSE RANDOM mg/dL 116 169* 123 140 223* 121 151*             BETA-HYDROXYBUTYRATE   Date Value Ref Range Status   05/09/2021 3 7 (H) <0 6 mmol/L Final          Results from last 7 days   Lab Units 05/14/21  1436   LACTIC ACID mmol/L 1 8       Results from last 7 days   Lab Units 05/13/21  0513   FERRITIN ng/mL 272         Results from last 7 days   Lab Units 05/17/21  0619   TOTAL COUNTED  100       Medications:   Scheduled Medications:  docusate sodium, 100 mg, Oral, BID  finasteride, 5 mg, Oral, Daily  furosemide, 40 mg, Oral, Daily  insulin glargine, 30 Units, Subcutaneous, QAM  insulin lispro, 10 Units, Subcutaneous, TID With Meals  insulin lispro, 4-20 Units, Subcutaneous, TID With Meals  insulin lispro, 4-20 Units, Subcutaneous, HS  labetalol, 200 mg, Oral, HS  pantoprazole, 40 mg, Oral, BID AC  pravastatin, 40 mg, Oral, Daily With Dinner  senna, 1 tablet, Oral, Daily      Continuous IV Infusions:     PRN Meds:  acetaminophen, 650 mg, Oral, Q6H PRN  calcium carbonate, 1,000 mg, Oral, Daily PRN  fentanyl citrate (PF), 50 mcg, Intravenous, Q1H PRN  hydrALAZINE, 5 mg, Intravenous, Q6H PRN  ondansetron, 4 mg, Intravenous, Q6H PRN  polyethylene glycol, 17 g, Oral, Daily PRN        Discharge Plan: D  Network Utilization Review Department  ATTENTION: Please call with any questions or concerns to [phone] and carefully listen to the prompts so that you are directed to the right person  All voicemails are confidential   [facility] all requests for admission clinical reviews, approved or denied determinations and any other requests to dedicated fax number below belonging to the campus where the patient is receiving treatment   List of dedicated fax numbers for the Facilities:  [address] DENIALS (Administrative/Medical Necessity) [phone]   [address] (Maternity/NICU/Pediatrics) [phone]   [address] [address] [facility] [id number] [place] [address] [address] [place], [place] [address] [phone]

## 2021-05-20 ENCOUNTER — NURSE TRIAGE (OUTPATIENT)
Dept: OTHER | Facility: OTHER | Age: 81
End: 2021-05-20

## 2021-05-20 ENCOUNTER — TELEPHONE (OUTPATIENT)
Dept: OTHER | Facility: OTHER | Age: 81
End: 2021-05-20

## 2021-05-20 VITALS
HEIGHT: 70 IN | DIASTOLIC BLOOD PRESSURE: 77 MMHG | BODY MASS INDEX: 30.93 KG/M2 | RESPIRATION RATE: 20 BRPM | OXYGEN SATURATION: 96 % | SYSTOLIC BLOOD PRESSURE: 134 MMHG | WEIGHT: 216.05 LBS | TEMPERATURE: 98 F | HEART RATE: 61 BPM

## 2021-05-20 LAB
GLUCOSE SERPL-MCNC: 139 MG/DL (ref 65–140)
GLUCOSE SERPL-MCNC: 147 MG/DL (ref 65–140)
GLUCOSE SERPL-MCNC: 148 MG/DL (ref 65–140)

## 2021-05-20 PROCEDURE — 82948 REAGENT STRIP/BLOOD GLUCOSE: CPT

## 2021-05-20 PROCEDURE — 99239 HOSP IP/OBS DSCHRG MGMT >30: CPT | Performed by: STUDENT IN AN ORGANIZED HEALTH CARE EDUCATION/TRAINING PROGRAM

## 2021-05-20 RX ORDER — BLOOD SUGAR DIAGNOSTIC
1 STRIP MISCELLANEOUS 4 TIMES DAILY
Qty: 100 EACH | Refills: 0 | Status: SHIPPED | OUTPATIENT
Start: 2021-05-20 | End: 2021-12-28 | Stop reason: ALTCHOICE

## 2021-05-20 RX ORDER — PANTOPRAZOLE SODIUM 40 MG/1
40 TABLET, DELAYED RELEASE ORAL
Qty: 60 TABLET | Refills: 1 | Status: SHIPPED | OUTPATIENT
Start: 2021-05-20 | End: 2021-07-20

## 2021-05-20 RX ADMIN — INSULIN LISPRO 10 UNITS: 100 INJECTION, SOLUTION INTRAVENOUS; SUBCUTANEOUS at 09:16

## 2021-05-20 RX ADMIN — INSULIN GLARGINE 30 UNITS: 100 INJECTION, SOLUTION SUBCUTANEOUS at 10:01

## 2021-05-20 RX ADMIN — FUROSEMIDE 40 MG: 40 TABLET ORAL at 09:15

## 2021-05-20 RX ADMIN — INSULIN LISPRO 10 UNITS: 100 INJECTION, SOLUTION INTRAVENOUS; SUBCUTANEOUS at 13:29

## 2021-05-20 RX ADMIN — FINASTERIDE 5 MG: 5 TABLET, FILM COATED ORAL at 09:14

## 2021-05-20 RX ADMIN — PANTOPRAZOLE SODIUM 40 MG: 40 TABLET, DELAYED RELEASE ORAL at 09:14

## 2021-05-20 NOTE — DISCHARGE INSTR - AVS FIRST PAGE
Recommended close follow-up with primary care provider in 3-5 days of discharge  Recommended repeat blood work for CBC within 1-2 weeks with primary care provider to monitor hemoglobin level  Recommended follow-up with Gastroenterology outpatient  Recommended to follow-up with endocrinology  Recommended to closely monitor fingerstick glucose at home and keep a log book appearing to her primary care visit

## 2021-05-20 NOTE — TELEPHONE ENCOUNTER
Reason for Disposition   [1] Caller has URGENT medication question about med that PCP or specialist prescribed AND [2] triager unable to answer question    Answer Assessment - Initial Assessment Questions  1  NAME of MEDICATION: "What medicine are you calling about?"      Test strips, needles, machine  2  QUESTION: "What is your question?"      Messed up meds  3  PRESCRIBING HCP: "Who prescribed it?" Reason: if prescribed by specialist, call should be referred to that group        Prescribed by hospitalist    Protocols used: MEDICATION QUESTION CALL-ADULT-

## 2021-05-20 NOTE — PLAN OF CARE
Problem: Potential for Falls  Goal: Patient will remain free of falls  Description: INTERVENTIONS:  - Assess patient frequently for physical needs  -  Identify cognitive and physical deficits and behaviors that affect risk of falls  -  [place] fall precautions as indicated by assessment   - Educate patient/family on patient safety including physical limitations  - Instruct patient to call for assistance with activity based on assessment  - Modify environment to reduce risk of injury  - Consider OT/PT consult to assist with strengthening/mobility  Outcome: Progressing     Problem: Nutrition/Hydration-ADULT  Goal: Nutrient/Hydration intake appropriate for improving, restoring or maintaining nutritional needs  Description: Monitor and assess patient's nutrition/hydration status for malnutrition  Collaborate with interdisciplinary team and initiate plan and interventions as ordered  Monitor patient's weight and dietary intake as ordered or per policy  Utilize nutrition screening tool and intervene as necessary  Determine patient's food preferences and provide high-protein, high-caloric foods as appropriate       INTERVENTIONS:  - Monitor oral intake, urinary output, labs, and treatment plans  - Assess nutrition and hydration status and recommend course of action  - Evaluate amount of meals eaten  - Assist patient with eating if necessary   - Allow adequate time for meals  - Recommend/ encourage appropriate diets, oral nutritional supplements, and vitamin/mineral supplements  - Order, calculate, and assess calorie counts as needed  - Recommend, monitor, and adjust tube feedings and TPN/PPN based on assessed needs  - Assess need for intravenous fluids  - Provide nutrition/hydration education as appropriate  - Include patient/family/caregiver in decisions related to nutrition  Outcome: Progressing     Problem: PAIN - ADULT  Goal: Verbalizes/displays adequate comfort level or baseline comfort level  Description: Interventions:  - Encourage patient to monitor pain and request assistance  - Assess pain using appropriate pain scale  - Administer analgesics based on type and severity of pain and evaluate response  - Implement non-pharmacological measures as appropriate and evaluate response  - Consider cultural and social influences on pain and pain management  - Notify physician/advanced practitioner if interventions unsuccessful or patient reports new pain  Outcome: Progressing     Problem: INFECTION - ADULT  Goal: Absence or prevention of progression during hospitalization  Description: INTERVENTIONS:  - Assess and monitor for signs and symptoms of infection  - Monitor lab/diagnostic results  - Monitor all insertion sites, i e  indwelling lines, tubes, and drains  - Monitor endotracheal if appropriate and nasal secretions for changes in amount and color  - [place] appropriate cooling/warming therapies per order  - Administer medications as ordered  - Instruct and encourage patient and family to use good hand hygiene technique  - Identify and instruct in appropriate isolation precautions for identified infection/condition  Outcome: Progressing     Problem: SAFETY ADULT  Goal: Patient will remain free of falls  Description: INTERVENTIONS:  - Assess patient frequently for physical needs  -  Identify cognitive and physical deficits and behaviors that affect risk of falls    -  [place] fall precautions as indicated by assessment   - Educate patient/family on patient safety including physical limitations  - Instruct patient to call for assistance with activity based on assessment  - Modify environment to reduce risk of injury  - Consider OT/PT consult to assist with strengthening/mobility  Outcome: Progressing  Goal: Maintain or return to baseline ADL function  Description: INTERVENTIONS:  -  Assess patient's ability to carry out ADLs; assess patient's baseline for ADL function and identify physical deficits which impact ability to perform ADLs (bathing, care of mouth/teeth, toileting, grooming, dressing, etc )  - Assess/evaluate cause of self-care deficits   - Assess range of motion  - Assess patient's mobility; develop plan if impaired  - Assess patient's need for assistive devices and provide as appropriate  - Encourage maximum independence but intervene and supervise when necessary  - Involve family in performance of ADLs  - Assess for home care needs following discharge   - Consider OT consult to assist with ADL evaluation and planning for discharge  - Provide patient education as appropriate  Outcome: Progressing  Goal: Maintain or return mobility status to optimal level  Description: INTERVENTIONS:  - Assess patient's baseline mobility status (ambulation, transfers, stairs, etc )    - Identify cognitive and physical deficits and behaviors that affect mobility  - Identify mobility aids required to assist with transfers and/or ambulation (gait belt, sit-to-stand, lift, walker, cane, etc )  - [place] fall precautions as indicated by assessment  - Record patient progress and toleration of activity level on Mobility SBAR; progress patient to next Phase/Stage  - Instruct patient to call for assistance with activity based on assessment  - Consider rehabilitation consult to assist with strengthening/weightbearing, etc   Outcome: Progressing     Problem: DISCHARGE PLANNING  Goal: Discharge to home or other facility with appropriate resources  Description: INTERVENTIONS:  - Identify barriers to discharge w/patient and caregiver  - Arrange for needed discharge resources and transportation as appropriate  - Identify discharge learning needs (meds, wound care, etc )  - Arrange for interpretive services to assist at discharge as needed  - Refer to Case Management Department for coordinating discharge planning if the patient needs post-hospital services based on physician/advanced practitioner order or complex needs related to functional status, cognitive ability, or social support system  Outcome: Progressing     Problem: Knowledge Deficit  Goal: Patient/family/caregiver demonstrates understanding of disease process, treatment plan, medications, and discharge instructions  Description: Complete learning assessment and assess knowledge base    Interventions:  - Provide teaching at level of understanding  - Provide teaching via preferred learning methods  Outcome: Progressing     Problem: METABOLIC, FLUID AND ELECTROLYTES - ADULT  Goal: Glucose maintained within target range  Description: INTERVENTIONS:  - Monitor Blood Glucose as ordered  - Assess for signs and symptoms of hyperglycemia and hypoglycemia  - Administer ordered medications to maintain glucose within target range  - Assess nutritional intake and initiate nutrition service referral as needed  Outcome: Progressing     Problem: Prexisting or High Potential for Compromised Skin Integrity  Goal: Skin integrity is maintained or improved  Description: INTERVENTIONS:  - Identify patients at risk for skin breakdown  - Assess and monitor skin integrity  - Assess and monitor nutrition and hydration status  - Monitor labs   - Assess for incontinence   - Turn and reposition patient  - Assist with mobility/ambulation  - Relieve pressure over bony prominences  - Avoid friction and shearing  - Provide appropriate hygiene as needed including keeping skin clean and dry  - Evaluate need for skin moisturizer/barrier cream  - Collaborate with interdisciplinary team   - Patient/family teaching  - Consider wound care consult   Outcome: Progressing

## 2021-05-20 NOTE — TELEPHONE ENCOUNTER
Regarding: diabetic medication problem  ----- Message from Mars Macias sent at 5/20/2021  6:44 PM EDT -----  "he was released from the hospital where they prescribed diabetic strips and needles  They wrote the script incorrectly, I need the doctor to call the [facility] pharmacy and correct the scripts   My dad does not have any testing strips or needles for tonight"

## 2021-05-20 NOTE — DISCHARGE SUMMARY
[address] [facility]  Discharge- Jefferson Guzman 1940, 79 y o  male MRN: [id number]  Unit/Bed#: MS Patel-01 Encounter: [id number]  Primary Care Provider: Sabra Gutierrez MD   Date and time admitted to hospital: 5/9/2021  4:12 PM    * Hyperosmolar hyperglycemic state (HHS) [facility])  Assessment & Plan        Lab Results   Component Value Date     HGBA1C 12 3 (H) 05/09/2021                Recent Labs     05/19/21  0120 05/19/21  0719 05/19/21  1104 05/19/21  1547   POCGLU 131 109 144* 121         Blood Sugar Average: Last 72 hrs:     Currently significantly better  Continue current dose of Lantus 30 units q h s ,  Lispro  10 units t i d  With meals  Daughter at bedside counseled on insulin management as well as hypoglycemic management  Nursing staff to initiate insulin  And diabetic teaching  Nutritionist recommendation appreciated  pt has no co-pay for insulin per [facility]     Acute blood loss anemia (ABLA)  Assessment & Plan  EGD with push enteroscopy on 05/17/2021 reflux esophagitis, small hiatal hernia, small clean based gastric ulcer, multiple antral and body erosions without active bleeding  No signs of overt active bleeding noted  Hemoglobin stable around 7 8  Monitor CBC and transfuse below 7  Continue Protonix 40 mg b i d   Patient will need colonoscopy if hemoglobin drops again per GI  Recommended close follow-up with primary provider and Gastroenterology  Daughter is agreeable with plan       Gastric ulcer  Assessment & Plan  Continue Protonix 40 mg b i d   Outpatient follow-up with Gastroenterology      Hematemesis  Assessment & Plan  Now resolved  Hemoglobin stable around 7 8  Continue Protonix 40 mg b i d   Monitor CBC and transfuse below 7  Patient will need colonoscopy if noted with worsening hemoglobin  GI signed off  Stable for discharge    Outpatient follow-up      New onset type 2 diabetes mellitus [facility])  Assessment & Plan        Lab Results   Component Value Date     HGBA1C 12 3 (H) 05/09/2021         Recent Labs     05/19/21  0120 05/19/21  0719 05/19/21  1104 05/19/21  1547   POCGLU 131 109 144* 121         Blood Sugar Average: Last 72 hrs:  (P) 155 6875      Continue insulin  Monitor sugars     Dementia (HCC)  Assessment & Plan  As per documentation patient has history of dementia  Late onset 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   Patient follows with geriatrician, recommendations to pursue conservative management including frequent reorientation and redirection  Being in socially, physically and mentally active  Fall precautions, optimization of chronic conditions  Cognitive challenging exercises  Recommendations to start vitamin E/Namenda on upcoming neurocognitive assessment visit by geriatrician  Daughter prefers bring father home with family support              Dysphagia  Assessment & Plan  Resolved  Currently tolerating regular diet well      Acute metabolic encephalopathy  Assessment & Plan  Brain MRI report noted negative for acute finding  Now resume currently patient is awake, alert, oriented at his baseline         Late onset 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 ([facility] [address] )  Assessment & Plan  Currently stable at his baseline  Resume home medication      Chronic kidney disease  Assessment & Plan  Lab Results   · Component · Value · Date   ·   · EGFR · 31 · 05/19/2021   ·   · EGFR · 30 · 05/18/2021   ·   · EGFR · 34 · 05/17/2021   ·   · CREATININE · 2 26 (H) · 05/19/2021   ·   · CREATININE · 2 32 (H) · 05/18/2021   ·   · CREATININE · 2 08 (H) · 05/17/2021   · Acute kidney injury superimposed to chronic kidney disease stage IIIB  · Creatinine baseline between 1 8-2 6    · Currently stable at baseline      BPH (benign prostatic hyperplasia)  Assessment & Plan  · Continue finasteride 5 mg oral daily      Other hyperlipidemia  Assessment & Plan  · Continue home dose statin        Resolved Problems  Date Reviewed: 5/20/2021 Resolved    Hypertensive crisis 5/12/2021     Resolved by  Mikie Armstrong PA-C    Hypokalemia 5/12/2021     Resolved by  Mikie Armstrong PA-C    Overview Deleted 5/10/2021 10:47 AM by Karthik Anguiano MD            [facility]) 5/13/2021     Resolved by  Mikie Armstrong PA-C        Discharging Physician / Practitioner: Sandra Reveles MD  PCP: Sherley Silva MD  Admission Date:   Admission Orders (From admission, onward)     Ordered        05/09/21 1845  Inpatient Admission  Once                   Discharge Date: 05/20/21    Consultations During Hospital Stay:  · Gastroenterology  Procedures Performed:   · Intubation/ICU care  · EGD with push enteroscopy noted for reflux esophagitis, small hiatal hernia, small clean based gastric body ulcer, multiple antral and body erosions with no active bleeding  Complications:  Hematemesis, hemorrhagic shock, aspiration monitoring    Reason for Admission:  Polyuria, weakness, facial droop, stroke-like symptoms, new onset diabetes causing hyperosmolar hyperglycemic state  Hospital Stay was complicated by episode of hemorrhagic shock, aspiration pneumonitis due to PUD requiring intubation ICU level care during this encounter  Hospital Course:     Jack Leroy is a 79 y o  male patient with past medical history of Alzheimer's dementia, BPH, hyperlipidemia, hypertension, CKD, who originally presented to the hospital on 5/9/2021 due to polyuria, facial droop, initially presented stroke-like symptoms and was found to have hyperosmolar hyperglycemic state  Brain MRI report noted negative for acute finding  Acute stroke was ruled out  Patient's initial symptoms likely due to newly diagnosed uncontrolled diabetes with hemoglobin A1c of more than 12  Started on Lantus and short-acting insulin with meal coverage with improvement of sugar and sodium level    His hospitalization was complicated by acute blood loss anemia causing volume loss and hemorrhagic shock, aspiration pneumonitis secondary to peptic ulcer disease requiring intubation, ICU level of care and  EGD with push enteroscopy noted with reflux esophagitis, small hiatal hernia, small clean based gastric body ulcer and multiple antral and body erosions with no active bleeding  Subsequently stabilized above and downgraded to slim services  Hemoglobin has been stable around 7-8 range after PRBC transfusion  GI recommended continue Protonix 40 mg b i d  Recommended close outpatient follow-up for colonoscopy if hemoglobin drops again  Overall currently patient is doing much better  Accu-Cheks stable on above-stated Lantus and lispro dose  Patient was seen by PT OT recommended post acute rehab but declined and wishes to bring her father home with VNA services and family support  I had counseled and educated daughter at bedside regarding insulin management as well as hypoglycemic symptoms and management  Nursing staff also helped with diabetic teaching  Recommended close follow-up with primary care provider, endocrinology, Gastroenterology outpatient  Daughter is agreeable with above plan  Currently patient is hemodynamically stable tolerating diet well without any acute complaints or any acute event reported  Hemodynamically stable for discharge  No other events reported  Refer to earlier notes for further clarification        Please see above list of diagnoses and related plan for additional information  Condition at Discharge: good    Discharge Day Visit / Exam:   Subjective:  Afebrile, hemodynamically stable  Awake, alert, oriented at his baseline  Seen sitting in a chair eating breakfast   Patient denies chest pain, dyspnea, fever, chills, nausea vomiting, any new complaints  To be discharge  No other events reported        Vitals: Blood Pressure: 134/77 (05/20/21 1503)  Pulse: 61 (05/20/21 1503)  Temperature: 98 °F (36 7 °C) (05/20/21 1503)  Temp Source: Oral (05/19/21 1512)  Respirations: 20 (05/20/21 1503)  Height: 5' 9 5" (176 5 cm) (05/10/21 1605)  Weight - Scale: 98 kg (216 lb 0 8 oz) (05/10/21 0900)  SpO2: 96 % (05/20/21 1503)  Exam:   Physical Exam  Constitutional:       General: He is not in acute distress  Appearance: Normal appearance  Comments: Elderly male patient, acutely nontoxic appearing  HENT:      Head: Normocephalic and atraumatic  Eyes:      Extraocular Movements: Extraocular movements intact  Pupils: Pupils are equal, round, and reactive to light  Neck:      Musculoskeletal: Normal range of motion and neck supple  Cardiovascular:      Rate and Rhythm: Normal rate and regular rhythm  Pulses: Normal pulses  Heart sounds: Normal heart sounds  Pulmonary:      Effort: Pulmonary effort is normal  No respiratory distress  Breath sounds: Normal breath sounds  No stridor  Abdominal:      General: Bowel sounds are normal  There is no distension  Palpations: Abdomen is soft  Tenderness: There is no abdominal tenderness  Neurological:      General: No focal deficit present  Mental Status: He is alert  Mental status is at baseline  Psychiatric:         Behavior: Behavior normal          Discussion with Family: Updated  (daughter) at bedside  Discharge instructions/Information to patient and family:   See after visit summary for information provided to patient and family  Provisions for Follow-Up Care:  See after visit summary for information related to follow-up care and any pertinent home health orders  Disposition:   Home with VNA Services (Reminder: Complete face to face encounter)    Planned Readmission:  None     Discharge Statement:  I spent 35 minutes discharging the patient  This time was spent on the day of discharge  I had direct contact with the patient on the day of discharge   Greater than 50% of the total time was spent examining patient, answering all patient questions, arranging and discussing plan of care with patient as well as directly providing post-discharge instructions  Additional time then spent on discharge activities  Discharge Medications:  See after visit summary for reconciled discharge medications provided to patient and/or family        **Please Note: This note may have been constructed using a voice recognition system**

## 2021-05-21 ENCOUNTER — TRANSITIONAL CARE MANAGEMENT (OUTPATIENT)
Dept: GERIATRICS | Age: 81
End: 2021-05-21

## 2021-05-21 NOTE — UTILIZATION REVIEW
Notification of Discharge   This is a Notification of Discharge from our facility [address]  Please be advised that this patient has been discharge from our facility  Below you will find the admission and discharge date and time including the patients disposition  UTILIZATION REVIEW CONTACT:  Melissa Mayer  Utilization   Network Utilization Review Department  Phone: [phone] x carefully listen to the prompts  All voicemails are confidential   Email: [email or web address]     PHYSICIAN ADVISORY SERVICES:  FOR [id number] REVIEW - MEDICAL NECESSITY DENIAL  Phone: [phone]  Fax: [phone]  Email: [email or web address]     PRESENTATION DATE: 5/9/2021  4:12 PM  OBERVATION ADMISSION DATE:   INPATIENT ADMISSION DATE: 5/9/21 1845   DISCHARGE DATE: 5/20/2021  5:16 PM  DISPOSITION: Home with [place] with [address] INFORMATION:  Send all requests for admission clinical reviews, approved or denied determinations and any other requests to dedicated fax number below belonging to the campus where the patient is receiving treatment   List of dedicated fax numbers:  [address] [place] [address] DENIALS (Administrative/Medical Necessity) [phone]   [address]  (Maternity/NICU/Pediatrics) [phone]   Yovani Regan [phone]   Particia Notice [phone]   Aki Damon [phone]   Prateekreese Murillo [facility] [address] [facility] [phone]   [facility]  [phone]   [address] [facility]  [address] [facility] [address] [facility] [phone]

## 2021-05-24 ENCOUNTER — OFFICE VISIT (OUTPATIENT)
Dept: ENDOCRINOLOGY | Facility: CLINIC | Age: 81
End: 2021-05-24
Payer: COMMERCIAL

## 2021-05-24 VITALS
BODY MASS INDEX: 30.13 KG/M2 | DIASTOLIC BLOOD PRESSURE: 84 MMHG | WEIGHT: 207 LBS | SYSTOLIC BLOOD PRESSURE: 142 MMHG | TEMPERATURE: 98.2 F | HEART RATE: 68 BPM

## 2021-05-24 DIAGNOSIS — E11.65 TYPE 2 DIABETES MELLITUS WITH HYPERGLYCEMIA, WITH LONG-TERM CURRENT USE OF INSULIN (HCC): Primary | ICD-10-CM

## 2021-05-24 DIAGNOSIS — E78.2 MIXED HYPERLIPIDEMIA: ICD-10-CM

## 2021-05-24 DIAGNOSIS — Z79.4 TYPE 2 DIABETES MELLITUS WITH HYPERGLYCEMIA, WITH LONG-TERM CURRENT USE OF INSULIN (HCC): Primary | ICD-10-CM

## 2021-05-24 DIAGNOSIS — E11.9 NEW ONSET TYPE 2 DIABETES MELLITUS (HCC): ICD-10-CM

## 2021-05-24 DIAGNOSIS — I10 ESSENTIAL HYPERTENSION: ICD-10-CM

## 2021-05-24 PROCEDURE — 99214 OFFICE O/P EST MOD 30 MIN: CPT | Performed by: INTERNAL MEDICINE

## 2021-05-24 RX ORDER — INSULIN GLARGINE 100 [IU]/ML
25 INJECTION, SOLUTION SUBCUTANEOUS EVERY MORNING
Qty: 10 ML | Refills: 0
Start: 2021-05-24 | End: 2021-07-16 | Stop reason: SDUPTHER

## 2021-05-24 NOTE — PROGRESS NOTES
Lina Boykin 79 y o  male MRN: [id number]    Encounter: [id number]      Assessment/Plan     Assessment: This is a 79y o -year-old male with diabetes with hyperglycemia  Plan:    Diagnoses and all orders for this visit:    Type 2 diabetes mellitus with hyperglycemia, with long-term current use of insulin ([facility])   A1c uncontrolled,  Lab Results   Component Value Date    HGBA1C 12 3 (H) 05/09/2021    blood sugars improved on current insulin regimen  will start tapering down Lantus to 25 units in the morning and Humalog 8 units with breakfast, 8 units with dinner plus scale  Skip Humalog for lunch, as lunch is smaller, and caretaker is not around during the afternoon  discussed hypoglycemia symptoms and treatment  continue monitoring blood sugars 3-4 times daily, send log in 2 weeks for further adjustment  will refer to dietitian/nutrition therapy /diabetes education  New onset type 2 diabetes mellitus ([facility] )   order C-peptide, hina antibodies and islet cell antibodies, to rule out type 1 diabetes  -     Ambulatory referral to Endocrinology  -     insulin glargine (LANTUS) 100 units/mL subcutaneous injection; Inject 25 Units under the skin every morning  -     insulin lispro (HumaLOG) 100 units/mL injection; Inject 8 Units under the skin 2 (two) times a day with meals  -     Hemoglobin A1C; Future  -     Basic metabolic panel; Future  -     Microalbumin / creatinine urine ratio; Future  -     C-peptide; Future  -     Glutamic acid decarboxylase; Future  -     Anti-islet cell antibody; Future  -     Ambulatory referral to Diabetic Education; Future    Essential hypertension   blood pressure well controlled  continue current management      Mixed hyperlipidemia   continue statins    Lori Lilly MD        CC: Diabetes    History of Present Illness     HPI:    Lina Boykin Is 71-year-old gentleman with medical  History of hyperlipidemia, hypertension, newly diagnosed diabetes, presented recently to the hospital with severe hyperglycemia acute kidney injury and hypertensive crisis  he was started on insulin therapy  he was found to have elevated anion gap, positive beta hydroxybutyrate suggestive of diabetic ketoacidosis  currently his taking Lantus 30 units in the morning, Humalog 10 units with meals  he is accompanied by his caretaker, who is his daughter  she helps to check blood sugar 3 times daily, blood sugars are usually in  mg/dL range  He also has history of CKD, creatinine on discharge was 2 2  He has history of hypertension, controlled on medication, Norvasc 10 mg daily, hydralazine 50 mg 3 times daily,  Labetalol 200 mg at bedtime  for hyperlipidemia, his taking Zocor 20 mg daily        Lab Results   Component Value Date    CREATININE 2 26 (H) 05/19/2021         Lab Results   Component Value Date    HGBA1C 12 3 (H) 05/09/2021          Review of Systems    Historical Information   Past Medical History:   Diagnosis Date    Anemia     BPH (benign prostatic hyperplasia)     Chronic kidney disease     D[facility]     Diabetes mellitus [facility]     Hyperlipidemia     Hypertension     Incontinence     Osteoarthritis      Past Surgical History:   Procedure Laterality Date    COLON SURGERY      COLONOSCOPY      FL RETROGRADE PYELOGRAM  12/13/2019    AZ CYSTOURETHRO W/IMPLANT N/A 12/13/2019    Procedure: CYSTOSCOPY WITH INSERTION UROLIFT, BILATERAL RETROGRADE PYELOGRAM;  Surgeon: Audie Salinas MD;  Location: [facility];  Service: Urology    TUMOR REMOVAL       Social History   Social History     Substance and Sexual Activity   Alcohol Use Never    Frequency: Never     Social History     Substance and Sexual Activity   Drug Use Never     Social History     Tobacco Use   Smoking Status Passive Smoke Exposure - Never Smoker   Smokeless Tobacco Never Used     Family History:   Family History   Problem Relation Age of Onset    Heart failure Brother     Dementia Maternal Uncle        Meds/Allergies   Current Outpatient Medications   Medication Sig Dispense Refill    acetaminophen (TYLENOL) 500 mg tablet Take 500 mg by mouth every 6 (six) hours as needed for mild pain      allopurinol (ZYLOPRIM) 300 mg tablet take 1 tablet by mouth once daily 90 tablet 2    amLODIPine (NORVASC) 10 mg tablet take 1 tablet by mouth daily 90 tablet 2    Diclofenac Sodium (VOLTAREN) 1 % Apply 2 g topically 4 (four) times a day 1 Tube 2    finasteride (PROSCAR) 5 mg tablet Take 1 tablet (5 mg total) by mouth daily 90 tablet 3    fluticasone (FLONASE) 50 mcg/act nasal spray 1 spray into each nostril daily 1 Bottle 3    furosemide (LASIX) 40 mg tablet Take 1 tablet (40 mg total) by mouth daily 90 tablet 3    hydrALAZINE (APRESOLINE) 50 mg tablet take 1 tablet by mouth three times a day 90 tablet 2    Incontinence Supply Disposable (Incontinence Brief Large) MISC by Does not apply route 4 (four) times a day PLEASE SUPPLY EXTRA-LARGE  120 each 5    insulin glargine (LANTUS) 100 units/mL subcutaneous injection Inject 25 Units under the skin every morning 10 mL 0    insulin lispro (HumaLOG) 100 units/mL injection Inject 8 Units under the skin 2 (two) times a day with meals 10 mL 0    Insulin Pen Needle (Pen Needles) 32G X 5 MM MISC Use 1 each 4 (four) times a day 100 each 0    labetalol (NORMODYNE) 200 mg tablet Take 1 tablet (200 mg total) by mouth daily at bedtime 90 tablet 3    Multiple Vitamins-Minerals (MULTIVITAMIN WITH MINERALS) tablet Take 1 tablet by mouth daily      omega-3-acid ethyl esters (LOVAZA) 1 g capsule Take 1 g by mouth daily      pantoprazole (PROTONIX) 40 mg tablet Take 1 tablet (40 mg total) by mouth 2 (two) times a day before meals 60 tablet 1    simvastatin (ZOCOR) 20 mg tablet take 1 tablet by mouth at bedtime 30 tablet 2     No current facility-administered medications for this visit        Allergies   Allergen Reactions    Strawberry C [Ascorbate - Food Allergy] Hives       Objective   Vitals: Blood pressure 142/84, pulse 68, temperature 98 2 °F (36 8 °C), weight 93 9 kg (207 lb)  Physical Exam    The history was obtained from the review of the chart, patient  Lab Results:   Lab Results   Component Value Date/Time    Hemoglobin A1C 12 3 (H) 05/09/2021 04:28 PM    WBC 9 03 05/19/2021 06:14 AM    WBC 12 80 (H) 05/18/2021 04:47 AM    WBC 11 88 (H) 05/17/2021 06:19 AM    Hemoglobin 7 7 (L) 05/19/2021 06:14 AM    Hemoglobin 7 8 (L) 05/18/2021 04:47 AM    Hemoglobin 7 7 (L) 05/17/2021 11:04 AM    Hematocrit 24 4 (L) 05/19/2021 06:14 AM    Hematocrit 24 7 (L) 05/18/2021 04:47 AM    Hematocrit 23 8 (L) 05/17/2021 11:04 AM    MCV 98 05/19/2021 06:14 AM    MCV 98 05/18/2021 04:47 AM    MCV 95 05/17/2021 06:19 AM    Platelets 628 39/18/9609 06:14 AM    Platelets 067 21/37/0218 04:47 AM    Platelets 351 10/84/9065 06:19 AM    BUN 19 05/19/2021 06:14 AM    BUN 22 05/18/2021 04:47 AM    BUN 21 05/17/2021 06:19 AM    Potassium 3 6 05/19/2021 06:14 AM    Potassium 3 4 (L) 05/18/2021 04:47 AM    Potassium 3 5 05/17/2021 06:19 AM    Chloride 101 05/19/2021 06:14 AM    Chloride 102 05/18/2021 04:47 AM    Chloride 106 05/17/2021 06:19 AM    CO2 28 05/19/2021 06:14 AM    CO2 28 05/18/2021 04:47 AM    CO2 27 05/17/2021 06:19 AM    Creatinine 2 26 (H) 05/19/2021 06:14 AM    Creatinine 2 32 (H) 05/18/2021 04:47 AM    Creatinine 2 08 (H) 05/17/2021 06:19 AM    AST 17 05/09/2021 04:28 PM    AST 11 09/05/2020 09:05 AM    ALT 29 05/09/2021 04:28 PM    ALT 22 09/05/2020 09:05 AM    Albumin 3 6 05/09/2021 04:28 PM    Albumin 3 6 09/05/2020 09:05 AM    HDL, Direct 35 (L) 09/05/2020 09:05 AM    Triglycerides 109 09/05/2020 09:05 AM           Imaging Studies: I have personally reviewed pertinent reports  Portions of the record may have been created with voice recognition software   Occasional wrong word or "sound a like" substitutions may have occurred due to the inherent limitations of voice recognition software  Read the chart carefully and recognize, using context, where substitutions have occurred

## 2021-05-24 NOTE — PATIENT INSTRUCTIONS
Take humalog 8 units with breakfast and dinner +scale (  Scale - 150-200 -1 units, 201-250-2 units, 251-300 -3 units, 301-350-4 units, > 350- 5  units )        Hypoglycemia instructions   Fernie Bobo  5/24/2021  [id number]    Low Blood Sugar    Steps to treat low blood sugar  1  Test blood sugar if you have symptoms of low blood sugar:   Low Blood Sugar Symptoms:  o Sweaty  o Dizzy  o Rapid heartbeat  o Shaky    o Bad mood  o Hungry      2  Treat blood sugar less than 70 with 15 grams of fast-acting carbohydrate:   Examples of 15 grams Fast-Acting Carbohydrate:  o 4 oz juice  o 4 oz regular soda  o 3-4 glucose tablets (chew)  o 3-4 hard candies (chew)              3    Wait 15 minutes and test your blood sugar again           4   If blood sugar is less than 100, repeat steps 2-3       5  When your blood sugar is 100 or more, eat a snack if it will be longer than one hour until your next meal  The snack should be 15 grams of carbohydrate and a protein:   Examples of snacks:  o ½ sandwich  o 6 crackers with cheese  o Piece of fruit with cheese or peanut butter  o 6 crackers with peanut butter

## 2021-05-25 ENCOUNTER — TELEPHONE (OUTPATIENT)
Dept: GERIATRICS | Age: 81
End: 2021-05-25

## 2021-06-01 ENCOUNTER — OFFICE VISIT (OUTPATIENT)
Dept: GERIATRICS | Age: 81
End: 2021-06-01
Payer: COMMERCIAL

## 2021-06-01 VITALS
HEIGHT: 70 IN | BODY MASS INDEX: 29.84 KG/M2 | TEMPERATURE: 98.4 F | SYSTOLIC BLOOD PRESSURE: 148 MMHG | OXYGEN SATURATION: 98 % | DIASTOLIC BLOOD PRESSURE: 70 MMHG | HEART RATE: 79 BPM | WEIGHT: 208.4 LBS

## 2021-06-01 DIAGNOSIS — G89.29 CHRONIC PAIN OF BOTH KNEES: ICD-10-CM

## 2021-06-01 DIAGNOSIS — N18.32 STAGE 3B CHRONIC KIDNEY DISEASE (HCC): ICD-10-CM

## 2021-06-01 DIAGNOSIS — E11.9 NEW ONSET TYPE 2 DIABETES MELLITUS (HCC): Primary | ICD-10-CM

## 2021-06-01 DIAGNOSIS — K92.0 HEMATEMESIS WITHOUT NAUSEA: ICD-10-CM

## 2021-06-01 DIAGNOSIS — I10 ESSENTIAL HYPERTENSION: ICD-10-CM

## 2021-06-01 DIAGNOSIS — E78.49 OTHER HYPERLIPIDEMIA: ICD-10-CM

## 2021-06-01 DIAGNOSIS — M25.562 CHRONIC PAIN OF BOTH KNEES: ICD-10-CM

## 2021-06-01 DIAGNOSIS — G30.1 LATE ONSET ALZHEIMER'S DISEASE WITHOUT BEHAVIORAL DISTURBANCE (HCC): ICD-10-CM

## 2021-06-01 DIAGNOSIS — F02.80 LATE ONSET ALZHEIMER'S DISEASE WITHOUT BEHAVIORAL DISTURBANCE (HCC): ICD-10-CM

## 2021-06-01 DIAGNOSIS — R26.2 AMBULATORY DYSFUNCTION: ICD-10-CM

## 2021-06-01 DIAGNOSIS — M25.561 CHRONIC PAIN OF BOTH KNEES: ICD-10-CM

## 2021-06-01 DIAGNOSIS — D62 ACUTE BLOOD LOSS ANEMIA (ABLA): ICD-10-CM

## 2021-06-01 PROCEDURE — 99495 TRANSJ CARE MGMT MOD F2F 14D: CPT | Performed by: STUDENT IN AN ORGANIZED HEALTH CARE EDUCATION/TRAINING PROGRAM

## 2021-06-01 NOTE — PATIENT INSTRUCTIONS
1)Avoid all Motrin, Alleve, Advil, Ibuprofen  2)Schedule an appointment with GI for follow up  3)Complete a CBC in mid [place]

## 2021-06-01 NOTE — PROGRESS NOTES
[address]  [address] [facility], [address] [facility], [address] [place] [address]    PRIMARY CARE PRACTICE FOR OLDER ADULTS  Transition of care      NAME: Lay Gallegos  AGE: 79 y o  SEX: male    DATE OF ENCOUNTER: 6/1/2021    Assessment and Plan     Problem List Items Addressed This Visit        Digestive    Hematemesis    Relevant Orders    Ambulatory referral to Gastroenterology       Endocrine    New onset type 2 diabetes mellitus ([facility] ) - Primary       Lab Results   Component Value Date    HGBA1C 12 3 (H) 05/09/2021     Recent diagnosis of new onset diabetes  Patient was seen by endocrinology and currently is on Lantus insulin 25 units every morning and Humalog insulin, 8 units bid  Patient is scheduled to see the diabetes educator  Recommend adherence to a diabetic, heart healthy diet  Counseled on symptoms of hypoglycemia and treatment            Cardiovascular and Mediastinum    Essential hypertension      /70   Discussed with patient and granddaughter that blood pressure goal is 488 systolic given history of dementia and vascular risk factors   Per granddaughter, patient did just have a high sodium lunch   Continue labetalol 200 mg daily, continue amlodipine 10 mg daily, continue hydralazine 50 mg t i d    Recommend adherence to a low-sodium, diabetic diet            Nervous and Auditory    Late onset 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 ([facility] )     Patient was scheduled for repeat neurocognitive testing however given recent hospitalization, will defer reassessment by 3 months   Patient remains at baseline, pleasantly confused, very talkative and personable  No overt changes in mood, personality or behaviors   Patient remains independent in most ADLs but dependent in all IADLs   Recommend reorientation and redirection as needed  Manage chronic conditions  Maintain Falls precautions  Encourage patient to remain active mentally, physically and socially   Patient should participate in cognitively challenging exercises as able            Genitourinary    Chronic kidney disease     Lab Results   Component Value Date    EGFR 31 05/19/2021    EGFR 30 05/18/2021    EGFR 34 05/17/2021    CREATININE 2 26 (H) 05/19/2021    CREATININE 2 32 (H) 05/18/2021    CREATININE 2 08 (H) 05/17/2021     Avoid nephrotoxic agents  Medications to be renally dosed   Will continue to monitor periodically            Other    Other hyperlipidemia      Continue simvastatin 20 mg daily   Recommend adherence to a diabetic, heart healthy diet         Acute blood loss anemia (ABLA)      Patient with hematemesis x2  during hospitalization and decreased hemoglobin from 14 1-9 8  S/pTransfusion with 1 unit of PRBC   S/p Intubation, ICU admission and push enteroscopy showing  Reflux esophagitis, small hiatal hernia, gastric body ulcer and multiple antral and body erosions  Continue pantoprazole 40 mg b i d    No recurrent symptoms since discharge  Will refer to Gastroenterology as no referral made upon discharge   Will repeat CBC in 2 weeks-if hemoglobin continues to decrease will require colonoscopy         Relevant Orders    CBC and differential    Ambulatory referral to Gastroenterology    Ambulatory dysfunction      Patient continues with use of a rolling walker   Maintain Falls precautions   Patient to start physical therapy for gait training, balance and strengthening         Chronic pain of both knees      Patient continues to complain of chronic bilateral knee pain  Advised once patient has completed his course of physical therapy at home, patient can follow-up with orthopedics  Continue topical agents such as Aspercreme, BenGay to his knees   Continue physical therapy as scheduled   Maintain [place] precautions   Consider Tylenol as needed                   - Counseling Documentation: patient was counseled regarding: diagnostic results, instructions for management, risk factor reductions, prognosis, patient and family education, impressions, risks and benefits of treatment options and importance of compliance with treatment    Chief Complaint     Chief Complaint   Patient presents with    Transition of Care Management       TCM Call (since 5/2/2021)     Date and time call was made  5/21/2021  4:48 PM    Hospital care reviewed  Records reviewed    Patient was hospitialized at  [facility]        Date of Admission  05/09/21    Date of discharge  05/20/21    Diagnosis  stroke like symptoms; Hyperglycemia; dysphagia; hypertensive crisis    Disposition  Home    Current Symptoms  None      TCM Call (since 5/2/2021)     Post hospital issues  Aftercare intervention; Reduced activity    Should patient be enrolled in anticoag monitoring? No    Scheduled for follow up? Yes    Patients specialists  Endocrinologist    Referrals needed  none, appointments already made at time of TCM call    Did you obtain your prescribed medications  Yes    Do you need help managing your prescriptions or medications  Yes    Why type of assitance do you need  daughter wants more information and training for patient and family learning and managing insulin    Is transportation to your appointment needed  No    I have advised the patient to call PCP with any new or worsening symptoms  Linda Villegas  care staff; Family    The type of support provided  Physical; Emotional; Other (comment)    Do you have social support  Yes, as much as I need    Are you recieving any outpatient services  No    Are you recieving home care services  Yes    Types of home care services  Home PT; Home RT; Other (comment); Nurse visit    Comment  PT/OT/SN and Speech    Are you using any community resources  No    Current waiver services  No    Have you fallen in the last 12 months  Yes    How many times  few times    Interperter language line needed  No    Counseling  Caregiver;  Family    Counseling topics Activities of daily living; patient and family education; Home health agency benefits          History of Present Illness     HPI      This is a very pleasant 67-year-old male with dementia, BPH, HLD, HTN and CKD who presents for his transition of care visit after recent hospitalization  The patient was admitted to hospital on 05/09/2021 for polyuria, facial droop and initial stroke-like symptoms  MRI brain was negative for any acute findings and he was assessed with hyperosmolar hyperglycemia  His HbA1c was noted to be 12 and he was started on Lantus as well as short-acting insulin for newly diagnosed diabetes  During his hospitalization, he did have acute blood loss anemia causing hemorrhagic shock and aspiration pneumonitis secondary to peptic ulcer disease requiring intubation and ICU admission  EGD with push enteroscopy showed reflux esophagitis, a small hiatal hernia, a small clean based gastric body ulcer and multiple antral and body erosions with no active bleeding  He was transfused with PRBC and hemoglobin stabilized between 7 and 8  He was started on PPI therapy and transitioned to the general medical floor  The patient was advised to follow-up with GI  and for further monitoring of his hemoglobin with recommendations for colonoscopy if hemoglobin dropped again  Today the patient is in the office with his granddaughter who participates in his primary care taking and with whom he lives  The patient states he has felt much better although given his history of dementia, review of systems are unreliable  Granddaughter does explain the patient has been tolerating oral intake although overall is less than what he normally did  No cardiorespiratory distress, fever, chills, URI or urinary symptoms  No hematemesis or passage of blood in stool and the patient has started occupational therapy at home with [facility] services  Physical therapy is scheduled to start later this week      Since discharge, the patient has established care with endocrinology and    insulin has been weaned  Patient's fasting blood sugars have been in the low 120s  He did have 1 episode of hypoglycemia upon discharge from the hospital however has not had any recurrent episodes since  The patient is also scheduled for an appointment for Diabetes Education  The patient has been sleeping well, having regular bowel movements and has had no recent falls        The following portions of the patient's history were reviewed and updated as appropriate: allergies, current medications, past family history, past medical history, past social history, past surgical history and problem list     Review of Systems     Review of Systems   Unable to perform ROS: Dementia       Active Problem List     Patient Active Problem List   Diagnosis    Dyspnea on exertion    Essential hypertension    Pyelonephritis    Other hyperlipidemia    MILE (acute kidney injury) ([address] )    Hematuria    Metabolic acidosis    Acute blood loss anemia (ABLA)    Azotemia    Ambulatory dysfunction    BPH (benign prostatic hyperplasia)    Abdominal distention    Chronic pain of both knees    Stool incontinence    Chronic kidney disease    Urinary retention    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)    Snoring    Allergic rhinitis    Mixed stress and urge urinary incontinence    Bilateral leg edema    Ingrown toenail    Need for influenza vaccination    Osteoarthritis of knee    Vision changes    Acute metabolic encephalopathy    Hyperosmolar hyperglycemic state (HHS) ([address] )    Dysphagia    New onset type 2 diabetes mellitus ([address] )    Hematemesis    Aspiration into airway    Gastric ulcer       Objective     /70 (BP Location: Left arm, Patient Position: Sitting, Cuff Size: Adult)   Pulse 79   Temp 98 4 °F (36 9 °C) (Temporal)   Ht 5' 9 88" (1 775 m)   Wt 94 5 kg (208 lb 6 4 oz)   SpO2 98%   BMI 30 00 kg/m²     Physical Exam  Vitals signs reviewed  Constitutional:       General: He is not in acute distress  Appearance: Normal appearance  He is well-developed  He is not ill-appearing or diaphoretic  Comments: Elderly male sitting comfortably in chair in no obvious cardiorespiratory or painful distress   HENT:      Head: Normocephalic and atraumatic  Right Ear: Tympanic membrane, ear canal and external ear normal       Left Ear: Tympanic membrane, ear canal and external ear normal       Nose: Nose normal  No congestion  Mouth/Throat:      Mouth: Mucous membranes are moist       Pharynx: Oropharynx is clear  No oropharyngeal exudate  Eyes:      General: No scleral icterus  Right eye: No discharge  Left eye: No discharge  Conjunctiva/sclera: Conjunctivae normal    Neck:      Musculoskeletal: Normal range of motion and neck supple  Vascular: No JVD  Cardiovascular:      Rate and Rhythm: Normal rate and regular rhythm  Heart sounds: Murmur present  No friction rub  No gallop  Pulmonary:      Effort: Pulmonary effort is normal  No respiratory distress  Breath sounds: Normal breath sounds  No wheezing or rales  Abdominal:      General: Bowel sounds are normal  There is no distension  Palpations: Abdomen is soft  Tenderness: There is no abdominal tenderness  There is no guarding  Musculoskeletal: Normal range of motion  General: No tenderness or deformity  Skin:     General: Skin is warm  Coloration: Skin is not pale  Findings: No erythema or rash  Neurological:      General: No focal deficit present  Mental Status: He is alert  Mental status is at baseline  He is disoriented  Cranial Nerves: No cranial nerve deficit  Motor: No weakness  Gait: Gait abnormal       Deep Tendon Reflexes: Reflexes are normal and symmetric     Psychiatric:      Comments: Pleasantly confused at baseline         Pertinent Laboratory/Diagnostic Studies:  Lab Results   Component Value Date    WBC 9 03 05/19/2021    HGB 7 7 (L) 05/19/2021    HCT 24 4 (L) 05/19/2021    MCV 98 05/19/2021     05/19/2021     Lab Results   Component Value Date    HGBA1C 12 3 (H) 05/09/2021     Lab Results   Component Value Date    SODIUM 137 05/19/2021    K 3 6 05/19/2021     05/19/2021    CO2 28 05/19/2021    BUN 19 05/19/2021    CREATININE 2 26 (H) 05/19/2021    GLUC 116 05/19/2021    CALCIUM 7 5 (L) 05/19/2021       Current Medications     Current Outpatient Medications:     acetaminophen (TYLENOL) 500 mg tablet, Take 500 mg by mouth every 6 (six) hours as needed for mild pain, Disp: , Rfl:     allopurinol (ZYLOPRIM) 300 mg tablet, take 1 tablet by mouth once daily, Disp: 90 tablet, Rfl: 2    amLODIPine (NORVASC) 10 mg tablet, take 1 tablet by mouth daily, Disp: 90 tablet, Rfl: 2    Diclofenac Sodium (VOLTAREN) 1 %, Apply 2 g topically 4 (four) times a day, Disp: 1 Tube, Rfl: 2    finasteride (PROSCAR) 5 mg tablet, Take 1 tablet (5 mg total) by mouth daily, Disp: 90 tablet, Rfl: 3    fluticasone (FLONASE) 50 mcg/act nasal spray, 1 spray into each nostril daily, Disp: 1 Bottle, Rfl: 3    hydrALAZINE (APRESOLINE) 50 mg tablet, take 1 tablet by mouth three times a day, Disp: 90 tablet, Rfl: 2    Incontinence Supply Disposable (Incontinence Brief Large) MISC, by Does not apply route 4 (four) times a day PLEASE SUPPLY EXTRA-LARGE , Disp: 120 each, Rfl: 5    insulin glargine (LANTUS) 100 units/mL subcutaneous injection, Inject 25 Units under the skin every morning, Disp: 10 mL, Rfl: 0    insulin lispro (HumaLOG) 100 units/mL injection, Inject 8 Units under the skin 2 (two) times a day with meals, Disp: 10 mL, Rfl: 0    Insulin Pen Needle (Pen Needles) 32G X 5 MM MISC, Use 1 each 4 (four) times a day, Disp: 100 each, Rfl: 0    labetalol (NORMODYNE) 200 mg tablet, Take 1 tablet (200 mg total) by mouth daily at bedtime, Disp: 90 tablet, Rfl: 3    Multiple Vitamins-Minerals (MULTIVITAMIN WITH MINERALS) tablet, Take 1 tablet by mouth daily, Disp: , Rfl:     omega-3-acid ethyl esters (LOVAZA) 1 g capsule, Take 1 g by mouth daily, Disp: , Rfl:     pantoprazole (PROTONIX) 40 mg tablet, Take 1 tablet (40 mg total) by mouth 2 (two) times a day before meals, Disp: 60 tablet, Rfl: 1    simvastatin (ZOCOR) 20 mg tablet, take 1 tablet by mouth at bedtime, Disp: 30 tablet, Rfl: 2    furosemide (LASIX) 40 mg tablet, Take 1 tablet (40 mg total) by mouth daily (Patient not taking: Reported on 6/1/2021), Disp: 90 tablet, Rfl: 3    Health Maintenance     Health Maintenance   Topic Date Due    Medicare Annual Wellness Visit (AWV)  Never done    Diabetic Foot Exam  Never done    DM Eye Exam  Never done    URINE MICROALBUMIN  Never done    Depression Screening PHQ  Never done    COVID-19 Vaccine (1) Never done    BMI: Followup Plan  Never done    Fall Risk  Never done    DTaP,Tdap,and Td Vaccines (2 - Tdap) 08/31/2021    HEMOGLOBIN A1C  11/09/2021    BMI: Adult  06/01/2022    Pneumococcal Vaccine: 65+ Years  Completed    Influenza Vaccine  Completed    HIB Vaccine  Aged Out    Hepatitis B Vaccine  Aged Out    IPV Vaccine  Aged Out    Hepatitis A Vaccine  Aged Out    Meningococcal ACWY Vaccine  Aged Out    HPV Vaccine  Aged Out     Immunization History   Administered Date(s) Administered    DTaP 08/31/2011    Influenza, high dose seasonal 0 7 mL 10/28/2020    Influenza, seasonal, injectable 07/31/2015    Influenza, seasonal, injectable, preservative free 12/11/2008, 10/20/2010, 10/20/2011, 12/05/2011    Pneumococcal Polysaccharide PPV23 12/07/2007    Tdap 08/31/2011       Arielle Burdick MD  Geriatrics   6/2/2021 6:27 AM

## 2021-06-02 NOTE — ASSESSMENT & PLAN NOTE
Patient continues to complain of chronic bilateral knee pain  Advised once patient has completed his course of physical therapy at home, patient can follow-up with orthopedics  Continue topical agents such as Aspercreme, BenGay to his knees   Continue physical therapy as scheduled   Maintain Falls precautions   Consider Tylenol as needed

## 2021-06-02 NOTE — ASSESSMENT & PLAN NOTE
Lab Results   Component Value Date    EGFR 31 05/19/2021    EGFR 30 05/18/2021    EGFR 34 05/17/2021    CREATININE 2 26 (H) 05/19/2021    CREATININE 2 32 (H) 05/18/2021    CREATININE 2 08 (H) 05/17/2021     Avoid nephrotoxic agents  Medications to be renally dosed   Will continue to monitor periodically

## 2021-06-02 NOTE — ASSESSMENT & PLAN NOTE
/70   Discussed with patient and granddaughter that blood pressure goal is 017 systolic given history of dementia and vascular risk factors   Per granddaughter, patient did just have a high sodium lunch   Continue labetalol 200 mg daily, continue amlodipine 10 mg daily, continue hydralazine 50 mg t i d    Recommend adherence to a low-sodium, diabetic diet

## 2021-06-02 NOTE — ASSESSMENT & PLAN NOTE
Patient continues with use of a rolling walker   Maintain Falls precautions   Patient to start physical therapy for gait training, balance and strengthening

## 2021-06-02 NOTE — ASSESSMENT & PLAN NOTE
Lab Results   Component Value Date    HGBA1C 12 3 (H) 05/09/2021     Recent diagnosis of new onset diabetes  Patient was seen by endocrinology and currently is on Lantus insulin 25 units every morning and Humalog insulin, 8 units bid  Patient is scheduled to see the diabetes educator  Recommend adherence to a diabetic, heart healthy diet  Counseled on symptoms of hypoglycemia and treatment

## 2021-06-02 NOTE — ASSESSMENT & PLAN NOTE
Patient was scheduled for repeat neurocognitive testing however given recent hospitalization, will defer reassessment by 3 months   Patient remains at baseline, pleasantly confused, very talkative and personable  No overt changes in mood, personality or behaviors   Patient remains independent in most ADLs but dependent in all IADLs   Recommend reorientation and redirection as needed  Manage chronic conditions  Maintain [place] precautions  Encourage patient to remain active mentally, physically and socially   Patient should participate in cognitively challenging exercises as able

## 2021-06-02 NOTE — ASSESSMENT & PLAN NOTE
Patient with hematemesis x2  during hospitalization and decreased hemoglobin from 14 1-9 8  S/pTransfusion with 1 unit of PRBC   S/p Intubation, ICU admission and push enteroscopy showing  Reflux esophagitis, small hiatal hernia, gastric body ulcer and multiple antral and body erosions  Continue pantoprazole 40 mg b i d    No recurrent symptoms since discharge  Will refer to Gastroenterology as no referral made upon discharge   Will repeat CBC in 2 weeks-if hemoglobin continues to decrease will require colonoscopy

## 2021-06-04 DIAGNOSIS — N40.0 BENIGN PROSTATIC HYPERPLASIA WITHOUT LOWER URINARY TRACT SYMPTOMS: ICD-10-CM

## 2021-06-04 RX ORDER — FINASTERIDE 5 MG/1
TABLET, FILM COATED ORAL
Qty: 90 TABLET | Refills: 3 | Status: SHIPPED | OUTPATIENT
Start: 2021-06-04

## 2021-06-11 ENCOUNTER — APPOINTMENT (OUTPATIENT)
Dept: LAB | Facility: CLINIC | Age: 81
End: 2021-06-11
Payer: COMMERCIAL

## 2021-06-11 DIAGNOSIS — D62 ACUTE BLOOD LOSS ANEMIA (ABLA): ICD-10-CM

## 2021-06-11 LAB
BASOPHILS # BLD AUTO: 0.06 THOUSANDS/ΜL (ref 0–0.1)
BASOPHILS NFR BLD AUTO: 1 % (ref 0–1)
EOSINOPHIL # BLD AUTO: 0.24 THOUSAND/ΜL (ref 0–0.61)
EOSINOPHIL NFR BLD AUTO: 4 % (ref 0–6)
ERYTHROCYTE [DISTWIDTH] IN BLOOD BY AUTOMATED COUNT: 14.3 % (ref 11.6–15.1)
HCT VFR BLD AUTO: 36.5 % (ref 36.5–49.3)
HGB BLD-MCNC: 11.5 G/DL (ref 12–17)
IMM GRANULOCYTES # BLD AUTO: 0.02 THOUSAND/UL (ref 0–0.2)
IMM GRANULOCYTES NFR BLD AUTO: 0 % (ref 0–2)
LYMPHOCYTES # BLD AUTO: 1.21 THOUSANDS/ΜL (ref 0.6–4.47)
LYMPHOCYTES NFR BLD AUTO: 18 % (ref 14–44)
MCH RBC QN AUTO: 30.8 PG (ref 26.8–34.3)
MCHC RBC AUTO-ENTMCNC: 31.5 G/DL (ref 31.4–37.4)
MCV RBC AUTO: 98 FL (ref 82–98)
MONOCYTES # BLD AUTO: 0.51 THOUSAND/ΜL (ref 0.17–1.22)
MONOCYTES NFR BLD AUTO: 8 % (ref 4–12)
NEUTROPHILS # BLD AUTO: 4.8 THOUSANDS/ΜL (ref 1.85–7.62)
NEUTS SEG NFR BLD AUTO: 69 % (ref 43–75)
NRBC BLD AUTO-RTO: 0 /100 WBCS
PLATELET # BLD AUTO: 206 THOUSANDS/UL (ref 149–390)
PMV BLD AUTO: 10.9 FL (ref 8.9–12.7)
RBC # BLD AUTO: 3.73 MILLION/UL (ref 3.88–5.62)
WBC # BLD AUTO: 6.84 THOUSAND/UL (ref 4.31–10.16)

## 2021-06-11 PROCEDURE — 36415 COLL VENOUS BLD VENIPUNCTURE: CPT

## 2021-06-11 PROCEDURE — 85025 COMPLETE CBC W/AUTO DIFF WBC: CPT

## 2021-06-14 ENCOUNTER — TELEPHONE (OUTPATIENT)
Dept: GERIATRICS | Age: 81
End: 2021-06-14

## 2021-06-14 NOTE — TELEPHONE ENCOUNTER
----- Message from Brittany Jade MD sent at 6/13/2021  3:00 PM EDT -----  Please call the pt's daughter and inform he rthat his Hemoglobin is stable   Was 7 7 in hospital, now back to 11 5

## 2021-06-18 ENCOUNTER — OFFICE VISIT (OUTPATIENT)
Dept: DIABETES SERVICES | Facility: CLINIC | Age: 81
End: 2021-06-18
Payer: COMMERCIAL

## 2021-06-18 ENCOUNTER — TELEPHONE (OUTPATIENT)
Dept: GERIATRICS | Age: 81
End: 2021-06-18

## 2021-06-18 VITALS — HEIGHT: 69 IN | WEIGHT: 209 LBS | BODY MASS INDEX: 30.96 KG/M2

## 2021-06-18 DIAGNOSIS — E11.9 NEW ONSET TYPE 2 DIABETES MELLITUS (HCC): ICD-10-CM

## 2021-06-18 PROCEDURE — 97802 MEDICAL NUTRITION INDIV IN: CPT | Performed by: DIETITIAN, REGISTERED

## 2021-06-18 NOTE — PATIENT INSTRUCTIONS
45-60 grams of carbohydrate per meal  15 grams of carbohydrate per snack    3 meals per day, 4-5 hours apart  2 snacks per day, at least 2 hours apart from meals    Use the Portion Book and label reading to determine carbohydrate amounts in food and beverages    Please complete 3 day food record prior to follow-up appointment in 6 weeks

## 2021-06-18 NOTE — TELEPHONE ENCOUNTER
Home health care called and stated that they had a case open for this Pt but pt has been refusing to see anyone  No nurses had any contact with him and his family  They gave us a call to say they closed his case

## 2021-06-18 NOTE — PROGRESS NOTES
Medical Nutrition Therapy        Assessment    Visit Type: Initial visit    Chief complaint Diabetes Mellitus    HPI: 79year old male with new onset type 2 diabetes and history of dementia, HLD, HTN, and anemia  Most recent HbA1c 12 3%  His daughter is his caretaker and has accompanied him today  Danielleo download reveals average  mg/dL, SD 26 mg/dL, range  mg/dL  [facility] diet history reveals inconsistent carbohydrate intake, excess sodium and saturated fat intake, and inadequate intake of fruit and vegetables  Currently meals and snacks range from 7 to 60 grams of carbohydrate  Breakfast may often be too low in carbohydrates, sometimes as low as 7 g  His snack 4-5 hours later may often contain 55-60 g carbohydrate  Lunch and dinner examples in diet recall were around 35-45 g carbohydrate  Explained basic pathophysiology of diabetes and impact of diet on blood glucose levels  Together we discussed what foods contain CHO, reading a food label, timing of meals and snacks, serving sizes, and the role of consistent carbohydrate intake in achieving and maintaining target BG levels  Used the portion booklet to teach Matthias Mariscal and his daughter more about food groups and basic carbohydrate counting  Created an individualized meal plan for Matthias Mariscal with 3 meals and 2 snacks providing 45-60 g carb per meal and 15 g carb per snack  Discussed choosing leaner proteins rather than high-fat proteins  Put together sample meals for Matthias Mariscal and his daughter's reference  Matthias Mariscal and his daughter demonstrated good understanding and will call with any questions prior to follow-up appointment in 6 weeks  Ht Readings from Last 1 Encounters:   06/01/21 5' 9 88" (1 775 m)     Wt Readings from Last 2 Encounters:   06/01/21 94 5 kg (208 lb 6 4 oz)   05/24/21 93 9 kg (207 lb)     Weight Change:  Yes reports was 220 lbs a coupe months beofre hospitlaized in early May 2021    Medical Diagnosis/ICD10: E11 9 (ICD-10-CM) - New onset type 2 diabetes mellitus    Barriers to Learning: cognitive and hearing  Dementia and has not gotten hearing aids ye  Do you follow any special diet presently?: Yes - has decreased intake of sweets, low sodium, low sugar, uses a lot of equal   Who shops: keerthi  Who cooks: daughter, but he makes his own lunch    Food Log: Completed via the method of food recall    Breakfast:wakes 5:30 am  Daughter makes breakfast, 2 boiled eggs, 1 slice whole wheat toast (keto toast 4 g CHO wit low sugar) and 3 sausages  Herbal teas with equal   Morning Snack:10:30 - 11 am gregg shepard, daughter gives him 2 (acutally 4 because he), also has a kind bar *(black cherries almonds) and 100 claorie pack of nuts (cashews, cranberries, peanuts)  Lunch:1 pm  Can of Progresso and wither half or whole sandwich on same keto bread (cheese, salami, ham)  Water or tea  Afternoon Snack: 3:30 -4 pm  applesauce (sima serving cup) and almond breeze yogurt  Dinner:8-9 pm  Pot roast with mashed potatoes (1 cup or maybe a little more) and carrots  Water or crystal light    Evening Snack:not usually  Beverages: water, crystal light , tea with equal, was drinking coffee but stopped for the most part, hot chocolate on occasion  Eating out/Take out:once per month at a diner for breakfast and [employer] twice per month  Exercise none    Calorie needs 1,800 kcals/day Carbs: 45-60 g/meal, 15 g/snack         Nutrition Diagnosis:  Inconsistent carbohydrate intake  intake related to Food and nutrition related knowledge deficit concerning appropriate amount and timing of carbohydrate intake as evidenced by  Estimated carbohydrate intake that is different from recommended types or ingested on an irregular basis    Intervention: plate method, reduced fat intake, increased fiber intake, label reading, carbohydrate counting, increased plant based foods, meal timing, meal planning, individualized meal plan, monitoring portion control and food diary     Treatment Goals: Patient understands education and recommendations, Patient will monitor portion control, Patient will increase their intake of plant based foods and Patient will count carbohydrates    Monitoring and evaluation:    Term code indicator  FH 1 6 3 Carbohydrate Intake Criteria: 45-60 g of carbohydrate per meal, 15 grams of carbohydrate per snack  Term code indicator  FH 4 4 Mealtime Behavior Criteria: 3 meals per day, 4-5 hours apart  2 snacks per day, at least 2 hours apart from meals  Patients Response to Instruction:  Ahmet Lynn  Expected Compliancegood    Thank you for coming to the [facility] for education today  Please feel free to call with any questions or concerns      Start: 8:15 am  Stop: 9:18 am  Referred by: Anne English MD    [facility], [address] [place] [address] [place] [id number]

## 2021-06-29 ENCOUNTER — TELEPHONE (OUTPATIENT)
Dept: ENDOCRINOLOGY | Facility: CLINIC | Age: 81
End: 2021-06-29

## 2021-06-29 NOTE — TELEPHONE ENCOUNTER
pts blood sugar went down to 42 so the daughter gave him something to eat and it went up to 76  Advised by Nickie Pires to have pt take 15 grams of carbs every 15 mins until he reaches 100, then give something with protein  I let the daughter know

## 2021-07-08 DIAGNOSIS — I10 ESSENTIAL HYPERTENSION: ICD-10-CM

## 2021-07-08 RX ORDER — SIMVASTATIN 20 MG
TABLET ORAL
Qty: 90 TABLET | Refills: 2 | Status: SHIPPED | OUTPATIENT
Start: 2021-07-08 | End: 2022-02-06

## 2021-07-08 NOTE — TELEPHONE ENCOUNTER
Patients daughter Sharon Young ([phone]) called asking if she can get a letter stating that her father needs his medications and insulin to board the plane for travel with him  7/29/2021 is the travel date   ([facility] - [employer])

## 2021-07-09 NOTE — TELEPHONE ENCOUNTER
Letter finalized and signed by Dr Omi Benítez  LCSW left voicemail for Charity Leblanc stating that the letter has been completed as requested and is ready to be picked up at the

## 2021-07-13 ENCOUNTER — TELEPHONE (OUTPATIENT)
Dept: ENDOCRINOLOGY | Facility: CLINIC | Age: 81
End: 2021-07-13

## 2021-07-13 DIAGNOSIS — E11.9 NEW ONSET TYPE 2 DIABETES MELLITUS (HCC): ICD-10-CM

## 2021-07-13 DIAGNOSIS — Z79.4 TYPE 2 DIABETES MELLITUS WITH HYPERGLYCEMIA, WITH LONG-TERM CURRENT USE OF INSULIN (HCC): Primary | ICD-10-CM

## 2021-07-13 DIAGNOSIS — E11.65 TYPE 2 DIABETES MELLITUS WITH HYPERGLYCEMIA, WITH LONG-TERM CURRENT USE OF INSULIN (HCC): Primary | ICD-10-CM

## 2021-07-13 RX ORDER — BLOOD SUGAR DIAGNOSTIC
STRIP MISCELLANEOUS
Qty: 150 STRIP | Refills: 3 | Status: SHIPPED | OUTPATIENT
Start: 2021-07-13

## 2021-07-13 RX ORDER — LANCETS
EACH MISCELLANEOUS
Qty: 150 EACH | Refills: 3 | Status: SHIPPED | OUTPATIENT
Start: 2021-07-13

## 2021-07-13 NOTE — TELEPHONE ENCOUNTER
pts daughter called  She will bring in his meter for download tomorrow  This pts sugars have been low and so she has not been giving hi the humalog at all since the last phone call ,    She is giving him the lantus at night   Sugars have been in the 70's, 80's  Highest now 130  She will drop it off tomorrow

## 2021-07-15 DIAGNOSIS — I10 ESSENTIAL HYPERTENSION: ICD-10-CM

## 2021-07-15 RX ORDER — HYDRALAZINE HYDROCHLORIDE 50 MG/1
TABLET, FILM COATED ORAL
Qty: 90 TABLET | Refills: 2 | Status: SHIPPED | OUTPATIENT
Start: 2021-07-15 | End: 2021-08-16 | Stop reason: SDUPTHER

## 2021-07-16 RX ORDER — INSULIN GLARGINE 100 [IU]/ML
20 INJECTION, SOLUTION SUBCUTANEOUS EVERY MORNING
Qty: 10 ML | Refills: 0
Start: 2021-07-16 | End: 2021-08-23 | Stop reason: SDUPTHER

## 2021-07-16 RX ORDER — PEN NEEDLE, DIABETIC 31 GX5/16"
NEEDLE, DISPOSABLE MISCELLANEOUS
COMMUNITY
Start: 2021-06-24

## 2021-07-16 RX ORDER — INSULIN GLARGINE 100 [IU]/ML
30 INJECTION, SOLUTION SUBCUTANEOUS
COMMUNITY
Start: 2021-07-04 | End: 2021-12-28 | Stop reason: ALTCHOICE

## 2021-07-16 NOTE — TELEPHONE ENCOUNTER
Blood sugars are tightly controlled, please inform pt to reduce lantus to 20 units and Humalog to 6 units with meals   Thanks     Letty Mae MD

## 2021-07-20 ENCOUNTER — OFFICE VISIT (OUTPATIENT)
Dept: GASTROENTEROLOGY | Facility: CLINIC | Age: 81
End: 2021-07-20
Payer: COMMERCIAL

## 2021-07-20 VITALS
DIASTOLIC BLOOD PRESSURE: 80 MMHG | BODY MASS INDEX: 28.71 KG/M2 | HEART RATE: 72 BPM | WEIGHT: 212 LBS | HEIGHT: 72 IN | SYSTOLIC BLOOD PRESSURE: 140 MMHG

## 2021-07-20 DIAGNOSIS — K25.7 CHRONIC GASTRIC ULCER WITHOUT HEMORRHAGE AND WITHOUT PERFORATION: ICD-10-CM

## 2021-07-20 DIAGNOSIS — K25.9 GASTRIC ULCER: ICD-10-CM

## 2021-07-20 DIAGNOSIS — K92.0 HEMATEMESIS WITHOUT NAUSEA: Primary | ICD-10-CM

## 2021-07-20 DIAGNOSIS — D62 ACUTE BLOOD LOSS ANEMIA (ABLA): ICD-10-CM

## 2021-07-20 DIAGNOSIS — K92.0 HEMATEMESIS: ICD-10-CM

## 2021-07-20 PROBLEM — R13.10 DYSPHAGIA: Status: RESOLVED | Noted: 2021-05-09 | Resolved: 2021-07-20

## 2021-07-20 PROCEDURE — 99214 OFFICE O/P EST MOD 30 MIN: CPT | Performed by: INTERNAL MEDICINE

## 2021-07-20 RX ORDER — PANTOPRAZOLE SODIUM 40 MG/1
40 TABLET, DELAYED RELEASE ORAL DAILY
Qty: 60 TABLET | Refills: 1 | Status: SHIPPED | OUTPATIENT
Start: 2021-07-20

## 2021-07-20 NOTE — PROGRESS NOTES
Follow-up Note -  [facility]  Alyson Lanier 1940 79 y o  male     ASSESSMENT @ PLAN:   He is a 67-year-old male status post ICU hospitalization with upper GI bleed with endoscopy showing angiectasia status post APC and small gastric ulcer reflux esophagitis and antral erosions who is doing well with a stable hemoglobin of 11 5 on June 11th     1 I do not think he needs another endoscopy    2 will recheck his CBC    3 will go down on his Protonix to once daily    Reason:  Anemia and upper GI bleed    HPI:  He is a 67-year-old male who was originally seen in the hospital during and ICU hospitalization for hypoglycemia and upper GI bleeding  He had an endoscopy with Dr Amado ramírez on May 13th that showed a small ulcer in the fundus without stigmata small angiectasia in the body of the stomach status post APC and 2 polyps in the distal esophagus not biopsy  He did well got extubated and went to the medical floor and then had a drop in hemoglobin and I did an endoscopy on May 17th with EGD with enteroscopy showing LA grade B reflux esophagitis small hiatal hernia gastric ulcer clean base and antral erosions  Biopsies were negative for H pylori  The patient did well thereafter  His hemoglobin stabilized  His hemoglobin as an outpatient on June 11th went up to 11 5  He is eating  He denies heartburn regurgitation abdominal pain he  He has minor constipation  He has no melena hematochezia  He is here with his daughter  We do not think he needs to have another endoscopy  This    REVIEW OF SYSTEMS:     CONSTITUTIONAL: Denies any fever, chills, or rigors  Good appetite, and no recent weight loss  HEENT: No earache or tinnitus  Denies hearing loss or visual disturbances  CARDIOVASCULAR: No chest pain or palpitations  RESPIRATORY: Denies any cough, hemoptysis, shortness of breath or dyspnea on exertion  GASTROINTESTINAL: As noted in the History of Present Illness     GENITOURINARY: No [FreeTextEntry1] : Exam findings and diagnosis were discussed at length with patient. \par Symptoms resolving with conservative measures\par Continue treatement for current  issues.\par Discussed continued medical management. \par Office based treatment, such as rubber band ligation,was discussed as an alternative if symptoms persist despite conservative management.\par \par All questions answered, patient expressed understanding and is agreeable to this plan.\par  problems with urination  Denies any hematuria or dysuria  NEUROLOGIC: No dizziness or vertigo, denies headaches  MUSCULOSKELETAL: Denies any muscle or joint pain  SKIN: Denies skin rashes or itching  ENDOCRINE: Denies excessive thirst  Denies intolerance to heat or cold  PSYCHOSOCIAL: Denies depression or anxiety  Denies any recent memory loss  Past Medical History:   Diagnosis Date    Anemia     BPH (benign prostatic hyperplasia)     Chronic kidney disease     [facility]     Diabetes mellitus ([facility]     Hyperlipidemia     Hypertension     Incontinence     Osteoarthritis       Past Surgical History:   Procedure Laterality Date    COLON SURGERY      COLONOSCOPY      FL RETROGRADE PYELOGRAM  12/13/2019    [place] CYSTOURETHRO W/IMPLANT N/A 12/13/2019    Procedure: CYSTOSCOPY WITH INSERTION UROLIFT, BILATERAL RETROGRADE PYELOGRAM;  Surgeon: Gil Murphy MD;  Location: [facility];  Service: Urology    TUMOR REMOVAL       Social History     Socioeconomic History    Marital status:      Spouse name: Not on file    Number of children: Not on file    Years of education: Not on file    Highest education level: Not on file   Occupational History    Not on file   Tobacco Use    Smoking status: Passive Smoke Exposure - Never Smoker    Smokeless tobacco: Never Used   Vaping Use    Vaping Use: Never used   Substance and Sexual Activity    Alcohol use: Never    Drug use: Never    Sexual activity: Not on file   Other Topics Concern    Not on file   Social History Narrative    Not on file     Social Determinants of Health     Financial Resource Strain:     Difficulty of Paying Living Expenses:    Food Insecurity:     Worried About Running Out of Food in the Last Year:     [address] in the Last Year:    Transportation Needs:     Lack of Transportation (Medical):      Lack of Transportation (Non-Medical):    Physical Activity:     Days of Exercise per Week:     Minutes of Exercise per Session:    Stress:     Feeling of Stress :    Social Connections:     Frequency of Communication with Friends and Family:     Frequency of Social Gatherings with Friends and Family:     Attends [employer] Services:     Active Member of Clubs or Organizations:     Attends Club or Organization Meetings:     Marital Status:    Intimate Partner Violence:     Fear of Current or Ex-Partner:     Emotionally Abused:     Physically Abused:     Sexually Abused:      Family History   Problem Relation Age of Onset    Heart failure Brother     Dementia Maternal Uncle      Strawberry c [ascorbate - food allergy]  Current Outpatient Medications   Medication Sig Dispense Refill    acetaminophen (TYLENOL) 500 mg tablet Take 500 mg by mouth every 6 (six) hours as needed for mild pain      allopurinol (ZYLOPRIM) 300 mg tablet take 1 tablet by mouth once daily 90 tablet 2    amLODIPine (NORVASC) 10 mg tablet take 1 tablet by mouth daily 90 tablet 2    B-D UF III MINI PEN NEEDLES 31G X 5 MM MISC use 1 PEN NEEDLE to inject MEDICATION subcutaneously four times a day      Contour Next Test test strip use to TEST BLOOD SUGAR four times a day 150 strip 3    Diclofenac Sodium (VOLTAREN) 1 % Apply 2 g topically 4 (four) times a day 1 Tube 2    finasteride (PROSCAR) 5 mg tablet take 1 tablet by mouth once daily 90 tablet 3    fluticasone (FLONASE) 50 mcg/act nasal spray 1 spray into each nostril daily 1 Bottle 3    hydrALAZINE (APRESOLINE) 50 mg tablet take 1 tablet by mouth three times a day 90 tablet 2    Incontinence Supply Disposable (Incontinence Brief Large) MISC by Does not apply route 4 (four) times a day PLEASE SUPPLY EXTRA-LARGE  120 each 5    insulin glargine (LANTUS) 100 units/mL subcutaneous injection Inject 20 Units under the skin every morning 10 mL 0    insulin lispro (HumaLOG) 100 units/mL injection Inject 8 Units under the skin 2 (two) times a day with meals 10 mL 0    Insulin Pen Needle (Pen Needles) 32G X 5 MM MISC Use 1 each 4 (four) times a day 100 each 0    labetalol (NORMODYNE) 200 mg tablet Take 1 tablet (200 mg total) by mouth daily at bedtime 90 tablet 3    Lantus SoloStar 100 units/mL injection pen Inject 30 Units under the skin daily at bedtime      Microlet Lancets MISC use to TEST BLOOD SUGAR four times a day 150 each 3    Multiple Vitamins-Minerals (MULTIVITAMIN WITH MINERALS) tablet Take 1 tablet by mouth daily      omega-3-acid ethyl esters (LOVAZA) 1 g capsule Take 1 g by mouth daily      pantoprazole (PROTONIX) 40 mg tablet Take 1 tablet (40 mg total) by mouth daily 60 tablet 1    simvastatin (ZOCOR) 20 mg tablet take 1 tablet by mouth at bedtime 90 tablet 2    furosemide (LASIX) 40 mg tablet Take 1 tablet (40 mg total) by mouth daily (Patient not taking: Reported on 7/20/2021) 90 tablet 3     No current facility-administered medications for this visit  Blood pressure 140/80, pulse 72, height 6' (1 829 m), weight 96 2 kg (212 lb)  PHYSICAL EXAM:     General Appearance:   Alert, cooperative, no distress, appears stated age    HEENT:   Normocephalic, atraumatic, anicteric      Neck:  Supple, symmetrical, trachea midline, no adenopathy;    thyroid: no enlargement/tenderness/nodules; no carotid  bruit or JVD    Lungs:   Clear to auscultation bilaterally; no rales, rhonchi or wheezing; respirations unlabored    Heart[de-identified]   S1 and S2 normal; regular rate and rhythm; no murmur, rub, or gallop     Abdomen:   Soft, non-tender, non-distended; normal bowel sounds; no masses, no organomegaly    Genitalia:   Deferred    Rectal:   Deferred    Extremities:  No cyanosis, clubbing or edema    Pulses:  2+ and symmetric all extremities    Skin:  Skin color, texture, turgor normal, no rashes or lesions    Lymph nodes:  No palpable cervical, axillary or inguinal lymphadenopathy        Lab Results   Component Value Date    WBC 6 84 06/11/2021    HGB 11 5 (L) 06/11/2021    HCT 36 5 06/11/2021    MCV 98 06/11/2021     06/11/2021     Lab Results   Component Value Date    CALCIUM 7 5 (L) 05/19/2021    K 3 6 05/19/2021    CO2 28 05/19/2021     05/19/2021    BUN 19 05/19/2021    CREATININE 2 26 (H) 05/19/2021     Lab Results   Component Value Date    ALT 29 05/09/2021    AST 17 05/09/2021    ALKPHOS 114 05/09/2021     Lab Results   Component Value Date    INR 1 15 05/12/2021    INR 1 03 05/09/2021    INR 1 10 10/07/2019    PROTIME 14 2 05/12/2021    PROTIME 13 0 05/09/2021    PROTIME 14 2 10/07/2019

## 2021-07-21 NOTE — TELEPHONE ENCOUNTER
Pt has not been taking lantus or humalog the past 2 weeks  His highest number has been 123  Daughter thinks he doesn't need insulin b/c she doesn't want him dropping too low  Please advise

## 2021-07-22 NOTE — TELEPHONE ENCOUNTER
Please inform pt to do blood work as order on 5/24/2021, that will help if we can stop insulin completely,   He should hold insulin for now , do bloood work , check blood sugars twice a day, before breakfast and dinner and send log in 1 week, call if blood sugars > 250 persistantly     Ryne Kasper MD

## 2021-07-23 ENCOUNTER — LAB (OUTPATIENT)
Dept: LAB | Facility: CLINIC | Age: 81
End: 2021-07-23
Payer: COMMERCIAL

## 2021-07-23 DIAGNOSIS — K92.0 HEMATEMESIS WITHOUT NAUSEA: ICD-10-CM

## 2021-07-23 DIAGNOSIS — E11.9 NEW ONSET TYPE 2 DIABETES MELLITUS (HCC): ICD-10-CM

## 2021-07-23 LAB
ANION GAP SERPL CALCULATED.3IONS-SCNC: 6 MMOL/L (ref 4–13)
BASOPHILS # BLD AUTO: 0.07 THOUSANDS/ΜL (ref 0–0.1)
BASOPHILS NFR BLD AUTO: 1 % (ref 0–1)
BUN SERPL-MCNC: 24 MG/DL (ref 5–25)
CALCIUM SERPL-MCNC: 9.1 MG/DL (ref 8.3–10.1)
CHLORIDE SERPL-SCNC: 108 MMOL/L (ref 100–108)
CO2 SERPL-SCNC: 23 MMOL/L (ref 21–32)
CREAT SERPL-MCNC: 2.12 MG/DL (ref 0.6–1.3)
CREAT UR-MCNC: 111 MG/DL
EOSINOPHIL # BLD AUTO: 0.19 THOUSAND/ΜL (ref 0–0.61)
EOSINOPHIL NFR BLD AUTO: 3 % (ref 0–6)
ERYTHROCYTE [DISTWIDTH] IN BLOOD BY AUTOMATED COUNT: 14.5 % (ref 11.6–15.1)
EST. AVERAGE GLUCOSE BLD GHB EST-MCNC: 111 MG/DL
GFR SERPL CREATININE-BSD FRML MDRD: 33 ML/MIN/1.73SQ M
GLUCOSE P FAST SERPL-MCNC: 86 MG/DL (ref 65–99)
HBA1C MFR BLD: 5.5 %
HCT VFR BLD AUTO: 43.8 % (ref 36.5–49.3)
HGB BLD-MCNC: 13.7 G/DL (ref 12–17)
IMM GRANULOCYTES # BLD AUTO: 0.02 THOUSAND/UL (ref 0–0.2)
IMM GRANULOCYTES NFR BLD AUTO: 0 % (ref 0–2)
LYMPHOCYTES # BLD AUTO: 1.56 THOUSANDS/ΜL (ref 0.6–4.47)
LYMPHOCYTES NFR BLD AUTO: 25 % (ref 14–44)
MCH RBC QN AUTO: 28.2 PG (ref 26.8–34.3)
MCHC RBC AUTO-ENTMCNC: 31.3 G/DL (ref 31.4–37.4)
MCV RBC AUTO: 90 FL (ref 82–98)
MICROALBUMIN UR-MCNC: 29.2 MG/L (ref 0–20)
MICROALBUMIN/CREAT 24H UR: 26 MG/G CREATININE (ref 0–30)
MONOCYTES # BLD AUTO: 0.62 THOUSAND/ΜL (ref 0.17–1.22)
MONOCYTES NFR BLD AUTO: 10 % (ref 4–12)
NEUTROPHILS # BLD AUTO: 3.91 THOUSANDS/ΜL (ref 1.85–7.62)
NEUTS SEG NFR BLD AUTO: 61 % (ref 43–75)
NRBC BLD AUTO-RTO: 0 /100 WBCS
PMV BLD AUTO: 12.2 FL (ref 8.9–12.7)
POTASSIUM SERPL-SCNC: 3.8 MMOL/L (ref 3.5–5.3)
RBC # BLD AUTO: 4.85 MILLION/UL (ref 3.88–5.62)
SODIUM SERPL-SCNC: 137 MMOL/L (ref 136–145)
WBC # BLD AUTO: 6.37 THOUSAND/UL (ref 4.31–10.16)

## 2021-07-23 PROCEDURE — 86341 ISLET CELL ANTIBODY: CPT

## 2021-07-23 PROCEDURE — 83519 RIA NONANTIBODY: CPT

## 2021-07-23 PROCEDURE — 83036 HEMOGLOBIN GLYCOSYLATED A1C: CPT

## 2021-07-23 PROCEDURE — 82043 UR ALBUMIN QUANTITATIVE: CPT

## 2021-07-23 PROCEDURE — 80048 BASIC METABOLIC PNL TOTAL CA: CPT

## 2021-07-23 PROCEDURE — 82570 ASSAY OF URINE CREATININE: CPT

## 2021-07-23 PROCEDURE — 85025 COMPLETE CBC W/AUTO DIFF WBC: CPT

## 2021-07-23 PROCEDURE — 36415 COLL VENOUS BLD VENIPUNCTURE: CPT

## 2021-07-23 PROCEDURE — 84681 ASSAY OF C-PEPTIDE: CPT

## 2021-07-24 LAB — C PEPTIDE SERPL-MCNC: 3 NG/ML (ref 1.1–4.4)

## 2021-07-26 LAB — PANC ISLET CELL AB TITR SER: NEGATIVE {TITER}

## 2021-07-27 LAB — GAD65 AB SER-ACNC: <5 U/ML (ref 0–5)

## 2021-08-05 ENCOUNTER — TELEPHONE (OUTPATIENT)
Dept: GERIATRICS | Age: 81
End: 2021-08-05

## 2021-08-05 NOTE — TELEPHONE ENCOUNTER
Left voicemail for patient's daughter relaying the change to a virtual appt on 8/10  Will send link to [email or web address]  [email or web address]

## 2021-08-05 NOTE — TELEPHONE ENCOUNTER
Patient's daughter, Ashley Yu, called to inquire if 8/10/21 2mo follow appointment can be virtual   Please advise

## 2021-08-06 ENCOUNTER — OFFICE VISIT (OUTPATIENT)
Dept: DIABETES SERVICES | Facility: CLINIC | Age: 81
End: 2021-08-06
Payer: COMMERCIAL

## 2021-08-06 VITALS — BODY MASS INDEX: 28.99 KG/M2 | WEIGHT: 214 LBS | HEIGHT: 72 IN

## 2021-08-06 DIAGNOSIS — E11.9 NEW ONSET TYPE 2 DIABETES MELLITUS (HCC): Primary | ICD-10-CM

## 2021-08-06 DIAGNOSIS — Z79.4 TYPE 2 DIABETES MELLITUS WITH HYPERGLYCEMIA, WITH LONG-TERM CURRENT USE OF INSULIN (HCC): Primary | ICD-10-CM

## 2021-08-06 DIAGNOSIS — E11.65 TYPE 2 DIABETES MELLITUS WITH HYPERGLYCEMIA, WITH LONG-TERM CURRENT USE OF INSULIN (HCC): Primary | ICD-10-CM

## 2021-08-06 PROCEDURE — 97803 MED NUTRITION INDIV SUBSEQ: CPT | Performed by: DIETITIAN, REGISTERED

## 2021-08-06 RX ORDER — LANCING DEVICE/LANCETS
KIT MISCELLANEOUS
Qty: 1 EACH | Refills: 0 | Status: SHIPPED | OUTPATIENT
Start: 2021-08-06

## 2021-08-06 NOTE — PATIENT INSTRUCTIONS
Continue with meal plan of 45-60 grams of carbohydrate per meal   Reduce intake of foods high in saturated fat such as butter, sausage, eggs, magaña, sour cream   Try to incorporate more high fiber fruit as some of the carbohydrate choices at meals and snacks  Please complete 3 day food record prior to next follow-up appointment in 3 months

## 2021-08-06 NOTE — PROGRESS NOTES
Medical Nutrition Therapy      Assessment    Chief complaint T2DM    Visit Type: Follow-up visit    HPI: Kip Wilkerson returned for follow-up today accompanied by his daughter  HbA1c improved to 5 5% from previously 12 3%  His daughter reports that they have stopped using insulin all together for the past month  [facility] food record reveals inconsistent carbohydrate intake and excess sodium and saturated fat intake  Overall, [facility] meals range 30-75 grams of carbohydrate per meal  Based on meal plan review and diet recall provided education about how to modify meals to improve consistency of carbohydrate intake as well as reducing saturated fat and sodium intake  Discussed sources of saturated fat in his diet such as butter, sour cream, sausage, and eggs  He usually only has one serving of fruit per day  Discussed using high-fiber fruit as some of his carbohydrate choices more often while still keeping meals to 45-60 grams carbohydrate and snacks to 15 g carbohydrate  Discussed choosing whole grains more often such as with his sandwich at lunch each day  Kip Wilkerson and his daughter verbalized understanding of topics and recommendations discussed today and will call with any questions prior to next follow-up appointment in 3 months  Ht Readings from Last 1 Encounters:   07/20/21 6' (1 829 m)     Wt Readings from Last 2 Encounters:   07/20/21 96 2 kg (212 lb)   06/18/21 94 8 kg (209 lb)     Weight Change: Yes 5 lb gain since initial visit 6 weeks ago    Medical Diagnosis/reason for visit      E11 9 (ICD-10-CM) - New onset type 2 diabetes mellitus       Food Log: Completed via the method of food recall               Breakfast:wakes 5:30- 6 am  Eats around 6:30 am  Oatmeal (1 cup), yogurt (23 g), tea (2 equal), toast (arnold granola oat bread 20 g per slice) with butter   OR 2 eggs, turkey sausage (4) and 2 slices toast with butter  Morning Snack:10:30 -11 am belvita breakfast crackers OR belvita with chocolate in the middle OR 2 gregg vidal  Lunch:1 pm-1:30 pm soup (progresso) and a sandwich (ham and cheese) on matt vargas honey wheat and tea (herbal) with equal    Afternoon Snack: 3:30 - 4 pm  Fruit (cherries 15, watermelon 2 cups)  Dinner:8-9 pm  Meatloaf, mashed potatoes (1 cup) or a baked potato with butter and sour cream, and a vegetables (broccoli or spinach)  Water or crystal light  Evening Snack: 10-11 pmlately ice cream in chocolate waffle cone with nuts on it    Beverages: water, crystal lite, tea, diet coke, coffee on occasion with equal and cream  Eating out/Take out:diner usually twice over a 3 week period    Exercise not as much lately    Calorie needs 1,800 kcals/day Carbs: 45-60 g/meal, 15 g/snack     Fat: 5 servings/day    Protein:8 oz/day    Nutrition Diagnosis:  Inconsistent carbohydrate intake  intake related to Food and nutrition related knowledge deficit concerning appropriate amount and timing of carbohydrate intake as evidenced by  Estimated carbohydrate intake that is different from recommended types or ingested on an irregular basis    Intervention: reduced fat intake, increased fiber intake, carbohydrate counting, increased plant based foods, meal planning, monitoring portion control and food diary     Treatment Goals: Patient understands education and recommendations, Patient will monitor fat intake, Patient will monitor portion control, Patient will consume 25-35 grams of fiber a day, Patient will increase their intake of plant based foods and Patient will count carbohydrates    Monitoring and evaluation:    Term code indicator  FH 1 6 3 Carbohydrate Intake Criteria: 45-60 g carbohydrate per meal, 15 g carbohydrate per snack  Term code indicator  FH 4 4 Mealtime Behavior Criteria: 3 meals per day, 4-5 hours apart  2-3 snacks per day, at least 2 hours apart from meals      Patients Response to Instruction:  Comprehensiongood  Motivationgood  Expected Compliancegood    Thank you for coming to the [facility] for education today  Please feel free to call with any questions or concerns      Start: 8:50 am  Stop: 9:38 am  Referred by: MD Sergio Mulligan, [address] [place] [address] Alix Schrader  [address] [place] [phone]

## 2021-08-16 ENCOUNTER — TELEMEDICINE (OUTPATIENT)
Dept: GERIATRICS | Age: 81
End: 2021-08-16
Payer: COMMERCIAL

## 2021-08-16 VITALS — DIASTOLIC BLOOD PRESSURE: 94 MMHG | WEIGHT: 215 LBS | BODY MASS INDEX: 29.16 KG/M2 | SYSTOLIC BLOOD PRESSURE: 188 MMHG

## 2021-08-16 DIAGNOSIS — M25.561 CHRONIC PAIN OF BOTH KNEES: ICD-10-CM

## 2021-08-16 DIAGNOSIS — R26.2 AMBULATORY DYSFUNCTION: ICD-10-CM

## 2021-08-16 DIAGNOSIS — E11.9 NEW ONSET TYPE 2 DIABETES MELLITUS (HCC): ICD-10-CM

## 2021-08-16 DIAGNOSIS — K25.7 CHRONIC GASTRIC ULCER WITHOUT HEMORRHAGE AND WITHOUT PERFORATION: ICD-10-CM

## 2021-08-16 DIAGNOSIS — E78.49 OTHER HYPERLIPIDEMIA: ICD-10-CM

## 2021-08-16 DIAGNOSIS — I10 ESSENTIAL HYPERTENSION: ICD-10-CM

## 2021-08-16 DIAGNOSIS — G89.29 CHRONIC PAIN OF BOTH KNEES: ICD-10-CM

## 2021-08-16 DIAGNOSIS — D62 ACUTE BLOOD LOSS ANEMIA (ABLA): ICD-10-CM

## 2021-08-16 DIAGNOSIS — N40.0 BENIGN PROSTATIC HYPERPLASIA WITHOUT LOWER URINARY TRACT SYMPTOMS: ICD-10-CM

## 2021-08-16 DIAGNOSIS — M25.562 CHRONIC PAIN OF BOTH KNEES: ICD-10-CM

## 2021-08-16 DIAGNOSIS — F02.80 LATE ONSET ALZHEIMER'S DISEASE WITHOUT BEHAVIORAL DISTURBANCE (HCC): Primary | ICD-10-CM

## 2021-08-16 DIAGNOSIS — G30.1 LATE ONSET ALZHEIMER'S DISEASE WITHOUT BEHAVIORAL DISTURBANCE (HCC): Primary | ICD-10-CM

## 2021-08-16 DIAGNOSIS — R26.81 GAIT INSTABILITY: ICD-10-CM

## 2021-08-16 PROCEDURE — 99215 OFFICE O/P EST HI 40 MIN: CPT | Performed by: STUDENT IN AN ORGANIZED HEALTH CARE EDUCATION/TRAINING PROGRAM

## 2021-08-16 RX ORDER — HYDRALAZINE HYDROCHLORIDE 50 MG/1
50 TABLET, FILM COATED ORAL 4 TIMES DAILY
Qty: 120 TABLET | Refills: 2 | Status: SHIPPED | OUTPATIENT
Start: 2021-08-16 | End: 2021-08-23 | Stop reason: SDUPTHER

## 2021-08-16 NOTE — PROGRESS NOTES
Virtual Regular Visit    Verification of patient location:    Patient is located in the following state in which I hold an active license [place]      Assessment/Plan:    Problem List Items Addressed This Visit        Digestive    Chronic gastric ulcer without hemorrhage and without perforation       Endocrine    New onset type 2 diabetes mellitus ([facility] )       Lab Results   Component Value Date    HGBA1C 5 5 07/23/2021     Patient had been having episodes of hypoglycemia with blood sugars in the 45s per daughter  She explains that she has since discontinued all insulin and fasting blood sugars have remained in the 90s  Daughter explains that she has attempted to communicate with endocrinology however has not been successful with determining an antidiabetic regimen since hypoglycemic episodes  She is scheduled to follow-up with endocrinology at the end of the month   Continue Accu-Chek logs  Continue routine follow-up with diabetes educator               Cardiovascular and Mediastinum    Essential hypertension      /94    No  focal neurological deficits  Blood pressure remains uncontrolled   Will increase hydralazine to 50 mg q i d     Continue amlodipine 10 mg daily  Continue labetalol 200 mg at nighttime  Patient has been eating canned soups-discussed adhering to a low-sodium diet   Exercise program as able  Daughter to call the office with blood pressure logs in the next 2 weeks         Relevant Medications    hydrALAZINE (APRESOLINE) 50 mg tablet       Nervous and Auditory    Late onset 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 ([facility] ) - Primary      Overall cognitive symptoms remain stable   No overt changes in mood, personality or behavior   Reorientation redirection as needed  Manage chronic conditions  Maintain [place] precautions  Encourage patient to remain active mentally, physically and socially   Patient to participate in cognitively challenging exercises as able   Family has been instrumental in keeping the patient active overall            Genitourinary    BPH (benign prostatic hyperplasia)      Stable   Continue finasteride 5 mg daily  Follow-up with Urology as scheduled            Other    Other hyperlipidemia     Stable   Continue simvastatin 20 mg daily   Recommend adherence to a heart healthy diet         Acute blood loss anemia (ABLA)     No recurrent episodes of hematemesis   Hemoglobin remained stable at 13 7   Patient continues to follow with GI with decreased pantoprazole dosing to once daily         Ambulatory dysfunction     Patient continues to ambulate using an assistive device Will refer to physical therapy for gait training, balance and strengthening  Maintain [place] precautions         Chronic pain of both knees      Continue Tylenol as needed , topical analgesics  Will refer to physical therapy for continued gait training, balance and strengthening   Maintain [place] precautions  Follow-up with orthopedics as needed         Gait instability      Patient continues to ambulate using an assistive device   Maintain [place] precautions  Will refer to physical therapy for gait training, balance and strengthening         Relevant Orders    Ambulatory referral to Physical Therapy               Reason for visit is   Chief Complaint   Patient presents with    Virtual Regular Visit        Encounter provider Niraj Aragon MD    Provider located at [address] [place] [address]  [phone]      Recent Visits  Date Type Provider Dept   08/16/21 [address],  [facility] [address]   Showing recent visits within past 7 days and meeting all other requirements  Future Appointments  No visits were found meeting these conditions  Showing future appointments within next 150 days and meeting all other requirements       The patient was identified by name and date of birth   Worthy Graver was informed that this is a telemedicine visit and that the visit is being conducted through [facility] ([facility]) and patient was informed that this is a secure, HIPAA-compliant platform  He agrees to proceed     My office door was closed  No one else was in the room  He acknowledged consent and understanding of privacy and security of the video platform  The patient has agreed to participate and understands they can discontinue the visit at any time  Patient is aware this is a billable service  Oc Mcgovern is a [de-identified] y o  male  who presents for routine follow-up of acute and chronic conditions   HPI   This is a pleasant 25-year-old male with HTN, HLD, new onset diabetes, acute blood loss anemia, chronic gastric ulcer, BPH and gait instability amongst other medical conditions who presents for routine follow-up of acute and chronic conditions  The patient is accompanied by his daughter who is his primary caretaker  Today daughter explains that the patient recently traveled to [facility] to visit his daughter which was a very enjoyable experience for him  The patient remains in good spirits and did receive his COVID 19 vaccination  Daughter was somewhat frustrated today as she explains she was extremely concerned as the patient did have an episode of hypoglycemia with blood sugar going to the low 40s  She explained that family was very panicked and did administer the foods and drink in an effort to correct his hypoglycemia  She explains that she has attempted to contact endocrinology on several occasions to determine an antidiabetic regimen since the patient's hypoglycemic episode  She explains that messages returned were not helpful as the dosage of insulin recommended was what the patient was previously on and not a recommended decrease  She continues to see the diabetes educator with recommendations for adhering to a diabetic diet    Daughter admits that she has since discontinued all insulin therapies for the patient  Most recent HbA1c was 5 5  She states Accu-Chek fasting levels have been in the 90s to date  She is scheduled to follow with endocrinology in the next 2 weeks  The patient was also seen by GI for his history of a chronic ulcer and acute blood loss anemia  He has not had any recurrent hematemesis or rectal bleeding  Hemoglobin has normalized and patient denies any acute blood loss symptoms  or symptoms of anemia  PPI therapy was decreased to once daily from that visit  The patient does remain unsteady in his gait and uses an assistive device to ambulate  Daughter is requesting a referral to physical therapy which was ordered  Today  blood pressure was 188/94  Per daughter, the patient is compliant with his current antihypertensive regimen  We discussed his current medication regimen and would increase hydralazine to 50 mg q  6h   Given patient's vascular risk factors and history of dementia, goal blood pressure would be 687 systolic  Daughter to obtain  Blood pressure logs for the next 2 weeks and call the office for possible medication titration  No headaches, change in vision or focal neurological deficit  Of note, daughter explains that the patient does eat canned soups although she has been attempting to buy low-sodium cans  Recommended avoiding any canned foods which typically are higher in sodium  No cardiorespiratory distress, fever, chills, URI or urinary symptoms  The patient continues to tolerate oral intake, has been sleeping well and having regular bowel movements  No overt changes in mood, personality or behavior given patient's history of dementia  Family does attempt to keep the patient active mentally, physically and socially  The patient continues on allopurinol for a history of gout which remains controlled  He has been compliant with finasteride for a history of BPH with symptoms stable    He also continues to do well on simvastatin for a history of hyperlipidemia which remains controlled          Past Medical History:   Diagnosis Date    Anemia     BPH (benign prostatic hyperplasia)     Chronic kidney disease     [facility]     Diabetes mellitus ([facility] )     Hyperlipidemia     Hypertension     Incontinence     Osteoarthritis        Past Surgical History:   Procedure Laterality Date    COLON SURGERY      COLONOSCOPY      FL RETROGRADE PYELOGRAM  12/13/2019    LA CYSTOURETHRO W/IMPLANT N/A 12/13/2019    Procedure: CYSTOSCOPY WITH INSERTION UROLIFT, BILATERAL RETROGRADE PYELOGRAM;  Surgeon: Jennifer Nichols MD;  Location: [facility];  Service: Urology    TUMOR REMOVAL         Current Outpatient Medications   Medication Sig Dispense Refill    acetaminophen (TYLENOL) 500 mg tablet Take 500 mg by mouth every 6 (six) hours as needed for mild pain      allopurinol (ZYLOPRIM) 300 mg tablet take 1 tablet by mouth once daily 90 tablet 2    amLODIPine (NORVASC) 10 mg tablet take 1 tablet by mouth daily 90 tablet 2    B-D UF III MINI PEN NEEDLES 31G X 5 MM MISC use 1 PEN NEEDLE to inject MEDICATION subcutaneously four times a day      Contour Next Test test strip use to TEST BLOOD SUGAR four times a day 150 strip 3    Diclofenac Sodium (VOLTAREN) 1 % Apply 2 g topically 4 (four) times a day 1 Tube 2    finasteride (PROSCAR) 5 mg tablet take 1 tablet by mouth once daily 90 tablet 3    fluticasone (FLONASE) 50 mcg/act nasal spray 1 spray into each nostril daily 1 Bottle 3    hydrALAZINE (APRESOLINE) 50 mg tablet Take 1 tablet (50 mg total) by mouth 4 (four) times a day 120 tablet 2    Incontinence Supply Disposable (Incontinence Brief Large) MISC by Does not apply route 4 (four) times a day PLEASE SUPPLY EXTRA-LARGE  120 each 5    labetalol (NORMODYNE) 200 mg tablet Take 1 tablet (200 mg total) by mouth daily at bedtime 90 tablet 3    Lancet Devices (Microlet Next Lancing Device) MISC Use to check blood sugar daily 1 each 0    Microlet Lancets MISC use to TEST BLOOD SUGAR four times a day 150 each 3    Multiple Vitamins-Minerals (MULTIVITAMIN WITH MINERALS) tablet Take 1 tablet by mouth daily      omega-3-acid ethyl esters (LOVAZA) 1 g capsule Take 1 g by mouth daily      pantoprazole (PROTONIX) 40 mg tablet Take 1 tablet (40 mg total) by mouth daily 60 tablet 1    simvastatin (ZOCOR) 20 mg tablet take 1 tablet by mouth at bedtime 90 tablet 2    furosemide (LASIX) 40 mg tablet Take 1 tablet (40 mg total) by mouth daily (Patient not taking: Reported on 8/16/2021) 90 tablet 3    insulin glargine (LANTUS) 100 units/mL subcutaneous injection Inject 20 Units under the skin every morning (Patient not taking: Reported on 8/6/2021) 10 mL 0    insulin lispro (HumaLOG) 100 units/mL injection Inject 8 Units under the skin 2 (two) times a day with meals (Patient not taking: Reported on 8/6/2021) 10 mL 0    Insulin Pen Needle (Pen Needles) 32G X 5 MM MISC Use 1 each 4 (four) times a day (Patient not taking: Reported on 8/6/2021) 100 each 0    Lantus SoloStar 100 units/mL injection pen Inject 30 Units under the skin daily at bedtime (Patient not taking: Reported on 8/6/2021)       No current facility-administered medications for this visit  Allergies   Allergen Reactions    Strawberry C [Ascorbate - Food Allergy] Hives       Review of Systems   Unable to perform ROS: Dementia       Video Exam    Vitals:    08/16/21 1601   BP: (!) 188/94   Weight: 97 5 kg (215 lb)       Physical Exam  Constitutional:       General: He is not in acute distress  Appearance: Normal appearance  He is not ill-appearing or diaphoretic  Comments:  Elderly male patient sitting comfortably in chair in no obvious cardiorespiratory or painful distress   HENT:      Head: Normocephalic and atraumatic        Right Ear: External ear normal       Left Ear: External ear normal       Nose: Nose normal       Mouth/Throat:      Mouth: Mucous membranes are moist  Pharynx: No oropharyngeal exudate  Eyes:      General: No scleral icterus  Right eye: No discharge  Left eye: No discharge  Conjunctiva/sclera: Conjunctivae normal    Cardiovascular:      Rate and Rhythm: Normal rate  Pulmonary:      Effort: Pulmonary effort is normal  No respiratory distress  Abdominal:      General: There is no distension  Palpations: Abdomen is soft  Tenderness: There is no abdominal tenderness  Musculoskeletal:         General: Normal range of motion  Cervical back: Normal range of motion  Comments:  Trace lower extremity edema   Skin:     General: Skin is dry  Neurological:      General: No focal deficit present  Mental Status: He is alert  Mental status is at baseline  He is disoriented  Gait: Gait abnormal    Psychiatric:      Comments:  Pleasantly  Intermittently confused at baseline          I spent 45 minutes directly with the patient during this visit    VIRTUAL VISIT [address] verbally agrees to participate in [employer]  Pt is aware that [employer] could be limited without vital signs or the ability to perform a full hands-on physical Alissa Sly understands he or the provider may request at any time to terminate the video visit and request the patient to seek care or treatment in person

## 2021-08-17 NOTE — ASSESSMENT & PLAN NOTE
No recurrent episodes of hematemesis   Hemoglobin remained stable at 13 7   Patient continues to follow with GI with decreased pantoprazole dosing to once daily

## 2021-08-17 NOTE — ASSESSMENT & PLAN NOTE
Patient continues to ambulate using an assistive device   Maintain [place] precautions  Will refer to physical therapy for gait training, balance and strengthening

## 2021-08-17 NOTE — ASSESSMENT & PLAN NOTE
Overall cognitive symptoms remain stable   No overt changes in mood, personality or behavior   Reorientation redirection as needed  Manage chronic conditions  Maintain [place] precautions  Encourage patient to remain active mentally, physically and socially   Patient to participate in cognitively challenging exercises as able   Family has been instrumental in keeping the patient active overall

## 2021-08-17 NOTE — ASSESSMENT & PLAN NOTE
/94    No  focal neurological deficits  Blood pressure remains uncontrolled   Will increase hydralazine to 50 mg q i d     Continue amlodipine 10 mg daily  Continue labetalol 200 mg at nighttime  Patient has been eating canned soups-discussed adhering to a low-sodium diet   Exercise program as able  Daughter to call the office with blood pressure logs in the next 2 weeks

## 2021-08-17 NOTE — ASSESSMENT & PLAN NOTE
Patient continues to ambulate using an assistive device Will refer to physical therapy for gait training, balance and strengthening  Maintain [place] precautions

## 2021-08-17 NOTE — ASSESSMENT & PLAN NOTE
Lab Results   Component Value Date    HGBA1C 5 5 07/23/2021     Patient had been having episodes of hypoglycemia with blood sugars in the 45s per daughter  She explains that she has since discontinued all insulin and fasting blood sugars have remained in the 90s  Daughter explains that she has attempted to communicate with endocrinology however has not been successful with determining an antidiabetic regimen since hypoglycemic episodes  She is scheduled to follow-up with endocrinology at the end of the month   Continue Accu-Chek logs  Continue routine follow-up with diabetes educator

## 2021-08-17 NOTE — ASSESSMENT & PLAN NOTE
Continue Tylenol as needed , topical analgesics  Will refer to physical therapy for continued gait training, balance and strengthening   Maintain [place] precautions  Follow-up with orthopedics as needed

## 2021-08-23 DIAGNOSIS — E11.9 NEW ONSET TYPE 2 DIABETES MELLITUS (HCC): ICD-10-CM

## 2021-08-23 DIAGNOSIS — I10 ESSENTIAL HYPERTENSION: ICD-10-CM

## 2021-08-23 RX ORDER — INSULIN GLARGINE 100 [IU]/ML
20 INJECTION, SOLUTION SUBCUTANEOUS EVERY MORNING
Qty: 10 ML | Refills: 5 | Status: SHIPPED | OUTPATIENT
Start: 2021-08-23 | End: 2021-08-26

## 2021-08-23 RX ORDER — HYDRALAZINE HYDROCHLORIDE 50 MG/1
50 TABLET, FILM COATED ORAL 4 TIMES DAILY
Qty: 120 TABLET | Refills: 2 | Status: SHIPPED | OUTPATIENT
Start: 2021-08-23 | End: 2022-02-06

## 2021-08-26 ENCOUNTER — OFFICE VISIT (OUTPATIENT)
Dept: ENDOCRINOLOGY | Facility: CLINIC | Age: 81
End: 2021-08-26
Payer: COMMERCIAL

## 2021-08-26 VITALS
SYSTOLIC BLOOD PRESSURE: 142 MMHG | WEIGHT: 212 LBS | TEMPERATURE: 98.9 F | HEIGHT: 72 IN | BODY MASS INDEX: 28.71 KG/M2 | DIASTOLIC BLOOD PRESSURE: 90 MMHG | HEART RATE: 68 BPM

## 2021-08-26 DIAGNOSIS — E11.9 NEW ONSET TYPE 2 DIABETES MELLITUS (HCC): Primary | ICD-10-CM

## 2021-08-26 DIAGNOSIS — E78.49 OTHER HYPERLIPIDEMIA: ICD-10-CM

## 2021-08-26 DIAGNOSIS — I10 ESSENTIAL HYPERTENSION: ICD-10-CM

## 2021-08-26 PROCEDURE — 99214 OFFICE O/P EST MOD 30 MIN: CPT | Performed by: PHYSICIAN ASSISTANT

## 2021-08-26 NOTE — PROGRESS NOTES
Patient Progress Note      CC: DM   Patient is here with his daughter  Referring Provider  No referring provider defined for this encounter  History of Present Illness:   Montserrat Oliver is a [de-identified] y o  male with a history of type 2 diabetes with long term use of insulin  Diabetes course has been stable  Complications of DM: HHS  Patient was hospitalized in May 2021 at which time he was diagnosed with HHS and newly uncontrolled DM  His NILO, islet cell antibody were negative and C-peptide was normal  Denies recent severe hypoglycemic or severe hyperglycemic episodes  Denies any issues with his current regimen  Home glucose monitoring: are performed sporadically    Home blood glucose readings:  mg/dl    Current regimen:  Lantus 20 units daily, Humalog 8 units twice a day with meals (not taking)  Daughter had stopped insulin about a month ago due to low BG levels  Hypoglycemic episodes: No, rare  H/o of hypoglycemia causing hospitalization or Intervention such as glucagon injection  or ambulance call :  No  Hypoglycemia symptoms: sweating  Treatment of hypoglycemia: discussed treatment     Medic alert tag: recommended: Yes    Diabetes education: Yes  Diet: 3 meals per day, 1-2 snacks per day  Timing of meals is predictable  Diabetic diet compliance:  compliant most of the time  Activity: Daily activity is predictable: Yes  No routine exercise   Ophthamology: UTD per patients daughter  Podiatry: overdue     Not on ACE inhibitor/ARB  Has hyperlipidemia: on statin - tolerating well, no myalgias  compliant most of the time, denies any side effects from medications    Thyroid disorders: No  History of pancreatitis: No    Patient Active Problem List   Diagnosis    Dyspnea on exertion    Essential hypertension    Pyelonephritis    Other hyperlipidemia    [facility]LE [facility] injury) [facility]    Hematuria    Metabolic acidosis    Acute blood loss anemia (ABLA)    Azotemia    Ambulatory dysfunction    BPH (benign prostatic hyperplasia)    Abdominal distention    Chronic pain of both knees    Stool incontinence    Chronic kidney disease    Urinary retention    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)    Snoring    Allergic rhinitis    Mixed stress and urge urinary incontinence    Bilateral leg edema    Ingrown toenail    Need for influenza vaccination    Osteoarthritis of knee    Vision changes    Acute metabolic encephalopathy    [facility]osm[facility]cemic state (HHS) [facility] [facility]    [facility]    Aspiration into airway    Chronic gastric ulcer without hemorrhage and without perforation    Gait instability      Past Medical History:   Diagnosis Date    Anemia     BPH (benign prostatic hyperplasia)     Chronic kidney disease     Dementia ([facility]     Diabetes mellitus [facility]     Hyperlipidemia     Hypertension     Incontinence     Osteoarthritis       Past Surgical History:   Procedure Laterality Date    COLON SURGERY      COLONOSCOPY      FL RETROGRADE PYELOGRAM  12/13/2019    CT CYSTOURETHRO W/IMPLANT N/A 12/13/2019    Procedure: CYSTOSCOPY WITH INSERTION UROLIFT, BILATERAL RETROGRADE PYELOGRAM;  Surgeon: Jing Ceja MD;  Location: [facility];  Service: Urology    TUMOR REMOVAL        Family History   Problem Relation Age of Onset    Heart failure Brother     Dementia Maternal Uncle      Social History     Tobacco Use    Smoking status: Passive Smoke Exposure - Never Smoker    Smokeless tobacco: Never Used   Substance Use Topics    Alcohol use: Never     Allergies   Allergen Reactions    Strawberry C [Ascorbate - Food Allergy] Hives         Current Outpatient Medications:     allopurinol (ZYLOPRIM) 300 mg tablet, take 1 tablet by mouth once daily, Disp: 90 tablet, Rfl: 2    amLODIPine (NORVASC) 10 mg tablet, take 1 tablet by mouth daily, Disp: 90 tablet, Rfl: 2    B-D UF III MINI PEN NEEDLES 31G X 5 MM MISC, use 1 PEN NEEDLE to inject MEDICATION subcutaneously four times a day, Disp: , Rfl:     Contour Next Test test strip, use to TEST BLOOD SUGAR four times a day, Disp: 150 strip, Rfl: 3    Diclofenac Sodium (VOLTAREN) 1 %, Apply 2 g topically 4 (four) times a day, Disp: 1 Tube, Rfl: 2    finasteride (PROSCAR) 5 mg tablet, take 1 tablet by mouth once daily, Disp: 90 tablet, Rfl: 3    fluticasone (FLONASE) 50 mcg/act nasal spray, 1 spray into each nostril daily, Disp: 1 Bottle, Rfl: 3    hydrALAZINE (APRESOLINE) 50 mg tablet, Take 1 tablet (50 mg total) by mouth 4 (four) times a day, Disp: 120 tablet, Rfl: 2    Incontinence Supply Disposable (Incontinence Brief Large) MISC, by Does not apply route 4 (four) times a day PLEASE SUPPLY EXTRA-LARGE , Disp: 120 each, Rfl: 5    Insulin Pen Needle (Pen Needles) 32G X 5 MM MISC, Use 1 each 4 (four) times a day, Disp: 100 each, Rfl: 0    labetalol (NORMODYNE) 200 mg tablet, Take 1 tablet (200 mg total) by mouth daily at bedtime, Disp: 90 tablet, Rfl: 3    Lancet Devices (Microlet Next Lancing Device) MISC, Use to check blood sugar daily, Disp: 1 each, Rfl: 0    Microlet Lancets MISC, use to TEST BLOOD SUGAR four times a day, Disp: 150 each, Rfl: 3    Multiple Vitamins-Minerals (MULTIVITAMIN WITH MINERALS) tablet, Take 1 tablet by mouth daily, Disp: , Rfl:     omega-3-acid ethyl esters (LOVAZA) 1 g capsule, Take 1 g by mouth daily, Disp: , Rfl:     pantoprazole (PROTONIX) 40 mg tablet, Take 1 tablet (40 mg total) by mouth daily, Disp: 60 tablet, Rfl: 1    simvastatin (ZOCOR) 20 mg tablet, take 1 tablet by mouth at bedtime, Disp: 90 tablet, Rfl: 2    acetaminophen (TYLENOL) 500 mg tablet, Take 500 mg by mouth every 6 (six) hours as needed for mild pain (Patient not taking: Reported on 8/26/2021), Disp: , Rfl:     furosemide (LASIX) 40 mg tablet, Take 1 tablet (40 mg total) by mouth daily (Patient not taking: Reported on 8/16/2021), Disp: 90 tablet, Rfl: 3    Lantus SoloStar 100 units/mL injection pen, Inject 30 Units under the skin daily at bedtime (Patient not taking: Reported on 8/6/2021), Disp: , Rfl:   Review of Systems   Constitutional: Negative for activity change, appetite change, fatigue and unexpected weight change  HENT: Negative for trouble swallowing  Eyes: Negative for visual disturbance  Respiratory: Negative for shortness of breath  Cardiovascular: Negative for chest pain and palpitations  Gastrointestinal: Negative for constipation and diarrhea  Endocrine: Negative  Musculoskeletal: Negative  Skin: Negative for wound  Neurological:        Dementia   Psychiatric/Behavioral: Negative  Physical Exam:  Body mass index is 28 75 kg/m²  /90   Pulse 68   Temp 98 9 °F (37 2 °C) (Tympanic)   Ht 6' (1 829 m)   Wt 96 2 kg (212 lb)   BMI 28 75 kg/m²    Wt Readings from Last 3 Encounters:   08/26/21 96 2 kg (212 lb)   08/16/21 97 5 kg (215 lb)   08/06/21 97 1 kg (214 lb)       Physical Exam  Vitals and nursing note reviewed  Constitutional:       Appearance: He is well-developed  HENT:      Head: Normocephalic  Eyes:      General: No scleral icterus  Pupils: Pupils are equal, round, and reactive to light  Neck:      Thyroid: No thyromegaly  Cardiovascular:      Rate and Rhythm: Normal rate and regular rhythm  Pulses:           Radial pulses are 2+ on the right side and 2+ on the left side  Heart sounds: No murmur heard  Pulmonary:      Effort: Pulmonary effort is normal  No respiratory distress  Breath sounds: Normal breath sounds  No wheezing  Musculoskeletal:      Cervical back: Neck supple  Skin:     General: Skin is warm and dry  Neurological:      Mental Status: He is alert  Patient's shoes and socks were not removed        Labs:   Component      Latest Ref Rng & Units 9/5/2020 5/9/2021 7/23/2021   Sodium      136 - 145 mmol/L 143  137   Potassium      3 5 - 5 3 mmol/L 3 9  3 8 Chloride      100 - 108 mmol/L 114 (H)  108   CO2      21 - 32 mmol/L 22  23   Anion Gap      4 - 13 mmol/L 7  6   BUN      5 - 25 mg/dL 21  24   Creatinine      0 60 - 1 30 mg/dL 1 90 (H)  2 12 (H)   GLUCOSE FASTING      65 - 99 mg/dL 122 (H)  86   Calcium      8 3 - 10 1 mg/dL 8 9  9 1   AST      5 - 45 U/L 11     ALT      12 - 78 U/L 22     Alkaline Phosphatase      46 - 116 U/L 83     Total Protein      6 4 - 8 2 g/dL 7 6     Albumin      3 5 - 5 0 g/dL 3 6     TOTAL BILIRUBIN      0 20 - 1 00 mg/dL 0 59     eGFR      ml/min/1 73sq m 38  33   Cholesterol      50 - 200 mg/dL 108     Triglycerides      <=150 mg/dL 109     HDL      >=40 mg/dL 35 (L)     LDL Calculated      0 - 100 mg/dL 51     Non-HDL Cholesterol      mg/dl 73     EXT Creatinine Urine      mg/dL   111 0   MICROALBUM ,U,RANDOM      0 0 - 20 0 mg/L   29 2 (H)   MICROALBUMIN/CREATININE RATIO      0 - 30 mg/g creatinine   26   Hemoglobin A1C      Normal 3 8-5 6%; PreDiabetic 5 7-6 4%; Diabetic >=6 5%; Glycemic control for adults with diabetes <7 0% %  12 3 (H) 5 5   eAG, EST AVG Glucose      mg/dl  306 111   TSH 3RD GENERATON      0 358 - 3 740 uIU/mL 1 800     C-PEPTIDE      1 1 - 4 4 ng/mL   3 0   Glutaminc Acid Decarboxylase 65 Ab      0 0 - 5 0 U/mL   <5 0   ISLET CELL ANTIBODY      Neg:<1:1   Negative     Plan:    Diagnoses and all orders for this visit:    New onset type 2 diabetes mellitus ([facility] )  HGA1C 5 5%  Improved  Treatment regimen: continue to monitor BG levels 1-2 times a day  Continue with dietary modifications  All insulin is stopped at this time  Discussed intensive insulin regimen does increase risk for hypoglycemia  Episodes of hypoglycemia can lead to permanent disability and death  Discussed risks/complications associated with uncontrolled diabetes  Advised to adhere to diabetic diet, and recommended staying active/exercising routinely  Keep carbohydrates consistent to limit blood glucose fluctuations    Advised to call if blood sugars less than 70 mg/dl or over 200 mg/dl  Check blood glucose 1-2 times a day  Discussed symptoms and treatment of hypoglycemia  Recommended routine follow-up with podiatry and ophthalmology  Ordered blood work to complete prior to next visit  -     Hemoglobin A1C; Future  -     Basic metabolic panel; Future    Essential hypertension  Blood pressure elevated  Advised to monitor BP at home and follow-up with PCP if remaining elevated  For now continue current treatment   -     Basic metabolic panel; Future    Other hyperlipidemia  LDL previously 51  Continue statin therapy         Discussed with the patient diagnosis and treatment and all questions fully answered  He will call me if any problems arise  Counseled patient on diagnostic results, prognosis, risk and benefit of treatment options, instruction for management, importance of treatment compliance, risk factor reduction and impressions        Kerri Madrigal PA-C

## 2021-08-26 NOTE — PATIENT INSTRUCTIONS
Hypoglycemia instructions   Idania Lopez  8/26/2021  [id number]    Low Blood Sugar    Steps to treat low blood sugar  1  Test blood sugar if you have symptoms of low blood sugar:   Low Blood Sugar Symptoms:  o Sweaty  o Dizzy  o Rapid heartbeat  o Shaky  o Bad mood  o Hungry      2  Treat blood sugar less than 70 with 15 grams of fast-acting carbohydrate:   Examples of 15 grams Fast-Acting Carbohydrate:  o 4 oz juice  o 4 oz regular soda  o 3-4 glucose tablets (chew)  o 3-4 hard candies (chew)          3  Wait 15 minutes and test your blood sugar again     4   If blood sugar is less than 100, repeat steps 2-3     5  When your blood sugar is 100 or more, eat a snack if it will be longer than one hour until your next meal  The snack should be 15 grams of carbohydrate and a protein:   Examples of snacks:  o ½ sandwich  o 6 crackers with cheese  o Piece of fruit with cheese or peanut butter  o 6 crackers with peanut butter

## 2021-09-23 ENCOUNTER — EVALUATION (OUTPATIENT)
Dept: PHYSICAL THERAPY | Facility: CLINIC | Age: 81
End: 2021-09-23
Payer: COMMERCIAL

## 2021-09-23 DIAGNOSIS — R26.81 GAIT INSTABILITY: Primary | ICD-10-CM

## 2021-09-23 PROCEDURE — 97162 PT EVAL MOD COMPLEX 30 MIN: CPT | Performed by: PHYSICAL THERAPIST

## 2021-09-23 NOTE — PROGRESS NOTES
PT Evaluation     Today's date: 2021  Patient name: Gabby Melton  : 1940  MRN: [id number]  Referring provider: Mariah Jensen MD  Dx:   Encounter Diagnosis     ICD-10-CM    1  Gait instability  R26 [id number] Ambulatory referral to Physical Therapy       Start Time:   Stop Time: 8007  Total time in clinic (min): 60 minutes    Assessment  Assessment details: Gabby Melton is a [de-identified] y o  male who presents with pain, decreased strength, decreased ROM, decreased joint mobility, ambulatory dysfunction and postural  dysfunction  Due to these impairments, Patient has difficulty performing a/iadls, recreational activities and engaging in social activities  Patient's clinical presentation is consistent with their referring diagnosis of generalized deconditioning  Patient would benefit from skilled physical therapy to address their aforementioned impairments, improve their level of function and to improve their overall quality of life  Impairments: abnormal gait, abnormal muscle firing, abnormal or restricted ROM, abnormal movement, activity intolerance, impaired physical strength, lacks appropriate home exercise program, pain with function, weight-bearing intolerance, poor posture  and poor body mechanics  Understanding of Dx/Px/POC: good   Prognosis: good    Goals  Short Term Goals: to be achieved by 4 weeks  1) Patient to be independent with basic HEP  2) Decrease pain to 4/10 at its worst   3) Increase LE strength by 1/2 MMT grade in all deficient planes  4) Patient to report decreased sleep interruption secondary to pain  5) Increase ambulatory tolerance by 4 min  Long Term Goals: to be achieved by discharge  1) FOTO equal to or greater than 53   2) Patient to be independent with comprehensive HEP  3) Abolish pain for improved quality of life  4) Lumbar spine ROM WNL all planes to improve a/iadls  5) Increase LE strength to 5/5 MMT grade in all planes to improve a/iadls    6) Patient to report no sleep interruption secondary to pain  7) Increase ambulatory tolerance to 6 min  8) Increase Five Times Sit to Stand Test and TUG by 3 seconds  Plan  Patient would benefit from: skilled PT  Planned modality interventions: biofeedback, cryotherapy, TENS, thermotherapy: hydrocollator packs, unattended electrical stimulation and low level laser therapy  Planned therapy interventions: abdominal trunk stabilization, activity modification, ADL retraining, ADL training, behavior modification, body mechanics training, breathing training, functional ROM exercises, home exercise program, IADL retraining, joint mobilization, manual therapy, massage, motor coordination training, neuromuscular re-education, patient education, postural training, self care, strengthening, stretching, therapeutic activities, therapeutic exercise and transfer training  Frequency: 2-3x week  Duration in weeks: 12  Plan of Care beginning date: 2021  Plan of Care expiration date: 2021  Treatment plan discussed with: patient and family (Patient's daughter present for duration of IE )        Subjective Evaluation    History of Present Illness  Mechanism of injury: Patient presents with PMH significant for Alzheimers  Majority of subjective history reported by daughter  Patient's daughter reports that he has presented with a 1 5 year h/o of b/l knee pain and weakness  Patient has limited standing tolerance (< 5 minutes)  Patient ambulates with a SPC and tends to walk with a flexed posture  Patient refuses to use a RW  Patient has used Voltaren  Patient generally is sedentary  Patient has generalized deconditioning/weakness  Patient is independent with a/iadls    Pain  Current pain rating: 3  At best pain ratin  At worst pain ratin  Location: bilateral knee  Quality: dull ache      Diagnostic Tests  X-ray: abnormal (Knee (bilateral): "Tricompartmental osteoarthritis with severe narrowing of the medial tibiofemoral joint space and osteophytes seen in all 3 compartments  ")  Treatments  Current treatment: physical therapy  Patient Goals  Patient goals for therapy: decreased pain, improved balance, return to sport/leisure activities, independence with ADLs/IADLs and increased strength          Objective     Active Range of Motion   Left Knee   Flexion: 100 degrees   Extension: 5 degrees     Right Knee   Flexion: 100 degrees   Extension: 5 degrees     Passive Range of Motion   Left Knee   Flexion: 105 degrees   Extension: 5 degrees     Right Knee   Flexion: 105 degrees   Extension: 5 degrees     Mobility   Patellar Mobility:   Left Knee   Hypomobile: left medial, left lateral, left superior and left inferior    Right Knee   Hypomobile: medial, lateral, superior and inferior     Strength/Myotome Testing     Left Hip   Planes of Motion   Flexion: 5  Extension: 4+  Abduction: 4    Right Hip   Planes of Motion   Flexion: 5  Extension: 4+  Abduction: 4    Left Knee   Flexion: 5 (+) crepitus  Extension: 5 (+) crepitus    Right Knee   Flexion: 5  Extension: 5    Left Ankle/Foot   Dorsiflexion: 5    Right Ankle/Foot   Dorsiflexion: 5    Ambulation     Ambulation: Level Surfaces   Ambulation without assistive device: independent    Observational Gait     Additional Observational Gait Details  Increased trunk flexion, bilateral genu valgum    Functional Assessment      Squat    Left within functional limits and right within functional limits  Comments  Timed Up and Go (TUG): 14 89 sec, Independent ambulation, Mod I sit to/from stand  Five Times Sit Stand Test: 15 09 sec, Mod I with b/l arm rests, good eccentric control  6 Minute Walk Test: 1 min 58 sec ; 192 ft               Precautions: Alzheimers, fall risk, uncontrolled HTN, DM      Manuals 9/23            Check BP NV                                                   Neuro Re-Ed             Webslide: low rows Ther Ex             Nustep             Standing hip abd, ext with TB             LAQ             Supine SLR             Prone quad str                                                      Ther Activity             Sit to stands             Total gym: squats             [employer]             [facility]                                       Gait Training                                       Modalities

## 2021-09-29 ENCOUNTER — OFFICE VISIT (OUTPATIENT)
Dept: PHYSICAL THERAPY | Facility: CLINIC | Age: 81
End: 2021-09-29
Payer: COMMERCIAL

## 2021-09-29 DIAGNOSIS — R26.81 GAIT INSTABILITY: Primary | ICD-10-CM

## 2021-09-29 PROCEDURE — 97112 NEUROMUSCULAR REEDUCATION: CPT | Performed by: PHYSICAL THERAPIST

## 2021-09-29 PROCEDURE — 97110 THERAPEUTIC EXERCISES: CPT | Performed by: PHYSICAL THERAPIST

## 2021-09-29 PROCEDURE — 97530 THERAPEUTIC ACTIVITIES: CPT | Performed by: PHYSICAL THERAPIST

## 2021-10-06 ENCOUNTER — APPOINTMENT (OUTPATIENT)
Dept: PHYSICAL THERAPY | Facility: CLINIC | Age: 81
End: 2021-10-06
Payer: COMMERCIAL

## 2021-10-08 ENCOUNTER — OFFICE VISIT (OUTPATIENT)
Dept: PHYSICAL THERAPY | Facility: CLINIC | Age: 81
End: 2021-10-08
Payer: COMMERCIAL

## 2021-10-08 DIAGNOSIS — R26.81 GAIT INSTABILITY: Primary | ICD-10-CM

## 2021-10-08 PROCEDURE — 97530 THERAPEUTIC ACTIVITIES: CPT | Performed by: PHYSICAL THERAPIST

## 2021-10-08 PROCEDURE — 97112 NEUROMUSCULAR REEDUCATION: CPT | Performed by: PHYSICAL THERAPIST

## 2021-10-08 PROCEDURE — 97110 THERAPEUTIC EXERCISES: CPT | Performed by: PHYSICAL THERAPIST

## 2021-10-14 ENCOUNTER — OFFICE VISIT (OUTPATIENT)
Dept: PHYSICAL THERAPY | Facility: CLINIC | Age: 81
End: 2021-10-14
Payer: COMMERCIAL

## 2021-10-14 DIAGNOSIS — R26.81 GAIT INSTABILITY: Primary | ICD-10-CM

## 2021-10-14 PROCEDURE — 97110 THERAPEUTIC EXERCISES: CPT | Performed by: PHYSICAL THERAPIST

## 2021-10-14 PROCEDURE — 97530 THERAPEUTIC ACTIVITIES: CPT | Performed by: PHYSICAL THERAPIST

## 2021-10-18 ENCOUNTER — APPOINTMENT (OUTPATIENT)
Dept: PHYSICAL THERAPY | Facility: CLINIC | Age: 81
End: 2021-10-18
Payer: COMMERCIAL

## 2021-10-20 ENCOUNTER — OFFICE VISIT (OUTPATIENT)
Dept: PHYSICAL THERAPY | Facility: CLINIC | Age: 81
End: 2021-10-20
Payer: COMMERCIAL

## 2021-10-20 VITALS — SYSTOLIC BLOOD PRESSURE: 174 MMHG | DIASTOLIC BLOOD PRESSURE: 95 MMHG

## 2021-10-20 DIAGNOSIS — R26.81 GAIT INSTABILITY: Primary | ICD-10-CM

## 2021-10-20 PROCEDURE — 97530 THERAPEUTIC ACTIVITIES: CPT | Performed by: PHYSICAL THERAPIST

## 2021-10-20 PROCEDURE — 97110 THERAPEUTIC EXERCISES: CPT | Performed by: PHYSICAL THERAPIST

## 2021-12-04 DIAGNOSIS — M1A.09X0 CHRONIC GOUT OF MULTIPLE SITES, UNSPECIFIED CAUSE: ICD-10-CM

## 2021-12-04 RX ORDER — ALLOPURINOL 300 MG/1
TABLET ORAL
Qty: 90 TABLET | Refills: 2 | Status: SHIPPED | OUTPATIENT
Start: 2021-12-04

## 2021-12-11 DIAGNOSIS — I10 ESSENTIAL HYPERTENSION: ICD-10-CM

## 2021-12-12 RX ORDER — AMLODIPINE BESYLATE 10 MG/1
TABLET ORAL
Qty: 90 TABLET | Refills: 2 | Status: SHIPPED | OUTPATIENT
Start: 2021-12-12

## 2021-12-28 ENCOUNTER — OFFICE VISIT (OUTPATIENT)
Dept: GERIATRICS | Age: 81
End: 2021-12-28
Payer: COMMERCIAL

## 2021-12-28 VITALS
HEART RATE: 98 BPM | WEIGHT: 217.2 LBS | HEIGHT: 69 IN | SYSTOLIC BLOOD PRESSURE: 152 MMHG | DIASTOLIC BLOOD PRESSURE: 86 MMHG | RESPIRATION RATE: 18 BRPM | OXYGEN SATURATION: 96 % | BODY MASS INDEX: 32.17 KG/M2 | TEMPERATURE: 97.5 F

## 2021-12-28 DIAGNOSIS — R60.0 BILATERAL LEG EDEMA: ICD-10-CM

## 2021-12-28 DIAGNOSIS — N40.0 BENIGN PROSTATIC HYPERPLASIA WITHOUT LOWER URINARY TRACT SYMPTOMS: ICD-10-CM

## 2021-12-28 DIAGNOSIS — I10 ESSENTIAL HYPERTENSION: ICD-10-CM

## 2021-12-28 DIAGNOSIS — Z23 INFLUENZA VACCINE ADMINISTERED: ICD-10-CM

## 2021-12-28 DIAGNOSIS — R06.83 SNORING: ICD-10-CM

## 2021-12-28 DIAGNOSIS — G30.1 LATE ONSET ALZHEIMER'S DISEASE WITHOUT BEHAVIORAL DISTURBANCE (HCC): Primary | ICD-10-CM

## 2021-12-28 DIAGNOSIS — N18.32 STAGE 3B CHRONIC KIDNEY DISEASE (HCC): ICD-10-CM

## 2021-12-28 DIAGNOSIS — Z23 NEED FOR INFLUENZA VACCINATION: ICD-10-CM

## 2021-12-28 DIAGNOSIS — F02.80 LATE ONSET ALZHEIMER'S DISEASE WITHOUT BEHAVIORAL DISTURBANCE (HCC): Primary | ICD-10-CM

## 2021-12-28 DIAGNOSIS — R44.3 HALLUCINATIONS: ICD-10-CM

## 2021-12-28 DIAGNOSIS — E55.9 VITAMIN D DEFICIENCY: ICD-10-CM

## 2021-12-28 DIAGNOSIS — R06.00 DYSPNEA ON EXERTION: ICD-10-CM

## 2021-12-28 DIAGNOSIS — E78.49 OTHER HYPERLIPIDEMIA: ICD-10-CM

## 2021-12-28 DIAGNOSIS — R26.2 AMBULATORY DYSFUNCTION: ICD-10-CM

## 2021-12-28 DIAGNOSIS — R26.81 GAIT INSTABILITY: ICD-10-CM

## 2021-12-28 DIAGNOSIS — E11.9 NEW ONSET TYPE 2 DIABETES MELLITUS (HCC): ICD-10-CM

## 2021-12-28 LAB
SL AMB  POCT GLUCOSE, UA: NORMAL
SL AMB LEUKOCYTE ESTERASE,UA: NORMAL
SL AMB POCT BILIRUBIN,UA: NORMAL
SL AMB POCT BLOOD,UA: NORMAL
SL AMB POCT CLARITY,UA: CLEAR
SL AMB POCT COLOR,UA: YELLOW
SL AMB POCT KETONES,UA: NORMAL
SL AMB POCT NITRITE,UA: NORMAL
SL AMB POCT PH,UA: 5
SL AMB POCT SPECIFIC GRAVITY,UA: 1.02
SL AMB POCT URINE PROTEIN: NORMAL
SL AMB POCT UROBILINOGEN: 0.2

## 2021-12-28 PROCEDURE — 99214 OFFICE O/P EST MOD 30 MIN: CPT | Performed by: STUDENT IN AN ORGANIZED HEALTH CARE EDUCATION/TRAINING PROGRAM

## 2021-12-28 PROCEDURE — G0008 ADMIN INFLUENZA VIRUS VAC: HCPCS | Performed by: STUDENT IN AN ORGANIZED HEALTH CARE EDUCATION/TRAINING PROGRAM

## 2021-12-28 PROCEDURE — 81002 URINALYSIS NONAUTO W/O SCOPE: CPT | Performed by: STUDENT IN AN ORGANIZED HEALTH CARE EDUCATION/TRAINING PROGRAM

## 2021-12-28 PROCEDURE — 90662 IIV NO PRSV INCREASED AG IM: CPT | Performed by: STUDENT IN AN ORGANIZED HEALTH CARE EDUCATION/TRAINING PROGRAM

## 2022-02-09 DIAGNOSIS — I10 ESSENTIAL HYPERTENSION: ICD-10-CM

## 2022-02-09 RX ORDER — LABETALOL 200 MG/1
TABLET, FILM COATED ORAL
Qty: 90 TABLET | Refills: 3 | Status: SHIPPED | OUTPATIENT
Start: 2022-02-09

## 2022-02-17 ENCOUNTER — OFFICE VISIT (OUTPATIENT)
Dept: CARDIOLOGY CLINIC | Facility: CLINIC | Age: 82
End: 2022-02-17
Payer: COMMERCIAL

## 2022-02-17 VITALS
OXYGEN SATURATION: 98 % | HEART RATE: 82 BPM | BODY MASS INDEX: 32.73 KG/M2 | SYSTOLIC BLOOD PRESSURE: 128 MMHG | DIASTOLIC BLOOD PRESSURE: 64 MMHG | WEIGHT: 221 LBS | HEIGHT: 69 IN

## 2022-02-17 DIAGNOSIS — R06.00 DYSPNEA ON EXERTION: Primary | ICD-10-CM

## 2022-02-17 DIAGNOSIS — I10 ESSENTIAL HYPERTENSION: ICD-10-CM

## 2022-02-17 DIAGNOSIS — E78.2 MIXED HYPERLIPIDEMIA: ICD-10-CM

## 2022-02-17 DIAGNOSIS — I10 PRIMARY HYPERTENSION: ICD-10-CM

## 2022-02-17 PROCEDURE — 93000 ELECTROCARDIOGRAM COMPLETE: CPT | Performed by: INTERNAL MEDICINE

## 2022-02-17 PROCEDURE — 99214 OFFICE O/P EST MOD 30 MIN: CPT | Performed by: INTERNAL MEDICINE

## 2022-02-17 RX ORDER — HYDRALAZINE HYDROCHLORIDE 50 MG/1
75 TABLET, FILM COATED ORAL 3 TIMES DAILY
Qty: 360 TABLET | Refills: 2
Start: 2022-02-17 | End: 2022-06-03 | Stop reason: SDUPTHER

## 2022-04-07 ENCOUNTER — HOSPITAL ENCOUNTER (OUTPATIENT)
Dept: NON INVASIVE DIAGNOSTICS | Facility: CLINIC | Age: 82
Discharge: HOME | End: 2022-04-07
Payer: COMMERCIAL

## 2022-04-07 VITALS
BODY MASS INDEX: 32.73 KG/M2 | HEART RATE: 82 BPM | WEIGHT: 221 LBS | DIASTOLIC BLOOD PRESSURE: 64 MMHG | SYSTOLIC BLOOD PRESSURE: 128 MMHG | HEIGHT: 69 IN

## 2022-04-07 DIAGNOSIS — R06.00 DYSPNEA ON EXERTION: ICD-10-CM

## 2022-04-07 DIAGNOSIS — I10 PRIMARY HYPERTENSION: ICD-10-CM

## 2022-04-07 LAB
AORTIC ROOT: 3.3 CM
AORTIC VALVE MEAN VELOCITY: 11.9 M/S
APICAL FOUR CHAMBER EJECTION FRACTION: 63 %
ASCENDING AORTA: 2.7 CM (ref 2.15–3.21)
AV LVOT MEAN GRADIENT: 1 MMHG
AV LVOT PEAK GRADIENT: 3 MMHG
AV MEAN GRADIENT: 6 MMHG
AV PEAK GRADIENT: 11 MMHG
AV REGURGITATION PRESSURE HALF TIME: 421 MS
AV VELOCITY RATIO: 0.49
DOP CALC AO PEAK VEL: 1.69 M/S
DOP CALC AO VTI: 34.75 CM
DOP CALC LVOT PEAK VEL VTI: 14.83 CM
DOP CALC LVOT PEAK VEL: 0.83 M/S
E WAVE DECELERATION TIME: 166 MS
FRACTIONAL SHORTENING: 36 % (ref 28–44)
INTERVENTRICULAR SEPTUM IN DIASTOLE (PARASTERNAL SHORT AXIS VIEW): 1.2 CM
INTERVENTRICULAR SEPTUM: 1.2 CM (ref 0.56–1.04)
LAAS-AP2: 33.3 CM2
LAAS-AP4: 32 CM2
LEFT ATRIUM SIZE: 5.6 CM
LEFT INTERNAL DIMENSION IN SYSTOLE: 3.5 CM (ref 3.95–5.98)
LEFT VENTRICULAR INTERNAL DIMENSION IN DIASTOLE: 5.5 CM (ref 6.57–9.78)
LEFT VENTRICULAR POSTERIOR WALL IN END DIASTOLE: 1.3 CM (ref 0.54–1.03)
LEFT VENTRICULAR STROKE VOLUME: 95 ML
LVSV (TEICH): 95 ML
MV E'TISSUE VEL-SEP: 9 CM/S
MV PEAK A VEL: 0.87 M/S
MV PEAK E VEL: 73 CM/S
MV STENOSIS PRESSURE HALF TIME: 48 MS
MV VALVE AREA P 1/2 METHOD: 4.58 CM2
RIGHT ATRIUM AREA SYSTOLE A4C: 16.8 CM2
RIGHT VENTRICLE ID DIMENSION: 3.3 CM
SL CV AV DECELERATION TIME RETROGRADE: 1450 MS
SL CV AV PEAK GRADIENT RETROGRADE: 102 MMHG
SL CV LEFT ATRIUM LENGTH A2C: 7.1 CM
SL CV LV EF: 55
SL CV PED ECHO LEFT VENTRICLE DIASTOLIC VOLUME (MOD BIPLANE) 2D: 147 ML
SL CV PED ECHO LEFT VENTRICLE SYSTOLIC VOLUME (MOD BIPLANE) 2D: 52 ML
Z-SCORE OF ASCENDING AORTA: 0.08
Z-SCORE OF INTERVENTRICULAR SEPTUM IN END DIASTOLE: 3.21
Z-SCORE OF LEFT VENTRICULAR DIMENSION IN END DIASTOLE: -3.76
Z-SCORE OF LEFT VENTRICULAR DIMENSION IN END SYSTOLE: -2.59
Z-SCORE OF LEFT VENTRICULAR POSTERIOR WALL IN END DIASTOLE: 4.13

## 2022-04-07 PROCEDURE — 93306 TTE W/DOPPLER COMPLETE: CPT

## 2022-04-07 PROCEDURE — 93306 TTE W/DOPPLER COMPLETE: CPT | Performed by: INTERNAL MEDICINE

## 2022-04-11 ENCOUNTER — TELEPHONE (OUTPATIENT)
Dept: CARDIOLOGY CLINIC | Facility: CLINIC | Age: 82
End: 2022-04-11

## 2022-04-29 ENCOUNTER — OFFICE VISIT (OUTPATIENT)
Dept: GERIATRICS | Age: 82
End: 2022-04-29
Payer: COMMERCIAL

## 2022-04-29 VITALS
WEIGHT: 220 LBS | DIASTOLIC BLOOD PRESSURE: 72 MMHG | BODY MASS INDEX: 31.5 KG/M2 | OXYGEN SATURATION: 95 % | HEIGHT: 70 IN | TEMPERATURE: 97.8 F | SYSTOLIC BLOOD PRESSURE: 118 MMHG | HEART RATE: 86 BPM

## 2022-04-29 DIAGNOSIS — E11.9 NEW ONSET TYPE 2 DIABETES MELLITUS (HCC): ICD-10-CM

## 2022-04-29 DIAGNOSIS — N39.46 MIXED STRESS AND URGE URINARY INCONTINENCE: ICD-10-CM

## 2022-04-29 DIAGNOSIS — F03.90 DEMENTIA WITHOUT BEHAVIORAL DISTURBANCE, UNSPECIFIED DEMENTIA TYPE (HCC): Primary | ICD-10-CM

## 2022-04-29 DIAGNOSIS — R26.81 GAIT INSTABILITY: ICD-10-CM

## 2022-04-29 DIAGNOSIS — R06.83 SNORING: ICD-10-CM

## 2022-04-29 DIAGNOSIS — N40.0 BENIGN PROSTATIC HYPERPLASIA WITHOUT LOWER URINARY TRACT SYMPTOMS: ICD-10-CM

## 2022-04-29 DIAGNOSIS — E78.49 OTHER HYPERLIPIDEMIA: ICD-10-CM

## 2022-04-29 DIAGNOSIS — N18.32 STAGE 3B CHRONIC KIDNEY DISEASE (HCC): ICD-10-CM

## 2022-04-29 DIAGNOSIS — I10 ESSENTIAL HYPERTENSION: ICD-10-CM

## 2022-04-29 PROCEDURE — 99483 ASSMT & CARE PLN PT COG IMP: CPT | Performed by: STUDENT IN AN ORGANIZED HEALTH CARE EDUCATION/TRAINING PROGRAM

## 2022-04-29 NOTE — PROGRESS NOTES
ASSESSMENT AND PLAN:  1  Dementia without behavioral disturbance, unspecified dementia type [facility])  Assessment & Plan:  MOCA 18/30, GDS 1/15, TUGT 12 sec  Patient with deficits in visual spatial, executive function, attention, language, abstraction, delayed recall and orientation domains  Given history, physical exam above neurocognitive screening, this places the patient at a level of mild to moderate dementia  Etiology mixed: Alzheimer's with vascular contributors as well  Patient was previously on Aricept however was unable to tolerate  MRI neuro quant: abnormal Hippocamal Occupancy Score, ([address]), concerning for a mesial temporal lobe focused Neurodegenerative process  Moderate white matter changes and punctate foci of hemosiderin deposition probably related to chronic microvascular disease  Patient has completed cognitive rehabilitation in the past  Patient does have a very strong family support which has been instrumental in keeping the patient active mentally, physically and socially  Daughter has been managing the patient's medication regimen  Consider blister packaging for ease of medication administration  Reorientation redirection as needed  Manage chronic conditions  Maintain Falls precautions  Encourage patient to remain active mentally, physically and socially  Participate in cognitively stimulating exercises as able  Ensure advanced directives in place  Will retest in 6 months      2  Snoring  Assessment & Plan:  Patient is scheduled for a sleep study      3  Other hyperlipidemia  Assessment & Plan:  Continues on simvastatin 20 mg daily  Lipid panel previously ordered  Recommend adherence to a heart healthy diet      4  New onset type 2 diabetes mellitus ([address] )  Assessment & Plan:    Lab Results   Component Value Date    HGBA1C 5 5 07/23/2021   Controlled  Currently off all antidiabetic medication regimen  Recommend a low sugar, heart healthy diet      5   Mixed stress and urge urinary incontinence  Assessment & Plan:  Recommend scheduled toileting every 2 hours once awake  Avoid fluid intake 2 hours prior to bedtime to avoid nocturnal incontinence      6  Gait instability  Assessment & Plan:  TUGT 12 sec  Continues to ambulate using an assistive device  Maintain Falls precautions  Previously did complete physical therapy for gait training, balance and strengthening      7  Essential hypertension  Assessment & Plan:  Blood pressure 118/72  Continue labetalol, amlodipine, hydralazine  Recommend adherence to a low sugar, heart healthy diet  Exercise as able      8  Stage 3b chronic kidney disease [facility])  Assessment & Plan:  Lab Results   Component Value Date    EGFR 33 07/23/2021    EGFR 31 05/19/2021    EGFR 30 05/18/2021    CREATININE 2 12 (H) 07/23/2021    CREATININE 2 26 (H) 05/19/2021    CREATININE 2 32 (H) 05/18/2021   Repeat blood work pending, previously ordered but not completed  Avoid nephrotoxic agents  Medications to be renally dosed      9  Benign prostatic hyperplasia without lower urinary tract symptoms  Assessment & Plan:  Stable  Continue finasteride  Follow up with Urology as scheduled        Decision-making capacity:  The patient should not make any medical financial decisions independently without the presence of his POA as assessed during this visit    Staging:  Mild-moderate dementia, FAST stage 4-5    Medications Review:  Medications appropriate for chronic conditions      HPI:    We had the pleasure of re-evaluating Thomas Hansen who is a [de-identified] y o  male in Geriatric follow-up today  Mr Fatmata Rogers is in the office with his daughter  Today daughter explains that the patient continues to have short-term memory loss and requires frequent reorientation and redirection  The patient does have a very strong family support system with his daughter and grand children at home  Family has ensured that the patient remains active mentally, physically and socially    Daughter explains recently, extended family members have been temporarily staying with them at home  The patient has been assigned certain household tasks which he has been compliant with completing  Family also often ensures that the patient recalls family members names  Daughter explains that the patient occasionally will nap during the daytime however does have an upcoming sleep study  He does maintain a routine at home and has not had any safety concerns involving the stove or leaving the faucets running  Daughter explains the patient typically does remain active and busy during the daytime  He has completed courses of physical as well as cognitive therapy  Family does ensure he participates in cognitively stimulating exercises as well by playing Webshoz, word finds and other games  Daughter has purchased a headphone to assist with his hearing loss  He remains independent in all ADLs but dependent in IADLs  No overt changes in mood, personality or behaviors  No cardiorespiratory distress, fever, chills, URI, urinary symptoms or recent falls  The patient has been tolerating oral intake, sleeping well and having regular bowel movements  COGNITION:  Memory Issues noticed since over 3 years  Memory affected: short term memory loss    Symptoms started: gradual  Over time the memory has:  worsened  Memory issue(s) were noted by: family   Patient has difficulties with medication errors  Difficulty finding the right word while speaking: Yes  Requires repeat information or asking the same question repeatedly: Yes  Fluctuation in alertness: No  Changes in mood or personality:No  Current or previous treatment for depression or anxiety: No    Family member with dementia and what type?  Uncle  History of head trauma: No  History of stroke: No  History of alcohol or substance abuse: No    FUNCTIONAL STATUS:  BADLs  Does patient require assistance with:  Bathing: No  Dressing: No  Toileting: No  Transferring: No  Continence: Yes  Feeding: No    IADLs  Dose patient require assistance with:  Telephone: No  Shopping: Yes  Food Preparation: Yes  Housekeeping: No  Laundry: No  Transportation: Yes  Medications: Yes  Finances: Yes    NEUROPSYCH SYMPTOMS:  Does patient get angry or hostile? Resist care from others? No  Does patient see or hear things that no one else can see or hear? Yes  Does patient act impatient and cranky? Does mood frequently change for no apparent reason? No  Does patient act suspicious without good reason (example: believes that others are stealing from him or her, or planning to harm him or her in some way)? Yes, chronically  Does patient less interested in his or her usual activities or in the activities and plans of others? No  Does patient have trouble sleeping at night? Yes  Dose patient have abnormal movements while asleep?  No    SAFETY:  Hearing and vision issue: Yes  Any gait or balance disorder: Yes  Uses: cane  Any falls in the last year: No  Any history of wandering: No  Are there firearms or guns in the home: No  Does patient drive: No  Any driving accidents or citations in the last year: No  Any concerns about patient's ability to drive: No    ACP REVIEW:  Does patient have POA: Yes  Does patient have a Living will: Yes  Any legal assistance needed for healthcare planning?: No    ROS: Review of Systems   Unable to perform ROS: Dementia       Allergies   Allergen Reactions    Strawberry C [Ascorbate - Food Allergy] Hives       Medications:    Current Outpatient Medications on File Prior to Visit   Medication Sig Dispense Refill    acetaminophen (TYLENOL) 500 mg tablet Take 500 mg by mouth every 6 (six) hours as needed for mild pain        allopurinol (ZYLOPRIM) 300 mg tablet take 1 tablet by mouth once daily 90 tablet 2    amLODIPine (NORVASC) 10 mg tablet take 1 tablet by mouth daily 90 tablet 2    B-D UF III MINI PEN NEEDLES 31G X 5 MM MISC use 1 PEN NEEDLE to inject MEDICATION subcutaneously four times a day      Diclofenac Sodium (VOLTAREN) 1 % Apply 2 g topically 4 (four) times a day 1 Tube 2    finasteride (PROSCAR) 5 mg tablet take 1 tablet by mouth once daily 90 tablet 3    fluticasone (FLONASE) 50 mcg/act nasal spray 1 spray into each nostril daily 1 Bottle 3    hydrALAZINE (APRESOLINE) 50 mg tablet Take 1 5 tablets (75 mg total) by mouth 3 (three) times a day 360 tablet 2    Incontinence Supply Disposable (Incontinence Brief Large) MISC by Does not apply route 4 (four) times a day PLEASE SUPPLY EXTRA-LARGE  120 each 5    labetalol (NORMODYNE) 200 mg tablet take 1 tablet by mouth once daily at bedtime 90 tablet 3    Multiple Vitamins-Minerals (MULTIVITAMIN WITH MINERALS) tablet Take 1 tablet by mouth daily      omega-3-acid ethyl esters (LOVAZA) 1 g capsule Take 1 g by mouth daily      pantoprazole (PROTONIX) 40 mg tablet Take 1 tablet (40 mg total) by mouth daily (Patient taking differently: Take 40 mg by mouth daily As needed ) 60 tablet 1    simvastatin (ZOCOR) 20 mg tablet take 1 tablet by mouth at bedtime 90 tablet 2    Contour Next Test test strip use to TEST BLOOD SUGAR four times a day (Patient not taking: Reported on 12/28/2021) 150 strip 3    furosemide (LASIX) 40 mg tablet Take 1 tablet (40 mg total) by mouth daily (Patient not taking: Reported on 8/16/2021) 90 tablet 3    Lancet Devices (Microlet Next Lancing Device) MISC Use to check blood sugar daily (Patient not taking: Reported on 12/28/2021 ) 1 each 0    Microlet Lancets MISC use to TEST BLOOD SUGAR four times a day (Patient not taking: Reported on 12/28/2021) 150 each 3     No current facility-administered medications on file prior to visit         History:  Past Medical History:   Diagnosis Date    MLIE (acute kidney injury) ([facility] ) 10/8/2019    Anemia     BPH (benign prostatic hyperplasia)     Chronic kidney disease     Dementia ([facility] )     Diabetes mellitus ([place] [address] )     Hyperlipidemia     Hyperosmolar hyperglycemic state ([facility]) ([facility] [address] ) 5/9/2021    Hypertension     Incontinence     Metabolic acidosis 94/82/0112    Osteoarthritis      Past Surgical History:   Procedure Laterality Date    COLON SURGERY      COLONOSCOPY      FL RETROGRADE PYELOGRAM  12/13/2019    KS CYSTOURETHRO W/IMPLANT N/A 12/13/2019    Procedure: CYSTOSCOPY WITH INSERTION UROLIFT, BILATERAL RETROGRADE PYELOGRAM;  Surgeon: Emory Buck MD;  Location: [facility];  Service: Urology    TUMOR REMOVAL       Family History   Problem Relation Age of Onset    Heart failure Brother     Dementia Maternal Uncle      Social History     Socioeconomic History    Marital status:      Spouse name: Not on file    Number of children: Not on file    Years of education: Not on file    Highest education level: Not on file   Occupational History    Not on file   Tobacco Use    Smoking status: Passive Smoke Exposure - Never Smoker    Smokeless tobacco: Never Used   Vaping Use    Vaping Use: Never used   Substance and Sexual Activity    Alcohol use: Never    Drug use: Never    Sexual activity: Not on file   Other Topics Concern    Not on file   Social History Narrative    Not on file     Social Determinants of Health     Financial Resource Strain: Not on file   Food Insecurity: Not on file   Transportation Needs: Not on file   Physical Activity: Not on file   Stress: Not on file   Social Connections: Not on file   Intimate Partner Violence: Not on file   Housing Stability: Not on file     Past Surgical History:   Procedure Laterality Date    COLON SURGERY      COLONOSCOPY      FL RETROGRADE PYELOGRAM  12/13/2019    KS CYSTOURETHRO W/IMPLANT N/A 12/13/2019    Procedure: CYSTOSCOPY WITH INSERTION UROLIFT, BILATERAL RETROGRADE PYELOGRAM;  Surgeon: Emory Buck MD;  Location: [facility];  Service: Urology    TUMOR REMOVAL         OBJECTIVE:  Vitals:    04/29/22 1300   BP: 118/72   BP Location: Left arm   Patient Position: Sitting Cuff Size: Standard   Pulse: 86   Temp: 97 8 °F (36 6 °C)   TempSrc: Temporal   SpO2: 95%   Weight: 99 8 kg (220 lb)   Height: 5' 10" (1 778 m)     Body mass index is 31 57 kg/m²  Physical Exam  Vitals reviewed  Constitutional:       General: He is not in acute distress  Appearance: Normal appearance  He is well-developed  He is not ill-appearing or diaphoretic  HENT:      Head: Normocephalic and atraumatic  Right Ear: Tympanic membrane, ear canal and external ear normal       Left Ear: Tympanic membrane, ear canal and external ear normal       Nose: Nose normal       Mouth/Throat:      Mouth: Mucous membranes are moist       Pharynx: Oropharynx is clear  No oropharyngeal exudate  Eyes:      General: No scleral icterus  Right eye: No discharge  Left eye: No discharge  Conjunctiva/sclera: Conjunctivae normal    Neck:      Vascular: No JVD  Cardiovascular:      Rate and Rhythm: Normal rate and regular rhythm  Heart sounds: Murmur heard  No friction rub  No gallop  Pulmonary:      Effort: Pulmonary effort is normal  No respiratory distress  Breath sounds: Normal breath sounds  No wheezing or rales  Abdominal:      General: Bowel sounds are normal  There is no distension  Palpations: Abdomen is soft  Tenderness: There is no abdominal tenderness  There is no guarding  Musculoskeletal:         General: No tenderness or deformity  Normal range of motion  Cervical back: Normal range of motion and neck supple  Right lower leg: No edema  Left lower leg: No edema  Skin:     General: Skin is warm  Coloration: Skin is not pale  Findings: No erythema or rash  Neurological:      General: No focal deficit present  Mental Status: He is alert  Mental status is at baseline  Cranial Nerves: No cranial nerve deficit  Sensory: No sensory deficit  Motor: No weakness        Coordination: Coordination normal       Gait: Gait abnormal       Deep Tendon Reflexes: Reflexes are normal and symmetric  Reflexes normal       Comments: AOR x 2  Moving all limbs  Follows command readily   Psychiatric:         Mood and Affect: Mood normal          Behavior: Behavior normal          MoCA: 18/30      Labs & Imaging:  Lab Results   Component Value Date    WBC 6 37 07/23/2021    HGB 13 7 07/23/2021    HCT 43 8 07/23/2021    MCV 90 07/23/2021    PLT  07/23/2021      Comment:      Not reported due to platelet clumping  Lab Results   Component Value Date    SODIUM 137 07/23/2021    K 3 8 07/23/2021     07/23/2021    CO2 23 07/23/2021    BUN 24 07/23/2021    CREATININE 2 12 (H) 07/23/2021    GLUC 116 05/19/2021    CALCIUM 9 1 07/23/2021     Lab Results   Component Value Date    OZTOGWPT19 097 12/04/2019     Lab Results   Component Value Date    WEY7YPZKCFKX 0 804 05/09/2021     Lab Results   Component Value Date    PHPO64WWVACZ 41 9 12/04/2019      Results for orders placed during the hospital encounter of 05/09/21    CT head without contrast    Narrative  CT BRAIN - WITHOUT CONTRAST    INDICATION:   ams  COMPARISON:  MRI 1/26/2020    TECHNIQUE:  CT examination of the brain was performed  In addition to axial images, sagittal and coronal 2D reformatted images were created and submitted for interpretation  Radiation dose length product (DLP) for this visit:  805 mGy-cm   This examination, like all CT scans performed in the [facility], was performed utilizing techniques to minimize radiation dose exposure, including the use of iterative  reconstruction and automated exposure control  IMAGE QUALITY:  Diagnostic  FINDINGS:    PARENCHYMA: Decreased attenuation is noted in periventricular and subcortical white matter demonstrating an appearance that is statistically most likely to represent moderate microangiopathic change  No CT signs of acute infarction  No intracranial mass, mass effect or midline shift    No acute parenchymal hemorrhage  VENTRICLES AND EXTRA-AXIAL SPACES:  Cerebral volume loss  No hydrocephalus  VISUALIZED ORBITS AND PARANASAL SINUSES:  No acute abnormality  CALVARIUM AND EXTRACRANIAL SOFT TISSUES:  Normal     Impression  No acute intracranial abnormality  Microangiopathic changes  Cerebral volume loss              Workstation performed: [id number]

## 2022-05-01 PROBLEM — N17.9 AKI (ACUTE KIDNEY INJURY) (HCC): Status: RESOLVED | Noted: 2019-10-08 | Resolved: 2022-05-01

## 2022-05-01 PROBLEM — E87.20 METABOLIC ACIDOSIS: Status: RESOLVED | Noted: 2019-10-11 | Resolved: 2022-05-01

## 2022-05-01 PROBLEM — E11.65 HYPEROSMOLAR HYPERGLYCEMIC STATE (HHS) (HCC): Status: RESOLVED | Noted: 2021-05-09 | Resolved: 2022-05-01

## 2022-05-01 PROBLEM — E87.2 METABOLIC ACIDOSIS: Status: RESOLVED | Noted: 2019-10-11 | Resolved: 2022-05-01

## 2022-05-01 PROBLEM — E11.00 HYPEROSMOLAR HYPERGLYCEMIC STATE (HHS) (HCC): Status: RESOLVED | Noted: 2021-05-09 | Resolved: 2022-05-01

## 2022-05-01 PROBLEM — F03.90 DEMENTIA WITHOUT BEHAVIORAL DISTURBANCE (HCC): Status: ACTIVE | Noted: 2022-05-01

## 2022-05-01 NOTE — ASSESSMENT & PLAN NOTE
Continues on simvastatin 20 mg daily  Lipid panel previously ordered  Recommend adherence to a heart healthy diet

## 2022-05-01 NOTE — ASSESSMENT & PLAN NOTE
MOCA 18/30, GDS 1/15, TUGT 12 sec  Patient with deficits in visual spatial, executive function, attention, language, abstraction, delayed recall and orientation domains  Given history, physical exam above neurocognitive screening, this places the patient at a level of mild to moderate dementia  Etiology mixed: Alzheimer's with vascular contributors as well  Patient was previously on Aricept however was unable to tolerate  MRI neuro quant: abnormal Hippocamal Occupancy Score, ([address]), concerning for a mesial temporal lobe focused Neurodegenerative process  Moderate white matter changes and punctate foci of hemosiderin deposition probably related to chronic microvascular disease  Patient has completed cognitive rehabilitation in the past  Patient does have a very strong family support which has been instrumental in keeping the patient active mentally, physically and socially  Daughter has been managing the patient's medication regimen  Consider blister packaging for ease of medication administration  Reorientation redirection as needed  Manage chronic conditions  Maintain [place] precautions  Encourage patient to remain active mentally, physically and socially  Participate in cognitively stimulating exercises as able  Ensure advanced directives in place  Will retest in 6 months

## 2022-05-01 NOTE — ASSESSMENT & PLAN NOTE
Blood pressure 118/72  Continue labetalol, amlodipine, hydralazine  Recommend adherence to a low sugar, heart healthy diet  Exercise as able

## 2022-05-01 NOTE — ASSESSMENT & PLAN NOTE
Lab Results   Component Value Date    HGBA1C 5 5 07/23/2021   Controlled  Currently off all antidiabetic medication regimen  Recommend a low sugar, heart healthy diet

## 2022-05-01 NOTE — ASSESSMENT & PLAN NOTE
TUGT 12 sec  Continues to ambulate using an assistive device  Maintain [place] precautions  Previously did complete physical therapy for gait training, balance and strengthening

## 2022-05-01 NOTE — ASSESSMENT & PLAN NOTE
Lab Results   Component Value Date    EGFR 33 07/23/2021    EGFR 31 05/19/2021    EGFR 30 05/18/2021    CREATININE 2 12 (H) 07/23/2021    CREATININE 2 26 (H) 05/19/2021    CREATININE 2 32 (H) 05/18/2021   Repeat blood work pending, previously ordered but not completed  Avoid nephrotoxic agents  Medications to be renally dosed

## 2022-05-16 ENCOUNTER — TELEPHONE (OUTPATIENT)
Dept: SLEEP CENTER | Facility: CLINIC | Age: 82
End: 2022-05-16

## 2022-05-16 NOTE — TELEPHONE ENCOUNTER
Patient wife left message early this am to cancel consult  She is the  and she is sick   Unable to leave call back message voicemail full

## 2022-06-03 DIAGNOSIS — I10 ESSENTIAL HYPERTENSION: ICD-10-CM

## 2022-06-03 RX ORDER — HYDRALAZINE HYDROCHLORIDE 50 MG/1
75 TABLET, FILM COATED ORAL 3 TIMES DAILY
Qty: 360 TABLET | Refills: 2
Start: 2022-06-03 | End: 2022-06-04 | Stop reason: SDUPTHER

## 2022-06-04 DIAGNOSIS — I10 ESSENTIAL HYPERTENSION: ICD-10-CM

## 2022-06-04 RX ORDER — HYDRALAZINE HYDROCHLORIDE 50 MG/1
75 TABLET, FILM COATED ORAL 3 TIMES DAILY
Qty: 360 TABLET | Refills: 2
Start: 2022-06-04 | End: 2022-06-04 | Stop reason: SDUPTHER

## 2022-06-04 RX ORDER — HYDRALAZINE HYDROCHLORIDE 50 MG/1
75 TABLET, FILM COATED ORAL 3 TIMES DAILY
Qty: 360 TABLET | Refills: 2 | Status: SHIPPED | OUTPATIENT
Start: 2022-06-04

## 2022-06-28 DIAGNOSIS — N40.0 BENIGN PROSTATIC HYPERPLASIA WITHOUT LOWER URINARY TRACT SYMPTOMS: ICD-10-CM

## 2022-06-28 RX ORDER — FINASTERIDE 5 MG/1
5 TABLET, FILM COATED ORAL DAILY
Qty: 90 TABLET | Refills: 0 | OUTPATIENT
Start: 2022-06-28

## 2022-06-28 NOTE — TELEPHONE ENCOUNTER
[facility] asking for call back from patient  Please relay note from Hesham Mabry if/when patient calls back, thank you

## 2022-06-28 NOTE — TELEPHONE ENCOUNTER
Has not been seen since 2020   Was advised on PRN follow up at that time and to obtain refills from PCP

## 2022-06-28 NOTE — TELEPHONE ENCOUNTER
Medication Refill Request     Name finasteride (PROSCAR) 5 mg tablet  Dose/Frequency 1 x day  Quantity 90 tablets  Verified pharmacy   [x]  Verified ordering Provider   [x]  Verified enough for 3 days  [x]

## 2022-06-29 DIAGNOSIS — N40.0 BENIGN PROSTATIC HYPERPLASIA WITHOUT LOWER URINARY TRACT SYMPTOMS: ICD-10-CM

## 2022-06-29 RX ORDER — FINASTERIDE 5 MG/1
5 TABLET, FILM COATED ORAL DAILY
Qty: 90 TABLET | Refills: 3 | OUTPATIENT
Start: 2022-06-29 | End: 2022-09-27

## 2022-09-02 ENCOUNTER — TELEPHONE (OUTPATIENT)
Dept: GERIATRICS | Age: 82
End: 2022-09-02

## 2022-09-02 NOTE — TELEPHONE ENCOUNTER
Call patient's daughter Ambrocio Maradiaga informed her that Dr Ramos Orozco recommed that she contact patient's Endocrinology who was following him

## 2022-09-02 NOTE — TELEPHONE ENCOUNTER
Patient's daughter called needing advise regarding patient's glucose levels  Patient's daughter Gricelda Bailey stated that she checked patient's Blood Glucose levels when he was fasting and it was 124 but when she gave him lunch and checked his Blood Glucose level it was 279  Please advise

## 2022-09-14 ENCOUNTER — TELEPHONE (OUTPATIENT)
Dept: UROLOGY | Facility: AMBULATORY SURGERY CENTER | Age: 82
End: 2022-09-14

## 2022-09-14 DIAGNOSIS — N40.0 BENIGN PROSTATIC HYPERPLASIA WITHOUT LOWER URINARY TRACT SYMPTOMS: ICD-10-CM

## 2022-09-14 NOTE — TELEPHONE ENCOUNTER
[facility] in [facility] called and left VM stating pt is requesting refill of finasteride (PROSCAR) 5 mg tablet         Call [phone]

## 2022-09-14 NOTE — TELEPHONE ENCOUNTER
Patient was last seen on 7/7/2020 by Dr Montez Gave  Script was refilled for one additional year and then instructed to obtain future refills from his primary care physician  I called [facility] and made them aware of same  They will notify the patient  No further action required

## 2022-09-15 RX ORDER — FINASTERIDE 5 MG/1
5 TABLET, FILM COATED ORAL DAILY
Qty: 90 TABLET | Refills: 0 | Status: SHIPPED | OUTPATIENT
Start: 2022-09-15

## 2022-11-14 ENCOUNTER — TELEPHONE (OUTPATIENT)
Age: 82
End: 2022-11-14

## 2022-11-14 NOTE — TELEPHONE ENCOUNTER
Called pt LVM, informing pt that we are no longer a PCP office, and relaying Marychuy Whitlock message     ----- Message from [facility] sent at 11/11/2022  3:05 PM EST -----  Can someone check with Mr  Tiffanie Deana to make sure he has new pcp - Dr Max pathak is still getting refill request, but she is no longer here to refill    TY

## 2022-11-16 ENCOUNTER — TELEPHONE (OUTPATIENT)
Age: 82
End: 2022-11-16

## 2022-11-16 NOTE — TELEPHONE ENCOUNTER
Patient's daughter, Telma Zambrano called to see if maybe we can refill medications  Amlodlpine & Allopurinol due to the fact patient is on a waiting list for a pcp till January  Advised unfortunately we cannot

## 2024-01-09 ENCOUNTER — OFFICE VISIT (OUTPATIENT)
Dept: GASTROENTEROLOGY | Facility: CLINIC | Age: 84
End: 2024-01-09
Payer: COMMERCIAL

## 2024-01-09 VITALS
WEIGHT: 215.2 LBS | HEART RATE: 78 BPM | SYSTOLIC BLOOD PRESSURE: 160 MMHG | HEIGHT: 72 IN | BODY MASS INDEX: 29.15 KG/M2 | DIASTOLIC BLOOD PRESSURE: 86 MMHG

## 2024-01-09 DIAGNOSIS — K59.00 CONSTIPATION, UNSPECIFIED CONSTIPATION TYPE: Primary | ICD-10-CM

## 2024-01-09 DIAGNOSIS — K27.9 PUD (PEPTIC ULCER DISEASE): ICD-10-CM

## 2024-01-09 PROCEDURE — 99204 OFFICE O/P NEW MOD 45 MIN: CPT | Performed by: PHYSICIAN ASSISTANT

## 2024-01-09 RX ORDER — DOCUSATE SODIUM 100 MG/1
100 CAPSULE, LIQUID FILLED ORAL 2 TIMES DAILY
Qty: 60 CAPSULE | Refills: 2 | Status: SHIPPED | OUTPATIENT
Start: 2024-01-09 | End: 2024-02-08

## 2024-01-09 NOTE — PROGRESS NOTES
[facility]  Christiano Carrion 83 y.o. male MRN: [id number]       CC: Constipation    HPI: Christiano is an 83-year-old male with history of dementia, BPH, type 2 diabetes, hyperlipidemia.  Patient presents the office today with his daughter, Leyla.  Patient's daughter reports that the patient has had issues with constipation for several months, and notices that he does not complete a full plate of food.  The patient himself describes that when he has a bowel movement, he does not feel fully evacuated.  Patient denies heartburn, nausea, vomiting or regurgitation.  No unintentional weight loss.  To the patient and daughter's knowledge, there have been no signs or GI bleeding.    Patient has history of prior GI bleed in 2021 from gastric body ulcer.  His last endoscopic evaluation was at that time.  He had EGD with push enteroscopy with Dr. Elizondo revealing LA class B esophagitis, small hiatal hernia, clean-based gastric body ulcer and multiple erosions without active bleeding in the stomach.  There is no family history of GI malignancy to their knowledge.  Patient is a [place] .    Review of Systems:    CONSTITUTIONAL: Denies any fever, chills, or rigors. Good appetite, and no recent weight loss.  HEENT: No earache or tinnitus. Denies hearing loss or visual disturbances.  CARDIOVASCULAR: No chest pain or palpitations.   RESPIRATORY: Denies any cough, hemoptysis, shortness of breath or dyspnea on exertion.  GASTROINTESTINAL: As noted in the History of Present Illness.   GENITOURINARY: No problems with urination. Denies any hematuria or dysuria.  NEUROLOGIC: No dizziness or vertigo, denies headaches.   MUSCULOSKELETAL: Denies any muscle or joint pain.   SKIN: Denies skin rashes or itching.   ENDOCRINE: Denies excessive thirst. Denies intolerance to heat or cold.  PSYCHOSOCIAL: Denies depression or anxiety. Denies any recent memory loss.       Current Outpatient Medications   Medication Sig Dispense  Refill    finasteride (PROSCAR) 5 mg tablet Take 1 tablet (5 mg total) by mouth daily 90 tablet 0    acetaminophen (TYLENOL) 500 mg tablet Take 500 mg by mouth every 6 (six) hours as needed for mild pain        allopurinol (ZYLOPRIM) 300 mg tablet take 1 tablet by mouth once daily 30 tablet 0    amLODIPine (NORVASC) 10 mg tablet take 1 tablet by mouth daily 90 tablet 2    B-D UF III MINI PEN NEEDLES 31G X 5 MM MISC use 1 PEN NEEDLE to inject MEDICATION subcutaneously four times a day      Contour Next Test test strip use to TEST BLOOD SUGAR four times a day (Patient not taking: Reported on 12/28/2021) 150 strip 3    Diclofenac Sodium (VOLTAREN) 1 % Apply 2 g topically 4 (four) times a day 1 Tube 2    fluticasone (FLONASE) 50 mcg/act nasal spray 1 spray into each nostril daily 1 Bottle 3    furosemide (LASIX) 40 mg tablet Take 1 tablet (40 mg total) by mouth daily (Patient not taking: Reported on 8/16/2021) 90 tablet 3    hydrALAZINE (APRESOLINE) 50 mg tablet Take 1.5 tablets (75 mg total) by mouth 3 (three) times a day 360 tablet 2    Incontinence Supply Disposable (Incontinence Brief Large) MISC by Does not apply route 4 (four) times a day PLEASE SUPPLY EXTRA-LARGE. 120 each 5    labetalol (NORMODYNE) 200 mg tablet take 1 tablet by mouth once daily at bedtime 90 tablet 3    Lancet Devices (Microlet Next Lancing Device) MISC Use to check blood sugar daily (Patient not taking: Reported on 12/28/2021 ) 1 each 0    Microlet Lancets MISC use to TEST BLOOD SUGAR four times a day (Patient not taking: Reported on 12/28/2021) 150 each 3    Multiple Vitamins-Minerals (MULTIVITAMIN WITH MINERALS) tablet Take 1 tablet by mouth daily      omega-3-acid ethyl esters (LOVAZA) 1 g capsule Take 1 g by mouth daily      pantoprazole (PROTONIX) 40 mg tablet Take 1 tablet (40 mg total) by mouth daily (Patient taking differently: Take 40 mg by mouth daily As needed ) 60 tablet 1    simvastatin (ZOCOR) 20 mg tablet take 1 tablet by mouth  at bedtime 90 tablet 2     No current facility-administered medications for this visit.     Past Medical History:   Diagnosis Date    MILE (acute kidney injury) ([facility]) 10/8/2019    Anemia     BPH (benign prostatic hyperplasia)     Chronic kidney disease     Dementia (HCC)     Diabetes mellitus (HCC)     Hyperlipidemia     Hyperosmolar hyperglycemic state (HHS) ([facility]) 5/9/2021    Hypertension     Incontinence     Metabolic acidosis 10/11/2019    Osteoarthritis      Past Surgical History:   Procedure Laterality Date    COLON SURGERY      COLONOSCOPY      FL RETROGRADE PYELOGRAM  12/13/2019    OH CYSTOURETHRO W/IMPLANT N/A 12/13/2019    Procedure: CYSTOSCOPY WITH INSERTION UROLIFT, BILATERAL RETROGRADE PYELOGRAM;  Surgeon: Miguel Ordoñez MD;  Location: [facility];  Service: Urology    TUMOR REMOVAL       Social History     Socioeconomic History    Marital status:      Spouse name: Not on file    Number of children: Not on file    Years of education: Not on file    Highest education level: Not on file   Occupational History    Not on file   Tobacco Use    Smoking status: Passive Smoke Exposure - Never Smoker    Smokeless tobacco: Never   Vaping Use    Vaping status: Never Used   Substance and Sexual Activity    Alcohol use: Never    Drug use: Never    Sexual activity: Not on file   Other Topics Concern    Not on file   Social History Narrative    Not on file     Social Determinants of Health     Financial Resource Strain: Low Risk  (1/3/2023)    [facility]    Overall Financial Resource Strain (CARDIA)     Difficulty of Paying Living Expenses: Not very hard   Food Insecurity: Food Insecurity Present (1/3/2023)    [facility]    Hunger Vital Sign     Worried About Running Out of Food in the Last Year: Never true     Ran Out of Food in the Last Year: Sometimes true   Transportation Needs: No Transportation Needs (1/3/2023)    Received from [place]  [facility]    PRAPARE - Transportation     Lack of Transportation (Medical): No     Lack of Transportation (Non-Medical): No   Physical Activity: Not on file   Stress: No Stress Concern Present (1/3/2023)    Received from [facility]    [employer] - Occupational Stress Questionnaire     Feeling of Stress : Only a little   Social Connections: Socially Isolated (1/3/2023)    Received from [facility]    Social Connection and Isolation Panel [NHANES]     Frequency of Communication with Friends and Family: More than three times a week     Frequency of Social Gatherings with Friends and Family: More than three times a week     Attends Sabianist Services: Never     Active Member of Clubs or Organizations: No     Attends Club or Organization Meetings: Never     Marital Status:    Intimate Partner Violence: Not At Risk (1/3/2023)    Received from [facility]    Humiliation, Afraid, Rape, and Kick questionnaire     Fear of Current or Ex-Partner: No     Emotionally Abused: No     Physically Abused: No     Sexually Abused: No   Housing Stability: Low Risk  (1/3/2023)    Received from [facility]    Housing Stability Vital Sign     Unable to Pay for Housing in the Last Year: No     Number of Places Lived in the Last Year: 1     Unstable Housing in the Last Year: No     Family History   Problem Relation Age of Onset    Heart failure Brother     Dementia Maternal Uncle             PHYSICAL EXAM:    There were no vitals filed for this visit.  General Appearance:   Alert and oriented x 3. Cooperative, and in no respiratory distress   HEENT:   Normocephalic, atraumatic, anicteric.     Neck:  Supple, symmetrical, trachea midline   Lungs:   Clear to auscultation bilaterally    Heart::   Regular rate and rhythm   Abdomen:   Soft, non-tender, non-distended; normal bowel sounds; no masses, no organomegaly    Genitalia:    "Deferred    Rectal:   Deferred    Extremities:  No cyanosis, clubbing or edema    Pulses:  2+ and symmetric all extremities    Skin:  Skin color, texture, turgor normal, no rashes or lesions    Lymph nodes:  No palpable cervical or supraclavicular lymphadenopathy        Lab Results:             Invalid input(s): \"LABALBU\"            Imaging Studies:   No results found.    ASSESSMENT and PLAN:      1) Constipation, history of peptic ulcer disease and early satiety- Patient reports that he is not as active as he once was.  Patient's daughter is unsure when his last colonoscopy was, but likely over 10 years ago now.  Patient's daughter reports she is deferring colonoscopy.  She thought patient's fullness symptoms were related to GI ulcer.  However, patient fortunately had improvement in hemoglobin up to 12.  We went over alarm symptoms that would warrant another endoscopy.  Continue to monitor weight closely.  We will check his iron levels.  If abnormal, could perform endoscopy to investigate and confirm healing of gastric ulcer.  She voices understanding and agrees with that plan.  In the meantime, patient will start Colace twice a day, and start an every other day interval.      Follow up in 6 months or sooner if symptoms worsen.       Portions of the record may have been created with voice recognition software.  Occasional wrong word or \"sound a like\" substitutions may have occurred due to the inherent limitations of voice recognition software.  Read the chart carefully and recognize, using context, where substitutions have occurred.  "

## 2024-02-09 ENCOUNTER — TELEPHONE (OUTPATIENT)
Dept: GASTROENTEROLOGY | Facility: CLINIC | Age: 84
End: 2024-02-09

## 2024-02-09 NOTE — TELEPHONE ENCOUNTER
Left message for patient to call and reschedule appointment with Sandra on 7/16/24, she will not be in that day

## 2024-02-21 PROBLEM — N12 PYELONEPHRITIS: Status: RESOLVED | Noted: 2019-10-08 | Resolved: 2024-02-21

## 2024-08-21 NOTE — PROGRESS NOTES
[facility]  [address] [facility], [address] [place] [address], [address] [place] [address]    PRIMARY CARE PRACTICE FOR OLDER ADULTS      NAME: Byron Both  AGE: 67 y o  SEX: male    DATE OF ENCOUNTER: 11/21/2019    Assessment and Plan     Problem List Items Addressed This Visit        Cardiovascular and Mediastinum    Essential hypertension     Patient currently on labetalol 200 mg daily and clonidine 0 1 mg p o  HS  Blood pressures continue to be elevated, greaterthan 982M systolics and 1 episode of systolic blood pressure in the 200s 1 week ago  Patient denies any headaches, new changes in vision, weakness or focal neurological deficits  Will start amlodipine 5 mg daily and up titrate as needed  Daughter to call in 3 days with blood pressure readings and well then determine whether amlodipine should be increased    Discussed with patient and daughter, that after patient's urolith procedure, goal would be to discontinue clonidine and transition to a different hypertensive agent  Recommend adherence to a low-sodium, heart healthy diet         Relevant Medications    amLODIPine (NORVASC) 10 mg tablet       Nervous and Auditory    Dementia without behavioral disturbance ([facility] [address] )     Patient to follow-up with our clinic for a comprehensive geriatric assessment in the future  Recommend reorientation and redirection as needed  Encouraged patient to remain active mentally, physically and socially  Patient also encouraged to participate in cognitively challenging exercises such as cross words and puzzles  Manage chronic conditions  Maintain Falls precautions  Will order vitamin B12, folate, vitamin-D level and HbA1c as part of memory loss workup  Will continue to monitor         Relevant Orders    TSH, 3rd generation with T4 reflex    Vitamin B12    Folate    Hemoglobin A1C       Genitourinary    Hematuria, gross - Primary     Patient continues to follow with Urology  Recent cystoscopy in office negative  Patient is Patient presents to the emergency department via [facility] after a syncopal episode at work. EMS states patient felt dizzy before the syncopal episode. Patient did urinate herself. Patient states she has been fasting but did eat today. She felt like she drank enough fluids today. Patient denies nausea, vomiting, or diarrhea. Patient denies head hitting her head. Patient states her left index finger hurts. Patient's blood sugar en route was 131. Patient states she feels a little better now but tired. Denies chest pain or shortness of breath.    scheduled for cystoscopy with implantation of urine lift and bilateral retrograde pyelograms for complete upper tract evaluation  Continue tamsulosin and Proscar for BPH  Will order CBC, CMP, HbA1c, TSH with reflex T4 as part of preoperative clearance labs  Will also order chest x-ray and EKG  Will follow up with patient in 2 weeks for preop clearance visit           BPH (benign prostatic hyperplasia)     Patient continues to follow with Urology with assessment of enlarged prostate with signs of outlet obstruction  Continue tamsulosin 0 4 mg p o  Daily  Continue finasteride 5 mg p o  Daily  To follow up with Urology as routinely scheduled           Stage 3 chronic kidney disease ([facility] [address] )     Most recent GFR was 35  Requisition given to patient for repeat CMP  Medications to be renally dosed  Avoid nephrotoxic agents  Will continue to monitor routinely         Gross hematuria    Relevant Orders    CBC and differential    Comprehensive metabolic panel    X-ray chest 2 views    ECG 12 lead       Other    Ambulatory dysfunction     Patient recently completed a course of physical therapy  Maintain [place] precautions  Will continue to monitor for gait instability  Encouraged patient to use recommended assistive device when ambulating           Visual impairment     Patient complains of chronic decline in vision over the past 2 years    Referral given to patient to consult with Ophthalmology for annual eye exam  Maintain [place] precautions  Will continue to monitor           Other Visit Diagnoses     Vision impairment        Relevant Orders    Ambulatory referral to Ophthalmology    Encounter for vitamin deficiency screening        Relevant Orders    Vitamin D 25 hydroxy        - Counseling Documentation: patient was counseled regarding: diagnostic results, instructions for management, risk factor reductions, prognosis, patient and family education, impressions, risks and benefits of treatment options and importance of compliance with treatment    Chief Complaint     Chief Complaint   Patient presents with    Follow-up       History of Present Illness     HPI  This is a 71-year-old male with a past medical history of BPH with outlet obstruction, uncontrolled HTN, dementia and prior history of urinary retention/ hematuria who presents for follow-up of his chronic conditions  Patient is present with his daughter who is his primary caretaker  Daughter explains that the patient is scheduled on  for a urolift procedure with retrograde pyelogram and will require preoperative clearance prior to the procedure being done  Patient was recently seen by Urology where he had a cystoscopy at the office which was negative and also started on finasteride and tamsulosin with some improvement reported in urinary symptoms and no recurrence of gross hematuria per patient  Daughter explains that the patient continues to have a progressive decline in memory loss especially in short-term memory  She would like to schedule a comprehensive geriatric assessment in the future once his acute medical issues have stabilized  For patient's history of hypertension, blood pressure logs remain uncontrolled  Daughter explains that the patient had 1 episode of systolic blood pressure over 200mmHg over a week ago  His systolic blood pressure remains consistently high over 170mmHg  He denies any headaches, new changes in vision, weakness or focal neurological deficits  He explains that he has also been adherent to a heart healthy diet  He currently remains on labetalol 200 mg daily as well as clonidine 0 1 mg at night  He was previously on a combination of an ARB and CCB  Daughter explains that this medication was discontinued as she had found  bottles of them as result of which she had not administered them to her father  Patient is independent in some ADLs and dependent in all IADLs    The patient also complains of visual impairment over the past 2 years as he has not followed with an ophthalmology during that time  He denies any acute eye pain or acute vision losses  Patient continues on allopurinol for history of gout with good control  He also continues on labetalol and clonidine for history of hypertension with blood pressures remaining elevated  He also has been compliant with tamsulosin and finasteride for history of BPH with outlet obstruction    The following portions of the patient's history were reviewed and updated as appropriate: allergies, current medications, past family history, past medical history, past social history, past surgical history and problem list     Review of Systems     Review of Systems   Constitutional: Positive for activity change  Negative for chills and fever  HENT: Negative for congestion, ear pain, rhinorrhea, sore throat and trouble swallowing  Eyes: Positive for visual disturbance (chronic decreased vision)  Negative for discharge  Respiratory: Negative for cough, chest tightness, shortness of breath, wheezing and stridor  Cardiovascular: Negative for chest pain, palpitations and leg swelling  Gastrointestinal: Negative for abdominal pain, constipation, diarrhea, nausea and vomiting  Genitourinary: Positive for difficulty urinating, frequency and hematuria  Negative for decreased urine volume and dysuria  Hesitancy   Musculoskeletal: Positive for arthralgias (chronic) and gait problem (uses walker)  Skin: Negative for color change, pallor, rash and wound  Neurological: Positive for weakness  Negative for dizziness  Psychiatric/Behavioral: Positive for confusion (at baseline intermittently) and decreased concentration (chronic)  Negative for hallucinations and sleep disturbance         Active Problem List     Patient Active Problem List   Diagnosis    Essential hypertension    Pyelonephritis    Other hyperlipidemia    MILE [facility] kidney [facility]) ([facility] [address] )    Hematuria, gross    Metabolic acidosis    Acute blood loss anemia    Azotemia    Ambulatory dysfunction    BPH (benign prostatic hyperplasia)    Abdominal distention    Chronic pain of both knees    Stool incontinence    Stage 3 chronic kidney disease (HCC)    Dementia without behavioral disturbance (HCC)    Gross hematuria    Urinary retention    Visual impairment       Objective     BP (!) 174/96 (BP Location: Left arm, Patient Position: Sitting, Cuff Size: Large)   Pulse 72   Temp (!) 97 1 °F (36 2 °C) (Temporal)   Resp 12   Ht 5' 9 61" (1 768 m)   Wt 91 4 kg (201 lb 9 6 oz)   SpO2 97%   BMI 29 25 kg/m²     Physical Exam   Constitutional: He appears well-developed and well-nourished  No distress  HENT:   Head: Normocephalic and atraumatic  Right Ear: External ear normal    Left Ear: External ear normal    Nose: Nose normal    Mouth/Throat: Oropharynx is clear and moist  No oropharyngeal exudate  Eyes: Conjunctivae are normal  Right eye exhibits no discharge  Left eye exhibits no discharge  No scleral icterus  Neck: Neck supple  No JVD present  Cardiovascular: Normal rate, regular rhythm and intact distal pulses  Exam reveals no gallop and no friction rub  Murmur (ESM2/6) heard  Pulmonary/Chest: Effort normal and breath sounds normal  No stridor  No respiratory distress  He has no wheezes  He has no rales  Abdominal: Soft  Bowel sounds are normal  He exhibits no distension and no mass  There is no tenderness  There is no rebound and no guarding  No CVA tenderness   Musculoskeletal: Normal range of motion  He exhibits no edema, tenderness or deformity  Neurological: He is alert  He has normal reflexes  No cranial nerve deficit  Patient oriented to self and month  Follows commands readily  Moving all limbs   Skin: Skin is warm  No rash noted  He is not diaphoretic  No erythema  No pallor  Psychiatric: He has a normal mood and affect  Vitals reviewed        Pertinent Laboratory/Diagnostic Studies:  Reviewed prior blood work  Requisition given to patient for CBC, CMP, TSH with reflex T4, HbA1c, vitamin B12, folate, vitamin-D, chest x-ray, EKG    Current Medications     Current Outpatient Medications:     allopurinol (ZYLOPRIM) 300 mg tablet, Take 300 mg by mouth daily, Disp: , Rfl:     cloNIDine (CATAPRES) 0 1 mg tablet, Take 1 tablet (0 1 mg total) by mouth daily at bedtime, Disp: 30 tablet, Rfl: 0    donepezil (ARICEPT) 5 mg tablet, Take 5 mg by mouth daily at bedtime, Disp: , Rfl:     finasteride (PROSCAR) 5 mg tablet, Take 1 tablet (5 mg total) by mouth daily for 30 doses, Disp: 30 tablet, Rfl: 6    labetalol (NORMODYNE) 200 mg tablet, Take 200 mg by mouth daily at bedtime, Disp: , Rfl:     Multiple Vitamins-Minerals (MULTIVITAMIN WITH MINERALS) tablet, Take 1 tablet by mouth daily, Disp: , Rfl:     omega-3-acid ethyl esters (LOVAZA) 1 g capsule, Take 1 g by mouth daily, Disp: , Rfl:     simvastatin (ZOCOR) 20 mg tablet, Take 20 mg by mouth daily at bedtime, Disp: , Rfl:     tamsulosin (FLOMAX) 0 4 mg, Take 1 capsule (0 4 mg total) by mouth daily with dinner for 30 doses, Disp: 30 capsule, Rfl: 6    amLODIPine (NORVASC) 10 mg tablet, Take 1 tablet (10 mg total) by mouth daily, Disp: 30 tablet, Rfl: 1    aspirin 81 MG tablet, , Disp: , Rfl:     brimonidine (ALPHAGAN P) 0 1 %, , Disp: , Rfl:     dorzolamide-timolol (COSOPT) 22 3-6 8 MG/ML ophthalmic solution, , Disp: , Rfl:     Health Maintenance     Health Maintenance   Topic Date Due    Depression Screening PHQ  1940    Medicare Annual Wellness Visit (AWV)  1940    BMI: Followup Plan  08/11/1959    Fall Risk  08/11/2006    Pneumococcal Vaccine: 65+ Years (1 of 2 - PCV13) 08/11/2006    Influenza Vaccine  07/01/2019    BMI: Adult  11/21/2020    DTaP,Tdap,and Td Vaccines (2 - Tdap) 08/31/2021    Pneumococcal Vaccine: Pediatrics (0 to 5 Years) and At-Risk Patients (6 to 59 Years)  Aged Out    HIB Vaccine  Aged [place]  Hepatitis B Vaccine  Aged Out    IPV Vaccine  Aged Out    Hepatitis A Vaccine  Aged Out    Meningococcal ACWY Vaccine  Aged Out    HPV Vaccine  Aged Out       There is no immunization history on file for this patient      Rich Nolasco MD  Geriatrics   11/21/2019 10:41 PM

## 2024-10-05 ENCOUNTER — APPOINTMENT (OUTPATIENT)
Dept: LAB | Facility: CLINIC | Age: 84
End: 2024-10-05
Payer: COMMERCIAL

## 2024-10-05 DIAGNOSIS — I10 ESSENTIAL HYPERTENSION, BENIGN: ICD-10-CM

## 2024-10-05 DIAGNOSIS — N25.81 SECONDARY HYPERPARATHYROIDISM OF RENAL ORIGIN (HCC): ICD-10-CM

## 2024-10-05 DIAGNOSIS — N18.6 TYPE 2 DIABETES MELLITUS WITH ESRD (END-STAGE RENAL DISEASE) (HCC): ICD-10-CM

## 2024-10-05 DIAGNOSIS — E11.22 TYPE 2 DIABETES MELLITUS WITH ESRD (END-STAGE RENAL DISEASE) (HCC): ICD-10-CM

## 2024-10-05 DIAGNOSIS — N39.46 MIXED INCONTINENCE URGE AND STRESS (MALE)(FEMALE): ICD-10-CM

## 2024-10-05 DIAGNOSIS — K21.9 GASTROESOPHAGEAL REFLUX DISEASE WITHOUT ESOPHAGITIS: ICD-10-CM

## 2024-10-05 DIAGNOSIS — R73.01 IMPAIRED FASTING GLUCOSE: ICD-10-CM

## 2024-10-05 DIAGNOSIS — E55.9 VITAMIN D DEFICIENCY DISEASE: ICD-10-CM

## 2024-10-05 LAB
25(OH)D3 SERPL-MCNC: 33.5 NG/ML (ref 30–100)
ALBUMIN SERPL BCG-MCNC: 4.1 G/DL (ref 3.5–5)
ALP SERPL-CCNC: 68 U/L (ref 34–104)
ALT SERPL W P-5'-P-CCNC: 14 U/L (ref 7–52)
ANION GAP SERPL CALCULATED.3IONS-SCNC: 12 MMOL/L (ref 4–13)
AST SERPL W P-5'-P-CCNC: 15 U/L (ref 13–39)
BILIRUB SERPL-MCNC: 0.59 MG/DL (ref 0.2–1)
BUN SERPL-MCNC: 22 MG/DL (ref 5–25)
CALCIUM SERPL-MCNC: 9 MG/DL (ref 8.4–10.2)
CHLORIDE SERPL-SCNC: 105 MMOL/L (ref 96–108)
CHOLEST SERPL-MCNC: 128 MG/DL
CO2 SERPL-SCNC: 23 MMOL/L (ref 21–32)
CREAT SERPL-MCNC: 1.8 MG/DL (ref 0.6–1.3)
CREAT UR-MCNC: 80.3 MG/DL
ERYTHROCYTE [DISTWIDTH] IN BLOOD BY AUTOMATED COUNT: 14.4 % (ref 11.6–15.1)
GFR SERPL CREATININE-BSD FRML MDRD: 34 ML/MIN/1.73SQ M
GLUCOSE P FAST SERPL-MCNC: 110 MG/DL (ref 65–99)
HCT VFR BLD AUTO: 39.2 % (ref 36.5–49.3)
HDLC SERPL-MCNC: 39 MG/DL
HGB BLD-MCNC: 12.5 G/DL (ref 12–17)
LDLC SERPL CALC-MCNC: 70 MG/DL (ref 0–100)
MCH RBC QN AUTO: 29.2 PG (ref 26.8–34.3)
MCHC RBC AUTO-ENTMCNC: 31.9 G/DL (ref 31.4–37.4)
MCV RBC AUTO: 92 FL (ref 82–98)
MICROALBUMIN UR-MCNC: 74.6 MG/L
MICROALBUMIN/CREAT 24H UR: 93 MG/G CREATININE (ref 0–30)
NONHDLC SERPL-MCNC: 89 MG/DL
PHOSPHATE SERPL-MCNC: 2.8 MG/DL (ref 2.3–4.1)
PLATELET # BLD AUTO: 176 THOUSANDS/UL (ref 149–390)
PMV BLD AUTO: 11.7 FL (ref 8.9–12.7)
POTASSIUM SERPL-SCNC: 4 MMOL/L (ref 3.5–5.3)
PROT SERPL-MCNC: 7.1 G/DL (ref 6.4–8.4)
PSA SERPL-MCNC: 1.15 NG/ML (ref 0–4)
PTH-INTACT SERPL-MCNC: 93.5 PG/ML (ref 12–88)
RBC # BLD AUTO: 4.28 MILLION/UL (ref 3.88–5.62)
SODIUM SERPL-SCNC: 140 MMOL/L (ref 135–147)
TRIGL SERPL-MCNC: 95 MG/DL
URATE SERPL-MCNC: 4.3 MG/DL (ref 3.5–8.5)
WBC # BLD AUTO: 6.98 THOUSAND/UL (ref 4.31–10.16)

## 2024-10-05 PROCEDURE — 80053 COMPREHEN METABOLIC PANEL: CPT

## 2024-10-05 PROCEDURE — 80061 LIPID PANEL: CPT

## 2024-10-05 PROCEDURE — 84550 ASSAY OF BLOOD/URIC ACID: CPT

## 2024-10-05 PROCEDURE — 82043 UR ALBUMIN QUANTITATIVE: CPT

## 2024-10-05 PROCEDURE — 84100 ASSAY OF PHOSPHORUS: CPT

## 2024-10-05 PROCEDURE — 85027 COMPLETE CBC AUTOMATED: CPT

## 2024-10-05 PROCEDURE — 36415 COLL VENOUS BLD VENIPUNCTURE: CPT

## 2024-10-05 PROCEDURE — 83970 ASSAY OF PARATHORMONE: CPT

## 2024-10-05 PROCEDURE — 82570 ASSAY OF URINE CREATININE: CPT

## 2024-10-05 PROCEDURE — G0103 PSA SCREENING: HCPCS

## 2024-10-05 PROCEDURE — 82306 VITAMIN D 25 HYDROXY: CPT

## 2024-11-20 NOTE — ASSESSMENT & PLAN NOTE
Called and left a detailed voice message requesting callback to discuss depression screening, Per provider, Please follow up with patient regarding screening responses for [place]-SSRS Suicide screening. Will follow up with patient if I do not receive a cb. Provider notified.   · Continue statin

## 2024-11-27 NOTE — PROCEDURES
Intubation    Date/Time: 5/12/2021 3:30 AM  Performed by: Devra Goldmann, PA-C  Authorized by: Devra Goldmann, PA-C     Consent:     Consent obtained:  Emergent situation    Consent given by: Pat discuss risks/benefits, alternatives with patient's guardian, Krystle Hussein  Risks discussed:  Aspiration, bleeding, brain injury, death, dental trauma, hypoxia, laryngeal injury and pneumothorax    Alternatives discussed:  No treatment, delayed treatment and alternative treatment  Universal protocol:     Required blood products, implants, devices, and special equipment available: yes      Immediately prior to procedure, a time out was called: yes      Patient identity confirmed: Anonymous protocol, patient vented/unresponsive  Pre-procedure details:     Patient status:  Altered mental status    Pretreatment medications:  Etomidate (20 mg)    Paralytics:  Vecuronium (10 mg)  Indications:     Indications for intubation: respiratory failure, airway protection, hypoxemia and pulmonary toilet    Procedure details:     Preoxygenation:  Nasal cannula    CPR in progress: no      Intubation method:  Oral    Oral intubation technique: MacGrath  Laryngoscope blade: Mac 3    Tube size (mm):  8 0    Tube type:  Cuffed and hi-lo    Number of attempts:  1    Ventilation between attempts: no      Cricoid pressure: yes      Tube visualized through cords: yes    Placement assessment:     ETT to lip:  27 (initially 25 but right mainstem on CXR)    Tube secured with:  ETT saldivar    Breath sounds:  Absent over the epigastrium and diminished    Placement verification: chest rise, CXR verification, direct visualization, equal breath sounds, ETCO2 detector and tube exhalation      CXR findings:  ETT in proper place  Post-procedure details:     Patient tolerance of procedure: Tolerated well, no immediate complications  Comments:      Patient without any blood in oropharynx  Significant clots lavaged out of ETT upon placement 
denies pain/discomfort (Rating = 0)

## 2025-06-21 NOTE — PROGRESS NOTES
Daily Note     Today's date: 2021  Patient name: Leland Arteaga  : 1940  MRN: [id number]  Referring provider: Alba Alas MD  Dx:   Encounter Diagnosis     ICD-10-CM    1  Gait instability  [id number]        Start Time:   Stop Time: 110  Total time in clinic (min): 40 minutes    Subjective: Patient offers no complaints following initial evaluation  Objective: See treatment diary below      Assessment: Patient demonstrated good tolerance to PT today with no adverse effects reported post session  Despite report of feeling okay, patient was instructed to rest when he demonstrated excessive SOB  Patient also requires constant cueing in order to slow down his exercises and to remind him what exercise he is performing  Progress, as tolerated  Plan: Continue per plan of care  Precautions: Alzheimers, fall risk, uncontrolled HTN, DM      Manuals            Check BP /102 after exercise 150/98 5 min rest                                                  Neuro Re-Ed             Webslide: low rows  GTB 30x                                                                                         Ther Ex             Nustep  8'           Standing hip abd, ext with TB  No TB 10x ea           LAQ             Supine SLR             Prone quad str                                                      Ther Activity             Sit to stands  10x            Total gym: squats  25x           FSU  10x ea 6"           LSU  10x ea 6"                                     Gait Training                                       Modalities Group Topic: BH Coping Skills Education    Date: 6/21/2025  Start Time: 1320  End Time: 1405  Facilitators: Theresa Miller CNA    Focus: CBT: [facility] [employer] [place]   Number in attendance: 10    Offered but refused

## (undated) DEVICE — CATH FOLEY 20FR 5ML 2 WAY SILICONE ELASTIMER

## (undated) DEVICE — STERILE SURGICAL LUBRICANT,  TUBE: Brand: SURGILUBE

## (undated) DEVICE — GUIDEWIRE STRGHT TIP 0.035 IN  SOLO PLUS

## (undated) DEVICE — CATH URET .038 10FR 50CM DUAL LUMEN

## (undated) DEVICE — CHLORHEXIDINE 4PCT 4 OZ

## (undated) DEVICE — INVIEW CLEAR LEGGINGS: Brand: CONVERTORS

## (undated) DEVICE — PREMIUM DRY TRAY LF: Brand: MEDLINE INDUSTRIES, INC.

## (undated) DEVICE — GLOVE SRG BIOGEL 7

## (undated) DEVICE — UROCATCH BAG

## (undated) DEVICE — BAG URINE DRAINAGE 2000ML ANTI RFLX LF

## (undated) DEVICE — PACK TUR